# Patient Record
Sex: FEMALE | Race: WHITE | Employment: UNEMPLOYED | URBAN - METROPOLITAN AREA
[De-identification: names, ages, dates, MRNs, and addresses within clinical notes are randomized per-mention and may not be internally consistent; named-entity substitution may affect disease eponyms.]

---
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
## 2019-04-02 ENCOUNTER — HOSPITAL ENCOUNTER (OUTPATIENT)
Dept: NEUROLOGY | Age: 54
Discharge: HOME | End: 2019-04-02
Payer: COMMERCIAL

## 2019-04-02 VITALS — HEIGHT: 61 IN | WEIGHT: 138 LBS | BODY MASS INDEX: 26.06 KG/M2

## 2019-04-02 PROCEDURE — 95912 NRV CNDJ TEST 11-12 STUDIES: CPT

## 2019-04-02 PROCEDURE — 95886 MUSC TEST DONE W/N TEST COMP: CPT

## 2019-04-02 NOTE — PROCEDURES
[address] [facility]  [address] [facility], [address] [facility]  Phone: [phone]  Fax: [phone]      Referring Provider: Brice Rodriguez*  Primary Care Physician: Ok Tanner MD  Patient Name: Kip Ramos  Patient YOB: 1965  Gender: female  BMI: Body mass index is 26.07 kg/m². Height 5' 1\" (1.549 m), weight 138 lb (62.6 kg). 4/2/2019    Description of clinical problem:   No chief complaint on file. Pain Yes   ; Numbness/tingling  Yes; Weakness  No       Brief physical exam:   Sensory deficit No; Weakness No; Atrophy  No; Reflex abnormality No    Study Limitations:      Summary of Findings:   Nerve conduction studies:   · The following nerve conduction studies were abnormal:   · None  · All other nerve conduction studies listed in the table above were normal in latency, amplitude and conduction velocity. [facility].  Electrodiagnostic Laboratory  [place]        Full Name: Daylin Tapia. Ami Or Gender: Female  MRN: [id number] YOB: 1965  Location[de-identified] [id number] (01)      Visit Date: 4/2/2019 11:15  Age: 48 Years 9 Months Old  Examining Physician: Dr. Leslye Barthel   Referring Physician: Dr. Leanna Bush  Technician: Phyllis Woodard   Height: 5 feet 1 inch  Weight: 138 lbs  Notes: Bilateral lowers r/o tarsal tunnel right greater then left; gait imbalance      Motor NCS      Nerve / Sites Lat. Amplitude Amp. 1-2 Distance Lat Diff Velocity Temp.    ms mV % cm ms m/s °C   R Peroneal - EDB      Ankle 5.52 2.7 100 8   32.4      Pop fossa 14.95 1.5 55.3 38 9.43 40 32.2   L Peroneal - EDB      Ankle 5.16 4.8 100 8   31.8      Pop fossa 11.35 3.5 73.1 35 6.20 56 31.8   R Tibial - AH      Ankle 3.96 18.4 100 8   31.7      Pop fossa 12.19 15.4 83.5 41 8.23 50 31.7   L Tibial - AH      Ankle 3.75 17.4 100 8   31.1      Pop fossa 11.61 12.6 72.7 42 7.86 53 31.1       Sensory NCS      Nerve / Sites Onset Lat Peak Lat PP Amp Distance Peak Diff Velocity Temp.    ms ms µV cm ms m/s °C   R Superficial peroneal - Ankle      Lat leg 1.88 2.60 4.4 10  53 32.1   L Superficial peroneal - Ankle      Lat leg 1.93 3.02 4.3 10  52 31.6   R Sural - Ankle (Calf)      Calf 2.03 2.71 32.7 14  69 31.9   L Sural - Ankle (Calf)      Calf 2.34 3.02 12.8 14  60 31.4   R Medial plantar, Lateral plantar - Ankle (Medial, lateral sole)      Medial plantar Sole 2.50 3.28 6.3 14  56 31.2      Lateral plantar Sole 2.71 3.44 7.3 14  52 31.2        -0.16  31.2   L Medial plantar, Lateral plantar - Ankle (Medial, lateral sole)      Medial plantar Sole 2.55 3.28 18.1 14  55 31.2      Lateral plantar Sole 2.71 3.75 7.3 14  52 31.1        -0.47  31.1       F  Wave      Nerve F Lat M Lat F-M Lat    ms ms ms   R Peroneal - EDB 46.3 5.9 40.4   R Tibial - AH 42.0 4.0 38.0   L Tibial - AH 42.7 3.0 39.6   L Peroneal - EDB 46.2 5.3 40.9       H Reflex      Nerve Lat Hmax    ms   R Tibial - Soleus 30.6   L Tibial - Soleus 30.6       EMG         EMG Summary Table     Spontaneous MUAP Recruitment   Muscle IA Fib PSW Fasc H.F. Amp Dur. PPP Pattern   R. Extensor digitorum brevis N None None None None N N N N   R. Gastrocnemius (Lateral head) N None None None None N N N N   R. Gastrocnemius (Medial head) N None None None None N N N N   R. Tibialis anterior N None None None None N N N N   R. Vastus lateralis N None None None None N N N N                 ·     Needle EMG:   · Needle EMG was performed using a concentric needle. · The following abnormalities were seen on needle EMG: None  All other muscles tested, as listed in the table above demonstrated normal amplitude, duration, phases and recruitment and no active denervation signs were seen. Diagnostic Interpretation:   Normal EMG study of lower extremities. There is no evidence of peripheral neuropathy. There is no evidence of entrapment neuropathies. Clinical correlation advised.       Technologist: [facility]  Physician: Christen Melendrez M.D.    Nerve conduction studies and electromyography were performed according to our laboratory policies and procedures which can be provided upon request. All abnormal values are identified in the table.  Laboratory normal values can also be provided upon request.       Cc: DAREN Walsh MD

## 2019-05-21 ENCOUNTER — APPOINTMENT (OUTPATIENT)
Dept: MRI IMAGING | Age: 54
End: 2019-05-21
Payer: COMMERCIAL

## 2019-05-21 ENCOUNTER — HOSPITAL ENCOUNTER (EMERGENCY)
Age: 54
Discharge: PSYCHIATRIC OR SUBSTANCE USE FACILITY | End: 2019-05-21
Attending: EMERGENCY MEDICINE
Payer: COMMERCIAL

## 2019-05-21 ENCOUNTER — APPOINTMENT (OUTPATIENT)
Dept: GENERAL RADIOLOGY | Age: 54
End: 2019-05-21
Payer: COMMERCIAL

## 2019-05-21 ENCOUNTER — HOSPITAL ENCOUNTER (OUTPATIENT)
Age: 54
Discharge: HOME | DRG: 045 | End: 2019-05-21
Attending: INTERNAL MEDICINE
Payer: COMMERCIAL

## 2019-05-21 ENCOUNTER — HOSPITAL ENCOUNTER (EMERGENCY)
Age: 54
Discharge: HOME | End: 2019-05-21
Attending: EMERGENCY MEDICINE
Payer: COMMERCIAL

## 2019-05-21 ENCOUNTER — APPOINTMENT (OUTPATIENT)
Dept: CT IMAGING | Age: 54
End: 2019-05-21
Payer: COMMERCIAL

## 2019-05-21 ENCOUNTER — HOSPITAL ENCOUNTER (INPATIENT)
Age: 54
LOS: 3 days | Discharge: NURSING, REHAB OR LONG-TERM CARE | DRG: 045 | End: 2019-05-24
Attending: INTERNAL MEDICINE | Admitting: INTERNAL MEDICINE
Payer: COMMERCIAL

## 2019-05-21 ENCOUNTER — HOSPITAL ENCOUNTER (OUTPATIENT)
Age: 54
Discharge: HOME | End: 2019-05-21
Payer: COMMERCIAL

## 2019-05-21 VITALS
HEART RATE: 106 BPM | BODY MASS INDEX: 26.06 KG/M2 | HEIGHT: 61 IN | DIASTOLIC BLOOD PRESSURE: 79 MMHG | TEMPERATURE: 98.4 F | RESPIRATION RATE: 18 BRPM | SYSTOLIC BLOOD PRESSURE: 118 MMHG | WEIGHT: 138 LBS | OXYGEN SATURATION: 99 %

## 2019-05-21 VITALS
BODY MASS INDEX: 26.06 KG/M2 | OXYGEN SATURATION: 98 % | HEIGHT: 61 IN | DIASTOLIC BLOOD PRESSURE: 77 MMHG | TEMPERATURE: 97.6 F | RESPIRATION RATE: 20 BRPM | SYSTOLIC BLOOD PRESSURE: 114 MMHG | WEIGHT: 138 LBS | HEART RATE: 80 BPM

## 2019-05-21 DIAGNOSIS — M43.10 ANTEROLISTHESIS: ICD-10-CM

## 2019-05-21 DIAGNOSIS — M21.371 RIGHT FOOT DROP: Primary | ICD-10-CM

## 2019-05-21 DIAGNOSIS — I63.232 ACUTE ISCHEMIC LEFT ICA STROKE (HCC): Primary | ICD-10-CM

## 2019-05-21 PROBLEM — I63.9 ACUTE CVA (CEREBROVASCULAR ACCIDENT) (HCC): Status: ACTIVE | Noted: 2019-05-21

## 2019-05-21 LAB
ALBUMIN SERPL-MCNC: 5 G/DL (ref 3.5–5.2)
ALP BLD-CCNC: 81 U/L (ref 35–104)
ALT SERPL-CCNC: 28 U/L (ref 0–32)
ANION GAP SERPL CALCULATED.3IONS-SCNC: 15 MMOL/L (ref 7–16)
APTT: 34.3 SEC (ref 24.5–35.1)
AST SERPL-CCNC: 21 U/L (ref 0–31)
BASOPHILS ABSOLUTE: 0.01 E9/L (ref 0–0.2)
BASOPHILS RELATIVE PERCENT: 0.1 % (ref 0–2)
BILIRUB SERPL-MCNC: 0.2 MG/DL (ref 0–1.2)
BUN BLDV-MCNC: 14 MG/DL (ref 6–20)
CALCIUM SERPL-MCNC: 10.1 MG/DL (ref 8.6–10.2)
CHLORIDE BLD-SCNC: 97 MMOL/L (ref 98–107)
CO2: 28 MMOL/L (ref 22–29)
CREAT SERPL-MCNC: 0.6 MG/DL (ref 0.5–1)
EOSINOPHILS ABSOLUTE: 0.01 E9/L (ref 0.05–0.5)
EOSINOPHILS RELATIVE PERCENT: 0.1 % (ref 0–6)
GFR AFRICAN AMERICAN: >60
GFR NON-AFRICAN AMERICAN: >60 ML/MIN/1.73
GLUCOSE BLD-MCNC: 184 MG/DL (ref 74–99)
HCT VFR BLD CALC: 49.7 % (ref 34–48)
HEMOGLOBIN: 17.1 G/DL (ref 11.5–15.5)
IMMATURE GRANULOCYTES #: 0.07 E9/L
IMMATURE GRANULOCYTES %: 0.6 % (ref 0–5)
INR BLD: 0.9
LYMPHOCYTES ABSOLUTE: 1.17 E9/L (ref 1.5–4)
LYMPHOCYTES RELATIVE PERCENT: 9.6 % (ref 20–42)
MCH RBC QN AUTO: 30.7 PG (ref 26–35)
MCHC RBC AUTO-ENTMCNC: 34.4 % (ref 32–34.5)
MCV RBC AUTO: 89.2 FL (ref 80–99.9)
MONOCYTES ABSOLUTE: 0.11 E9/L (ref 0.1–0.95)
MONOCYTES RELATIVE PERCENT: 0.9 % (ref 2–12)
NEUTROPHILS ABSOLUTE: 10.8 E9/L (ref 1.8–7.3)
NEUTROPHILS RELATIVE PERCENT: 88.7 % (ref 43–80)
PDW BLD-RTO: 12.5 FL (ref 11.5–15)
PLATELET # BLD: 274 E9/L (ref 130–450)
PMV BLD AUTO: 10.3 FL (ref 7–12)
POTASSIUM SERPL-SCNC: 3.5 MMOL/L (ref 3.5–5)
PROTHROMBIN TIME: 10.8 SEC (ref 9.3–12.4)
RBC # BLD: 5.57 E12/L (ref 3.5–5.5)
SODIUM BLD-SCNC: 140 MMOL/L (ref 132–146)
TOTAL PROTEIN: 7.9 G/DL (ref 6.4–8.3)
TROPONIN: <0.01 NG/ML (ref 0–0.03)
WBC # BLD: 12.2 E9/L (ref 4.5–11.5)

## 2019-05-21 PROCEDURE — 99284 EMERGENCY DEPT VISIT MOD MDM: CPT

## 2019-05-21 PROCEDURE — 70450 CT HEAD/BRAIN W/O DYE: CPT

## 2019-05-21 PROCEDURE — 99291 CRITICAL CARE FIRST HOUR: CPT | Performed by: SURGERY

## 2019-05-21 PROCEDURE — 6370000000 HC RX 637 (ALT 250 FOR IP): Performed by: EMERGENCY MEDICINE

## 2019-05-21 PROCEDURE — 85610 PROTHROMBIN TIME: CPT

## 2019-05-21 PROCEDURE — 87081 CULTURE SCREEN ONLY: CPT

## 2019-05-21 PROCEDURE — 70496 CT ANGIOGRAPHY HEAD: CPT

## 2019-05-21 PROCEDURE — 2000000000 HC ICU R&B

## 2019-05-21 PROCEDURE — A0425 GROUND MILEAGE: HCPCS

## 2019-05-21 PROCEDURE — 85730 THROMBOPLASTIN TIME PARTIAL: CPT

## 2019-05-21 PROCEDURE — 2580000003 HC RX 258: Performed by: EMERGENCY MEDICINE

## 2019-05-21 PROCEDURE — 80053 COMPREHEN METABOLIC PANEL: CPT

## 2019-05-21 PROCEDURE — 0042T CT BRAIN PERFUSION: CPT

## 2019-05-21 PROCEDURE — 84484 ASSAY OF TROPONIN QUANT: CPT

## 2019-05-21 PROCEDURE — A0426 ALS 1: HCPCS

## 2019-05-21 PROCEDURE — 72148 MRI LUMBAR SPINE W/O DYE: CPT

## 2019-05-21 PROCEDURE — 6360000002 HC RX W HCPCS: Performed by: EMERGENCY MEDICINE

## 2019-05-21 PROCEDURE — 93005 ELECTROCARDIOGRAM TRACING: CPT | Performed by: PHYSICIAN ASSISTANT

## 2019-05-21 PROCEDURE — 70551 MRI BRAIN STEM W/O DYE: CPT

## 2019-05-21 PROCEDURE — 71045 X-RAY EXAM CHEST 1 VIEW: CPT

## 2019-05-21 PROCEDURE — 70498 CT ANGIOGRAPHY NECK: CPT

## 2019-05-21 PROCEDURE — 85025 COMPLETE CBC W/AUTO DIFF WBC: CPT

## 2019-05-21 PROCEDURE — 6360000004 HC RX CONTRAST MEDICATION: Performed by: RADIOLOGY

## 2019-05-21 RX ORDER — METHYLPREDNISOLONE 4 MG/1
TABLET ORAL
Qty: 1 KIT | Refills: 0 | Status: ON HOLD | OUTPATIENT
Start: 2019-05-21 | End: 2019-05-21 | Stop reason: ALTCHOICE

## 2019-05-21 RX ORDER — METOPROLOL TARTRATE 50 MG/1
50 TABLET, FILM COATED ORAL DAILY
COMMUNITY
End: 2019-05-21

## 2019-05-21 RX ORDER — AMLODIPINE BESYLATE 5 MG/1
5 TABLET ORAL DAILY
Status: DISCONTINUED | OUTPATIENT
Start: 2019-05-22 | End: 2019-05-24 | Stop reason: HOSPADM

## 2019-05-21 RX ORDER — ASPIRIN 325 MG
325 TABLET ORAL ONCE
Status: COMPLETED | OUTPATIENT
Start: 2019-05-21 | End: 2019-05-21

## 2019-05-21 RX ORDER — TRIAMTERENE AND HYDROCHLOROTHIAZIDE 37.5; 25 MG/1; MG/1
1 TABLET ORAL DAILY
Status: ON HOLD | COMMUNITY
End: 2019-06-06 | Stop reason: HOSPADM

## 2019-05-21 RX ORDER — DEXAMETHASONE SODIUM PHOSPHATE 10 MG/ML
10 INJECTION INTRAMUSCULAR; INTRAVENOUS ONCE
Status: COMPLETED | OUTPATIENT
Start: 2019-05-21 | End: 2019-05-21

## 2019-05-21 RX ORDER — TRIAMTERENE AND HYDROCHLOROTHIAZIDE 37.5; 25 MG/1; MG/1
1 TABLET ORAL DAILY
Status: DISCONTINUED | OUTPATIENT
Start: 2019-05-22 | End: 2019-05-24 | Stop reason: HOSPADM

## 2019-05-21 RX ORDER — SODIUM CHLORIDE 9 MG/ML
INJECTION, SOLUTION INTRAVENOUS ONCE
Status: COMPLETED | OUTPATIENT
Start: 2019-05-21 | End: 2019-05-21

## 2019-05-21 RX ADMIN — DEXAMETHASONE SODIUM PHOSPHATE 10 MG: 10 INJECTION INTRAMUSCULAR; INTRAVENOUS at 08:10

## 2019-05-21 RX ADMIN — ASPIRIN 325 MG: 325 TABLET, COATED ORAL at 21:26

## 2019-05-21 RX ADMIN — IOPAMIDOL 100 ML: 755 INJECTION, SOLUTION INTRAVENOUS at 18:57

## 2019-05-21 RX ADMIN — SODIUM CHLORIDE: 9 INJECTION, SOLUTION INTRAVENOUS at 20:00

## 2019-05-21 ASSESSMENT — ENCOUNTER SYMPTOMS
NAUSEA: 0
SHORTNESS OF BREATH: 0
DIARRHEA: 0
CHEST TIGHTNESS: 0
DIARRHEA: 0
ABDOMINAL PAIN: 0
SORE THROAT: 0
COLOR CHANGE: 0
BACK PAIN: 0
BACK PAIN: 0
BLOOD IN STOOL: 0
ABDOMINAL PAIN: 0
VOMITING: 0
BLOOD IN STOOL: 0
VOMITING: 0
COLOR CHANGE: 0
NAUSEA: 0
CONSTIPATION: 0
SHORTNESS OF BREATH: 0
RHINORRHEA: 0
COUGH: 0
COUGH: 0

## 2019-05-21 ASSESSMENT — PAIN SCALES - GENERAL: PAINLEVEL_OUTOF10: 0

## 2019-05-21 NOTE — ED TRIAGE NOTES
Name: Wai Pavon  : 1965  MRN: [id number]    Date: 2019    Benefits of immediately proceeding with Radiology exam outweigh the risks and therefore the following is being waived:      [x] Pregnancy test    [] Protocol for Iodine allergy    [] MRI questionnaire    [x] BUN/Creatinine        MESERET Larios

## 2019-05-21 NOTE — ED PROVIDER NOTES
foot to mid-calf). Negative for dizziness, syncope, light-headedness and headaches. Psychiatric/Behavioral: Negative for confusion. Physical Exam   Constitutional: She is oriented to person, place, and time. She appears well-developed and well-nourished. No distress. HENT:   Head: Normocephalic and atraumatic. Right Ear: External ear normal.   Left Ear: External ear normal.   Nose: Nose normal.   Mouth/Throat: Oropharynx is clear and moist. No oropharyngeal exudate. Eyes: Pupils are equal, round, and reactive to light. Conjunctivae and EOM are normal. Right eye exhibits no discharge. Left eye exhibits no discharge. No scleral icterus. Neck: Neck supple. Cardiovascular: Normal rate, regular rhythm, normal heart sounds and intact distal pulses. Exam reveals no gallop and no friction rub. No murmur heard. Pulmonary/Chest: Effort normal and breath sounds normal. No stridor. No respiratory distress. She has no wheezes. She has no rales. She exhibits no tenderness. Abdominal: Soft. Bowel sounds are normal. She exhibits no distension and no mass. There is no tenderness. There is no rebound and no guarding. Musculoskeletal: She exhibits no edema. Patient unable to dorsiflex her right ankle or right great toe; dorsiflexion of ankle and extension of right knee 5/5 on right   Neurological: She is alert and oriented to person, place, and time. A sensory deficit (generalized numbness to palpation) is present. She exhibits normal muscle tone. Skin: Skin is warm and dry. No rash noted. She is not diaphoretic. No erythema. No pallor. Psychiatric: She has a normal mood and affect. Her behavior is normal. Judgment and thought content normal.       Procedures    MDM  Number of Diagnoses or Management Options  Anterolisthesis:   Right foot drop:   Diagnosis management comments: Patient presents with painless foot drop. MRI of the lumbar spine ordered with compression of L4 nerve root seen.   Neurosurgery was contacted. They recommended outpatient follow-up. This could be a peripheral nerve issue as well. Patient has an ankle-foot orthotic ordered and will receive that tomorrow. She was given oral Decadron here and prescription for Medrol Dosepak. She did not want any crutches. She has [facility] soon she is encouraged to follow up with neurosurgery as outpatient. She should follow up with her PCP as well. She'll return here if needed. --------------------------------------------- PAST HISTORY ---------------------------------------------  Past Medical History:  has a past medical history of Hypertension. Past Surgical History:  has no past surgical history on file. Social History:  reports that she has been smoking cigarettes. She has been smoking about 0.50 packs per day. She has never used smokeless tobacco. She reports that she does not drink alcohol or use drugs. Family History: family history is not on file. The patients home medications have been reviewed. Allergies: Patient has no known allergies. -------------------------------------------------- RESULTS -------------------------------------------------  Labs:  No results found for this visit on 05/21/19. Radiology:  MRI Lumbar Spine WO Contrast   Final Result   1. Grade 1 anterolisthesis of L4 on L5 with abutment of the exiting   right L4 spinal nerve root on series 3 image 3, within the   extraforaminal region without evident compression. Clinical   correlation with any potential spinal radiculopathy levels on physical   exam is recommended. 2. Other degenerative changes noted T9-T10, T11-L1, and L2-S1, as   described above. No mary spinal canal stenosis is seen. See above for   level by level details.             ------------------------- NURSING NOTES AND VITALS REVIEWED ---------------------------  Date / Time Roomed:  5/21/2019  5:57 AM  ED Bed Assignment:  19/19    The nursing notes within the ED encounter and vital signs as below have been reviewed. /77   Pulse 80   Temp 97.6 °F (36.4 °C)   Resp 20   Ht 5' 1\" (1.549 m)   Wt 138 lb (62.6 kg)   LMP 05/01/2019 (Approximate)   SpO2 98%   BMI 26.07 kg/m²   Oxygen Saturation Interpretation: Normal      ------------------------------------------ PROGRESS NOTES ------------------------------------------  Consults:  9178 - discussed case with Dr. Deonte Felipe, neurosurgery. He reviewed the CT. Although he says it is possible, he says it is unlikely that her back findings would be causing her foot drop. He recommended AFO which she has coming and he will follow up with her as outpatient. ED COURSE MEDICATIONS:                Medications   dexamethasone (DECADRON) injection 10 mg (10 mg Oral Given 5/21/19 0810)       I have spoken with the patient and discussed todays results, in addition to providing specific details for the plan of care and counseling regarding the diagnosis and prognosis. Their questions are answered at this time and they are agreeable with the plan. I discussed at length with them reasons for immediate return here for re evaluation. They will followup with primary care by calling their office tomorrow. --------------------------------- ADDITIONAL PROVIDER NOTES ---------------------------------  At this time the patient is without objective evidence of an acute process requiring hospitalization or inpatient management. They have remained hemodynamically stable throughout their entire ED visit and are stable for discharge with outpatient follow-up. The plan has been discussed in detail and they are aware of the specific conditions for emergent return, as well as the importance of follow-up. New Prescriptions    METHYLPREDNISOLONE (MEDROL, TOMASZ,) 4 MG TABLET    Take by mouth. Diagnosis:  1. Right foot drop    2.  Anterolisthesis        Disposition:  Patient's disposition: Discharge to home  Patient's condition is stable.             [facility]   Resident  05/21/19 0601

## 2019-05-21 NOTE — ED NOTES
Bed:  [place] [address]  Expected date:   Expected time:   Means of arrival:   Comments:  Delmer De La Paz RN  05/21/19 1098

## 2019-05-21 NOTE — ED PROVIDER NOTES
Patient is a 72-year-old female presenting with right leg weakness, right arm heaviness. Was seen here in the department earlier this morning for progressive worsening weakness of her right lower extremity. States she's been having intermittent weakness, foot drop in her right lower leg on and off for the past several years, was scheduled for an MRI for evaluation. However, starting 9 AM yesterday she began having worsening weakness of her lower leg. Was seen this morning, felt to be peripheral in origin, subsequently felt stable for outpatient management and follow-up. However, approximately 2 hours prior to arrival, she began having more weakness in her right lower extremity, unable to ambulate and noticed that her right arm was becoming weak compared to her last period does note associated tingling on her right side compared to left as well. Denies headache, vision changes, chest pain, shortness of breath, pain in her extremities. No prior history of DVT or PE. Symptoms reportedly moderate in severity, worsened by nothing and improved by nothing. Review of Systems   Constitutional: Negative for chills and fever. Respiratory: Negative for cough, chest tightness and shortness of breath. Cardiovascular: Negative for chest pain, palpitations and leg swelling. Gastrointestinal: Negative for abdominal pain, blood in stool, diarrhea, nausea and vomiting. Genitourinary: Negative for dysuria, flank pain, frequency, menstrual problem, vaginal bleeding and vaginal discharge. Musculoskeletal: Negative for back pain and neck pain. Skin: Negative for color change, rash and wound. Neurological: Positive for weakness (RLE, RUE). Negative for dizziness, syncope and light-headedness. RLE/RUE tingling       Physical Exam   Constitutional: She is oriented to person, place, and time. She appears well-developed and well-nourished. No distress. HENT:   Head: Normocephalic and atraumatic. 0=Performs both tasks correctly   2. Best Gaze: 0=normal   3. Visual: 0=No visual loss   4. Facial Palsy: 0=Normal symmetric movement   5a. Motor left arm: 0=No drift, limb holds 90 (or 45) degrees for full 10 seconds   5b. Motor right arm: 0=No drift, limb holds 90 (or 45) degrees for full 10 seconds   6a. motor left le=No drift, limb holds 90 (or 45) degrees for full 10 seconds   6b  Motor right leg:  3=No effort against gravity, limb falls   7. Limb Ataxia: 1=Present in one limb   8. Sensory: 0=Normal; no sensory loss   9. Best Language:  0=No aphasia, normal   10. Dysarthria: 0=Normal   11. Extinction and Inattention: 0=No abnormality   12. Distal motor function: 0=Normal    Total:   4     NIH Stroke Scale      Interval: Reassessment  Time: 9:01 PM  Person Administering Scale: Troy Steele    Administer stroke scale items in the order listed. Record performance in each category after each subscale exam. Do not go back and change scores. Follow directions provided for each exam technique. Scores should reflect what the patient does, not what the clinician thinks the patient can do. The clinician should record answers while administering the exam and work quickly. Except where indicated, the patient should not be coached (i.e., repeated requests to patient to make a special effort). 1a  Level of consciousness: 0=alert; keenly responsive   1b. LOC questions:  0=Performs both tasks correctly   1c. LOC commands: 0=Performs both tasks correctly   2. Best Gaze: 0=normal   3. Visual: 0=No visual loss   4. Facial Palsy: 0=Normal symmetric movement   5a. Motor left arm: 0=No drift, limb holds 90 (or 45) degrees for full 10 seconds   5b.   Motor right arm: 0=No drift, limb holds 90 (or 45) degrees for full 10 seconds   6a. motor left le=No drift, limb holds 90 (or 45) degrees for full 10 seconds   6b  Motor right le=Some effort against gravity, limb cannot get to or maintain (if cured) 90 (or 45) degrees, drifts down to bed, but has some effort against gravity   7. Limb Ataxia: 1=Present in one limb   8. Sensory: 0=Normal; no sensory loss   9. Best Language:  1=Mild to moderate aphasia; some obvious loss of fluency or facility of comprehension without significant limitation on ideas expressed or form of expression. 10. Dysarthria: 0=Normal   11. Extinction and Inattention: 0=No abnormality   12. Distal motor function: 0=Normal    Total:   4       ED Course as of May 21 2101   Tue May 21, 2019   1953 D/w dominic [facility] evaluating, also d/w dr Brynn Key    [NAVEEN]   8843 Pt has old CVA in left frontal likely bigger pr radiologist, possibly due to occluded left ICA, likely per cta/p not amenable to intervention    [NAVEEN]   2000 Patient reportedly became aphasic in MRI - reassessed on return to ED, having significant expressive aphasia. Is following commands. Upper extremity strength remains unchanged, RLE remains weak and unchanged. Decision made to repeat CT head to rule out hemorrhagic conversion. Pt is still not a tPA candidate due to onset of symptoms 9AM yesterday. Attending discussed possible embolectomy. [RU]   2049 Dw/ vascular dr Isai Martinez, no indication for stat endocarotiectomy    [NAVEEN]   2049 Critical care  Please note that the withdrawal or failure to initiate urgent interventions for this patient would likely result in a life threatening deterioration or permanent disability. Accordingly this patient received 90 minutes of critical care time, excluding separately billable procedures. [NAVEEN]      ED Course User Index  [NAVEEN] Jackie Perla DO  [RU] Rhoda Harris,        --------------------------------------------- PAST HISTORY ---------------------------------------------  Past Medical History:  has a past medical history of Hypertension. Past Surgical History:  has no past surgical history on file. Social History:  reports that she has been smoking cigarettes.   She has been smoking about 0.50 packs per day. She has never used smokeless tobacco. She reports that she does not drink alcohol or use drugs. Family History: family history is not on file. The patients home medications have been reviewed. Allergies: Patient has no known allergies.     -------------------------------------------------- RESULTS -------------------------------------------------    Lab  Results for orders placed or performed during the hospital encounter of 05/21/19   CBC Auto Differential   Result Value Ref Range    WBC 12.2 (H) 4.5 - 11.5 E9/L    RBC 5.57 (H) 3.50 - 5.50 E12/L    Hemoglobin 17.1 (H) 11.5 - 15.5 g/dL    Hematocrit 49.7 (H) 34.0 - 48.0 %    MCV 89.2 80.0 - 99.9 fL    MCH 30.7 26.0 - 35.0 pg    MCHC 34.4 32.0 - 34.5 %    RDW 12.5 11.5 - 15.0 fL    Platelets 569 057 - 650 E9/L    MPV 10.3 7.0 - 12.0 fL    Neutrophils % 88.7 (H) 43.0 - 80.0 %    Immature Granulocytes % 0.6 0.0 - 5.0 %    Lymphocytes % 9.6 (L) 20.0 - 42.0 %    Monocytes % 0.9 (L) 2.0 - 12.0 %    Eosinophils % 0.1 0.0 - 6.0 %    Basophils % 0.1 0.0 - 2.0 %    Neutrophils # 10.80 (H) 1.80 - 7.30 E9/L    Immature Granulocytes # 0.07 E9/L    Lymphocytes # 1.17 (L) 1.50 - 4.00 E9/L    Monocytes # 0.11 0.10 - 0.95 E9/L    Eosinophils # 0.01 (L) 0.05 - 0.50 E9/L    Basophils # 0.01 0.00 - 0.20 E9/L   Comprehensive Metabolic Panel   Result Value Ref Range    Sodium 140 132 - 146 mmol/L    Potassium 3.5 3.5 - 5.0 mmol/L    Chloride 97 (L) 98 - 107 mmol/L    CO2 28 22 - 29 mmol/L    Anion Gap 15 7 - 16 mmol/L    Glucose 184 (H) 74 - 99 mg/dL    BUN 14 6 - 20 mg/dL    CREATININE 0.6 0.5 - 1.0 mg/dL    GFR Non-African American >60 >=60 mL/min/1.73    GFR African American >60     Calcium 10.1 8.6 - 10.2 mg/dL    Total Protein 7.9 6.4 - 8.3 g/dL    Alb 5.0 3.5 - 5.2 g/dL    Total Bilirubin 0.2 0.0 - 1.2 mg/dL    Alkaline Phosphatase 81 35 - 104 U/L    ALT 28 0 - 32 U/L    AST 21 0 - 31 U/L   Troponin   Result Value Ref Range    Troponin <0.01 0.00 - 0.03 ng/mL   APTT   Result Value Ref Range    aPTT 34.3 24.5 - 35.1 sec   Protime-INR   Result Value Ref Range    Protime 10.8 9.3 - 12.4 sec    INR 0.9        Radiology  CT Head WO Contrast   Final Result      1. No gross intracranial hemorrhage. 2. Redemonstration of area of chronic stroke or trauma involving   medial left frontal lobe. 3. Subtle foci of hypoattenuation are associated with parasagittal   left frontal lobe which could suggest areas of recent stroke as seen   on MRI brain performed today. Short-term follow-up could be helpful   for further evaluation. MRI BRAIN WO CONTRAST   Final Result      1. Findings are suggestive of recent stroke involving medial left   frontal lobe cortex and few foci of abnormal signal involving cortex   along left frontal convexity. 2. Area of encephalomalacic change related to chronic stroke or   remote/old trauma is seen involving medial left frontal lobe. 3. Absent left ICA flow void suggestive of left ICA occlusion. ALERT:  THIS IS AN ABNORMAL REPORT. CT Brain Perfusion   Final Result      Findings suggest areas of ischemia involving left frontal lobe, left   basal ganglia, and extending into left temporal lobe appearing in   distribution of left MIRA and ICA. Findings called to Dr. Sushil Aviles at   7:47 PM 5/21/2019. CTA HEAD W CONTRAST   Final Result      1. Occlusion of left internal carotid artery at its origin likely   related to severe atherosclerotic plaque. 2. Right cervical internal carotid artery is patent. 3. Right and left middle cerebral arteries are patent without evidence   of occlusion. 4. Anterior cerebral arteries are patent without evidence of   occlusion. CTA NECK W CONTRAST   Final Result      1. Occlusion of left internal carotid artery at its origin likely   related to severe atherosclerotic plaque. 2. Right cervical internal carotid artery is patent.       3. Right and complaining of chronic weakness in her right lower extremity, has had EMG in the past, evaluations multiple times for this but states it is been progressively worsening since 9 AM yesterday. Following discharge, approximately 2 hours prior to presentation here she began having increasing weakness of his right lower extremity and felt as though her right arm was heavy as well with numbness and tingling sensation reported. On arrival patient received tonight score 4 due to weakness of the right lower extremity, inability to sit test ataxia on heel to shin but had no speech deficits. Patient went to CT, no acute findings on noncontrast CT scan of the head, awaiting interventional radiologist, discussion was that the patient was not currently a TPA candidate due to extensive duration of her symptoms at greater than 24 hours. Possible whether she would be an interventional candidate, CTA of the head and neck ordered with brain perfusion. Awaiting results of CT scan, discussion regarding differential. Presentation concerning for possible MS flare versus atypical CVA, peripheral lesion, etc. and an MRI of the brain and cervical spine was obtained. On MRI, attending was called by radiologist regarding findings on CTA. Concern for ICA occlusion. Neurosurgery consult for possible interventional therapy, felt she was not a candidate for that at this time. Consult with vascular surgery, potential for stat carotid endarterectomy, however, also felt not appropriate candidate at this time. While the patient was in MRI, she developed acute onset expressive aphasia. She was brought back to the ER, repeat exam showed near complete expressive aphasia that time but no additional findings. Peripheral weakness remains the same. She is monitored closely, be assessed by myself as well as attending. She subsequently began to have improvement in her symptoms, was having improved ability to vocalize.  Repeat NIH after several minutes did elicit mild aphasia, however, she also had an improvement in the weakness in her right leg and was now able to lift the heel of her foot off of the bed improved compared to on arrival. Patient remained without meningeal signs, not felt to be seizure, meningitis, encephalitis. Repeat CT scan of the head was performed to rule out hemorrhagic conversion which was unchanged. Decision ultimately made to transfer to neuro ICU for close monitoring and evaluation and management. 9:02 PM  I have spoken with the patient and discussed todays results, in addition to providing specific details for the plan of care and counseling regarding the diagnosis and prognosis. Their questions are answered at this time and they are agreeable with the plan. I have discussed the risks and benefits of transfer and they wish to proceed with the transfer. --------------------------------- ADDITIONAL PROVIDER NOTES ---------------------------------  Reason for transfer: Left ICA stroke. This patient's ED course included: a personal history and physicial examination, re-evaluation prior to disposition, multiple bedside re-evaluations, IV medications, cardiac monitoring, continuous pulse oximetry and complex medical decision making and emergency management    This patient has remained hemodynamically stable during their ED course. Please note that the withdrawal or failure to initiate urgent interventions for this patient would likely result in a life threatening deterioration or permanent disability. Accordingly this patient received 90 minutes of critical care time, excluding separately billable procedures. Clinical Impression  1. Acute ischemic left ICA stroke ([facility] [address]          Disposition  Patient's disposition: Transfer to [facility]. Transferred by: [place]. Patient's condition is serious.        Jarvis Sandoval DO  Resident  05/21/19 1033

## 2019-05-21 NOTE — PROGRESS NOTES
Patient arrived in radiology for MRIs of brain and cervical spine w+wo contrast.  Pt was alert and answered all safety questions. Pt was able to move from cart to MRI table with minimal assistance. While performing MRI of Brain the patient began moving. I attempted to check on patient over intercom, but got no response. I brought the patient out of scanner and found that she was alert, but had developed aphasia. The ER nurse was notified of change in status and pt was transported back to emergency department.

## 2019-05-22 PROBLEM — G83.11 PARALYSIS OF RIGHT LOWER EXTREMITY (HCC): Status: ACTIVE | Noted: 2019-05-22

## 2019-05-22 PROBLEM — I65.22 OCCLUSION OF LEFT INTERNAL CAROTID ARTERY: Status: ACTIVE | Noted: 2019-05-22

## 2019-05-22 LAB
EKG ATRIAL RATE: 108 BPM
EKG P AXIS: 34 DEGREES
EKG P-R INTERVAL: 162 MS
EKG Q-T INTERVAL: 344 MS
EKG QRS DURATION: 86 MS
EKG QTC CALCULATION (BAZETT): 460 MS
EKG R AXIS: 30 DEGREES
EKG T AXIS: 16 DEGREES
EKG VENTRICULAR RATE: 108 BPM

## 2019-05-22 PROCEDURE — 93010 ELECTROCARDIOGRAM REPORT: CPT | Performed by: INTERNAL MEDICINE

## 2019-05-22 PROCEDURE — APPSS180 APP SPLIT SHARED TIME > 60 MINUTES: Performed by: NURSE PRACTITIONER

## 2019-05-22 PROCEDURE — 99291 CRITICAL CARE FIRST HOUR: CPT | Performed by: PSYCHIATRY & NEUROLOGY

## 2019-05-22 PROCEDURE — 2580000003 HC RX 258: Performed by: PSYCHIATRY & NEUROLOGY

## 2019-05-22 PROCEDURE — 6370000000 HC RX 637 (ALT 250 FOR IP): Performed by: PSYCHIATRY & NEUROLOGY

## 2019-05-22 PROCEDURE — 97162 PT EVAL MOD COMPLEX 30 MIN: CPT

## 2019-05-22 PROCEDURE — 99291 CRITICAL CARE FIRST HOUR: CPT | Performed by: SURGERY

## 2019-05-22 PROCEDURE — 92523 SPEECH SOUND LANG COMPREHEN: CPT

## 2019-05-22 PROCEDURE — 99253 IP/OBS CNSLTJ NEW/EST LOW 45: CPT | Performed by: SURGERY

## 2019-05-22 PROCEDURE — 97535 SELF CARE MNGMENT TRAINING: CPT

## 2019-05-22 PROCEDURE — 2000000000 HC ICU R&B

## 2019-05-22 PROCEDURE — 97530 THERAPEUTIC ACTIVITIES: CPT

## 2019-05-22 PROCEDURE — 6360000002 HC RX W HCPCS: Performed by: NURSE PRACTITIONER

## 2019-05-22 PROCEDURE — 6370000000 HC RX 637 (ALT 250 FOR IP): Performed by: INTERNAL MEDICINE

## 2019-05-22 PROCEDURE — 2580000003 HC RX 258: Performed by: NURSE PRACTITIONER

## 2019-05-22 RX ORDER — SODIUM CHLORIDE 0.9 % (FLUSH) 0.9 %
10 SYRINGE (ML) INJECTION PRN
Status: DISCONTINUED | OUTPATIENT
Start: 2019-05-22 | End: 2019-05-24 | Stop reason: HOSPADM

## 2019-05-22 RX ORDER — 0.9 % SODIUM CHLORIDE 0.9 %
1000 INTRAVENOUS SOLUTION INTRAVENOUS ONCE
Status: COMPLETED | OUTPATIENT
Start: 2019-05-22 | End: 2019-05-22

## 2019-05-22 RX ORDER — ONDANSETRON 2 MG/ML
4 INJECTION INTRAMUSCULAR; INTRAVENOUS EVERY 6 HOURS PRN
Status: DISCONTINUED | OUTPATIENT
Start: 2019-05-22 | End: 2019-05-24 | Stop reason: HOSPADM

## 2019-05-22 RX ORDER — SODIUM CHLORIDE 0.9 % (FLUSH) 0.9 %
10 SYRINGE (ML) INJECTION EVERY 12 HOURS SCHEDULED
Status: DISCONTINUED | OUTPATIENT
Start: 2019-05-22 | End: 2019-05-24 | Stop reason: HOSPADM

## 2019-05-22 RX ORDER — ASPIRIN 81 MG/1
81 TABLET ORAL DAILY
Status: DISCONTINUED | OUTPATIENT
Start: 2019-05-22 | End: 2019-05-24 | Stop reason: HOSPADM

## 2019-05-22 RX ADMIN — Medication 10 ML: at 19:43

## 2019-05-22 RX ADMIN — ASPIRIN 81 MG: 81 TABLET ORAL at 16:51

## 2019-05-22 RX ADMIN — SODIUM CHLORIDE 1000 ML: 9 INJECTION, SOLUTION INTRAVENOUS at 16:51

## 2019-05-22 RX ADMIN — TRIAMTERENE AND HYDROCHLOROTHIAZIDE 1 TABLET: 37.5; 25 TABLET ORAL at 09:41

## 2019-05-22 RX ADMIN — AMLODIPINE BESYLATE 5 MG: 5 TABLET ORAL at 09:41

## 2019-05-22 RX ADMIN — ENOXAPARIN SODIUM 40 MG: 40 INJECTION SUBCUTANEOUS at 14:51

## 2019-05-22 ASSESSMENT — PAIN SCALES - GENERAL
PAINLEVEL_OUTOF10: 0

## 2019-05-22 NOTE — ED NOTES
Patients speech is rapidly improving. She is able to speak in full sentences and able to state her full name, , where she is, her children's names and her cats name - which she was previously unable to say. The patient is also to lift her right heel off of the bed and hold it in the air > 10 seconds - which she was also previously unable to do. RN and Dr. Paula Arora completed repeat NIH assessment. Patient is resting in bed with  at the bedside and appears calm and relaxed. The patient is on the telemetry monitor with continuous pulse oximetry and blood pressure is cycling q15 minutes. The call light is within reach.      Joselin Simon RN  [phone]

## 2019-05-22 NOTE — ED NOTES
RN assigned after patients return from MRI. Patient has expressive aphasia and is unable to say any words at all. It appears as if she is understanding but is not able to speak. She is able to follow commands at this time. Her right lower extremity is flaccid and she is unable to move her right toes or hold her right leg up in the air. Patient is crying because she appears upset that she can not speak. The patient's  is at the bedside. Patient was placed on cardiac telemetry monitor immediately, vital signs were assessed and charted, and RN completed NIH stroke scale with Dr. Negro Estrada.      Jad Dumont RN  05/21/19 7422

## 2019-05-22 NOTE — ED NOTES
Patient was given a swallow test with small sips of water and had no signs of difficulty swallowing.       Ty Kunz RN  05/21/19 5783

## 2019-05-22 NOTE — ED NOTES
Patient is complaining of a heaviness in her right upper extremity, she describes this feeling as a \"weird heavy feeling. \" She has full sensation in her right upper extremity. Dr. Concha Pinedo & Dr. Del Valle Staff made aware immediately.      Angelina Mascorro RN  05/21/19 2122

## 2019-05-22 NOTE — ED NOTES
The patient is 49 yo f started having right leg weakness around 9 AM yesterday. She has had issues witht his leg over the past month and had a EMG done by Dr. Orona Fearing that was essentially unremarkable. This morning she came back for increased weakness and foot drop she was able walk in here but left a wheelchair and had worsening weakness since yesterday. Her  noted she was dragging her leg yesterday and this morning. The doctor's this morning felt she had peripheral disease and did a MRI of her lumbar spine which showed some anterolisthesis and degenerative disease and possibly a right sided compressed lumbar radicular nerve. Patient came back with some nonspecific numbness and tingling of her right arm however motor strength of her arms and hand  are completely normal. Her right leg was very significantly weak with foot drop and she couldn't hold up to gravity. CT head was done which showed old left sided encephalomalacia/infarct versus old TBI. CTA and perfusion were done. Also MRI was ordered simultaneously. I discussed with radiologist who noted patient has occluded left ICA which is acute on chronic with abnormal perfusion. Interventional neurologist is evaluating Dr. Rick Alvarado. Pt now has intermittent aphasia and sent back to r/o hemorrhagic transformation. Case d/w corinna, no indication for stat endocarectomy, admit to neuro icu, monitor BP, fine iwht BP now in 160s. D/w dr Delray Epley who will admit at [facility] to neuroicu    Critical care:  Please note that the withdrawal or failure to initiate urgent interventions for this patient would likely result in a life threatening deterioration or permanent disability. Accordingly this patient received 90 minutes of critical care time, excluding separately billable procedures.      Re-evaluation:  9:00 PM  Expressive aphasia resolvd at this time, some improvement of r leg weakness but still very weak    DX:  LEFT ICA CVA    DISPOSITION:  Admit to [facility] NEURO ICU    Chay Manzanares,   05/21/19 2010       Chay Manzanares,   05/21/19 8340

## 2019-05-22 NOTE — PROGRESS NOTES
2116 Access center called with a bed assignment, patient going to [phone] call nurse to nurse to [phone] Transfered Access center to RN for patient needs. Sera Nelson

## 2019-05-22 NOTE — ED NOTES
Verbal report given to Kathy Fulton RN at [facility] neuro intensive care unit. Bedside report given to transport team. Patient's vital signs were assessed immediately prior to transport.       Tahira Colmenares RN  05/21/19 0066

## 2019-05-23 LAB
ANION GAP SERPL CALCULATED.3IONS-SCNC: 8 MMOL/L (ref 7–16)
BASOPHILS ABSOLUTE: 0.03 E9/L (ref 0–0.2)
BASOPHILS RELATIVE PERCENT: 0.3 % (ref 0–2)
BUN BLDV-MCNC: 13 MG/DL (ref 6–20)
CALCIUM IONIZED: 1.27 MMOL/L (ref 1.15–1.33)
CALCIUM SERPL-MCNC: 9 MG/DL (ref 8.6–10.2)
CHLORIDE BLD-SCNC: 106 MMOL/L (ref 98–107)
CHOLESTEROL, TOTAL: 185 MG/DL (ref 0–199)
CO2: 27 MMOL/L (ref 22–29)
CREAT SERPL-MCNC: 0.6 MG/DL (ref 0.5–1)
EOSINOPHILS ABSOLUTE: 0.15 E9/L (ref 0.05–0.5)
EOSINOPHILS RELATIVE PERCENT: 1.4 % (ref 0–6)
GFR AFRICAN AMERICAN: >60
GFR NON-AFRICAN AMERICAN: >60 ML/MIN/1.73
GLUCOSE BLD-MCNC: 95 MG/DL (ref 74–99)
HCT VFR BLD CALC: 43.1 % (ref 34–48)
HDLC SERPL-MCNC: 44 MG/DL
HEMOGLOBIN: 14.8 G/DL (ref 11.5–15.5)
IMMATURE GRANULOCYTES #: 0.06 E9/L
IMMATURE GRANULOCYTES %: 0.5 % (ref 0–5)
LDL CHOLESTEROL CALCULATED: 106 MG/DL (ref 0–99)
LYMPHOCYTES ABSOLUTE: 5.04 E9/L (ref 1.5–4)
LYMPHOCYTES RELATIVE PERCENT: 45.4 % (ref 20–42)
MAGNESIUM: 2.2 MG/DL (ref 1.6–2.6)
MCH RBC QN AUTO: 30.5 PG (ref 26–35)
MCHC RBC AUTO-ENTMCNC: 34.3 % (ref 32–34.5)
MCV RBC AUTO: 88.7 FL (ref 80–99.9)
MONOCYTES ABSOLUTE: 0.62 E9/L (ref 0.1–0.95)
MONOCYTES RELATIVE PERCENT: 5.6 % (ref 2–12)
MRSA CULTURE ONLY: NORMAL
NEUTROPHILS ABSOLUTE: 5.21 E9/L (ref 1.8–7.3)
NEUTROPHILS RELATIVE PERCENT: 46.8 % (ref 43–80)
PDW BLD-RTO: 12.8 FL (ref 11.5–15)
PHOSPHORUS: 3 MG/DL (ref 2.5–4.5)
PLATELET # BLD: 203 E9/L (ref 130–450)
PMV BLD AUTO: 9.5 FL (ref 7–12)
POTASSIUM REFLEX MAGNESIUM: 4 MMOL/L (ref 3.5–5)
RBC # BLD: 4.86 E12/L (ref 3.5–5.5)
SODIUM BLD-SCNC: 141 MMOL/L (ref 132–146)
TRIGL SERPL-MCNC: 177 MG/DL (ref 0–149)
VLDLC SERPL CALC-MCNC: 35 MG/DL
WBC # BLD: 11.1 E9/L (ref 4.5–11.5)

## 2019-05-23 PROCEDURE — 6370000000 HC RX 637 (ALT 250 FOR IP): Performed by: NURSE PRACTITIONER

## 2019-05-23 PROCEDURE — 6360000002 HC RX W HCPCS: Performed by: NURSE PRACTITIONER

## 2019-05-23 PROCEDURE — 99233 SBSQ HOSP IP/OBS HIGH 50: CPT | Performed by: PHYSICIAN ASSISTANT

## 2019-05-23 PROCEDURE — 2580000003 HC RX 258: Performed by: NURSE PRACTITIONER

## 2019-05-23 PROCEDURE — 80061 LIPID PANEL: CPT

## 2019-05-23 PROCEDURE — 36415 COLL VENOUS BLD VENIPUNCTURE: CPT

## 2019-05-23 PROCEDURE — 1200000000 HC SEMI PRIVATE

## 2019-05-23 PROCEDURE — 84100 ASSAY OF PHOSPHORUS: CPT

## 2019-05-23 PROCEDURE — 82330 ASSAY OF CALCIUM: CPT

## 2019-05-23 PROCEDURE — 83735 ASSAY OF MAGNESIUM: CPT

## 2019-05-23 PROCEDURE — 85025 COMPLETE CBC W/AUTO DIFF WBC: CPT

## 2019-05-23 PROCEDURE — 97530 THERAPEUTIC ACTIVITIES: CPT

## 2019-05-23 PROCEDURE — 80048 BASIC METABOLIC PNL TOTAL CA: CPT

## 2019-05-23 PROCEDURE — 6370000000 HC RX 637 (ALT 250 FOR IP): Performed by: PSYCHIATRY & NEUROLOGY

## 2019-05-23 RX ORDER — ATORVASTATIN CALCIUM 40 MG/1
40 TABLET, FILM COATED ORAL NIGHTLY
Status: DISCONTINUED | OUTPATIENT
Start: 2019-05-23 | End: 2019-05-24 | Stop reason: HOSPADM

## 2019-05-23 RX ADMIN — Medication 10 ML: at 08:53

## 2019-05-23 RX ADMIN — ENOXAPARIN SODIUM 40 MG: 40 INJECTION SUBCUTANEOUS at 08:53

## 2019-05-23 RX ADMIN — Medication 10 ML: at 20:42

## 2019-05-23 RX ADMIN — ASPIRIN 81 MG: 81 TABLET ORAL at 08:53

## 2019-05-23 RX ADMIN — ATORVASTATIN CALCIUM 40 MG: 40 TABLET, FILM COATED ORAL at 20:42

## 2019-05-23 ASSESSMENT — PAIN SCALES - GENERAL
PAINLEVEL_OUTOF10: 0

## 2019-05-24 ENCOUNTER — TELEPHONE (OUTPATIENT)
Dept: VASCULAR SURGERY | Age: 54
End: 2019-05-24

## 2019-05-24 ENCOUNTER — HOSPITAL ENCOUNTER (INPATIENT)
Age: 54
LOS: 13 days | Discharge: HOME | DRG: 045 | End: 2019-06-06
Attending: PHYSICAL MEDICINE & REHABILITATION | Admitting: PHYSICAL MEDICINE & REHABILITATION
Payer: COMMERCIAL

## 2019-05-24 VITALS
RESPIRATION RATE: 18 BRPM | HEIGHT: 61 IN | OXYGEN SATURATION: 97 % | WEIGHT: 147.9 LBS | TEMPERATURE: 97.8 F | HEART RATE: 75 BPM | BODY MASS INDEX: 27.93 KG/M2 | SYSTOLIC BLOOD PRESSURE: 142 MMHG | DIASTOLIC BLOOD PRESSURE: 84 MMHG

## 2019-05-24 LAB
ANION GAP SERPL CALCULATED.3IONS-SCNC: 13 MMOL/L (ref 7–16)
BASOPHILS ABSOLUTE: 0.03 E9/L (ref 0–0.2)
BASOPHILS RELATIVE PERCENT: 0.3 % (ref 0–2)
BUN BLDV-MCNC: 14 MG/DL (ref 6–20)
CALCIUM SERPL-MCNC: 9.1 MG/DL (ref 8.6–10.2)
CHLORIDE BLD-SCNC: 104 MMOL/L (ref 98–107)
CO2: 23 MMOL/L (ref 22–29)
CREAT SERPL-MCNC: 0.6 MG/DL (ref 0.5–1)
EOSINOPHILS ABSOLUTE: 0.18 E9/L (ref 0.05–0.5)
EOSINOPHILS RELATIVE PERCENT: 2.1 % (ref 0–6)
GFR AFRICAN AMERICAN: >60
GFR NON-AFRICAN AMERICAN: >60 ML/MIN/1.73
GLUCOSE BLD-MCNC: 93 MG/DL (ref 74–99)
HCT VFR BLD CALC: 43.5 % (ref 34–48)
HEMOGLOBIN: 14.7 G/DL (ref 11.5–15.5)
IMMATURE GRANULOCYTES #: 0.04 E9/L
IMMATURE GRANULOCYTES %: 0.5 % (ref 0–5)
LYMPHOCYTES ABSOLUTE: 2.83 E9/L (ref 1.5–4)
LYMPHOCYTES RELATIVE PERCENT: 32.4 % (ref 20–42)
MCH RBC QN AUTO: 29.8 PG (ref 26–35)
MCHC RBC AUTO-ENTMCNC: 33.8 % (ref 32–34.5)
MCV RBC AUTO: 88.1 FL (ref 80–99.9)
MONOCYTES ABSOLUTE: 0.7 E9/L (ref 0.1–0.95)
MONOCYTES RELATIVE PERCENT: 8 % (ref 2–12)
NEUTROPHILS ABSOLUTE: 4.96 E9/L (ref 1.8–7.3)
NEUTROPHILS RELATIVE PERCENT: 56.7 % (ref 43–80)
PDW BLD-RTO: 12.5 FL (ref 11.5–15)
PLATELET # BLD: 215 E9/L (ref 130–450)
PMV BLD AUTO: 9.6 FL (ref 7–12)
POTASSIUM REFLEX MAGNESIUM: 4.1 MMOL/L (ref 3.5–5)
RBC # BLD: 4.94 E12/L (ref 3.5–5.5)
SODIUM BLD-SCNC: 140 MMOL/L (ref 132–146)
WBC # BLD: 8.7 E9/L (ref 4.5–11.5)

## 2019-05-24 PROCEDURE — 6370000000 HC RX 637 (ALT 250 FOR IP): Performed by: NURSE PRACTITIONER

## 2019-05-24 PROCEDURE — 36415 COLL VENOUS BLD VENIPUNCTURE: CPT

## 2019-05-24 PROCEDURE — 6360000002 HC RX W HCPCS: Performed by: NURSE PRACTITIONER

## 2019-05-24 PROCEDURE — 2580000003 HC RX 258: Performed by: NURSE PRACTITIONER

## 2019-05-24 PROCEDURE — 6370000000 HC RX 637 (ALT 250 FOR IP): Performed by: INTERNAL MEDICINE

## 2019-05-24 PROCEDURE — 97535 SELF CARE MNGMENT TRAINING: CPT

## 2019-05-24 PROCEDURE — 97127 HC SP THER IVNTJ W/FOCUS COG FUNCJ: CPT

## 2019-05-24 PROCEDURE — 1280000000 HC REHAB R&B

## 2019-05-24 PROCEDURE — 6370000000 HC RX 637 (ALT 250 FOR IP): Performed by: PSYCHIATRY & NEUROLOGY

## 2019-05-24 PROCEDURE — 97530 THERAPEUTIC ACTIVITIES: CPT

## 2019-05-24 PROCEDURE — 99223 1ST HOSP IP/OBS HIGH 75: CPT | Performed by: PHYSICAL MEDICINE & REHABILITATION

## 2019-05-24 PROCEDURE — 2580000003 HC RX 258

## 2019-05-24 PROCEDURE — 80048 BASIC METABOLIC PNL TOTAL CA: CPT

## 2019-05-24 PROCEDURE — 92507 TX SP LANG VOICE COMM INDIV: CPT

## 2019-05-24 PROCEDURE — 85025 COMPLETE CBC W/AUTO DIFF WBC: CPT

## 2019-05-24 PROCEDURE — 99232 SBSQ HOSP IP/OBS MODERATE 35: CPT | Performed by: SURGERY

## 2019-05-24 RX ORDER — ASPIRIN 81 MG/1
81 TABLET ORAL DAILY
Status: CANCELLED | OUTPATIENT
Start: 2019-05-25

## 2019-05-24 RX ORDER — ATORVASTATIN CALCIUM 40 MG/1
40 TABLET, FILM COATED ORAL NIGHTLY
Status: DISCONTINUED | OUTPATIENT
Start: 2019-05-24 | End: 2019-06-06 | Stop reason: HOSPADM

## 2019-05-24 RX ORDER — ONDANSETRON 2 MG/ML
4 INJECTION INTRAMUSCULAR; INTRAVENOUS EVERY 6 HOURS PRN
Status: CANCELLED | OUTPATIENT
Start: 2019-05-24

## 2019-05-24 RX ORDER — SODIUM CHLORIDE 0.9 % (FLUSH) 0.9 %
SYRINGE (ML) INJECTION
Status: COMPLETED
Start: 2019-05-24 | End: 2019-05-24

## 2019-05-24 RX ORDER — ACETAMINOPHEN 325 MG/1
650 TABLET ORAL 4 TIMES DAILY PRN
Status: DISCONTINUED | OUTPATIENT
Start: 2019-05-24 | End: 2019-06-06 | Stop reason: HOSPADM

## 2019-05-24 RX ORDER — ONDANSETRON 2 MG/ML
4 INJECTION INTRAMUSCULAR; INTRAVENOUS EVERY 6 HOURS PRN
Status: DISCONTINUED | OUTPATIENT
Start: 2019-05-24 | End: 2019-05-24

## 2019-05-24 RX ORDER — ATORVASTATIN CALCIUM 40 MG/1
40 TABLET, FILM COATED ORAL NIGHTLY
Status: CANCELLED | OUTPATIENT
Start: 2019-05-24

## 2019-05-24 RX ORDER — ASPIRIN 81 MG/1
81 TABLET ORAL DAILY
Status: DISCONTINUED | OUTPATIENT
Start: 2019-05-25 | End: 2019-06-06 | Stop reason: HOSPADM

## 2019-05-24 RX ADMIN — ENOXAPARIN SODIUM 40 MG: 40 INJECTION SUBCUTANEOUS at 10:36

## 2019-05-24 RX ADMIN — Medication: at 22:53

## 2019-05-24 RX ADMIN — Medication 10 ML: at 10:37

## 2019-05-24 RX ADMIN — ATORVASTATIN CALCIUM 40 MG: 40 TABLET, FILM COATED ORAL at 22:53

## 2019-05-24 RX ADMIN — ASPIRIN 81 MG: 81 TABLET ORAL at 10:35

## 2019-05-24 ASSESSMENT — PAIN SCALES - GENERAL
PAINLEVEL_OUTOF10: 0
PAINLEVEL_OUTOF10: 0

## 2019-05-25 LAB
ANION GAP SERPL CALCULATED.3IONS-SCNC: 12 MMOL/L (ref 7–16)
BASOPHILS ABSOLUTE: 0.02 E9/L (ref 0–0.2)
BASOPHILS RELATIVE PERCENT: 0.2 % (ref 0–2)
BUN BLDV-MCNC: 15 MG/DL (ref 6–20)
CALCIUM SERPL-MCNC: 9 MG/DL (ref 8.6–10.2)
CHLORIDE BLD-SCNC: 104 MMOL/L (ref 98–107)
CO2: 26 MMOL/L (ref 22–29)
CREAT SERPL-MCNC: 0.7 MG/DL (ref 0.5–1)
EOSINOPHILS ABSOLUTE: 0.31 E9/L (ref 0.05–0.5)
EOSINOPHILS RELATIVE PERCENT: 3.8 % (ref 0–6)
GFR AFRICAN AMERICAN: >60
GFR NON-AFRICAN AMERICAN: >60 ML/MIN/1.73
GLUCOSE BLD-MCNC: 99 MG/DL (ref 74–99)
HCT VFR BLD CALC: 42.1 % (ref 34–48)
HEMOGLOBIN: 13.9 G/DL (ref 11.5–15.5)
IMMATURE GRANULOCYTES #: 0.05 E9/L
IMMATURE GRANULOCYTES %: 0.6 % (ref 0–5)
INR BLD: 0.9
LYMPHOCYTES ABSOLUTE: 2.78 E9/L (ref 1.5–4)
LYMPHOCYTES RELATIVE PERCENT: 34.2 % (ref 20–42)
MCH RBC QN AUTO: 29.7 PG (ref 26–35)
MCHC RBC AUTO-ENTMCNC: 33 % (ref 32–34.5)
MCV RBC AUTO: 90 FL (ref 80–99.9)
MONOCYTES ABSOLUTE: 0.65 E9/L (ref 0.1–0.95)
MONOCYTES RELATIVE PERCENT: 8 % (ref 2–12)
NEUTROPHILS ABSOLUTE: 4.32 E9/L (ref 1.8–7.3)
NEUTROPHILS RELATIVE PERCENT: 53.2 % (ref 43–80)
PDW BLD-RTO: 12.3 FL (ref 11.5–15)
PLATELET # BLD: 201 E9/L (ref 130–450)
PMV BLD AUTO: 9.6 FL (ref 7–12)
POTASSIUM REFLEX MAGNESIUM: 4.1 MMOL/L (ref 3.5–5)
PROTHROMBIN TIME: 10.5 SEC (ref 9.3–12.4)
RBC # BLD: 4.68 E12/L (ref 3.5–5.5)
SODIUM BLD-SCNC: 142 MMOL/L (ref 132–146)
WBC # BLD: 8.1 E9/L (ref 4.5–11.5)

## 2019-05-25 PROCEDURE — 80048 BASIC METABOLIC PNL TOTAL CA: CPT

## 2019-05-25 PROCEDURE — 92610 EVALUATE SWALLOWING FUNCTION: CPT

## 2019-05-25 PROCEDURE — 36415 COLL VENOUS BLD VENIPUNCTURE: CPT

## 2019-05-25 PROCEDURE — 97162 PT EVAL MOD COMPLEX 30 MIN: CPT

## 2019-05-25 PROCEDURE — 92523 SPEECH SOUND LANG COMPREHEN: CPT

## 2019-05-25 PROCEDURE — 97530 THERAPEUTIC ACTIVITIES: CPT

## 2019-05-25 PROCEDURE — 85025 COMPLETE CBC W/AUTO DIFF WBC: CPT

## 2019-05-25 PROCEDURE — 85610 PROTHROMBIN TIME: CPT

## 2019-05-25 PROCEDURE — 1280000000 HC REHAB R&B

## 2019-05-25 PROCEDURE — 97165 OT EVAL LOW COMPLEX 30 MIN: CPT | Performed by: OCCUPATIONAL THERAPIST

## 2019-05-25 PROCEDURE — 6370000000 HC RX 637 (ALT 250 FOR IP): Performed by: INTERNAL MEDICINE

## 2019-05-25 PROCEDURE — 97535 SELF CARE MNGMENT TRAINING: CPT | Performed by: OCCUPATIONAL THERAPIST

## 2019-05-25 PROCEDURE — 6360000002 HC RX W HCPCS: Performed by: INTERNAL MEDICINE

## 2019-05-25 RX ADMIN — ASPIRIN 81 MG: 81 TABLET ORAL at 09:06

## 2019-05-25 RX ADMIN — ENOXAPARIN SODIUM 40 MG: 40 INJECTION SUBCUTANEOUS at 09:06

## 2019-05-25 RX ADMIN — ATORVASTATIN CALCIUM 40 MG: 40 TABLET, FILM COATED ORAL at 21:59

## 2019-05-25 ASSESSMENT — PAIN SCALES - GENERAL: PAINLEVEL_OUTOF10: 0

## 2019-05-26 PROCEDURE — 97127 HC SP THER IVNTJ W/FOCUS COG FUNCJ: CPT

## 2019-05-26 PROCEDURE — 6360000002 HC RX W HCPCS: Performed by: INTERNAL MEDICINE

## 2019-05-26 PROCEDURE — 1280000000 HC REHAB R&B

## 2019-05-26 PROCEDURE — 6370000000 HC RX 637 (ALT 250 FOR IP): Performed by: INTERNAL MEDICINE

## 2019-05-26 RX ADMIN — ASPIRIN 81 MG: 81 TABLET ORAL at 08:03

## 2019-05-26 RX ADMIN — ENOXAPARIN SODIUM 40 MG: 40 INJECTION SUBCUTANEOUS at 08:03

## 2019-05-26 RX ADMIN — ATORVASTATIN CALCIUM 40 MG: 40 TABLET, FILM COATED ORAL at 21:30

## 2019-05-26 ASSESSMENT — PAIN SCALES - GENERAL
PAINLEVEL_OUTOF10: 0

## 2019-05-27 PROCEDURE — 6360000002 HC RX W HCPCS: Performed by: INTERNAL MEDICINE

## 2019-05-27 PROCEDURE — 97530 THERAPEUTIC ACTIVITIES: CPT

## 2019-05-27 PROCEDURE — 6370000000 HC RX 637 (ALT 250 FOR IP): Performed by: INTERNAL MEDICINE

## 2019-05-27 PROCEDURE — 1280000000 HC REHAB R&B

## 2019-05-27 PROCEDURE — 97110 THERAPEUTIC EXERCISES: CPT

## 2019-05-27 RX ADMIN — ATORVASTATIN CALCIUM 40 MG: 40 TABLET, FILM COATED ORAL at 22:39

## 2019-05-27 RX ADMIN — ASPIRIN 81 MG: 81 TABLET ORAL at 08:24

## 2019-05-27 RX ADMIN — ENOXAPARIN SODIUM 40 MG: 40 INJECTION SUBCUTANEOUS at 08:25

## 2019-05-27 ASSESSMENT — PAIN SCALES - GENERAL
PAINLEVEL_OUTOF10: 0

## 2019-05-28 PROCEDURE — 97530 THERAPEUTIC ACTIVITIES: CPT

## 2019-05-28 PROCEDURE — 99232 SBSQ HOSP IP/OBS MODERATE 35: CPT | Performed by: PHYSICAL MEDICINE & REHABILITATION

## 2019-05-28 PROCEDURE — 92507 TX SP LANG VOICE COMM INDIV: CPT

## 2019-05-28 PROCEDURE — 6360000002 HC RX W HCPCS: Performed by: INTERNAL MEDICINE

## 2019-05-28 PROCEDURE — 97110 THERAPEUTIC EXERCISES: CPT

## 2019-05-28 PROCEDURE — 1280000000 HC REHAB R&B

## 2019-05-28 PROCEDURE — 6370000000 HC RX 637 (ALT 250 FOR IP): Performed by: INTERNAL MEDICINE

## 2019-05-28 PROCEDURE — 97535 SELF CARE MNGMENT TRAINING: CPT

## 2019-05-28 PROCEDURE — 97127 HC SP THER IVNTJ W/FOCUS COG FUNCJ: CPT

## 2019-05-28 RX ADMIN — ENOXAPARIN SODIUM 40 MG: 40 INJECTION SUBCUTANEOUS at 08:34

## 2019-05-28 RX ADMIN — ASPIRIN 81 MG: 81 TABLET ORAL at 08:34

## 2019-05-28 RX ADMIN — ATORVASTATIN CALCIUM 40 MG: 40 TABLET, FILM COATED ORAL at 21:21

## 2019-05-28 ASSESSMENT — PAIN SCALES - GENERAL
PAINLEVEL_OUTOF10: 0

## 2019-05-29 PROCEDURE — 92507 TX SP LANG VOICE COMM INDIV: CPT | Performed by: SPEECH-LANGUAGE PATHOLOGIST

## 2019-05-29 PROCEDURE — 97535 SELF CARE MNGMENT TRAINING: CPT

## 2019-05-29 PROCEDURE — 97530 THERAPEUTIC ACTIVITIES: CPT

## 2019-05-29 PROCEDURE — 1280000000 HC REHAB R&B

## 2019-05-29 PROCEDURE — 99231 SBSQ HOSP IP/OBS SF/LOW 25: CPT | Performed by: PHYSICAL MEDICINE & REHABILITATION

## 2019-05-29 PROCEDURE — 6360000002 HC RX W HCPCS: Performed by: INTERNAL MEDICINE

## 2019-05-29 PROCEDURE — 97110 THERAPEUTIC EXERCISES: CPT

## 2019-05-29 PROCEDURE — 6370000000 HC RX 637 (ALT 250 FOR IP): Performed by: INTERNAL MEDICINE

## 2019-05-29 RX ADMIN — ENOXAPARIN SODIUM 40 MG: 40 INJECTION SUBCUTANEOUS at 10:21

## 2019-05-29 RX ADMIN — ASPIRIN 81 MG: 81 TABLET ORAL at 10:21

## 2019-05-29 RX ADMIN — ATORVASTATIN CALCIUM 40 MG: 40 TABLET, FILM COATED ORAL at 20:36

## 2019-05-29 ASSESSMENT — PAIN SCALES - GENERAL
PAINLEVEL_OUTOF10: 0

## 2019-05-30 PROCEDURE — 97530 THERAPEUTIC ACTIVITIES: CPT

## 2019-05-30 PROCEDURE — 97127 HC SP THER IVNTJ W/FOCUS COG FUNCJ: CPT | Performed by: SPEECH-LANGUAGE PATHOLOGIST

## 2019-05-30 PROCEDURE — 99232 SBSQ HOSP IP/OBS MODERATE 35: CPT | Performed by: PHYSICAL MEDICINE & REHABILITATION

## 2019-05-30 PROCEDURE — 97535 SELF CARE MNGMENT TRAINING: CPT

## 2019-05-30 PROCEDURE — 6360000002 HC RX W HCPCS: Performed by: INTERNAL MEDICINE

## 2019-05-30 PROCEDURE — 6370000000 HC RX 637 (ALT 250 FOR IP): Performed by: INTERNAL MEDICINE

## 2019-05-30 PROCEDURE — 1280000000 HC REHAB R&B

## 2019-05-30 PROCEDURE — 6370000000 HC RX 637 (ALT 250 FOR IP): Performed by: PHYSICAL MEDICINE & REHABILITATION

## 2019-05-30 RX ADMIN — ASPIRIN 81 MG: 81 TABLET ORAL at 08:20

## 2019-05-30 RX ADMIN — ENOXAPARIN SODIUM 40 MG: 40 INJECTION SUBCUTANEOUS at 08:20

## 2019-05-30 RX ADMIN — ACETAMINOPHEN 650 MG: 325 TABLET, FILM COATED ORAL at 08:21

## 2019-05-30 RX ADMIN — ATORVASTATIN CALCIUM 40 MG: 40 TABLET, FILM COATED ORAL at 20:43

## 2019-05-30 ASSESSMENT — PAIN SCALES - GENERAL
PAINLEVEL_OUTOF10: 0
PAINLEVEL_OUTOF10: 0
PAINLEVEL_OUTOF10: 2

## 2019-05-30 ASSESSMENT — PAIN DESCRIPTION - PAIN TYPE: TYPE: CHRONIC PAIN

## 2019-05-30 ASSESSMENT — PAIN DESCRIPTION - PROGRESSION: CLINICAL_PROGRESSION: NOT CHANGED

## 2019-05-30 ASSESSMENT — PAIN DESCRIPTION - ORIENTATION: ORIENTATION: LOWER

## 2019-05-30 ASSESSMENT — PAIN DESCRIPTION - FREQUENCY: FREQUENCY: INTERMITTENT

## 2019-05-30 ASSESSMENT — PAIN DESCRIPTION - DESCRIPTORS: DESCRIPTORS: ACHING;DISCOMFORT;DULL;SORE

## 2019-05-30 ASSESSMENT — PAIN DESCRIPTION - ONSET: ONSET: ON-GOING

## 2019-05-30 ASSESSMENT — PAIN DESCRIPTION - LOCATION: LOCATION: BACK

## 2019-05-31 PROCEDURE — 99231 SBSQ HOSP IP/OBS SF/LOW 25: CPT | Performed by: PHYSICAL MEDICINE & REHABILITATION

## 2019-05-31 PROCEDURE — 6360000002 HC RX W HCPCS: Performed by: INTERNAL MEDICINE

## 2019-05-31 PROCEDURE — 97535 SELF CARE MNGMENT TRAINING: CPT

## 2019-05-31 PROCEDURE — 97530 THERAPEUTIC ACTIVITIES: CPT

## 2019-05-31 PROCEDURE — 6370000000 HC RX 637 (ALT 250 FOR IP): Performed by: INTERNAL MEDICINE

## 2019-05-31 PROCEDURE — 92507 TX SP LANG VOICE COMM INDIV: CPT | Performed by: SPEECH-LANGUAGE PATHOLOGIST

## 2019-05-31 PROCEDURE — 6370000000 HC RX 637 (ALT 250 FOR IP): Performed by: PHYSICAL MEDICINE & REHABILITATION

## 2019-05-31 PROCEDURE — 1280000000 HC REHAB R&B

## 2019-05-31 PROCEDURE — 97110 THERAPEUTIC EXERCISES: CPT

## 2019-05-31 RX ADMIN — ACETAMINOPHEN 650 MG: 325 TABLET, FILM COATED ORAL at 07:56

## 2019-05-31 RX ADMIN — ASPIRIN 81 MG: 81 TABLET ORAL at 07:56

## 2019-05-31 RX ADMIN — ENOXAPARIN SODIUM 40 MG: 40 INJECTION SUBCUTANEOUS at 07:56

## 2019-05-31 RX ADMIN — ATORVASTATIN CALCIUM 40 MG: 40 TABLET, FILM COATED ORAL at 22:17

## 2019-05-31 ASSESSMENT — PAIN DESCRIPTION - ONSET: ONSET: ON-GOING

## 2019-05-31 ASSESSMENT — PAIN DESCRIPTION - LOCATION: LOCATION: BACK

## 2019-05-31 ASSESSMENT — PAIN SCALES - GENERAL
PAINLEVEL_OUTOF10: 0
PAINLEVEL_OUTOF10: 2
PAINLEVEL_OUTOF10: 0
PAINLEVEL_OUTOF10: 0

## 2019-05-31 ASSESSMENT — PAIN DESCRIPTION - PAIN TYPE: TYPE: CHRONIC PAIN

## 2019-05-31 ASSESSMENT — PAIN DESCRIPTION - DESCRIPTORS: DESCRIPTORS: ACHING;DISCOMFORT;DULL

## 2019-05-31 ASSESSMENT — PAIN DESCRIPTION - FREQUENCY: FREQUENCY: INTERMITTENT

## 2019-06-01 PROCEDURE — 1280000000 HC REHAB R&B

## 2019-06-01 PROCEDURE — 6370000000 HC RX 637 (ALT 250 FOR IP): Performed by: INTERNAL MEDICINE

## 2019-06-01 PROCEDURE — 6360000002 HC RX W HCPCS: Performed by: INTERNAL MEDICINE

## 2019-06-01 PROCEDURE — 97530 THERAPEUTIC ACTIVITIES: CPT

## 2019-06-01 PROCEDURE — 97127 HC SP THER IVNTJ W/FOCUS COG FUNCJ: CPT | Performed by: SPEECH-LANGUAGE PATHOLOGIST

## 2019-06-01 RX ADMIN — ATORVASTATIN CALCIUM 40 MG: 40 TABLET, FILM COATED ORAL at 20:35

## 2019-06-01 RX ADMIN — ENOXAPARIN SODIUM 40 MG: 40 INJECTION SUBCUTANEOUS at 10:15

## 2019-06-01 RX ADMIN — ASPIRIN 81 MG: 81 TABLET ORAL at 10:18

## 2019-06-01 ASSESSMENT — PAIN SCALES - GENERAL
PAINLEVEL_OUTOF10: 0

## 2019-06-02 PROCEDURE — 6360000002 HC RX W HCPCS: Performed by: INTERNAL MEDICINE

## 2019-06-02 PROCEDURE — 6370000000 HC RX 637 (ALT 250 FOR IP): Performed by: INTERNAL MEDICINE

## 2019-06-02 PROCEDURE — 1280000000 HC REHAB R&B

## 2019-06-02 PROCEDURE — 97530 THERAPEUTIC ACTIVITIES: CPT

## 2019-06-02 RX ADMIN — ENOXAPARIN SODIUM 40 MG: 40 INJECTION SUBCUTANEOUS at 10:05

## 2019-06-02 RX ADMIN — ASPIRIN 81 MG: 81 TABLET ORAL at 10:07

## 2019-06-02 RX ADMIN — ATORVASTATIN CALCIUM 40 MG: 40 TABLET, FILM COATED ORAL at 20:33

## 2019-06-02 ASSESSMENT — PAIN SCALES - GENERAL
PAINLEVEL_OUTOF10: 0

## 2019-06-03 PROCEDURE — 97530 THERAPEUTIC ACTIVITIES: CPT

## 2019-06-03 PROCEDURE — 97535 SELF CARE MNGMENT TRAINING: CPT

## 2019-06-03 PROCEDURE — 6370000000 HC RX 637 (ALT 250 FOR IP): Performed by: INTERNAL MEDICINE

## 2019-06-03 PROCEDURE — 99231 SBSQ HOSP IP/OBS SF/LOW 25: CPT | Performed by: PHYSICAL MEDICINE & REHABILITATION

## 2019-06-03 PROCEDURE — 6360000002 HC RX W HCPCS: Performed by: INTERNAL MEDICINE

## 2019-06-03 PROCEDURE — L1932 AFO RIG ANT TIB PREFAB TCF/=: HCPCS

## 2019-06-03 PROCEDURE — 97110 THERAPEUTIC EXERCISES: CPT

## 2019-06-03 PROCEDURE — 92507 TX SP LANG VOICE COMM INDIV: CPT | Performed by: SPEECH-LANGUAGE PATHOLOGIST

## 2019-06-03 PROCEDURE — 1280000000 HC REHAB R&B

## 2019-06-03 RX ADMIN — ENOXAPARIN SODIUM 40 MG: 40 INJECTION SUBCUTANEOUS at 08:13

## 2019-06-03 RX ADMIN — ATORVASTATIN CALCIUM 40 MG: 40 TABLET, FILM COATED ORAL at 20:37

## 2019-06-03 RX ADMIN — ASPIRIN 81 MG: 81 TABLET ORAL at 08:13

## 2019-06-03 ASSESSMENT — PAIN SCALES - GENERAL
PAINLEVEL_OUTOF10: 0

## 2019-06-04 PROCEDURE — 97530 THERAPEUTIC ACTIVITIES: CPT

## 2019-06-04 PROCEDURE — 1280000000 HC REHAB R&B

## 2019-06-04 PROCEDURE — 6370000000 HC RX 637 (ALT 250 FOR IP): Performed by: INTERNAL MEDICINE

## 2019-06-04 PROCEDURE — 97535 SELF CARE MNGMENT TRAINING: CPT

## 2019-06-04 PROCEDURE — 99232 SBSQ HOSP IP/OBS MODERATE 35: CPT | Performed by: PHYSICAL MEDICINE & REHABILITATION

## 2019-06-04 PROCEDURE — 6360000002 HC RX W HCPCS: Performed by: INTERNAL MEDICINE

## 2019-06-04 PROCEDURE — 97110 THERAPEUTIC EXERCISES: CPT

## 2019-06-04 PROCEDURE — 97127 HC SP THER IVNTJ W/FOCUS COG FUNCJ: CPT | Performed by: SPEECH-LANGUAGE PATHOLOGIST

## 2019-06-04 RX ADMIN — ENOXAPARIN SODIUM 40 MG: 40 INJECTION SUBCUTANEOUS at 07:44

## 2019-06-04 RX ADMIN — ATORVASTATIN CALCIUM 40 MG: 40 TABLET, FILM COATED ORAL at 21:07

## 2019-06-04 RX ADMIN — ASPIRIN 81 MG: 81 TABLET ORAL at 07:45

## 2019-06-04 ASSESSMENT — PAIN SCALES - GENERAL
PAINLEVEL_OUTOF10: 0

## 2019-06-05 PROCEDURE — 92507 TX SP LANG VOICE COMM INDIV: CPT

## 2019-06-05 PROCEDURE — 97530 THERAPEUTIC ACTIVITIES: CPT

## 2019-06-05 PROCEDURE — 6370000000 HC RX 637 (ALT 250 FOR IP): Performed by: INTERNAL MEDICINE

## 2019-06-05 PROCEDURE — 1280000000 HC REHAB R&B

## 2019-06-05 PROCEDURE — 97127 HC OT THER IVNTJ W/FOCUS COG FUNCJ: CPT

## 2019-06-05 PROCEDURE — 99231 SBSQ HOSP IP/OBS SF/LOW 25: CPT | Performed by: PHYSICAL MEDICINE & REHABILITATION

## 2019-06-05 PROCEDURE — 97110 THERAPEUTIC EXERCISES: CPT

## 2019-06-05 RX ADMIN — ATORVASTATIN CALCIUM 40 MG: 40 TABLET, FILM COATED ORAL at 21:56

## 2019-06-05 RX ADMIN — ASPIRIN 81 MG: 81 TABLET ORAL at 08:22

## 2019-06-05 ASSESSMENT — PAIN SCALES - GENERAL
PAINLEVEL_OUTOF10: 0

## 2019-06-06 VITALS
OXYGEN SATURATION: 97 % | SYSTOLIC BLOOD PRESSURE: 122 MMHG | TEMPERATURE: 97.7 F | BODY MASS INDEX: 27.34 KG/M2 | WEIGHT: 144.8 LBS | RESPIRATION RATE: 16 BRPM | DIASTOLIC BLOOD PRESSURE: 70 MMHG | HEIGHT: 61 IN | HEART RATE: 78 BPM

## 2019-06-06 PROCEDURE — 97110 THERAPEUTIC EXERCISES: CPT

## 2019-06-06 PROCEDURE — 97530 THERAPEUTIC ACTIVITIES: CPT

## 2019-06-06 PROCEDURE — 6370000000 HC RX 637 (ALT 250 FOR IP): Performed by: INTERNAL MEDICINE

## 2019-06-06 PROCEDURE — 99239 HOSP IP/OBS DSCHRG MGMT >30: CPT | Performed by: PHYSICAL MEDICINE & REHABILITATION

## 2019-06-06 RX ORDER — ATORVASTATIN CALCIUM 40 MG/1
40 TABLET, FILM COATED ORAL NIGHTLY
Qty: 30 TABLET | Refills: 0 | Status: SHIPPED | OUTPATIENT
Start: 2019-06-06 | End: 2019-06-15

## 2019-06-06 RX ORDER — ASPIRIN 81 MG/1
81 TABLET ORAL DAILY
Qty: 90 TABLET | Refills: 0 | Status: SHIPPED | OUTPATIENT
Start: 2019-06-07 | End: 2019-09-10 | Stop reason: SDUPTHER

## 2019-06-06 RX ADMIN — ASPIRIN 81 MG: 81 TABLET ORAL at 09:24

## 2019-06-06 ASSESSMENT — PAIN SCALES - GENERAL: PAINLEVEL_OUTOF10: 0

## 2019-06-11 ENCOUNTER — TELEPHONE (OUTPATIENT)
Dept: ADMINISTRATIVE | Age: 54
End: 2019-06-11

## 2019-06-11 NOTE — TELEPHONE ENCOUNTER
Patient called needing to schedule a one month hospital follow up with Dr Yamileth Lan. I did not have any openings within one month. Please call her back. Thank you.

## 2019-06-15 ENCOUNTER — HOSPITAL ENCOUNTER (EMERGENCY)
Age: 54
Discharge: HOME | End: 2019-06-15
Attending: EMERGENCY MEDICINE
Payer: COMMERCIAL

## 2019-06-15 ENCOUNTER — APPOINTMENT (OUTPATIENT)
Dept: CT IMAGING | Age: 54
End: 2019-06-15
Payer: COMMERCIAL

## 2019-06-15 ENCOUNTER — APPOINTMENT (OUTPATIENT)
Dept: GENERAL RADIOLOGY | Age: 54
End: 2019-06-15
Payer: COMMERCIAL

## 2019-06-15 VITALS
DIASTOLIC BLOOD PRESSURE: 86 MMHG | OXYGEN SATURATION: 98 % | RESPIRATION RATE: 16 BRPM | WEIGHT: 144 LBS | TEMPERATURE: 97.7 F | HEART RATE: 72 BPM | SYSTOLIC BLOOD PRESSURE: 142 MMHG | BODY MASS INDEX: 27.21 KG/M2

## 2019-06-15 DIAGNOSIS — R42 DIZZINESS: Primary | ICD-10-CM

## 2019-06-15 DIAGNOSIS — R42 LIGHTHEADEDNESS: ICD-10-CM

## 2019-06-15 DIAGNOSIS — I65.22 INTERNAL CAROTID ARTERY OCCLUSION, LEFT: ICD-10-CM

## 2019-06-15 LAB
ALBUMIN SERPL-MCNC: 4.6 G/DL (ref 3.5–5.2)
ALP BLD-CCNC: 68 U/L (ref 35–104)
ALT SERPL-CCNC: 32 U/L (ref 0–32)
ANION GAP SERPL CALCULATED.3IONS-SCNC: 10 MMOL/L (ref 7–16)
AST SERPL-CCNC: 18 U/L (ref 0–31)
BACTERIA: NORMAL /HPF
BASOPHILS ABSOLUTE: 0.02 E9/L (ref 0–0.2)
BASOPHILS RELATIVE PERCENT: 0.3 % (ref 0–2)
BILIRUB SERPL-MCNC: 0.4 MG/DL (ref 0–1.2)
BILIRUBIN URINE: NEGATIVE
BLOOD, URINE: ABNORMAL
BUN BLDV-MCNC: 9 MG/DL (ref 6–20)
CALCIUM SERPL-MCNC: 9.3 MG/DL (ref 8.6–10.2)
CHLORIDE BLD-SCNC: 104 MMOL/L (ref 98–107)
CLARITY: CLEAR
CO2: 29 MMOL/L (ref 22–29)
COLOR: YELLOW
CREAT SERPL-MCNC: 0.7 MG/DL (ref 0.5–1)
EKG ATRIAL RATE: 66 BPM
EKG P AXIS: 44 DEGREES
EKG P-R INTERVAL: 140 MS
EKG Q-T INTERVAL: 414 MS
EKG QRS DURATION: 94 MS
EKG QTC CALCULATION (BAZETT): 434 MS
EKG R AXIS: 42 DEGREES
EKG T AXIS: 35 DEGREES
EKG VENTRICULAR RATE: 66 BPM
EOSINOPHILS ABSOLUTE: 0.21 E9/L (ref 0.05–0.5)
EOSINOPHILS RELATIVE PERCENT: 2.6 % (ref 0–6)
GFR AFRICAN AMERICAN: >60
GFR NON-AFRICAN AMERICAN: >60 ML/MIN/1.73
GLUCOSE BLD-MCNC: 117 MG/DL (ref 74–99)
GLUCOSE URINE: NEGATIVE MG/DL
HCT VFR BLD CALC: 44.8 % (ref 34–48)
HEMOGLOBIN: 15.3 G/DL (ref 11.5–15.5)
IMMATURE GRANULOCYTES #: 0.03 E9/L
IMMATURE GRANULOCYTES %: 0.4 % (ref 0–5)
KETONES, URINE: NEGATIVE MG/DL
LEUKOCYTE ESTERASE, URINE: NEGATIVE
LYMPHOCYTES ABSOLUTE: 2.4 E9/L (ref 1.5–4)
LYMPHOCYTES RELATIVE PERCENT: 30.2 % (ref 20–42)
MCH RBC QN AUTO: 30.8 PG (ref 26–35)
MCHC RBC AUTO-ENTMCNC: 34.2 % (ref 32–34.5)
MCV RBC AUTO: 90.1 FL (ref 80–99.9)
MONOCYTES ABSOLUTE: 0.4 E9/L (ref 0.1–0.95)
MONOCYTES RELATIVE PERCENT: 5 % (ref 2–12)
NEUTROPHILS ABSOLUTE: 4.89 E9/L (ref 1.8–7.3)
NEUTROPHILS RELATIVE PERCENT: 61.5 % (ref 43–80)
NITRITE, URINE: NEGATIVE
PDW BLD-RTO: 12.5 FL (ref 11.5–15)
PH UA: 6 (ref 5–9)
PLATELET # BLD: 236 E9/L (ref 130–450)
PMV BLD AUTO: 9.8 FL (ref 7–12)
POTASSIUM REFLEX MAGNESIUM: 4.1 MMOL/L (ref 3.5–5)
PROTEIN UA: NEGATIVE MG/DL
RBC # BLD: 4.97 E12/L (ref 3.5–5.5)
RBC UA: NORMAL /HPF (ref 0–2)
RENAL EPITHELIAL, UA: NORMAL /HPF
SODIUM BLD-SCNC: 143 MMOL/L (ref 132–146)
SPECIFIC GRAVITY UA: <=1.005 (ref 1–1.03)
TOTAL PROTEIN: 7.1 G/DL (ref 6.4–8.3)
TROPONIN: <0.01 NG/ML (ref 0–0.03)
UROBILINOGEN, URINE: 0.2 E.U./DL
WBC # BLD: 8 E9/L (ref 4.5–11.5)
WBC UA: NORMAL /HPF (ref 0–5)

## 2019-06-15 PROCEDURE — 70496 CT ANGIOGRAPHY HEAD: CPT

## 2019-06-15 PROCEDURE — 2580000003 HC RX 258: Performed by: NURSE PRACTITIONER

## 2019-06-15 PROCEDURE — 6360000004 HC RX CONTRAST MEDICATION: Performed by: RADIOLOGY

## 2019-06-15 PROCEDURE — 80053 COMPREHEN METABOLIC PANEL: CPT

## 2019-06-15 PROCEDURE — 70498 CT ANGIOGRAPHY NECK: CPT

## 2019-06-15 PROCEDURE — 84484 ASSAY OF TROPONIN QUANT: CPT

## 2019-06-15 PROCEDURE — 93005 ELECTROCARDIOGRAM TRACING: CPT | Performed by: NURSE PRACTITIONER

## 2019-06-15 PROCEDURE — 93010 ELECTROCARDIOGRAM REPORT: CPT | Performed by: INTERNAL MEDICINE

## 2019-06-15 PROCEDURE — 99285 EMERGENCY DEPT VISIT HI MDM: CPT

## 2019-06-15 PROCEDURE — 85025 COMPLETE CBC W/AUTO DIFF WBC: CPT

## 2019-06-15 PROCEDURE — 36415 COLL VENOUS BLD VENIPUNCTURE: CPT

## 2019-06-15 PROCEDURE — 2580000003 HC RX 258: Performed by: RADIOLOGY

## 2019-06-15 PROCEDURE — 71046 X-RAY EXAM CHEST 2 VIEWS: CPT

## 2019-06-15 PROCEDURE — 81001 URINALYSIS AUTO W/SCOPE: CPT

## 2019-06-15 PROCEDURE — 6370000000 HC RX 637 (ALT 250 FOR IP): Performed by: NURSE PRACTITIONER

## 2019-06-15 RX ORDER — CLONIDINE HYDROCHLORIDE 0.1 MG/1
0.1 TABLET ORAL ONCE
Status: DISCONTINUED | OUTPATIENT
Start: 2019-06-15 | End: 2019-06-15 | Stop reason: HOSPADM

## 2019-06-15 RX ORDER — CLONIDINE HYDROCHLORIDE 0.1 MG/1
0.1 TABLET ORAL 2 TIMES DAILY
Qty: 60 TABLET | Refills: 0 | Status: SHIPPED | OUTPATIENT
Start: 2019-06-15 | End: 2019-07-02

## 2019-06-15 RX ORDER — SODIUM CHLORIDE 0.9 % (FLUSH) 0.9 %
10 SYRINGE (ML) INJECTION ONCE
Status: COMPLETED | OUTPATIENT
Start: 2019-06-15 | End: 2019-06-15

## 2019-06-15 RX ORDER — 0.9 % SODIUM CHLORIDE 0.9 %
1000 INTRAVENOUS SOLUTION INTRAVENOUS ONCE
Status: COMPLETED | OUTPATIENT
Start: 2019-06-15 | End: 2019-06-15

## 2019-06-15 RX ORDER — MECLIZINE HCL 12.5 MG/1
25 TABLET ORAL ONCE
Status: COMPLETED | OUTPATIENT
Start: 2019-06-15 | End: 2019-06-15

## 2019-06-15 RX ORDER — ATORVASTATIN CALCIUM 40 MG/1
40 TABLET, FILM COATED ORAL EVERY MORNING
COMMUNITY
End: 2019-07-11 | Stop reason: SDUPTHER

## 2019-06-15 RX ADMIN — IOPAMIDOL 60 ML: 755 INJECTION, SOLUTION INTRAVENOUS at 13:05

## 2019-06-15 RX ADMIN — MECLIZINE 25 MG: 12.5 TABLET ORAL at 14:14

## 2019-06-15 RX ADMIN — Medication 10 ML: at 13:06

## 2019-06-15 RX ADMIN — SODIUM CHLORIDE 1000 ML: 9 INJECTION, SOLUTION INTRAVENOUS at 11:55

## 2019-06-15 NOTE — ED NOTES
Radiology Procedure Waiver   Name: Xiomara Sales  : 1965  MRN: [id number]    Date:  6/15/19    Time: 1:03 PM    Benefits of immediately proceeding with Radiology exam(s) without pre-testing outweigh the risks or are not indicated as specified below and therefore the following is/are being waived:    [x] Pregnancy test   [] Patients LMP on-time and regular.   [] Patient had Tubal Ligation or has other Contraception Device. [] Patient  is Menopausal or Premenarcheal.    [] Patient had Full or Partial Hysterectomy. [] Protocol for Iodine allergy    [] MRI Questionnaire     [] BUN/Creatinine   [] Patient age w/no hx of renal dysfunction. [] Patient on Dialysis. [] Recent Normal Labs.   Electronically signed by DEMARIO Howell NP on 6/15/19 at 1:03 PM             DEMARIO Farias NP  06/15/19 9730

## 2019-06-15 NOTE — ED PROVIDER NOTES
any shortness of breath at this time. Vertebrobasilar CVA Symptoms:    Vertigo: no (0)   Diplopia: no (0)   Decreased Vision: no (0)   Oscillopsia: no (0)   Ataxia: no (0)   Precipitated by moving one upper extremity with  Decreased BP (subclavian steal syndrome):       no (0)   Total:    0     N/A  ROS    Pertinent positives and negatives are stated within HPI, all other systems reviewed and are negative. Past Surgical History:  has no past surgical history on file. Social History:  reports that she has been smoking cigarettes. She has been smoking about 0.50 packs per day. She has never used smokeless tobacco. She reports that she does not drink alcohol or use drugs. Family History: family history is not on file. Allergies: Patient has no known allergies. Physical Exam           ED Triage Vitals   BP Temp Temp Source Pulse Resp SpO2 Height Weight   06/15/19 1102 06/15/19 1102 06/15/19 1102 06/15/19 1059 06/15/19 1102 06/15/19 1059 -- 06/15/19 1102   (!) 167/86 97.7 °F (36.5 °C) Temporal 70 17 97 %  144 lb (65.3 kg)      Oxygen Saturation Interpretation: Normal.    Constitutional:  Level of Consciousness: Awake and alert. ETOH: No.         Distress: none,  cooperative. Eyes:  PERRL, EOMI, no discharge or conjunctival injection. Ears:  External ears without lesions. Throat:  Pharynx without injection, exudate, or tonsillar hypertrophy. Airway patient. Neck:  Normal ROM. Supple. Left bruit noted  Respiratory:  Clear to auscultation and breath sounds equal.  CV:  Regular rate and rhythm, normal heart sounds, without pathological murmurs, ectopy, gallops, or rubs. GI:  Abdomen Soft, nontender, good bowel sounds. No firm or pulsatile mass. Back:  No costovertebral tenderness. Integument:  Normal turgor. Warm, dry, without visible rash, unless noted elsewhere. Lymphatic: no lymphadenopathy noted  Neurological:  Oriented x3.   Cranial nerves I-XII in tact  NIH Stroke Scale/Score at time of initial evaluation:  1A: Level of Consciousness 0 - alert; keenly responsive   1B: Ask Month and Age 0 - answers both questions correctly   1C:  Tell Patient To Open and Close Eyes, then Hand  Squeeze 0 - performs both tasks correctly   2: Test Horizontal Extraocular Movements 0 - normal   3: Test Visual Fields 0 - no visual loss   4: Test Facial Palsy 0 - normal symmetric movement   5A: Test Left Arm Motor Drift 0 - no drift, limb holds 90 (or 45) degrees for full 10 seconds   5B: Test Right Arm Motor Drift 0 - no drift, limb holds 90 (or 45) degrees for full 10 seconds   6A: Test Left Leg Motor Drift 0 - no drift; leg holds 30 degree position for full 5 seconds   6B: Test Right Leg Motor Drift 0 - no drift; leg holds 30 degree position for full 5 seconds   7: Test Limb Ataxia   (FNF/Heel-Shin) 1 - present in one limb   8: Test Sensation 0 - normal; no sensory loss   9: Test Language/Aphasia 0 - no aphasia, normal   10: Test Dysarthria 0 - normal   11: Test Extinction/Inattention 0 - no abnormality   Total Score: 1   6/15/19 at 3:47 PM.          Lab / Imaging Results   (All laboratory and radiology results have been personally reviewed by myself)  Labs:  Results for orders placed or performed during the hospital encounter of 06/15/19   Comprehensive Metabolic Panel w/ Reflex to MG   Result Value Ref Range    Sodium 143 132 - 146 mmol/L    Potassium reflex Magnesium 4.1 3.5 - 5.0 mmol/L    Chloride 104 98 - 107 mmol/L    CO2 29 22 - 29 mmol/L    Anion Gap 10 7 - 16 mmol/L    Glucose 117 (H) 74 - 99 mg/dL    BUN 9 6 - 20 mg/dL    CREATININE 0.7 0.5 - 1.0 mg/dL    GFR Non-African American >60 >=60 mL/min/1.73    GFR African American >60     Calcium 9.3 8.6 - 10.2 mg/dL    Total Protein 7.1 6.4 - 8.3 g/dL    Alb 4.6 3.5 - 5.2 g/dL    Total Bilirubin 0.4 0.0 - 1.2 mg/dL    Alkaline Phosphatase 68 35 - 104 U/L    ALT 32 0 - 32 U/L    AST 18 0 - 31 U/L   CBC Auto Differential   Result Value Ref Range    WBC 8.0 4.5 - 11.5 E9/L    RBC 4.97 3.50 - 5.50 E12/L    Hemoglobin 15.3 11.5 - 15.5 g/dL    Hematocrit 44.8 34.0 - 48.0 %    MCV 90.1 80.0 - 99.9 fL    MCH 30.8 26.0 - 35.0 pg    MCHC 34.2 32.0 - 34.5 %    RDW 12.5 11.5 - 15.0 fL    Platelets 602 541 - 333 E9/L    MPV 9.8 7.0 - 12.0 fL    Neutrophils % 61.5 43.0 - 80.0 %    Immature Granulocytes % 0.4 0.0 - 5.0 %    Lymphocytes % 30.2 20.0 - 42.0 %    Monocytes % 5.0 2.0 - 12.0 %    Eosinophils % 2.6 0.0 - 6.0 %    Basophils % 0.3 0.0 - 2.0 %    Neutrophils # 4.89 1.80 - 7.30 E9/L    Immature Granulocytes # 0.03 E9/L    Lymphocytes # 2.40 1.50 - 4.00 E9/L    Monocytes # 0.40 0.10 - 0.95 E9/L    Eosinophils # 0.21 0.05 - 0.50 E9/L    Basophils # 0.02 0.00 - 0.20 E9/L   Troponin   Result Value Ref Range    Troponin <0.01 0.00 - 0.03 ng/mL   Urinalysis, reflex to microscopic   Result Value Ref Range    Color, UA Yellow Straw/Yellow    Clarity, UA Clear Clear    Glucose, Ur Negative Negative mg/dL    Bilirubin Urine Negative Negative    Ketones, Urine Negative Negative mg/dL    Specific Gravity, UA <=1.005 1.005 - 1.030    Blood, Urine SMALL (A) Negative    pH, UA 6.0 5.0 - 9.0    Protein, UA Negative Negative mg/dL    Urobilinogen, Urine 0.2 <2.0 E.U./dL    Nitrite, Urine Negative Negative    Leukocyte Esterase, Urine Negative Negative   Microscopic Urinalysis   Result Value Ref Range    WBC, UA NONE 0 - 5 /HPF    RBC, UA 0-1 0 - 2 /HPF    Renal Epithelial, Urine FEW /HPF    Bacteria, UA NONE /HPF   EKG 12 Lead   Result Value Ref Range    Ventricular Rate 66 BPM    Atrial Rate 66 BPM    P-R Interval 140 ms    QRS Duration 94 ms    Q-T Interval 414 ms    QTc Calculation (Bazett) 434 ms    P Axis 44 degrees    R Axis 42 degrees    T Axis 35 degrees     Imaging: All Radiology results interpreted by Radiologist unless otherwise noted. CTA Head W Contrast   Final Result      1.  There is a 2 vessel branch pattern to the aortic arch, and the   right vertebral artery is occluded from its origin to the skull base. 2. The right carotid arterial distribution is normal to the level of   the skull base, but the left internal carotid origin shows surgically   significant and likely critical stenosis over a short segment with   poststenotic dilation. The distal internal carotid artery on the left   is remarkable only for a redundant/tortuous segment just proximal to   the skull base. 4. The [place] is not complete. 5. Intracranial images show an attenuated cavernous carotid artery on   the left with a septated appearance inferiorly, that could be related   to dissection. There is an attenuated appearance to the anterior   aspect of the left upper anterior cerebral artery. A patent   communicating artery is suspected. Middle cerebral arterial   distributions appear intact. 6. No acute intracranial venous pathology is noted, but there is a   dominant intracranial right jugular venous drainage         ALERT:  THIS IS AN ABNORMAL REPORT-short segment high-grade stenosis   of the left ICA origin and left cavernous carotid stenosis with   possible dissection, total apparent occlusion of the right vertebral   artery, and attenuation of distal left anterior cerebral arterial   perfusion in the regions of infarction. CTA NECK W CONTRAST   Final Result      1. There is a 2 vessel branch pattern to the aortic arch, and the   right vertebral artery is occluded from its origin to the skull base. 2. The right carotid arterial distribution is normal to the level of   the skull base, but the left internal carotid origin shows surgically   significant and likely critical stenosis over a short segment with   poststenotic dilation. The distal internal carotid artery on the left   is remarkable only for a redundant/tortuous segment just proximal to   the skull base. 4. The [place] is not complete.    5. Intracranial images show an attenuated cavernous carotid artery on   the left with a septated appearance inferiorly, that could be related   to dissection. There is an attenuated appearance to the anterior   aspect of the left upper anterior cerebral artery. A patent   communicating artery is suspected. Middle cerebral arterial   distributions appear intact. 6. No acute intracranial venous pathology is noted, but there is a   dominant intracranial right jugular venous drainage         ALERT:  THIS IS AN ABNORMAL REPORT-short segment high-grade stenosis   of the left ICA origin and left cavernous carotid stenosis with   possible dissection, total apparent occlusion of the right vertebral   artery, and attenuation of distal left anterior cerebral arterial   perfusion in the regions of infarction. XR CHEST STANDARD (2 VW)   Final Result   No radiographic evidence of acute cardiopulmonary disease. EKG #1:  Interpreted by emergency department physician unless otherwise noted. Time:  1119    Rate: 66  Rhythm: Sinus. Interpretation: normal EKG, normal sinus rhythm, unchanged from previous tracings. ED Course / Medical Decision Making     Medications   0.9 % sodium chloride bolus (0 mLs Intravenous Stopped 6/15/19 1407)   iopamidol (ISOVUE-370) 76 % injection 60 mL (60 mLs Intravenous Given 6/15/19 1305)   sodium chloride flush 0.9 % injection 10 mL (10 mLs Intravenous Given 6/15/19 1306)   meclizine (ANTIVERT) tablet 25 mg (25 mg Oral Given 6/15/19 1414)        Re-Evaluations:  6/15/19      Time:     Patients symptoms are improving. Consultations:             Dr. Jono Willis    Procedures:   none    MDM: Patient is a 79-year-old female who came to the emergency department today with complaints of dizziness. Patient recently had a CVA. Her blood work was unremarkable at this time.   The CTA of her neck did show the patient's already known left occlusion of her left internal carotid but was showing the left carotid artery with a septated appearance inferiorly that could be related to a dissection. Due to this reading Dr. Abhijeet Simmons was consulted at this time who felt that this was normal with patient's history. Patient was given Antivert as well as IV fluids while in the emergency department with relief. She will be discharged at this time and is to follow-up with Dr. Abhijeet Simmons. I did instruct her that if any of her symptoms worsened that she need to return to the emergency room immediately. Patient was also hypertensive while in the emergency room she did state that prior to being discharged from the hospital they did discontinue all of her blood pressure medications. A prescription for clonidine 0.1 mg was given at this time. Counseling:   I have spoken with the patient and discussed todays results, in addition to providing specific details for the plan of care and counseling regarding the diagnosis and prognosis and are agreeable with the plan to be discharged. At this time the patient is without objective evidence of an acute process requiring hospitalization or inpatient management. They have remained hemodynamically stable throughout their entire ED visit and are stable for discharge with outpatient follow-up. The plan has been discussed in detail and they are aware of the specific conditions for emergent return, as well as the importance of follow-up. Assessment      1. Dizziness    2. Lightheadedness    3. Internal carotid artery occlusion, left      This patient's ED course included: a personal history and physicial examination, cardiac monitoring and continuous pulse oximetry  This patient has remained hemodynamically stable during their ED course. Plan   Discharge to home. Patient condition is good.     New Medications     Discharge Medication List as of 6/15/2019  3:33 PM      START taking these medications    Details   cloNIDine (CATAPRES) 0.1 MG tablet Take 1 tablet by mouth 2 times daily, Disp-60 tablet, R-0Print           Electronically signed by DEMARIO Pisano NP   DD: 6/15/19  **This report was transcribed using voice recognition software. Every effort was made to ensure accuracy; however, inadvertent computerized transcription errors may be present.   END OF PROVIDER NOTE        DEMARIO Asif NP  06/16/19 1952

## 2019-07-02 ENCOUNTER — OFFICE VISIT (OUTPATIENT)
Dept: NEUROLOGY | Age: 54
End: 2019-07-02
Payer: COMMERCIAL

## 2019-07-02 VITALS
HEART RATE: 64 BPM | SYSTOLIC BLOOD PRESSURE: 162 MMHG | HEIGHT: 61 IN | TEMPERATURE: 97.3 F | OXYGEN SATURATION: 98 % | DIASTOLIC BLOOD PRESSURE: 102 MMHG | WEIGHT: 147 LBS | BODY MASS INDEX: 27.75 KG/M2 | RESPIRATION RATE: 12 BRPM

## 2019-07-02 DIAGNOSIS — I63.412 CEREBROVASCULAR ACCIDENT (CVA) DUE TO EMBOLISM OF LEFT MIDDLE CEREBRAL ARTERY (HCC): ICD-10-CM

## 2019-07-02 DIAGNOSIS — R42 INTERMITTENT LIGHTHEADEDNESS: Primary | ICD-10-CM

## 2019-07-02 PROCEDURE — 99204 OFFICE O/P NEW MOD 45 MIN: CPT | Performed by: PHYSICIAN ASSISTANT

## 2019-07-02 SDOH — HEALTH STABILITY: MENTAL HEALTH: HOW OFTEN DO YOU HAVE A DRINK CONTAINING ALCOHOL?: 2-4 TIMES A MONTH

## 2019-07-02 NOTE — PROGRESS NOTES
abnormality, atraumatic  Eyes: conjunctivae/corneas clear; no drainage  Neck: no adenopathy, no carotid bruit, supple   Back: symmetric, no curvature. ROM normal.   Lungs: clear to auscultation bilaterally  Heart: regular rate and rhythm, S1, S2 normal, no murmur  Extremities: normal, atraumatic, no cyanosis or edema-- AFO brace on RLE   Pulses: 2+ and symmetric  Skin:  color, texture, turgor normal--no rashes or lesions    Hyperventilation + for reproducing lightheaded/dizzy symptoms       Mental Status: alert and oriented. Thought content appropriate.      Appropriate attention/concentration  Intact fundus of knowledge  Repetition intact  Memories intact    Speech: no dysarthria  Language: no aphasias    Cranial Nerves:  I: smell    II: visual acuity     II: visual fields Full    II: pupils BENI   III,VII: ptosis None   III,IV,VI: extraocular muscles  EOMI without nystagmus   V: mastication Normal   V: facial light touch sensation  Normal   V,VII: corneal reflex     VII: facial muscle function - upper  Normal   VII: facial muscle function - lower Normal   VIII: hearing Normal   IX: soft palate elevation  Normal   IX,X: gag reflex    XI: trapezius strength  5/5   XI: sternocleidomastoid strength 5/5   XI: neck extension strength  5/5   XII: tongue strength  Normal     Motor:  5/5 throughout except RLE 3+-4/5  Normal bulk and tone  No drift   No abnormal movements    Sensory:  LT and PP normal  Vibration normal    Coordination:   FN, FFM and JAYASHREE normal  HS slower on R -- without dysmetria     Gait:  R hemiparetic gait   Romberg's negative    DTR:   + 2 throughout     No Babinskis  No Yap's    No other pathological reflexes    Laboratory/Radiology:  ry/Radiology:     CBC with Differential:    Lab Results   Component Value Date    WBC 8.0 06/15/2019    RBC 4.97 06/15/2019    HGB 15.3 06/15/2019    HCT 44.8 06/15/2019     06/15/2019    MCV 90.1 06/15/2019    MCH 30.8 06/15/2019    MCHC 34.2 06/15/2019    RDW

## 2019-07-11 RX ORDER — ATORVASTATIN CALCIUM 40 MG/1
TABLET, FILM COATED ORAL
Qty: 30 TABLET | Refills: 0 | Status: SHIPPED | OUTPATIENT
Start: 2019-07-11 | End: 2019-10-03

## 2019-08-28 ENCOUNTER — TELEPHONE (OUTPATIENT)
Dept: ADMINISTRATIVE | Age: 54
End: 2019-08-28

## 2019-08-28 NOTE — TELEPHONE ENCOUNTER
Cardiology note available through [facility]. It appears patient would be new to our practice. Please review and advise.

## 2019-09-10 ENCOUNTER — OFFICE VISIT (OUTPATIENT)
Dept: FAMILY MEDICINE CLINIC | Age: 54
End: 2019-09-10
Payer: COMMERCIAL

## 2019-09-10 VITALS
DIASTOLIC BLOOD PRESSURE: 84 MMHG | BODY MASS INDEX: 27.94 KG/M2 | RESPIRATION RATE: 16 BRPM | HEIGHT: 61 IN | WEIGHT: 148 LBS | SYSTOLIC BLOOD PRESSURE: 118 MMHG | OXYGEN SATURATION: 98 % | HEART RATE: 96 BPM

## 2019-09-10 DIAGNOSIS — I10 ESSENTIAL HYPERTENSION: ICD-10-CM

## 2019-09-10 DIAGNOSIS — I65.22 ICAO (INTERNAL CAROTID ARTERY OCCLUSION), LEFT: ICD-10-CM

## 2019-09-10 DIAGNOSIS — R53.83 FATIGUE, UNSPECIFIED TYPE: ICD-10-CM

## 2019-09-10 DIAGNOSIS — I73.9 PAD (PERIPHERAL ARTERY DISEASE) (HCC): ICD-10-CM

## 2019-09-10 DIAGNOSIS — E55.9 VITAMIN D DEFICIENCY: ICD-10-CM

## 2019-09-10 DIAGNOSIS — I63.9 CEREBROVASCULAR ACCIDENT (CVA), UNSPECIFIED MECHANISM (HCC): Primary | ICD-10-CM

## 2019-09-10 PROCEDURE — 1036F TOBACCO NON-USER: CPT | Performed by: FAMILY MEDICINE

## 2019-09-10 PROCEDURE — G8427 DOCREV CUR MEDS BY ELIG CLIN: HCPCS | Performed by: FAMILY MEDICINE

## 2019-09-10 PROCEDURE — G8419 CALC BMI OUT NRM PARAM NOF/U: HCPCS | Performed by: FAMILY MEDICINE

## 2019-09-10 PROCEDURE — 99204 OFFICE O/P NEW MOD 45 MIN: CPT | Performed by: FAMILY MEDICINE

## 2019-09-10 PROCEDURE — G8598 ASA/ANTIPLAT THER USED: HCPCS | Performed by: FAMILY MEDICINE

## 2019-09-10 PROCEDURE — 3017F COLORECTAL CA SCREEN DOC REV: CPT | Performed by: FAMILY MEDICINE

## 2019-09-10 RX ORDER — BACLOFEN 10 MG/1
TABLET ORAL
Qty: 60 TABLET | Refills: 2 | Status: SHIPPED | OUTPATIENT
Start: 2019-09-10 | End: 2019-10-28

## 2019-09-10 RX ORDER — AMLODIPINE BESYLATE 5 MG/1
5 TABLET ORAL
COMMUNITY
Start: 2019-08-27 | End: 2020-07-28 | Stop reason: SDUPTHER

## 2019-09-10 RX ORDER — ASPIRIN 81 MG/1
81 TABLET ORAL DAILY
Qty: 90 TABLET | Refills: 1 | Status: SHIPPED
Start: 2019-09-10 | End: 2020-03-27 | Stop reason: SDUPTHER

## 2019-09-10 ASSESSMENT — ENCOUNTER SYMPTOMS
DIARRHEA: 0
ABDOMINAL PAIN: 0
SINUS PRESSURE: 0
CONSTIPATION: 0
BACK PAIN: 0
COUGH: 0
SHORTNESS OF BREATH: 0
SORE THROAT: 0
EYE PAIN: 0

## 2019-09-10 ASSESSMENT — PATIENT HEALTH QUESTIONNAIRE - PHQ9
2. FEELING DOWN, DEPRESSED OR HOPELESS: 0
SUM OF ALL RESPONSES TO PHQ QUESTIONS 1-9: 0
SUM OF ALL RESPONSES TO PHQ9 QUESTIONS 1 & 2: 0
1. LITTLE INTEREST OR PLEASURE IN DOING THINGS: 0
SUM OF ALL RESPONSES TO PHQ QUESTIONS 1-9: 0

## 2019-09-10 NOTE — PROGRESS NOTES
Garrison Thakur   1965    Chief Complaint:     Chief Complaint   Patient presents with   Jeremy Dill Doctor       HPI  Garrison Thakur 48 y.o. who presents as a new patient to establish care. Patient has a previous history of CVA, hypertension, left internal carotid artery occlusion. This has all been within recent months. Her first episode was in May. She also had some numbness and tingling and weakness in her right lower extremity. She currently has an AFO placed. She states she has been going to physical therapy for this. However she only has 9 sessions left and she has been doing therapy at home. She is also being evaluated by back surgeon. She did get a second opinion by cardiologist up at the [facility] who recommended echo, event monitor and NIKKO. However patient does not wish to go back to them. She currently has event monitor on her. She wishes for echo and NIKKO to be completed around here. She also is very fatigued. She has not had labs recently since . She states she does feel improved since diagnosis of CVA however she is slightly anxious. She is not sure if this is because of the recent stroke or not. She does not wish for any medication at this time. She denies any depression symptoms. Past Medical History:   Diagnosis Date    Back pain     Cerebral artery occlusion with cerebral infarction ([address]     Hypertension     Numbness and tingling     Occlusion of left internal carotid artery 2019    Paralysis of right lower extremity ([address] 2019       History reviewed. No pertinent surgical history.        Social History     Socioeconomic History    Marital status: Single     Spouse name: None    Number of children: None    Years of education: None    Highest education level: None   Occupational History    None   Social Needs    Financial resource strain: None    Food insecurity:     Worry: None     Inability: None    Transportation

## 2019-09-13 ENCOUNTER — OFFICE VISIT (OUTPATIENT)
Dept: PHYSICAL MEDICINE AND REHAB | Age: 54
End: 2019-09-13
Payer: COMMERCIAL

## 2019-09-13 VITALS
DIASTOLIC BLOOD PRESSURE: 78 MMHG | HEART RATE: 84 BPM | OXYGEN SATURATION: 98 % | BODY MASS INDEX: 27.56 KG/M2 | HEIGHT: 61 IN | WEIGHT: 146 LBS | SYSTOLIC BLOOD PRESSURE: 122 MMHG

## 2019-09-13 DIAGNOSIS — M21.371 RIGHT FOOT DROP: ICD-10-CM

## 2019-09-13 DIAGNOSIS — G81.91 RIGHT HEMIPARESIS (HCC): ICD-10-CM

## 2019-09-13 DIAGNOSIS — R25.2 SPASTICITY AS LATE EFFECT OF CEREBROVASCULAR ACCIDENT (CVA): ICD-10-CM

## 2019-09-13 DIAGNOSIS — Z86.73 HISTORY OF STROKE: Primary | ICD-10-CM

## 2019-09-13 DIAGNOSIS — I69.398 SPASTICITY AS LATE EFFECT OF CEREBROVASCULAR ACCIDENT (CVA): ICD-10-CM

## 2019-09-13 PROCEDURE — G8598 ASA/ANTIPLAT THER USED: HCPCS | Performed by: PHYSICAL MEDICINE & REHABILITATION

## 2019-09-13 PROCEDURE — G8419 CALC BMI OUT NRM PARAM NOF/U: HCPCS | Performed by: PHYSICAL MEDICINE & REHABILITATION

## 2019-09-13 PROCEDURE — 99214 OFFICE O/P EST MOD 30 MIN: CPT | Performed by: PHYSICAL MEDICINE & REHABILITATION

## 2019-09-13 PROCEDURE — 1036F TOBACCO NON-USER: CPT | Performed by: PHYSICAL MEDICINE & REHABILITATION

## 2019-09-13 PROCEDURE — G8427 DOCREV CUR MEDS BY ELIG CLIN: HCPCS | Performed by: PHYSICAL MEDICINE & REHABILITATION

## 2019-09-13 PROCEDURE — 3017F COLORECTAL CA SCREEN DOC REV: CPT | Performed by: PHYSICAL MEDICINE & REHABILITATION

## 2019-09-13 NOTE — PROGRESS NOTES
 Diabetes Mother        No Known Allergies    Current Outpatient Medications   Medication Sig Dispense Refill    Calcium Carbonate-Vit D-Min (CALCIUM 1200 PO) Take by mouth      amLODIPine (NORVASC) 5 MG tablet Take 5 mg by mouth      Multiple Vitamins-Minerals (MULTIVITAMIN ADULT PO) Take 1 tablet by mouth      aspirin 81 MG EC tablet Take 1 tablet by mouth daily 90 tablet 1    baclofen (LIORESAL) 10 MG tablet Take one tab po bid prn 60 tablet 2    atorvastatin (LIPITOR) 40 MG tablet TAKE 1 TABLET BY MOUTH EVERY NIGHT (Patient taking differently: Take 80 mg by mouth daily ) 30 tablet 0     No current facility-administered medications for this visit. Review of Systems  General: No chills, fatigue, fever, malaise, night sweats, weight gain,  weight loss. Psychological: No anxiety, depression, suicidal ideation   Ophthalmic: No blurry vision, decreased vision, double vision, loss of vision  Ear Nose Throat: No hearing loss, tinnitus, phonophobia, sensitivity to smells, vertigo, or vocal changes. Allergy/Immunology: No watery eyes, itchy eyes, frequent infections. Hematological and Lymphatic: No bleeding problems, blood clots, bruising  Endocrine:  No polydypsia, polyuria, temperature intolerance. Respiratory: No cough, shortness of breath, wheezing. Cardiovascular: No syncope, chest pain, dyspnea on exertion,palpitations. Gastrointestinal: No abdominal pain, hematemesis, melena, nausea, vomiting, stool incontinence  Genito-Urinary: No dysuria, hematuria, incontinence   Musculoskeletal: Per HPI  Neurological: Per HPI  Dermatological:  No rash      Physical Exam:   Blood pressure 122/78, pulse 84, height 5' 1\" (1.549 m), weight 146 lb (66.2 kg), SpO2 98 %. General: The patient is in no apparent distress. HEENT: No rhinorrhea, sneezing, yawning, or lacrimation. No scleral icterus or conjunctival injection. SKIN: No piloerection. No track marks. No rash. Normal turgor.  No erythema or was educated about the diagnosis, prognosis, indications, risks and benefits of treatment. An opportunity to ask questions was given to the patient and questions were answered. The patient agreed to proceed with the recommended treatment as described above. Follow up 4 months         Tiffanie Dotson M.D.   Physical Medicine and Rehabilitation

## 2019-09-23 ENCOUNTER — HOSPITAL ENCOUNTER (OUTPATIENT)
Age: 54
Discharge: HOME | End: 2019-09-23
Payer: COMMERCIAL

## 2019-09-23 DIAGNOSIS — R53.83 FATIGUE, UNSPECIFIED TYPE: ICD-10-CM

## 2019-09-23 DIAGNOSIS — E55.9 VITAMIN D DEFICIENCY: ICD-10-CM

## 2019-09-23 DIAGNOSIS — I63.9 CEREBROVASCULAR ACCIDENT (CVA), UNSPECIFIED MECHANISM (HCC): ICD-10-CM

## 2019-09-23 LAB
ALBUMIN SERPL-MCNC: 4.9 G/DL (ref 3.5–5.2)
ALP BLD-CCNC: 74 U/L (ref 35–104)
ALT SERPL-CCNC: 33 U/L (ref 0–32)
ANION GAP SERPL CALCULATED.3IONS-SCNC: 13 MMOL/L (ref 7–16)
AST SERPL-CCNC: 22 U/L (ref 0–31)
BASOPHILS ABSOLUTE: 0.03 E9/L (ref 0–0.2)
BASOPHILS RELATIVE PERCENT: 0.3 % (ref 0–2)
BILIRUB SERPL-MCNC: 0.7 MG/DL (ref 0–1.2)
BUN BLDV-MCNC: 12 MG/DL (ref 6–20)
C-REACTIVE PROTEIN: 0.1 MG/DL (ref 0–0.4)
CALCIUM SERPL-MCNC: 9.6 MG/DL (ref 8.6–10.2)
CHLORIDE BLD-SCNC: 103 MMOL/L (ref 98–107)
CHOLESTEROL, TOTAL: 133 MG/DL (ref 0–199)
CO2: 26 MMOL/L (ref 22–29)
CREAT SERPL-MCNC: 0.7 MG/DL (ref 0.5–1)
EOSINOPHILS ABSOLUTE: 0.36 E9/L (ref 0.05–0.5)
EOSINOPHILS RELATIVE PERCENT: 3.7 % (ref 0–6)
FOLATE: >20 NG/ML (ref 4.8–24.2)
GFR AFRICAN AMERICAN: >60
GFR NON-AFRICAN AMERICAN: >60 ML/MIN/1.73
GLUCOSE BLD-MCNC: 95 MG/DL (ref 74–99)
HCT VFR BLD CALC: 46.3 % (ref 34–48)
HDLC SERPL-MCNC: 48 MG/DL
HEMOGLOBIN: 15.4 G/DL (ref 11.5–15.5)
IMMATURE GRANULOCYTES #: 0.02 E9/L
IMMATURE GRANULOCYTES %: 0.2 % (ref 0–5)
LDL CHOLESTEROL CALCULATED: 66 MG/DL (ref 0–99)
LYMPHOCYTES ABSOLUTE: 2.31 E9/L (ref 1.5–4)
LYMPHOCYTES RELATIVE PERCENT: 23.5 % (ref 20–42)
MCH RBC QN AUTO: 29.7 PG (ref 26–35)
MCHC RBC AUTO-ENTMCNC: 33.3 % (ref 32–34.5)
MCV RBC AUTO: 89.4 FL (ref 80–99.9)
MONOCYTES ABSOLUTE: 0.63 E9/L (ref 0.1–0.95)
MONOCYTES RELATIVE PERCENT: 6.4 % (ref 2–12)
NEUTROPHILS ABSOLUTE: 6.5 E9/L (ref 1.8–7.3)
NEUTROPHILS RELATIVE PERCENT: 65.9 % (ref 43–80)
PDW BLD-RTO: 11.9 FL (ref 11.5–15)
PLATELET # BLD: 238 E9/L (ref 130–450)
PMV BLD AUTO: 9.3 FL (ref 7–12)
POTASSIUM SERPL-SCNC: 4.2 MMOL/L (ref 3.5–5)
RBC # BLD: 5.18 E12/L (ref 3.5–5.5)
SEDIMENTATION RATE, ERYTHROCYTE: 32 MM/HR (ref 0–20)
SODIUM BLD-SCNC: 142 MMOL/L (ref 132–146)
TOTAL PROTEIN: 7.6 G/DL (ref 6.4–8.3)
TRIGL SERPL-MCNC: 96 MG/DL (ref 0–149)
VITAMIN B-12: 1171 PG/ML (ref 211–946)
VITAMIN D 25-HYDROXY: 55 NG/ML (ref 30–100)
VLDLC SERPL CALC-MCNC: 19 MG/DL
WBC # BLD: 9.9 E9/L (ref 4.5–11.5)

## 2019-09-23 PROCEDURE — 36415 COLL VENOUS BLD VENIPUNCTURE: CPT

## 2019-09-23 PROCEDURE — 80053 COMPREHEN METABOLIC PANEL: CPT

## 2019-09-23 PROCEDURE — 85651 RBC SED RATE NONAUTOMATED: CPT

## 2019-09-23 PROCEDURE — 82607 VITAMIN B-12: CPT

## 2019-09-23 PROCEDURE — 85025 COMPLETE CBC W/AUTO DIFF WBC: CPT

## 2019-09-23 PROCEDURE — 86140 C-REACTIVE PROTEIN: CPT

## 2019-09-23 PROCEDURE — 80061 LIPID PANEL: CPT

## 2019-09-23 PROCEDURE — 82306 VITAMIN D 25 HYDROXY: CPT

## 2019-09-23 PROCEDURE — 82746 ASSAY OF FOLIC ACID SERUM: CPT

## 2019-09-25 ENCOUNTER — APPOINTMENT (OUTPATIENT)
Dept: INTERVENTIONAL RADIOLOGY/VASCULAR | Age: 54
End: 2019-09-25
Payer: COMMERCIAL

## 2019-09-25 ENCOUNTER — HOSPITAL ENCOUNTER (OUTPATIENT)
Dept: NON INVASIVE DIAGNOSTICS | Age: 54
Discharge: HOME | End: 2019-09-25
Payer: COMMERCIAL

## 2019-09-25 DIAGNOSIS — I10 ESSENTIAL HYPERTENSION: ICD-10-CM

## 2019-09-25 DIAGNOSIS — I63.9 CEREBROVASCULAR ACCIDENT (CVA), UNSPECIFIED MECHANISM (HCC): ICD-10-CM

## 2019-09-25 LAB
LV EF: 64 %
LVEF MODALITY: NORMAL

## 2019-09-25 PROCEDURE — 93306 TTE W/DOPPLER COMPLETE: CPT

## 2019-09-30 ENCOUNTER — TELEPHONE (OUTPATIENT)
Dept: FAMILY MEDICINE CLINIC | Age: 54
End: 2019-09-30

## 2019-10-03 ENCOUNTER — TELEPHONE (OUTPATIENT)
Dept: FAMILY MEDICINE CLINIC | Age: 54
End: 2019-10-03

## 2019-10-03 RX ORDER — ATORVASTATIN CALCIUM 80 MG/1
80 TABLET, FILM COATED ORAL DAILY
Refills: 11 | COMMUNITY
Start: 2019-09-24 | End: 2020-07-28 | Stop reason: SDUPTHER

## 2019-10-03 RX ORDER — OLMESARTAN MEDOXOMIL 20 MG/1
TABLET ORAL
COMMUNITY
Start: 2019-10-02 | End: 2019-10-28

## 2019-10-28 ENCOUNTER — OFFICE VISIT (OUTPATIENT)
Dept: NEUROLOGY | Age: 54
End: 2019-10-28
Payer: COMMERCIAL

## 2019-10-28 VITALS
HEART RATE: 75 BPM | RESPIRATION RATE: 15 BRPM | DIASTOLIC BLOOD PRESSURE: 82 MMHG | SYSTOLIC BLOOD PRESSURE: 137 MMHG | OXYGEN SATURATION: 100 %

## 2019-10-28 DIAGNOSIS — I63.412 CEREBROVASCULAR ACCIDENT (CVA) DUE TO EMBOLISM OF LEFT MIDDLE CEREBRAL ARTERY (HCC): Primary | ICD-10-CM

## 2019-10-28 PROCEDURE — 1036F TOBACCO NON-USER: CPT | Performed by: PHYSICIAN ASSISTANT

## 2019-10-28 PROCEDURE — 3017F COLORECTAL CA SCREEN DOC REV: CPT | Performed by: PHYSICIAN ASSISTANT

## 2019-10-28 PROCEDURE — G8419 CALC BMI OUT NRM PARAM NOF/U: HCPCS | Performed by: PHYSICIAN ASSISTANT

## 2019-10-28 PROCEDURE — G8484 FLU IMMUNIZE NO ADMIN: HCPCS | Performed by: PHYSICIAN ASSISTANT

## 2019-10-28 PROCEDURE — G8427 DOCREV CUR MEDS BY ELIG CLIN: HCPCS | Performed by: PHYSICIAN ASSISTANT

## 2019-10-28 PROCEDURE — 99213 OFFICE O/P EST LOW 20 MIN: CPT | Performed by: PHYSICIAN ASSISTANT

## 2019-10-28 PROCEDURE — G8598 ASA/ANTIPLAT THER USED: HCPCS | Performed by: PHYSICIAN ASSISTANT

## 2019-11-14 ENCOUNTER — TELEPHONE (OUTPATIENT)
Dept: FAMILY MEDICINE CLINIC | Age: 54
End: 2019-11-14

## 2019-11-15 ENCOUNTER — OFFICE VISIT (OUTPATIENT)
Dept: FAMILY MEDICINE CLINIC | Age: 54
End: 2019-11-15
Payer: COMMERCIAL

## 2019-11-15 VITALS
BODY MASS INDEX: 28.21 KG/M2 | HEART RATE: 74 BPM | HEIGHT: 61 IN | DIASTOLIC BLOOD PRESSURE: 82 MMHG | WEIGHT: 149.4 LBS | SYSTOLIC BLOOD PRESSURE: 134 MMHG

## 2019-11-15 DIAGNOSIS — B96.89 ACUTE BACTERIAL SINUSITIS: Primary | ICD-10-CM

## 2019-11-15 DIAGNOSIS — Z12.11 SCREENING FOR COLON CANCER: ICD-10-CM

## 2019-11-15 DIAGNOSIS — J01.90 ACUTE BACTERIAL SINUSITIS: Primary | ICD-10-CM

## 2019-11-15 PROCEDURE — G8427 DOCREV CUR MEDS BY ELIG CLIN: HCPCS | Performed by: FAMILY MEDICINE

## 2019-11-15 PROCEDURE — 3017F COLORECTAL CA SCREEN DOC REV: CPT | Performed by: FAMILY MEDICINE

## 2019-11-15 PROCEDURE — 99213 OFFICE O/P EST LOW 20 MIN: CPT | Performed by: FAMILY MEDICINE

## 2019-11-15 PROCEDURE — G8484 FLU IMMUNIZE NO ADMIN: HCPCS | Performed by: FAMILY MEDICINE

## 2019-11-15 PROCEDURE — G8598 ASA/ANTIPLAT THER USED: HCPCS | Performed by: FAMILY MEDICINE

## 2019-11-15 PROCEDURE — 1036F TOBACCO NON-USER: CPT | Performed by: FAMILY MEDICINE

## 2019-11-15 PROCEDURE — G8419 CALC BMI OUT NRM PARAM NOF/U: HCPCS | Performed by: FAMILY MEDICINE

## 2019-11-15 RX ORDER — CEFDINIR 300 MG/1
300 CAPSULE ORAL 2 TIMES DAILY
Qty: 14 CAPSULE | Refills: 0 | Status: SHIPPED | OUTPATIENT
Start: 2019-11-15 | End: 2019-11-22

## 2019-11-15 RX ORDER — OLMESARTAN MEDOXOMIL 20 MG/1
20 TABLET ORAL DAILY
COMMUNITY
End: 2020-07-28 | Stop reason: CLARIF

## 2019-11-15 ASSESSMENT — ENCOUNTER SYMPTOMS
CONSTIPATION: 0
SORE THROAT: 1
DIARRHEA: 0
SHORTNESS OF BREATH: 0
COUGH: 1
SINUS PRESSURE: 1
ABDOMINAL PAIN: 0
SINUS PAIN: 1

## 2019-12-20 ENCOUNTER — OFFICE VISIT (OUTPATIENT)
Dept: FAMILY MEDICINE CLINIC | Age: 54
End: 2019-12-20
Payer: COMMERCIAL

## 2019-12-20 VITALS
BODY MASS INDEX: 28.51 KG/M2 | HEIGHT: 61 IN | DIASTOLIC BLOOD PRESSURE: 88 MMHG | RESPIRATION RATE: 20 BRPM | WEIGHT: 151 LBS | SYSTOLIC BLOOD PRESSURE: 131 MMHG | HEART RATE: 110 BPM | TEMPERATURE: 97.8 F

## 2019-12-20 DIAGNOSIS — I63.9 CEREBROVASCULAR ACCIDENT (CVA), UNSPECIFIED MECHANISM (HCC): ICD-10-CM

## 2019-12-20 DIAGNOSIS — Z91.09 ENVIRONMENTAL ALLERGIES: ICD-10-CM

## 2019-12-20 DIAGNOSIS — M25.551 RIGHT HIP PAIN: ICD-10-CM

## 2019-12-20 DIAGNOSIS — G56.22 ULNAR NEUROPATHY OF LEFT UPPER EXTREMITY: Primary | ICD-10-CM

## 2019-12-20 DIAGNOSIS — I10 ESSENTIAL HYPERTENSION: ICD-10-CM

## 2019-12-20 PROCEDURE — G8598 ASA/ANTIPLAT THER USED: HCPCS | Performed by: FAMILY MEDICINE

## 2019-12-20 PROCEDURE — 1036F TOBACCO NON-USER: CPT | Performed by: FAMILY MEDICINE

## 2019-12-20 PROCEDURE — G8419 CALC BMI OUT NRM PARAM NOF/U: HCPCS | Performed by: FAMILY MEDICINE

## 2019-12-20 PROCEDURE — 3017F COLORECTAL CA SCREEN DOC REV: CPT | Performed by: FAMILY MEDICINE

## 2019-12-20 PROCEDURE — 99214 OFFICE O/P EST MOD 30 MIN: CPT | Performed by: FAMILY MEDICINE

## 2019-12-20 PROCEDURE — G8427 DOCREV CUR MEDS BY ELIG CLIN: HCPCS | Performed by: FAMILY MEDICINE

## 2019-12-20 PROCEDURE — G8484 FLU IMMUNIZE NO ADMIN: HCPCS | Performed by: FAMILY MEDICINE

## 2019-12-20 RX ORDER — METHYLPREDNISOLONE 4 MG/1
TABLET ORAL
Qty: 1 KIT | Refills: 0 | Status: SHIPPED | OUTPATIENT
Start: 2019-12-20 | End: 2019-12-26

## 2019-12-20 RX ORDER — MONTELUKAST SODIUM 10 MG/1
10 TABLET ORAL DAILY
Qty: 30 TABLET | Refills: 3 | Status: SHIPPED
Start: 2019-12-20 | End: 2020-04-29

## 2019-12-20 ASSESSMENT — ENCOUNTER SYMPTOMS
COUGH: 1
SORE THROAT: 0
ABDOMINAL PAIN: 0
DIARRHEA: 0
SINUS PRESSURE: 0
CONSTIPATION: 0
SHORTNESS OF BREATH: 0
SINUS PAIN: 0

## 2019-12-26 DIAGNOSIS — Z12.11 SCREENING FOR COLON CANCER: ICD-10-CM

## 2019-12-27 ENCOUNTER — HOSPITAL ENCOUNTER (OUTPATIENT)
Age: 54
Discharge: HOME | End: 2019-12-29
Payer: COMMERCIAL

## 2019-12-27 ENCOUNTER — HOSPITAL ENCOUNTER (OUTPATIENT)
Dept: GENERAL RADIOLOGY | Age: 54
Discharge: HOME | End: 2019-12-29
Payer: COMMERCIAL

## 2019-12-27 DIAGNOSIS — M25.551 RIGHT HIP PAIN: ICD-10-CM

## 2019-12-27 PROCEDURE — 73502 X-RAY EXAM HIP UNI 2-3 VIEWS: CPT

## 2019-12-30 ENCOUNTER — TELEPHONE (OUTPATIENT)
Dept: FAMILY MEDICINE CLINIC | Age: 54
End: 2019-12-30

## 2020-01-13 ENCOUNTER — OFFICE VISIT (OUTPATIENT)
Dept: PHYSICAL MEDICINE AND REHAB | Age: 55
End: 2020-01-13
Payer: COMMERCIAL

## 2020-01-13 VITALS
SYSTOLIC BLOOD PRESSURE: 126 MMHG | HEIGHT: 61 IN | WEIGHT: 148 LBS | DIASTOLIC BLOOD PRESSURE: 78 MMHG | HEART RATE: 76 BPM | OXYGEN SATURATION: 97 % | BODY MASS INDEX: 27.94 KG/M2

## 2020-01-13 PROCEDURE — G8427 DOCREV CUR MEDS BY ELIG CLIN: HCPCS | Performed by: PHYSICAL MEDICINE & REHABILITATION

## 2020-01-13 PROCEDURE — G8419 CALC BMI OUT NRM PARAM NOF/U: HCPCS | Performed by: PHYSICAL MEDICINE & REHABILITATION

## 2020-01-13 PROCEDURE — 3017F COLORECTAL CA SCREEN DOC REV: CPT | Performed by: PHYSICAL MEDICINE & REHABILITATION

## 2020-01-13 PROCEDURE — G8484 FLU IMMUNIZE NO ADMIN: HCPCS | Performed by: PHYSICAL MEDICINE & REHABILITATION

## 2020-01-13 PROCEDURE — 99214 OFFICE O/P EST MOD 30 MIN: CPT | Performed by: PHYSICAL MEDICINE & REHABILITATION

## 2020-01-13 PROCEDURE — 1036F TOBACCO NON-USER: CPT | Performed by: PHYSICAL MEDICINE & REHABILITATION

## 2020-01-13 NOTE — PROGRESS NOTES
Mackenzie Puente M.D. [facility] PHYSICAL MEDICINE AND REHABILITAION  [address]  [facility] [address]  Dept: [phone]  Dept Fax: [phone]    PCP: Kevyn Cortez DO  Date of visit: 1/13/20    Chief Complaint   Patient presents with    Extremity Weakness     wants to modify driving with L foot, discuss brace for R foot. Also, needs new order for PT      Kiana Weiss is a 48 y.o.  woman who presents for follow up of rehab needs post stroke. Patient sustained left MIRA and MCA distribution strokes in May of 2019 with right hemiparesis, right foot drop, cognitive impairment, and word finding deficits. Once medically stabilized she completed the Acute Rehab program from 5/24/19-6/6/19. At time of discharge home she was at [facility] for bathing, Mod I for all other ADLs, and Mod I for mobility. She was ambulating 700ft with a right AFO. She completed Speech therapy and OT and reported improvement in speech. She denies any ongoing issues with speech. She notes she is still somewhat forgetful at times, but cognition is within functional limits. Since last visit patient reports she is doing well overall. She is still in physical therapy, and notes ongoing progress. She ran out of covered PT sessions for the year. She is at [facility] level for all ADLs and mobility. She is utilizing her right AFO. She reports she is having some intermittent foot pain in the current AFO and does not feel she is getting adequate support or correction of ankle foot alignment in the current brace. She continues to hip hike and circumduct despite the current AFO. She has had 2 falls while wearing the current brace. She has persistent right foot drop. She has been unable to drive due to right foot drop. We previously discussed adaptive driving options and she now would like to pursue. Right upper extremity strength has improved.  She notes some pain and spasticity in the RLE and was previously started on baclofen 10mg BID by her primary care physician. She states the spasticity has improved greatly as she progresses with therapy and she is no longer taking the baclofen. No other issues reported. Past Medical History:   Diagnosis Date    Back pain     [facility]erebral [facility] occlusion [facility] infarction [facility] [address]     Hypertension     Numbness and tingling     Occlusion of left internal carotid artery 2019    Paralysis of right lower extremity ([facility] 2019       History reviewed. No pertinent surgical history.     Social History     Socioeconomic History    Marital status: Single     Spouse name: Not on file    Number of children: Not on file    Years of education: Not on file    Highest education level: Not on file   Occupational History    Not on file   Social Needs    Financial resource strain: Not on file    Food insecurity:     Worry: Not on file     Inability: Not on file    Transportation needs:     Medical: Not on file     Non-medical: Not on file   Tobacco Use    Smoking status: Former Smoker     Packs/day: 0.50     Types: Cigarettes     Last attempt to quit: 2019     Years since quittin.7    Smokeless tobacco: Never Used   Substance and Sexual Activity    Alcohol use: Yes     Frequency: 2-4 times a month     Comment: moderate    Drug use: No    Sexual activity: Not Currently   Lifestyle    Physical activity:     Days per week: Not on file     Minutes per session: Not on file    Stress: Not on file   Relationships    Social connections:     Talks on phone: Not on file     Gets together: Not on file     Attends Yarsanism service: Not on file     Active member of club or organization: Not on file     Attends meetings of clubs or organizations: Not on file     Relationship status: Not on file    Intimate partner violence:     Fear of current or ex partner: Not on file     Emotionally abused: Not on file     Physically abused: Not on rhinorrhea, sneezing, yawning, or lacrimation. No scleral icterus or conjunctival injection. SKIN: No piloerection. No track marks. No rash. Normal turgor. No erythema or ecchymosis. Psychological: Mood and affect are appropriate. Hygiene is appropriate. Cardiovascular:  Heart is regular rate and rhythm. Peripheral pulses are 2+ at the dorsalis pedis, posterior tibial and radial arteries. There is no edema. Respiratory: Respirations are regular and unlabored. There is no cyanosis. Lymphatic: There is no cervical lymphadenopathy. Gastrointestinal: Soft abdomen, non-tender. Genitourinary: No costovertebral angle tenderness. MSK: Normal range of motion. No deformity. Right plantar flexion spasticity MAS 2. Neurologic: Awake, alert and oriented in three planes. Speech is fluent. No facial weakness. Tongue is midline. Hearing is intact for conversation. Pupils are equal and round. Extraocular muscles are intact. Shoulder shrug symmetric. Strength:   R  L  Deltoid   5  5  Biceps   5  5  Triceps  5  5  Wrist Ext  5  5  Finger Abd  5  5  Hip Flex  4+  5  Knee Ext  5-  5  Ankle dorsi  2-  5  EHL   2 5  Ankle Plantar  2+  5    Sensory:  Intact for light touch in all upper and lower extremity dermatomes. Reflexes:   R  L  Biceps   2 2  Triceps  2 2  BR   2 2   Patellar  2 2   Ankle Jerk  2 2    No Babinski  No Yap    Gait: Mildly spastic gait. Right foot drop. + hip hike and circumduction. Gait improved with AFO. Impression:   1. History of stroke    2. Right hemiparesis ([address]    3. Right foot drop    4. 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 She is having some issues with her current AFO--not providing adequate support/alignment. She requires a right custom AFO brace. The patient is diagnosed with stroke, right hemiparesis, right foot drop.  Patient is in need of a right custom AFO to offset weakness, joint instability/malalignment, associated pain and proprioceptive/ tactile feedback. The custom orthosis will provide multi-plane joint control of the ankle and foot. Length of need will be more than six months for ambulation and will prevent tissue injury. It will improve the patients gait, limb alignment, balance, and risk of falling. This will enable the patient to safely and effectively achieve an increased level of physical activity, leading to an overall reduction in pain and improved quality of life. · Referral to the 's rehabilitation program for eval and adaptive driving training for left foot accelerator. Once she completes the program and training and passes functional  evaluation we will fill out her medical clearance forms for the BMV. The patient was educated about the diagnosis, prognosis, indications, risks and benefits of treatment. An opportunity to ask questions was given to the patient and questions were answered. The patient agreed to proceed with the recommended treatment as described above. Follow up 5 months         Lynda Diaz M.D.   Physical Medicine and Rehabilitation

## 2020-02-20 ENCOUNTER — HOSPITAL ENCOUNTER (OUTPATIENT)
Dept: ULTRASOUND IMAGING | Age: 55
Discharge: HOME | End: 2020-02-22
Payer: COMMERCIAL

## 2020-02-20 PROCEDURE — 76770 US EXAM ABDO BACK WALL COMP: CPT

## 2020-03-27 ENCOUNTER — TELEPHONE (OUTPATIENT)
Dept: FAMILY MEDICINE CLINIC | Age: 55
End: 2020-03-27

## 2020-03-27 RX ORDER — ASPIRIN 81 MG/1
81 TABLET ORAL DAILY
Qty: 90 TABLET | Refills: 1 | Status: SHIPPED
Start: 2020-03-27 | End: 2020-07-07

## 2020-04-29 ENCOUNTER — TELEPHONE (OUTPATIENT)
Dept: FAMILY MEDICINE CLINIC | Age: 55
End: 2020-04-29

## 2020-04-29 RX ORDER — MONTELUKAST SODIUM 10 MG/1
10 TABLET ORAL DAILY
Qty: 30 TABLET | Refills: 0 | Status: SHIPPED
Start: 2020-04-29 | End: 2020-04-29 | Stop reason: SDUPTHER

## 2020-04-29 RX ORDER — MONTELUKAST SODIUM 10 MG/1
10 TABLET ORAL DAILY
Qty: 30 TABLET | Refills: 3 | Status: SHIPPED
Start: 2020-04-29 | End: 2020-07-28 | Stop reason: SDUPTHER

## 2020-05-06 ENCOUNTER — TELEPHONE (OUTPATIENT)
Dept: VASCULAR SURGERY | Age: 55
End: 2020-05-06

## 2020-05-14 ENCOUNTER — TELEPHONE (OUTPATIENT)
Dept: FAMILY MEDICINE CLINIC | Age: 55
End: 2020-05-14

## 2020-05-14 NOTE — TELEPHONE ENCOUNTER
Pt called left a voice message for a return call, she had went to PT yesterday and she has been having nerve pain, therapist gave her a script and she would like to discuss this, [phone]

## 2020-05-14 NOTE — TELEPHONE ENCOUNTER
Patient called stated she had a stroke almost 1 year ago, having pains in her legs and hip, her legs feels really heavy , pt stated PT stated to her it could be nerve damage , and suggestive pt consult PCP to maybe prescribe gabapentin, also pt stated her left arm also gives her a throbbing pain, tingling down through fingers, she cant lay on that side, pt wants to know is she can have EMG testing done, please advise if you want a face to face with patient or a virtual visit

## 2020-05-18 ENCOUNTER — VIRTUAL VISIT (OUTPATIENT)
Dept: FAMILY MEDICINE CLINIC | Age: 55
End: 2020-05-18
Payer: COMMERCIAL

## 2020-05-18 VITALS — BODY MASS INDEX: 27.94 KG/M2 | WEIGHT: 148 LBS | HEIGHT: 61 IN

## 2020-05-18 PROCEDURE — 3017F COLORECTAL CA SCREEN DOC REV: CPT | Performed by: FAMILY MEDICINE

## 2020-05-18 PROCEDURE — G8427 DOCREV CUR MEDS BY ELIG CLIN: HCPCS | Performed by: FAMILY MEDICINE

## 2020-05-18 PROCEDURE — G9899 SCRN MAM PERF RSLTS DOC: HCPCS | Performed by: FAMILY MEDICINE

## 2020-05-18 PROCEDURE — 99214 OFFICE O/P EST MOD 30 MIN: CPT | Performed by: FAMILY MEDICINE

## 2020-05-18 RX ORDER — GABAPENTIN 100 MG/1
100 CAPSULE ORAL 2 TIMES DAILY
Qty: 60 CAPSULE | Refills: 5 | Status: SHIPPED
Start: 2020-05-18 | End: 2020-09-09 | Stop reason: ALTCHOICE

## 2020-05-18 ASSESSMENT — ENCOUNTER SYMPTOMS
COUGH: 0
SINUS PAIN: 0
DIARRHEA: 0
CONSTIPATION: 0
SORE THROAT: 0
SINUS PRESSURE: 0
SHORTNESS OF BREATH: 0
ABDOMINAL PAIN: 0

## 2020-05-18 ASSESSMENT — PATIENT HEALTH QUESTIONNAIRE - PHQ9
SUM OF ALL RESPONSES TO PHQ QUESTIONS 1-9: 0
SUM OF ALL RESPONSES TO PHQ QUESTIONS 1-9: 0
2. FEELING DOWN, DEPRESSED OR HOPELESS: 0
1. LITTLE INTEREST OR PLEASURE IN DOING THINGS: 0
SUM OF ALL RESPONSES TO PHQ9 QUESTIONS 1 & 2: 0

## 2020-05-18 NOTE — PROGRESS NOTES
Psychiatric/Behavioral: Negative for dysphoric mood. The patient is not nervous/anxious. PHYSICAL EXAM  Ht 5' 1\" (1.549 m)   Wt 148 lb (67.1 kg)   BMI 27.96 kg/m²   Physical Exam  Vitals signs reviewed. Constitutional:       Appearance: Normal appearance. She is normal weight. HENT:      Head: Normocephalic. Nose: Nose normal.   Eyes:      Extraocular Movements: Extraocular movements intact. Conjunctiva/sclera: Conjunctivae normal.   Neck:      Musculoskeletal: Normal range of motion. Pulmonary:      Effort: Pulmonary effort is normal.   Musculoskeletal: Normal range of motion. Skin:     Findings: No rash. Neurological:      General: No focal deficit present. Mental Status: She is alert and oriented to person, place, and time. Mental status is at baseline. Psychiatric:         Mood and Affect: Mood normal.         Behavior: Behavior normal.         Thought Content: Thought content normal.         Judgment: Judgment normal.              Laboratory:   All laboratory and radiology results have been personally reviewed by myself    Lab Results   Component Value Date     09/23/2019    K 4.2 09/23/2019    K 4.1 06/15/2019     09/23/2019    CO2 26 09/23/2019    BUN 12 09/23/2019    CREATININE 0.7 09/23/2019    PROT 7.6 09/23/2019    LABALBU 4.9 09/23/2019    CALCIUM 9.6 09/23/2019    GFRAA >60 09/23/2019    LABGLOM >60 09/23/2019    GLUCOSE 95 09/23/2019    AST 22 09/23/2019    ALT 33 09/23/2019    ALKPHOS 74 09/23/2019    BILITOT 0.7 09/23/2019    TSH 3.500 04/13/2015    VITD25 55 09/23/2019    CHOL 133 09/23/2019    TRIG 96 09/23/2019    HDL 48 09/23/2019    LDLCALC 66 09/23/2019        Lab Results   Component Value Date    CHOL 133 09/23/2019    CHOL 185 05/23/2019    CHOL 198 04/13/2015     Lab Results   Component Value Date    TRIG 96 09/23/2019    TRIG 177 (H) 05/23/2019    TRIG 163 (H) 04/13/2015     Lab Results   Component Value Date    HDL 48 09/23/2019    HDL 44

## 2020-05-28 ENCOUNTER — OFFICE VISIT (OUTPATIENT)
Dept: VASCULAR SURGERY | Age: 55
End: 2020-05-28
Payer: COMMERCIAL

## 2020-05-28 ENCOUNTER — TELEPHONE (OUTPATIENT)
Dept: VASCULAR SURGERY | Age: 55
End: 2020-05-28

## 2020-05-28 ENCOUNTER — HOSPITAL ENCOUNTER (OUTPATIENT)
Age: 55
Discharge: HOME | End: 2020-05-28
Payer: COMMERCIAL

## 2020-05-28 VITALS — HEIGHT: 61 IN | BODY MASS INDEX: 27.94 KG/M2 | WEIGHT: 148 LBS | RESPIRATION RATE: 16 BRPM

## 2020-05-28 PROBLEM — R29.898 RIGHT LEG WEAKNESS: Status: ACTIVE | Noted: 2020-05-28

## 2020-05-28 PROBLEM — I65.23 BILATERAL CAROTID ARTERY STENOSIS: Status: ACTIVE | Noted: 2020-05-28

## 2020-05-28 PROBLEM — R09.89 BRUIT OF LEFT CAROTID ARTERY: Status: ACTIVE | Noted: 2020-05-28

## 2020-05-28 PROBLEM — I63.9 ACUTE CVA (CEREBROVASCULAR ACCIDENT) (HCC): Status: RESOLVED | Noted: 2019-05-21 | Resolved: 2020-05-28

## 2020-05-28 PROBLEM — Z86.73 H/O: CVA (CEREBROVASCULAR ACCIDENT): Status: ACTIVE | Noted: 2020-05-28

## 2020-05-28 LAB
ANION GAP SERPL CALCULATED.3IONS-SCNC: 16 MMOL/L (ref 7–16)
BUN BLDV-MCNC: 15 MG/DL (ref 6–20)
CALCIUM SERPL-MCNC: 9.4 MG/DL (ref 8.6–10.2)
CHLORIDE BLD-SCNC: 102 MMOL/L (ref 98–107)
CO2: 30 MMOL/L (ref 22–29)
CREAT SERPL-MCNC: 0.6 MG/DL (ref 0.5–1)
GFR AFRICAN AMERICAN: >60
GFR NON-AFRICAN AMERICAN: >60 ML/MIN/1.73
GLUCOSE BLD-MCNC: 110 MG/DL (ref 74–99)
POTASSIUM SERPL-SCNC: 4.2 MMOL/L (ref 3.5–5)
SODIUM BLD-SCNC: 148 MMOL/L (ref 132–146)

## 2020-05-28 PROCEDURE — 36415 COLL VENOUS BLD VENIPUNCTURE: CPT

## 2020-05-28 PROCEDURE — G9899 SCRN MAM PERF RSLTS DOC: HCPCS | Performed by: SURGERY

## 2020-05-28 PROCEDURE — G8419 CALC BMI OUT NRM PARAM NOF/U: HCPCS | Performed by: SURGERY

## 2020-05-28 PROCEDURE — G8427 DOCREV CUR MEDS BY ELIG CLIN: HCPCS | Performed by: SURGERY

## 2020-05-28 PROCEDURE — 99215 OFFICE O/P EST HI 40 MIN: CPT | Performed by: SURGERY

## 2020-05-28 PROCEDURE — 1036F TOBACCO NON-USER: CPT | Performed by: SURGERY

## 2020-05-28 PROCEDURE — 3017F COLORECTAL CA SCREEN DOC REV: CPT | Performed by: SURGERY

## 2020-05-28 PROCEDURE — 80048 BASIC METABOLIC PNL TOTAL CA: CPT

## 2020-05-28 NOTE — PROGRESS NOTES
The CTA of the carotids was reviewed again by me, done in May of last year, revealed complete occlusion left internal carotid artery associate with associated plaque with mild stenosis of less than 30% on the right side    3. When I was looking at the CTA of the carotids done in May, I noted that patient did have a CT of the carotids in June, patient tells me she went there because of concerned that she not doing well, felt slightly lightheaded    I have personally reviewed the CTA, surprisingly, the left internal carotid is widely patent with stenosis at the takeoff, 80% intracranially, there is minimal dissection of the carotid siphon with mild to moderate disease but not hemodynamically significant    Assessment:    1. Occlusion of left internal carotid artery    2. H/O: CVA (cerebrovascular accident)    3. Right leg weakness    4. Bruit of left carotid artery    5. Bilateral carotid artery stenosis              Plan:       Discussed the patient and her , I informed him, that I reviewed the CTA carotid in June, at this time the left internal carotid recanalized revealed 80% atherosclerotic plaque stenosis with some distal disease neck of the left carotid siphon, to my recollection, the only thing he knew was that she has complete occlusion left internal carotid artery    Because of the new finding, but unfortunately the CTA was 3year-old, patient recommended repeat CT of the carotids for further documentation and indeed if she has similar findings, may benefit from carotid artery intervention    Patient was recommend CT of the carotids and then make additional recommendations at that time and to call me PRN if any neurological symptoms in the interim      Patient was instructed to continue walking program and to call if any worsening of symptoms and to call if any focal lateralizing neurological symptoms like loss of speech, vision or loss of function of extremity.       All the questions were answered. Orders Placed This Encounter   Procedures    CTA Neck W Contrast    CTA HEAD W CONTRAST    Basic Metabolic Panel             Indicated follow-up: Return if symptoms worsen or fail to improve.

## 2020-05-30 ENCOUNTER — HOSPITAL ENCOUNTER (OUTPATIENT)
Dept: CT IMAGING | Age: 55
Discharge: HOME | End: 2020-06-01
Payer: COMMERCIAL

## 2020-05-30 PROCEDURE — 70498 CT ANGIOGRAPHY NECK: CPT

## 2020-05-30 PROCEDURE — 6360000004 HC RX CONTRAST MEDICATION: Performed by: RADIOLOGY

## 2020-05-30 PROCEDURE — 70496 CT ANGIOGRAPHY HEAD: CPT

## 2020-05-30 RX ADMIN — IOPAMIDOL 60 ML: 755 INJECTION, SOLUTION INTRAVENOUS at 08:34

## 2020-06-01 ENCOUNTER — TELEPHONE (OUTPATIENT)
Dept: VASCULAR SURGERY | Age: 55
End: 2020-06-01

## 2020-06-01 NOTE — TELEPHONE ENCOUNTER
I had a long detailed discussion with patient regarding the CTA findings, approximately 80 to 90% left carotid stenosis, some degree of stenosis intracranially, approximate 30 to 40%, recommend a left carotid intervention provided patient stable from a cardiac point, patient informed me that Dr. Annetta Salcedo told her, based upon the 2D echo, her cardiac condition stable, and she is fairly active with any symptoms of angina palpitation etc.    I have also discussed with the patient's  David Said, telephone #[phone] explained to him the situation and he understands and agrees, informed him that I will contact Dr. Annetta Salcedo regarding her cardiac status in that interim tentatively we will plan on surgery next week, all their questions were answered

## 2020-06-04 ENCOUNTER — PREP FOR PROCEDURE (OUTPATIENT)
Dept: VASCULAR SURGERY | Age: 55
End: 2020-06-04

## 2020-06-04 ENCOUNTER — OFFICE VISIT (OUTPATIENT)
Dept: VASCULAR SURGERY | Age: 55
End: 2020-06-04
Payer: COMMERCIAL

## 2020-06-04 VITALS — BODY MASS INDEX: 27.94 KG/M2 | RESPIRATION RATE: 16 BRPM | WEIGHT: 148 LBS | HEIGHT: 61 IN

## 2020-06-04 DIAGNOSIS — I65.22 LEFT CAROTID STENOSIS: Primary | ICD-10-CM

## 2020-06-04 PROCEDURE — G8427 DOCREV CUR MEDS BY ELIG CLIN: HCPCS | Performed by: SURGERY

## 2020-06-04 PROCEDURE — 3017F COLORECTAL CA SCREEN DOC REV: CPT | Performed by: SURGERY

## 2020-06-04 PROCEDURE — 1036F TOBACCO NON-USER: CPT | Performed by: SURGERY

## 2020-06-04 PROCEDURE — G9899 SCRN MAM PERF RSLTS DOC: HCPCS | Performed by: SURGERY

## 2020-06-04 PROCEDURE — 99214 OFFICE O/P EST MOD 30 MIN: CPT | Performed by: SURGERY

## 2020-06-04 PROCEDURE — G8419 CALC BMI OUT NRM PARAM NOF/U: HCPCS | Performed by: SURGERY

## 2020-06-04 RX ORDER — SODIUM CHLORIDE 0.9 % (FLUSH) 0.9 %
10 SYRINGE (ML) INJECTION PRN
Status: CANCELLED | OUTPATIENT
Start: 2020-06-04

## 2020-06-04 RX ORDER — SODIUM CHLORIDE 9 MG/ML
INJECTION, SOLUTION INTRAVENOUS CONTINUOUS
Status: CANCELLED | OUTPATIENT
Start: 2020-06-04

## 2020-06-04 RX ORDER — SODIUM CHLORIDE 0.9 % (FLUSH) 0.9 %
10 SYRINGE (ML) INJECTION EVERY 12 HOURS SCHEDULED
Status: CANCELLED | OUTPATIENT
Start: 2020-06-04

## 2020-06-04 NOTE — PROGRESS NOTES
Chief Complaint:   Chief Complaint   Patient presents with    Pre-op Exam     CEA         HPI:  Patient came to the office with her , for discussion regarding the findings of the repeat CT of the carotids, that revealed approximately 90% left carotid stenosis in the neck, approximate 30 to 40% again stenosis and options for management including left carotid endarterectomy    Patient still has weakness of the right leg, foot drop, does wear a brace    Patient recently did undergo cardiac evaluation at [facility], 2D echo thought, no major abnormal findings, patient was told, her cardiac condition stable, I have personally discussed  [facility], okay to proceed with  carotid surgery from a cardiac perspective      Patient denies any new focal lateralizing neurological symptoms like loss of speech, vision or loss of function of extremity since her stroke last year    Patient can walk a few blocks , and denies any symptoms of rest pain    No Known Allergies    Current Outpatient Medications   Medication Sig Dispense Refill    gabapentin (NEURONTIN) 100 MG capsule Take 1 capsule by mouth 2 times daily for 180 days. Intended supply: 30 days 60 capsule 5    montelukast (SINGULAIR) 10 MG tablet Take 1 tablet by mouth daily 30 tablet 3    aspirin 81 MG EC tablet Take 1 tablet by mouth daily 90 tablet 1    olmesartan (BENICAR) 20 MG tablet Take 20 mg by mouth daily      atorvastatin (LIPITOR) 80 MG tablet Take 80 mg by mouth daily   11    amLODIPine (NORVASC) 5 MG tablet Take 5 mg by mouth      Multiple Vitamins-Minerals (MULTIVITAMIN ADULT PO) Take 1 tablet by mouth       No current facility-administered medications for this visit.         Past Medical History:   Diagnosis Date    Back pain     Bilateral carotid artery stenosis 5/28/2020    Bruit of left carotid artery 5/28/2020    Cerebral artery occlusion with cerebral infarction ([address]     H/O: CVA (cerebrovascular accident) 5/28/2020    Hypertension    

## 2020-06-05 ENCOUNTER — HOSPITAL ENCOUNTER (OUTPATIENT)
Age: 55
Discharge: HOME | End: 2020-06-07
Payer: COMMERCIAL

## 2020-06-05 PROCEDURE — U0003 INFECTIOUS AGENT DETECTION BY NUCLEIC ACID (DNA OR RNA); SEVERE ACUTE RESPIRATORY SYNDROME CORONAVIRUS 2 (SARS-COV-2) (CORONAVIRUS DISEASE [COVID-19]), AMPLIFIED PROBE TECHNIQUE, MAKING USE OF HIGH THROUGHPUT TECHNOLOGIES AS DESCRIBED BY CMS-2020-01-R: HCPCS

## 2020-06-07 LAB
SARS-COV-2: NOT DETECTED
SOURCE: NORMAL

## 2020-06-08 ENCOUNTER — HOSPITAL ENCOUNTER (OUTPATIENT)
Dept: PREADMISSION TESTING | Age: 55
Setting detail: SURGERY ADMIT
Discharge: HOME | DRG: 024 | End: 2020-06-08
Payer: COMMERCIAL

## 2020-06-08 ENCOUNTER — HOSPITAL ENCOUNTER (OUTPATIENT)
Dept: GENERAL RADIOLOGY | Age: 55
Discharge: HOME | DRG: 024 | End: 2020-06-10
Payer: COMMERCIAL

## 2020-06-08 LAB
ABO/RH: NORMAL
ALBUMIN SERPL-MCNC: 4.4 G/DL (ref 3.5–5.2)
ALP BLD-CCNC: 68 U/L (ref 35–104)
ALT SERPL-CCNC: 21 U/L (ref 0–32)
ANION GAP SERPL CALCULATED.3IONS-SCNC: 10 MMOL/L (ref 7–16)
ANTIBODY SCREEN: NORMAL
APTT: 31.2 SEC (ref 24.5–35.1)
AST SERPL-CCNC: 17 U/L (ref 0–31)
BILIRUB SERPL-MCNC: 0.3 MG/DL (ref 0–1.2)
BUN BLDV-MCNC: 13 MG/DL (ref 6–20)
CALCIUM SERPL-MCNC: 8.9 MG/DL (ref 8.6–10.2)
CHLORIDE BLD-SCNC: 106 MMOL/L (ref 98–107)
CO2: 25 MMOL/L (ref 22–29)
CREAT SERPL-MCNC: 0.6 MG/DL (ref 0.5–1)
EKG ATRIAL RATE: 69 BPM
EKG P AXIS: 34 DEGREES
EKG P-R INTERVAL: 168 MS
EKG Q-T INTERVAL: 404 MS
EKG QRS DURATION: 94 MS
EKG QTC CALCULATION (BAZETT): 432 MS
EKG R AXIS: 17 DEGREES
EKG T AXIS: 14 DEGREES
EKG VENTRICULAR RATE: 69 BPM
GFR AFRICAN AMERICAN: >60
GFR NON-AFRICAN AMERICAN: >60 ML/MIN/1.73
GLUCOSE BLD-MCNC: 98 MG/DL (ref 74–99)
HCG QUALITATIVE: NEGATIVE
HCT VFR BLD CALC: 41.7 % (ref 34–48)
HEMOGLOBIN: 13.7 G/DL (ref 11.5–15.5)
INR BLD: 0.9
MCH RBC QN AUTO: 29.8 PG (ref 26–35)
MCHC RBC AUTO-ENTMCNC: 32.9 % (ref 32–34.5)
MCV RBC AUTO: 90.8 FL (ref 80–99.9)
PDW BLD-RTO: 12.2 FL (ref 11.5–15)
PLATELET # BLD: 227 E9/L (ref 130–450)
PMV BLD AUTO: 10 FL (ref 7–12)
POTASSIUM REFLEX MAGNESIUM: 4.1 MMOL/L (ref 3.5–5)
PROTHROMBIN TIME: 10.1 SEC (ref 9.3–12.4)
RBC # BLD: 4.59 E12/L (ref 3.5–5.5)
SODIUM BLD-SCNC: 141 MMOL/L (ref 132–146)
TOTAL PROTEIN: 7 G/DL (ref 6.4–8.3)
WBC # BLD: 6.6 E9/L (ref 4.5–11.5)

## 2020-06-08 PROCEDURE — 84703 CHORIONIC GONADOTROPIN ASSAY: CPT

## 2020-06-08 PROCEDURE — 87081 CULTURE SCREEN ONLY: CPT

## 2020-06-08 PROCEDURE — 71046 X-RAY EXAM CHEST 2 VIEWS: CPT

## 2020-06-08 PROCEDURE — 93005 ELECTROCARDIOGRAM TRACING: CPT | Performed by: SURGERY

## 2020-06-08 PROCEDURE — 86901 BLOOD TYPING SEROLOGIC RH(D): CPT

## 2020-06-08 PROCEDURE — 85610 PROTHROMBIN TIME: CPT

## 2020-06-08 PROCEDURE — 86850 RBC ANTIBODY SCREEN: CPT

## 2020-06-08 PROCEDURE — 36415 COLL VENOUS BLD VENIPUNCTURE: CPT

## 2020-06-08 PROCEDURE — 80053 COMPREHEN METABOLIC PANEL: CPT

## 2020-06-08 PROCEDURE — 85730 THROMBOPLASTIN TIME PARTIAL: CPT

## 2020-06-08 PROCEDURE — 85027 COMPLETE CBC AUTOMATED: CPT

## 2020-06-08 PROCEDURE — 86900 BLOOD TYPING SEROLOGIC ABO: CPT

## 2020-06-09 ENCOUNTER — ANESTHESIA EVENT (OUTPATIENT)
Dept: OPERATING ROOM | Age: 55
DRG: 024 | End: 2020-06-09
Payer: COMMERCIAL

## 2020-06-09 LAB — MRSA CULTURE ONLY: NORMAL

## 2020-06-09 NOTE — H&P
occlusion with [facility] ([facility]      H/O: CVA (cerebrovascular accident) 2020    Hypertension      Numbness and tingling      Occlusion of left internal carotid artery 2019    Paralysis of right lower extremity ([facility] [address] 2019    Right leg weakness 2020            Past Surgical History   History reviewed. No pertinent surgical history.        Family History         Family History   Problem Relation Age of Onset    High Blood Pressure Mother      Diabetes Mother              Social History               Socioeconomic History    Marital status: Single       Spouse name: Not on file    Number of children: Not on file    Years of education: Not on file    Highest education level: Not on file   Occupational History    Not on file   Social Needs    Financial resource strain: Not on file    Food insecurity       Worry: Not on file       Inability: Not on file    Transportation needs       Medical: Not on file       Non-medical: Not on file   Tobacco Use    Smoking status: Former Smoker       Packs/day: 0.50       Types: Cigarettes       Last attempt to quit: 2019       Years since quittin.0    Smokeless tobacco: Never Used   Substance and Sexual Activity    Alcohol use:  Yes       Frequency: 2-4 times a month       Comment: moderate    Drug use: No    Sexual activity: Not Currently   Lifestyle    Physical activity       Days per week: Not on file       Minutes per session: Not on file    Stress: Not on file   Relationships    Social connections       Talks on phone: Not on file       Gets together: Not on file       Attends Yazidism service: Not on file       Active member of club or organization: Not on file       Attends meetings of clubs or organizations: Not on file       Relationship status: Not on file    Intimate partner violence       Fear of current or ex partner: Not on file       Emotionally abused: Not on file       Physically abused: Not on file       Brachial 3 3     Radial      3=normal   Femoral 2 2  2=diminished   Popliteal      1=barely palpable   Dorsalis pedis      0=absent   Posterior tibial      4=aneurysmal                  Other pertinent information:1. The past medical records were reviewed.     2. The CT of the carotid that was done last week was personally reviewed by me, in fact I have shown the patient and family the actual films, approximately 80 to 90% left internal carotid stenosis, some disease intracranially, on the left side, on the right side, less than 30% stenosis     Assessment:     1. Right leg weakness    2. H/O: CVA (cerebrovascular accident)    3. Bruit of left carotid artery    4. Bilateral carotid artery stenosis                   Plan:         I had a long detailed discussion with patient. All options, risks benefits and alternatives were explained, because of high-grade stenosis of a proximal 90% left side, patient recommend left carotid intervention, as she was found to be stable from a cardiac perspective, and person discussed with Dr. Baljinder Moon and also patient was symptomatic last year      The risks, benefits, options and alternatives were explained, it was decided to proceed with the carotid endarterectomy. All the risks including bleeding, clotting, infection, arterial, venous, nerve, cardiopulmonary complications, neuroprxia causing hoarseness of voice, difficulty swallowing, deviation of the tongue, stroke, complications leading to paralysis, death, etc were explained to the patient and her , including increased neurological morbidity due to previous stroke, understands and consents for surgery. All  questions were answered              Patient was instructed to continue walking program and to call if any worsening of symptoms and to call if any focal lateralizing neurological symptoms like loss of speech, vision or loss of function of extremity.       All the questions were answered.       Addendum:    No changes noted in the history and physical examination the patient since my last last evaluation of the patient    Carlos Cochran

## 2020-06-10 ENCOUNTER — HOSPITAL ENCOUNTER (INPATIENT)
Age: 55
LOS: 1 days | Discharge: HOME | DRG: 024 | End: 2020-06-11
Attending: SURGERY | Admitting: SURGERY
Payer: COMMERCIAL

## 2020-06-10 ENCOUNTER — ANESTHESIA (OUTPATIENT)
Dept: OPERATING ROOM | Age: 55
DRG: 024 | End: 2020-06-10
Payer: COMMERCIAL

## 2020-06-10 VITALS — DIASTOLIC BLOOD PRESSURE: 79 MMHG | SYSTOLIC BLOOD PRESSURE: 125 MMHG | OXYGEN SATURATION: 96 % | TEMPERATURE: 99.7 F

## 2020-06-10 PROBLEM — G83.11 PARALYSIS OF RIGHT LOWER EXTREMITY (HCC): Status: RESOLVED | Noted: 2019-05-22 | Resolved: 2020-06-10

## 2020-06-10 PROBLEM — I65.22 LEFT CAROTID STENOSIS: Status: ACTIVE | Noted: 2020-06-10

## 2020-06-10 LAB
ANION GAP SERPL CALCULATED.3IONS-SCNC: 10 MMOL/L (ref 7–16)
BUN BLDV-MCNC: 14 MG/DL (ref 6–20)
CALCIUM SERPL-MCNC: 7.6 MG/DL (ref 8.6–10.2)
CHLORIDE BLD-SCNC: 109 MMOL/L (ref 98–107)
CO2: 21 MMOL/L (ref 22–29)
CREAT SERPL-MCNC: 0.6 MG/DL (ref 0.5–1)
GFR AFRICAN AMERICAN: >60
GFR NON-AFRICAN AMERICAN: >60 ML/MIN/1.73
GLUCOSE BLD-MCNC: 171 MG/DL (ref 74–99)
POC ACT LR: 143 SECONDS
POC ACT LR: 154 SECONDS
POC ACT LR: 314 SECONDS
POC ACT LR: >400 SECONDS
POTASSIUM REFLEX MAGNESIUM: 3.8 MMOL/L (ref 3.5–5)
SODIUM BLD-SCNC: 140 MMOL/L (ref 132–146)

## 2020-06-10 PROCEDURE — 6370000000 HC RX 637 (ALT 250 FOR IP): Performed by: SURGERY

## 2020-06-10 PROCEDURE — 3700000001 HC ADD 15 MINUTES (ANESTHESIA): Performed by: SURGERY

## 2020-06-10 PROCEDURE — 6360000002 HC RX W HCPCS: Performed by: SURGERY

## 2020-06-10 PROCEDURE — 85347 COAGULATION TIME ACTIVATED: CPT

## 2020-06-10 PROCEDURE — 35301 RECHANNELING OF ARTERY: CPT | Performed by: SURGERY

## 2020-06-10 PROCEDURE — 2580000003 HC RX 258: Performed by: SURGERY

## 2020-06-10 PROCEDURE — 36620 INSERTION CATHETER ARTERY: CPT | Performed by: ANESTHESIOLOGY

## 2020-06-10 PROCEDURE — 2709999900 HC NON-CHARGEABLE SUPPLY: Performed by: SURGERY

## 2020-06-10 PROCEDURE — 7100000000 HC PACU RECOVERY - FIRST 15 MIN: Performed by: SURGERY

## 2020-06-10 PROCEDURE — 6360000002 HC RX W HCPCS: Performed by: ANESTHESIOLOGY

## 2020-06-10 PROCEDURE — 03CL0ZZ EXTIRPATION OF MATTER FROM LEFT INTERNAL CAROTID ARTERY, OPEN APPROACH: ICD-10-PCS | Performed by: SURGERY

## 2020-06-10 PROCEDURE — 2500000003 HC RX 250 WO HCPCS

## 2020-06-10 PROCEDURE — 3600000015 HC SURGERY LEVEL 5 ADDTL 15MIN: Performed by: SURGERY

## 2020-06-10 PROCEDURE — 6360000002 HC RX W HCPCS

## 2020-06-10 PROCEDURE — 7100000001 HC PACU RECOVERY - ADDTL 15 MIN: Performed by: SURGERY

## 2020-06-10 PROCEDURE — 03UN0JZ SUPPLEMENT LEFT EXTERNAL CAROTID ARTERY WITH SYNTHETIC SUBSTITUTE, OPEN APPROACH: ICD-10-PCS | Performed by: SURGERY

## 2020-06-10 PROCEDURE — 80048 BASIC METABOLIC PNL TOTAL CA: CPT

## 2020-06-10 PROCEDURE — 03CN0ZZ EXTIRPATION OF MATTER FROM LEFT EXTERNAL CAROTID ARTERY, OPEN APPROACH: ICD-10-PCS | Performed by: SURGERY

## 2020-06-10 PROCEDURE — 3600000005 HC SURGERY LEVEL 5 BASE: Performed by: SURGERY

## 2020-06-10 PROCEDURE — 2580000003 HC RX 258

## 2020-06-10 PROCEDURE — 3700000000 HC ANESTHESIA ATTENDED CARE: Performed by: SURGERY

## 2020-06-10 PROCEDURE — 88304 TISSUE EXAM BY PATHOLOGIST: CPT

## 2020-06-10 PROCEDURE — 03CJ0ZZ EXTIRPATION OF MATTER FROM LEFT COMMON CAROTID ARTERY, OPEN APPROACH: ICD-10-PCS | Performed by: SURGERY

## 2020-06-10 PROCEDURE — C1768 GRAFT, VASCULAR: HCPCS | Performed by: SURGERY

## 2020-06-10 PROCEDURE — 03UL0JZ SUPPLEMENT LEFT INTERNAL CAROTID ARTERY WITH SYNTHETIC SUBSTITUTE, OPEN APPROACH: ICD-10-PCS | Performed by: SURGERY

## 2020-06-10 PROCEDURE — 36415 COLL VENOUS BLD VENIPUNCTURE: CPT

## 2020-06-10 PROCEDURE — 03UJ0JZ SUPPLEMENT LEFT COMMON CAROTID ARTERY WITH SYNTHETIC SUBSTITUTE, OPEN APPROACH: ICD-10-PCS | Performed by: SURGERY

## 2020-06-10 PROCEDURE — 2000000000 HC ICU R&B

## 2020-06-10 DEVICE — PATCH VASC L15.2X2.5CM CLLGN KNIT DBL VEL IMPL HEMSHLD: Type: IMPLANTABLE DEVICE | Site: CAROTID | Status: FUNCTIONAL

## 2020-06-10 RX ORDER — LOSARTAN POTASSIUM 50 MG/1
50 TABLET ORAL DAILY
Status: DISCONTINUED | OUTPATIENT
Start: 2020-06-10 | End: 2020-06-11 | Stop reason: HOSPADM

## 2020-06-10 RX ORDER — HYDRALAZINE HYDROCHLORIDE 20 MG/ML
10 INJECTION INTRAMUSCULAR; INTRAVENOUS
Status: DISCONTINUED | OUTPATIENT
Start: 2020-06-10 | End: 2020-06-11

## 2020-06-10 RX ORDER — DEXAMETHASONE SODIUM PHOSPHATE 10 MG/ML
INJECTION INTRAMUSCULAR; INTRAVENOUS PRN
Status: DISCONTINUED | OUTPATIENT
Start: 2020-06-10 | End: 2020-06-10 | Stop reason: SDUPTHER

## 2020-06-10 RX ORDER — CEFAZOLIN SODIUM 2 G/50ML
2 SOLUTION INTRAVENOUS
Status: COMPLETED | OUTPATIENT
Start: 2020-06-10 | End: 2020-06-10

## 2020-06-10 RX ORDER — SODIUM CHLORIDE AND POTASSIUM CHLORIDE .9; .15 G/100ML; G/100ML
SOLUTION INTRAVENOUS CONTINUOUS
Status: DISCONTINUED | OUTPATIENT
Start: 2020-06-10 | End: 2020-06-11

## 2020-06-10 RX ORDER — PROPOFOL 10 MG/ML
INJECTION, EMULSION INTRAVENOUS PRN
Status: DISCONTINUED | OUTPATIENT
Start: 2020-06-10 | End: 2020-06-10 | Stop reason: SDUPTHER

## 2020-06-10 RX ORDER — MORPHINE SULFATE 2 MG/ML
2 INJECTION, SOLUTION INTRAMUSCULAR; INTRAVENOUS EVERY 5 MIN PRN
Status: DISCONTINUED | OUTPATIENT
Start: 2020-06-10 | End: 2020-06-10 | Stop reason: HOSPADM

## 2020-06-10 RX ORDER — SODIUM CHLORIDE 0.9 % (FLUSH) 0.9 %
10 SYRINGE (ML) INJECTION EVERY 12 HOURS SCHEDULED
Status: DISCONTINUED | OUTPATIENT
Start: 2020-06-10 | End: 2020-06-10 | Stop reason: HOSPADM

## 2020-06-10 RX ORDER — LABETALOL HYDROCHLORIDE 5 MG/ML
10 INJECTION, SOLUTION INTRAVENOUS
Status: DISCONTINUED | OUTPATIENT
Start: 2020-06-10 | End: 2020-06-11

## 2020-06-10 RX ORDER — SODIUM CHLORIDE 9 MG/ML
INJECTION, SOLUTION INTRAVENOUS CONTINUOUS PRN
Status: DISCONTINUED | OUTPATIENT
Start: 2020-06-10 | End: 2020-06-10 | Stop reason: SDUPTHER

## 2020-06-10 RX ORDER — SODIUM CHLORIDE 0.9 % (FLUSH) 0.9 %
10 SYRINGE (ML) INJECTION EVERY 12 HOURS SCHEDULED
Status: DISCONTINUED | OUTPATIENT
Start: 2020-06-10 | End: 2020-06-11 | Stop reason: HOSPADM

## 2020-06-10 RX ORDER — GABAPENTIN 100 MG/1
100 CAPSULE ORAL 2 TIMES DAILY
Status: DISCONTINUED | OUTPATIENT
Start: 2020-06-11 | End: 2020-06-11 | Stop reason: HOSPADM

## 2020-06-10 RX ORDER — LIDOCAINE HYDROCHLORIDE 20 MG/ML
INJECTION, SOLUTION INTRAVENOUS PRN
Status: DISCONTINUED | OUTPATIENT
Start: 2020-06-10 | End: 2020-06-10 | Stop reason: SDUPTHER

## 2020-06-10 RX ORDER — MONTELUKAST SODIUM 10 MG/1
10 TABLET ORAL DAILY
Status: DISCONTINUED | OUTPATIENT
Start: 2020-06-10 | End: 2020-06-11 | Stop reason: HOSPADM

## 2020-06-10 RX ORDER — OXYCODONE HYDROCHLORIDE AND ACETAMINOPHEN 5; 325 MG/1; MG/1
2 TABLET ORAL EVERY 4 HOURS PRN
Status: DISCONTINUED | OUTPATIENT
Start: 2020-06-10 | End: 2020-06-11

## 2020-06-10 RX ORDER — MEPERIDINE HYDROCHLORIDE 25 MG/ML
12.5 INJECTION INTRAMUSCULAR; INTRAVENOUS; SUBCUTANEOUS
Status: DISCONTINUED | OUTPATIENT
Start: 2020-06-10 | End: 2020-06-10 | Stop reason: HOSPADM

## 2020-06-10 RX ORDER — SODIUM CHLORIDE 9 MG/ML
INJECTION, SOLUTION INTRAVENOUS CONTINUOUS
Status: DISCONTINUED | OUTPATIENT
Start: 2020-06-10 | End: 2020-06-10

## 2020-06-10 RX ORDER — AMLODIPINE BESYLATE 5 MG/1
5 TABLET ORAL DAILY
Status: DISCONTINUED | OUTPATIENT
Start: 2020-06-11 | End: 2020-06-11 | Stop reason: HOSPADM

## 2020-06-10 RX ORDER — MORPHINE SULFATE 4 MG/ML
3 INJECTION, SOLUTION INTRAMUSCULAR; INTRAVENOUS EVERY 4 HOURS PRN
Status: DISCONTINUED | OUTPATIENT
Start: 2020-06-10 | End: 2020-06-11

## 2020-06-10 RX ORDER — NEOSTIGMINE METHYLSULFATE 1 MG/ML
INJECTION, SOLUTION INTRAVENOUS PRN
Status: DISCONTINUED | OUTPATIENT
Start: 2020-06-10 | End: 2020-06-10 | Stop reason: SDUPTHER

## 2020-06-10 RX ORDER — FENTANYL CITRATE 50 UG/ML
INJECTION, SOLUTION INTRAMUSCULAR; INTRAVENOUS PRN
Status: DISCONTINUED | OUTPATIENT
Start: 2020-06-10 | End: 2020-06-10 | Stop reason: SDUPTHER

## 2020-06-10 RX ORDER — ONDANSETRON 2 MG/ML
4 INJECTION INTRAMUSCULAR; INTRAVENOUS
Status: DISCONTINUED | OUTPATIENT
Start: 2020-06-10 | End: 2020-06-10 | Stop reason: HOSPADM

## 2020-06-10 RX ORDER — CEFAZOLIN SODIUM 2 G/50ML
2 SOLUTION INTRAVENOUS EVERY 8 HOURS
Status: COMPLETED | OUTPATIENT
Start: 2020-06-10 | End: 2020-06-10

## 2020-06-10 RX ORDER — SODIUM CHLORIDE 0.9 % (FLUSH) 0.9 %
10 SYRINGE (ML) INJECTION PRN
Status: DISCONTINUED | OUTPATIENT
Start: 2020-06-10 | End: 2020-06-10 | Stop reason: HOSPADM

## 2020-06-10 RX ORDER — OXYCODONE HYDROCHLORIDE AND ACETAMINOPHEN 5; 325 MG/1; MG/1
1 TABLET ORAL EVERY 4 HOURS PRN
Status: DISCONTINUED | OUTPATIENT
Start: 2020-06-10 | End: 2020-06-11 | Stop reason: HOSPADM

## 2020-06-10 RX ORDER — ROCURONIUM BROMIDE 10 MG/ML
INJECTION, SOLUTION INTRAVENOUS PRN
Status: DISCONTINUED | OUTPATIENT
Start: 2020-06-10 | End: 2020-06-10 | Stop reason: SDUPTHER

## 2020-06-10 RX ORDER — PHENYLEPHRINE HYDROCHLORIDE 10 MG/ML
INJECTION INTRAVENOUS PRN
Status: DISCONTINUED | OUTPATIENT
Start: 2020-06-10 | End: 2020-06-10 | Stop reason: SDUPTHER

## 2020-06-10 RX ORDER — GLYCOPYRROLATE 1 MG/5 ML
SYRINGE (ML) INTRAVENOUS PRN
Status: DISCONTINUED | OUTPATIENT
Start: 2020-06-10 | End: 2020-06-10 | Stop reason: SDUPTHER

## 2020-06-10 RX ORDER — ASPIRIN 81 MG/1
81 TABLET ORAL DAILY
Status: DISCONTINUED | OUTPATIENT
Start: 2020-06-11 | End: 2020-06-11 | Stop reason: HOSPADM

## 2020-06-10 RX ORDER — ATORVASTATIN CALCIUM 80 MG/1
80 TABLET, FILM COATED ORAL DAILY
Status: DISCONTINUED | OUTPATIENT
Start: 2020-06-10 | End: 2020-06-11 | Stop reason: HOSPADM

## 2020-06-10 RX ORDER — PROTAMINE SULFATE 10 MG/ML
INJECTION, SOLUTION INTRAVENOUS PRN
Status: DISCONTINUED | OUTPATIENT
Start: 2020-06-10 | End: 2020-06-10 | Stop reason: SDUPTHER

## 2020-06-10 RX ORDER — SODIUM CHLORIDE 0.9 % (FLUSH) 0.9 %
10 SYRINGE (ML) INJECTION PRN
Status: DISCONTINUED | OUTPATIENT
Start: 2020-06-10 | End: 2020-06-11 | Stop reason: HOSPADM

## 2020-06-10 RX ORDER — ONDANSETRON 2 MG/ML
INJECTION INTRAMUSCULAR; INTRAVENOUS PRN
Status: DISCONTINUED | OUTPATIENT
Start: 2020-06-10 | End: 2020-06-10 | Stop reason: SDUPTHER

## 2020-06-10 RX ORDER — LIDOCAINE HYDROCHLORIDE ANHYDROUS AND DEXTROSE MONOHYDRATE .4; 5 G/100ML; G/100ML
INJECTION, SOLUTION INTRAVENOUS CONTINUOUS PRN
Status: DISCONTINUED | OUTPATIENT
Start: 2020-06-10 | End: 2020-06-10 | Stop reason: SDUPTHER

## 2020-06-10 RX ORDER — OXYCODONE HYDROCHLORIDE AND ACETAMINOPHEN 5; 325 MG/1; MG/1
1 TABLET ORAL PRN
Status: DISCONTINUED | OUTPATIENT
Start: 2020-06-10 | End: 2020-06-10 | Stop reason: HOSPADM

## 2020-06-10 RX ORDER — HEPARIN SODIUM 1000 [USP'U]/ML
INJECTION, SOLUTION INTRAVENOUS; SUBCUTANEOUS PRN
Status: DISCONTINUED | OUTPATIENT
Start: 2020-06-10 | End: 2020-06-10 | Stop reason: SDUPTHER

## 2020-06-10 RX ORDER — OXYCODONE HYDROCHLORIDE AND ACETAMINOPHEN 5; 325 MG/1; MG/1
2 TABLET ORAL PRN
Status: DISCONTINUED | OUTPATIENT
Start: 2020-06-10 | End: 2020-06-10 | Stop reason: HOSPADM

## 2020-06-10 RX ORDER — ACETAMINOPHEN 325 MG/1
650 TABLET ORAL EVERY 4 HOURS PRN
Status: DISCONTINUED | OUTPATIENT
Start: 2020-06-10 | End: 2020-06-11

## 2020-06-10 RX ORDER — MIDAZOLAM HYDROCHLORIDE 1 MG/ML
INJECTION INTRAMUSCULAR; INTRAVENOUS PRN
Status: DISCONTINUED | OUTPATIENT
Start: 2020-06-10 | End: 2020-06-10 | Stop reason: SDUPTHER

## 2020-06-10 RX ORDER — MORPHINE SULFATE 2 MG/ML
2 INJECTION, SOLUTION INTRAMUSCULAR; INTRAVENOUS
Status: DISCONTINUED | OUTPATIENT
Start: 2020-06-10 | End: 2020-06-11

## 2020-06-10 RX ORDER — MORPHINE SULFATE 2 MG/ML
1 INJECTION, SOLUTION INTRAMUSCULAR; INTRAVENOUS EVERY 5 MIN PRN
Status: DISCONTINUED | OUTPATIENT
Start: 2020-06-10 | End: 2020-06-10 | Stop reason: HOSPADM

## 2020-06-10 RX ADMIN — MORPHINE SULFATE 2 MG: 2 INJECTION, SOLUTION INTRAMUSCULAR; INTRAVENOUS at 12:18

## 2020-06-10 RX ADMIN — HEPARIN SODIUM 8000 UNITS: 1000 INJECTION INTRAVENOUS; SUBCUTANEOUS at 08:46

## 2020-06-10 RX ADMIN — PROPOFOL 90 MG: 10 INJECTION, EMULSION INTRAVENOUS at 07:37

## 2020-06-10 RX ADMIN — MONTELUKAST SODIUM 10 MG: 10 TABLET, FILM COATED ORAL at 21:13

## 2020-06-10 RX ADMIN — FENTANYL CITRATE 50 MCG: 50 INJECTION, SOLUTION INTRAMUSCULAR; INTRAVENOUS at 10:50

## 2020-06-10 RX ADMIN — PHENYLEPHRINE HYDROCHLORIDE 100 MCG: 10 INJECTION INTRAVENOUS at 07:42

## 2020-06-10 RX ADMIN — SODIUM CHLORIDE: 9 INJECTION, SOLUTION INTRAVENOUS at 07:30

## 2020-06-10 RX ADMIN — FENTANYL CITRATE 100 MCG: 50 INJECTION, SOLUTION INTRAMUSCULAR; INTRAVENOUS at 07:37

## 2020-06-10 RX ADMIN — OXYCODONE HYDROCHLORIDE AND ACETAMINOPHEN 2 TABLET: 5; 325 TABLET ORAL at 20:01

## 2020-06-10 RX ADMIN — ROCURONIUM BROMIDE 10 MG: 10 INJECTION, SOLUTION INTRAVENOUS at 09:56

## 2020-06-10 RX ADMIN — MIDAZOLAM 2 MG: 1 INJECTION INTRAMUSCULAR; INTRAVENOUS at 07:30

## 2020-06-10 RX ADMIN — Medication 3 MG: at 10:37

## 2020-06-10 RX ADMIN — CEFAZOLIN SODIUM 2 G: 2 SOLUTION INTRAVENOUS at 23:01

## 2020-06-10 RX ADMIN — PHENYLEPHRINE HYDROCHLORIDE 100 MCG: 10 INJECTION INTRAVENOUS at 07:45

## 2020-06-10 RX ADMIN — HEPARIN SODIUM 2000 UNITS: 1000 INJECTION INTRAVENOUS; SUBCUTANEOUS at 09:29

## 2020-06-10 RX ADMIN — ENOXAPARIN SODIUM 40 MG: 40 INJECTION SUBCUTANEOUS at 15:15

## 2020-06-10 RX ADMIN — CEFAZOLIN SODIUM 2 G: 2 SOLUTION INTRAVENOUS at 07:41

## 2020-06-10 RX ADMIN — POTASSIUM CHLORIDE AND SODIUM CHLORIDE: 900; 150 INJECTION, SOLUTION INTRAVENOUS at 13:49

## 2020-06-10 RX ADMIN — ATORVASTATIN CALCIUM 80 MG: 80 TABLET, FILM COATED ORAL at 16:54

## 2020-06-10 RX ADMIN — HYDROMORPHONE HYDROCHLORIDE 0.5 MG: 1 INJECTION, SOLUTION INTRAMUSCULAR; INTRAVENOUS; SUBCUTANEOUS at 12:29

## 2020-06-10 RX ADMIN — Medication 0.6 MG: at 10:37

## 2020-06-10 RX ADMIN — SODIUM CHLORIDE: 9 INJECTION, SOLUTION INTRAVENOUS at 09:23

## 2020-06-10 RX ADMIN — FENTANYL CITRATE 50 MCG: 50 INJECTION, SOLUTION INTRAMUSCULAR; INTRAVENOUS at 08:08

## 2020-06-10 RX ADMIN — SODIUM CHLORIDE, PRESERVATIVE FREE 10 ML: 5 INJECTION INTRAVENOUS at 21:14

## 2020-06-10 RX ADMIN — PHENYLEPHRINE HYDROCHLORIDE 100 MCG: 10 INJECTION INTRAVENOUS at 07:51

## 2020-06-10 RX ADMIN — OXYCODONE HYDROCHLORIDE AND ACETAMINOPHEN 1 TABLET: 5; 325 TABLET ORAL at 15:28

## 2020-06-10 RX ADMIN — ONDANSETRON HYDROCHLORIDE 4 MG: 2 INJECTION, SOLUTION INTRAMUSCULAR; INTRAVENOUS at 10:26

## 2020-06-10 RX ADMIN — DEXAMETHASONE SODIUM PHOSPHATE 10 MG: 10 INJECTION INTRAMUSCULAR; INTRAVENOUS at 07:37

## 2020-06-10 RX ADMIN — FENTANYL CITRATE 50 MCG: 50 INJECTION, SOLUTION INTRAMUSCULAR; INTRAVENOUS at 09:56

## 2020-06-10 RX ADMIN — PHENYLEPHRINE HYDROCHLORIDE 100 MCG: 10 INJECTION INTRAVENOUS at 07:48

## 2020-06-10 RX ADMIN — LIDOCAINE HYDROCHLORIDE ANHYDROUS AND DEXTROSE MONOHYDRATE 2 MG/MIN: .4; 5 INJECTION, SOLUTION INTRAVENOUS at 07:45

## 2020-06-10 RX ADMIN — OXYCODONE HYDROCHLORIDE AND ACETAMINOPHEN 2 TABLET: 5; 325 TABLET ORAL at 23:31

## 2020-06-10 RX ADMIN — ROCURONIUM BROMIDE 20 MG: 10 INJECTION, SOLUTION INTRAVENOUS at 08:32

## 2020-06-10 RX ADMIN — PROTAMINE SULFATE 50 MG: 10 INJECTION, SOLUTION INTRAVENOUS at 09:56

## 2020-06-10 RX ADMIN — HYDROMORPHONE HYDROCHLORIDE 0.5 MG: 1 INJECTION, SOLUTION INTRAMUSCULAR; INTRAVENOUS; SUBCUTANEOUS at 12:39

## 2020-06-10 RX ADMIN — ROCURONIUM BROMIDE 50 MG: 10 INJECTION, SOLUTION INTRAVENOUS at 07:37

## 2020-06-10 RX ADMIN — SODIUM CHLORIDE: 9 INJECTION, SOLUTION INTRAVENOUS at 06:02

## 2020-06-10 RX ADMIN — CEFAZOLIN SODIUM 2 G: 2 SOLUTION INTRAVENOUS at 15:15

## 2020-06-10 RX ADMIN — LOSARTAN POTASSIUM 50 MG: 50 TABLET, FILM COATED ORAL at 16:54

## 2020-06-10 RX ADMIN — PHENYLEPHRINE HYDROCHLORIDE 100 MCG: 10 INJECTION INTRAVENOUS at 07:56

## 2020-06-10 RX ADMIN — LIDOCAINE HYDROCHLORIDE 100 MG: 20 INJECTION, SOLUTION INTRAVENOUS at 07:37

## 2020-06-10 RX ADMIN — PHENYLEPHRINE HYDROCHLORIDE 25 MCG/MIN: 10 INJECTION INTRAVENOUS at 08:00

## 2020-06-10 ASSESSMENT — PULMONARY FUNCTION TESTS
PIF_VALUE: 28
PIF_VALUE: 28
PIF_VALUE: 29
PIF_VALUE: 28
PIF_VALUE: 29
PIF_VALUE: 28
PIF_VALUE: 26
PIF_VALUE: 30
PIF_VALUE: 29
PIF_VALUE: 27
PIF_VALUE: 29
PIF_VALUE: 32
PIF_VALUE: 29
PIF_VALUE: 17
PIF_VALUE: 0
PIF_VALUE: 29
PIF_VALUE: 28
PIF_VALUE: 27
PIF_VALUE: 28
PIF_VALUE: 27
PIF_VALUE: 29
PIF_VALUE: 27
PIF_VALUE: 32
PIF_VALUE: 30
PIF_VALUE: 27
PIF_VALUE: 28
PIF_VALUE: 29
PIF_VALUE: 28
PIF_VALUE: 5
PIF_VALUE: 28
PIF_VALUE: 29
PIF_VALUE: 17
PIF_VALUE: 14
PIF_VALUE: 30
PIF_VALUE: 28
PIF_VALUE: 28
PIF_VALUE: 29
PIF_VALUE: 29
PIF_VALUE: 18
PIF_VALUE: 29
PIF_VALUE: 31
PIF_VALUE: 28
PIF_VALUE: 29
PIF_VALUE: 29
PIF_VALUE: 28
PIF_VALUE: 29
PIF_VALUE: 30
PIF_VALUE: 29
PIF_VALUE: 29
PIF_VALUE: 28
PIF_VALUE: 25
PIF_VALUE: 13
PIF_VALUE: 29
PIF_VALUE: 24
PIF_VALUE: 31
PIF_VALUE: 23
PIF_VALUE: 29
PIF_VALUE: 0
PIF_VALUE: 29
PIF_VALUE: 29
PIF_VALUE: 28
PIF_VALUE: 21
PIF_VALUE: 28
PIF_VALUE: 30
PIF_VALUE: 29
PIF_VALUE: 30
PIF_VALUE: 29
PIF_VALUE: 29
PIF_VALUE: 27
PIF_VALUE: 30
PIF_VALUE: 25
PIF_VALUE: 28
PIF_VALUE: 28
PIF_VALUE: 27
PIF_VALUE: 27
PIF_VALUE: 31
PIF_VALUE: 15
PIF_VALUE: 29
PIF_VALUE: 0
PIF_VALUE: 28
PIF_VALUE: 29
PIF_VALUE: 28
PIF_VALUE: 29
PIF_VALUE: 15
PIF_VALUE: 28
PIF_VALUE: 29
PIF_VALUE: 27
PIF_VALUE: 28
PIF_VALUE: 29
PIF_VALUE: 30
PIF_VALUE: 29
PIF_VALUE: 29
PIF_VALUE: 30
PIF_VALUE: 28
PIF_VALUE: 29
PIF_VALUE: 28
PIF_VALUE: 21
PIF_VALUE: 0
PIF_VALUE: 29
PIF_VALUE: 27
PIF_VALUE: 2
PIF_VALUE: 27
PIF_VALUE: 29
PIF_VALUE: 30
PIF_VALUE: 28
PIF_VALUE: 29
PIF_VALUE: 30
PIF_VALUE: 29
PIF_VALUE: 29
PIF_VALUE: 15
PIF_VALUE: 29
PIF_VALUE: 29
PIF_VALUE: 28
PIF_VALUE: 29
PIF_VALUE: 27
PIF_VALUE: 27
PIF_VALUE: 0
PIF_VALUE: 24
PIF_VALUE: 21
PIF_VALUE: 29
PIF_VALUE: 29
PIF_VALUE: 28
PIF_VALUE: 28
PIF_VALUE: 29
PIF_VALUE: 27
PIF_VALUE: 29
PIF_VALUE: 28
PIF_VALUE: 29
PIF_VALUE: 29
PIF_VALUE: 28
PIF_VALUE: 29
PIF_VALUE: 28
PIF_VALUE: 29
PIF_VALUE: 27
PIF_VALUE: 28
PIF_VALUE: 27
PIF_VALUE: 29
PIF_VALUE: 28
PIF_VALUE: 1
PIF_VALUE: 29
PIF_VALUE: 28
PIF_VALUE: 30
PIF_VALUE: 21
PIF_VALUE: 25
PIF_VALUE: 30
PIF_VALUE: 28
PIF_VALUE: 29
PIF_VALUE: 29
PIF_VALUE: 27
PIF_VALUE: 30
PIF_VALUE: 28
PIF_VALUE: 28
PIF_VALUE: 29
PIF_VALUE: 1
PIF_VALUE: 28
PIF_VALUE: 29
PIF_VALUE: 28
PIF_VALUE: 29
PIF_VALUE: 27
PIF_VALUE: 29
PIF_VALUE: 28
PIF_VALUE: 29
PIF_VALUE: 28
PIF_VALUE: 0
PIF_VALUE: 29
PIF_VALUE: 33
PIF_VALUE: 29
PIF_VALUE: 28
PIF_VALUE: 28
PIF_VALUE: 23
PIF_VALUE: 29
PIF_VALUE: 1
PIF_VALUE: 29
PIF_VALUE: 26
PIF_VALUE: 28
PIF_VALUE: 28
PIF_VALUE: 27
PIF_VALUE: 28

## 2020-06-10 ASSESSMENT — PAIN DESCRIPTION - PAIN TYPE
TYPE: SURGICAL PAIN;ACUTE PAIN
TYPE: ACUTE PAIN;SURGICAL PAIN
TYPE: SURGICAL PAIN;ACUTE PAIN
TYPE: ACUTE PAIN
TYPE: SURGICAL PAIN;ACUTE PAIN
TYPE: SURGICAL PAIN
TYPE: ACUTE PAIN;SURGICAL PAIN

## 2020-06-10 ASSESSMENT — PAIN DESCRIPTION - DESCRIPTORS
DESCRIPTORS: ACHING;CONSTANT;DISCOMFORT
DESCRIPTORS: ACHING;CONSTANT;DISCOMFORT
DESCRIPTORS: HEADACHE;PRESSURE
DESCRIPTORS: PRESSURE
DESCRIPTORS: HEADACHE;PRESSURE
DESCRIPTORS: DISCOMFORT;ACHING;HEADACHE

## 2020-06-10 ASSESSMENT — PAIN SCALES - GENERAL
PAINLEVEL_OUTOF10: 8
PAINLEVEL_OUTOF10: 8
PAINLEVEL_OUTOF10: 9
PAINLEVEL_OUTOF10: 8
PAINLEVEL_OUTOF10: 5
PAINLEVEL_OUTOF10: 6
PAINLEVEL_OUTOF10: 2
PAINLEVEL_OUTOF10: 5
PAINLEVEL_OUTOF10: 3
PAINLEVEL_OUTOF10: 7
PAINLEVEL_OUTOF10: 9

## 2020-06-10 ASSESSMENT — PAIN DESCRIPTION - ORIENTATION
ORIENTATION: LEFT

## 2020-06-10 ASSESSMENT — PAIN DESCRIPTION - LOCATION
LOCATION: NECK
LOCATION: HEAD;NECK
LOCATION: NECK
LOCATION: HEAD;NECK
LOCATION: HEAD;NECK

## 2020-06-10 ASSESSMENT — PAIN DESCRIPTION - ONSET
ONSET: ON-GOING
ONSET: GRADUAL

## 2020-06-10 ASSESSMENT — PAIN - FUNCTIONAL ASSESSMENT: PAIN_FUNCTIONAL_ASSESSMENT: 0-10

## 2020-06-10 ASSESSMENT — PAIN DESCRIPTION - FREQUENCY: FREQUENCY: CONTINUOUS

## 2020-06-10 ASSESSMENT — PAIN DESCRIPTION - PROGRESSION: CLINICAL_PROGRESSION: GRADUALLY IMPROVING

## 2020-06-10 NOTE — ANESTHESIA PROCEDURE NOTES
Arterial Line:    An arterial line was placed using surface landmarks, in the procedure area for the following indication(s): continuous blood pressure monitoring and blood sampling needed. A 20 gauge (size), 1 and 3/8 inch (length), Arrow (type) catheter was placed, Seldinger technique not used, into the right radial artery, secured by tape and Tegaderm. Anesthesia type: Local  Local infiltration: Injection    Events:  patient tolerated procedure well with no complications.   6/10/2020 7:00 AM6/10/2020 7:15 AM  Anesthesiologist: Willy King DO  Resident/CRNA: DEMARIO Patel - LUCY  Other anesthesia staff: Naya Barnes RN  Performed: Resident/CRNA and Other anesthesia staff   Preanesthetic Checklist  Completed: patient identified, site marked, surgical consent, pre-op evaluation, timeout performed, IV checked, risks and benefits discussed, monitors and equipment checked, anesthesia consent given, oxygen available and patient being monitored

## 2020-06-10 NOTE — CONSULTS
extremity paresis    CXR:  I have reviewed the CXR with the following interpretation: No acute findings 6/8/2020  EKG:  I have reviewed the EKG with the following interpretation: Sinus rhythm, right bundle branch block, 6/8/2020    Labs:     CBC:   Recent Labs     06/08/20  0830   WBC 6.6   RBC 4.59   HGB 13.7   HCT 41.7   MCV 90.8   RDW 12.2        BMP:   Recent Labs     06/08/20  0830 06/10/20  1125    140   K 4.1 3.8    109*   CO2 25 21*   BUN 13 14   CREATININE 0.6 0.6     LFT:  Recent Labs     06/08/20  0830   PROT 7.0   ALKPHOS 68   ALT 21   AST 17   BILITOT 0.3     CE:  No results for input(s): Nory Cordon in the last 72 hours. PT/INR:   Recent Labs     06/08/20  0830   INR 0.9   APTT 31.2     BNP: No results for input(s): BNP in the last 72 hours.   Hgb A1C: No results found for: LABA1C  No results found for: EAG  ESR:   Lab Results   Component Value Date    SEDRATE 32 (H) 09/23/2019     CRP:   Lab Results   Component Value Date    CRP 0.1 09/23/2019     D Dimer: No results found for: DDIMER  Folate and B12:   Lab Results   Component Value Date    EZKQRIUK60 1171 (H) 09/23/2019   ,   Lab Results   Component Value Date    FOLATE >20.0 09/23/2019     Lactic Acid: No results found for: LACTA  Thyroid Studies:   Lab Results   Component Value Date    TSH 3.500 04/13/2015         ASSESSMENT:      Status post left carotid endarterectomy  Carotid artery disease  Hypertension  Hyperlipidemia  Previous history of stroke  Residual right Lower Extremity Paresis  Allergies by history        PLAN:    Monitor blood pressure and will adjust medications if needed  Continue Cozaar  Continue amlodipine  Continue aspirin  Continue atorvastatin  Continue Singulair, patient described he has been prescribed this for allergies        Diet: Diet NPO Effective Now Exceptions are: Ice Chips, Popsicles, Sips with Meds  Code Status: Full Code    PT/OT Eval Status: [] Ordered [] Evaluation noted [x] Not

## 2020-06-10 NOTE — OP NOTE
Operative Note      Patient: Raeann Velasco  YOB: 1965  MRN: [id number]  DATE OF PROCEDURE: 6/10/2020      SURGEON:  Irais Watson M.D.      ASSISTANT:  Radha Henry C.F.A., C.S.T. PREOPERATIVE DIAGNOSIS: Left  Carotid  stenosis, 90%, with history of stroke, with right leg weakness      POSTOPERATIVE DIAGNOSIS:  Same      OPERATION: Left carotid endarterectomy and repair with Dacron patch. ANESTHESIA:  General endotracheal anesthesia. ESTIMATED BLOOD FYZR:193 ml      COMPLICATIONS: none      OPERATIVE PROCEDURE:   With the patient in a supine position, after   satisfactory induction of general endotracheal anesthesia, placement of a   Guerrero catheter and an arterial line, the left side of the neck and chest were   prepped and draped in the usual fashion. An incision was made along the anterior border of the sternomastoid and   deepened down to the platysma, gaining access to the carotid sheath where the   common, internal, and external carotid arteries were mobilized and   controlled with vessel loops. The patient was given heparin intravenously,   and the arteries were occluded. A longitudinal arteriotomy was made on the   common, extending onto the internal carotid artery. Examination of the lumen   revealed the plaque was fibrotic with ulceration causing approximately 90% +   stenosis. Backbleeding was found to be  sluggish. A temporary shunt was   inserted, restoring flow to the left side of the brain. Endarterectomy of the common, internal, and external carotid arteries was   then carried out. The distal intima was sutured with 6-0 Prolene. The   arteriotomy was repaired with a Dacron patch using 5-0 Prolene suture. Just   prior to completion of the repair, the shunt was removed. The arteries were   flushed, repair was completed, and flow was restored, first to the common   followed by external and internal carotid arteries.   The suture line was   found to

## 2020-06-11 VITALS
HEART RATE: 91 BPM | RESPIRATION RATE: 23 BRPM | SYSTOLIC BLOOD PRESSURE: 108 MMHG | TEMPERATURE: 98.3 F | OXYGEN SATURATION: 96 % | BODY MASS INDEX: 25.96 KG/M2 | WEIGHT: 137.5 LBS | HEIGHT: 61 IN | DIASTOLIC BLOOD PRESSURE: 74 MMHG

## 2020-06-11 PROCEDURE — 2580000003 HC RX 258: Performed by: SURGERY

## 2020-06-11 PROCEDURE — 6360000002 HC RX W HCPCS: Performed by: SURGERY

## 2020-06-11 PROCEDURE — 6370000000 HC RX 637 (ALT 250 FOR IP): Performed by: SURGERY

## 2020-06-11 RX ORDER — OXYCODONE HYDROCHLORIDE AND ACETAMINOPHEN 5; 325 MG/1; MG/1
1 TABLET ORAL EVERY 6 HOURS PRN
Qty: 28 TABLET | Refills: 0 | Status: SHIPPED | OUTPATIENT
Start: 2020-06-11 | End: 2020-06-18

## 2020-06-11 RX ADMIN — OXYCODONE HYDROCHLORIDE AND ACETAMINOPHEN 1 TABLET: 5; 325 TABLET ORAL at 10:51

## 2020-06-11 RX ADMIN — SODIUM CHLORIDE, PRESERVATIVE FREE 10 ML: 5 INJECTION INTRAVENOUS at 08:46

## 2020-06-11 RX ADMIN — GABAPENTIN 100 MG: 100 CAPSULE ORAL at 08:45

## 2020-06-11 RX ADMIN — ATORVASTATIN CALCIUM 80 MG: 80 TABLET, FILM COATED ORAL at 08:47

## 2020-06-11 RX ADMIN — ASPIRIN 81 MG: 81 TABLET, COATED ORAL at 08:45

## 2020-06-11 RX ADMIN — AMLODIPINE BESYLATE 5 MG: 5 TABLET ORAL at 08:45

## 2020-06-11 RX ADMIN — ACETAMINOPHEN 650 MG: 325 TABLET, FILM COATED ORAL at 07:50

## 2020-06-11 ASSESSMENT — PAIN SCALES - GENERAL
PAINLEVEL_OUTOF10: 3
PAINLEVEL_OUTOF10: 2
PAINLEVEL_OUTOF10: 6
PAINLEVEL_OUTOF10: 3
PAINLEVEL_OUTOF10: 7
PAINLEVEL_OUTOF10: 3

## 2020-06-11 NOTE — PROGRESS NOTES
Hospital Medicine Progress Note      Date of Admission: 6/10/2020  Hospital day # 1    Course: Follow up post op management    Subjective    Clinically improving. Feeling better. Stable overnight. No other overnight issues reported. Exam:    /84   Pulse 79   Temp 98.1 °F (36.7 °C) (Oral)   Resp 14   Ht 5' 1\" (1.549 m)   Wt 137 lb 8 oz (62.4 kg)   LMP 03/04/2020   SpO2 98%   BMI 25.98 kg/m²     General appearance: No apparent distress, appears stated age and cooperative. HEENT: Pupils equal, round, and reactive to light. Conjunctivae/corneas clear. Neck: Supple. No jugular venous distention. Trachea midline. Respiratory:  Normal respiratory effort. Clear to auscultation, bilaterally without Rales/Wheezes/Rhonchi. Cardiovascular: S1/S2  Abdomen: Soft, non-tender, non-distended with normal bowel sounds. Musculoskeletal: No clubbing, cyanosis or edema bilaterally. Brisk capillary refill. 2+ lower extremity pulses (dorsalis pedis). Skin:  No rashes    Neurologic: awake, alert and following commands     Medications:  Reviewed    Infusion Medications   Scheduled Medications    amLODIPine  5 mg Oral Daily    aspirin  81 mg Oral Daily    atorvastatin  80 mg Oral Daily    gabapentin  100 mg Oral BID    montelukast  10 mg Oral Daily    losartan  50 mg Oral Daily    sodium chloride flush  10 mL Intravenous 2 times per day    enoxaparin  40 mg Subcutaneous Daily     PRN Meds: sodium chloride flush, oxyCODONE-acetaminophen **OR** [DISCONTINUED] oxyCODONE-acetaminophen    I/O    Intake/Output Summary (Last 24 hours) at 6/11/2020 0935  Last data filed at 6/11/2020 0900  Gross per 24 hour   Intake 3030 ml   Output 3745 ml   Net -715 ml       Labs:   No results for input(s): WBC, HGB, HCT, PLT in the last 72 hours.     Recent Labs     06/10/20  1125      K 3.8   *   CO2 21*   BUN 14   CREATININE 0.6   CALCIUM 7.6*       No results for input(s): PROT, ALB, ALKPHOS, ALT, AST, BILITOT,

## 2020-06-11 NOTE — PLAN OF CARE
Fluid Volume:  Goal: Will show no signs or symptoms of fluid imbalance  Description: Will show no signs or symptoms of fluid imbalance  6/11/2020 0238 by Brianne De La Torre  Outcome: Met This Shift  6/10/2020 1533 by Hitesh Zabala RN  Outcome: Ongoing     Problem: Pain:  Goal: Control of acute pain  Description: Control of acute pain  Outcome: Met This Shift  Goal: Control of chronic pain  Description: Control of chronic pain  Outcome: Met This Shift

## 2020-06-11 NOTE — PROGRESS NOTES
PROGRESS NOTE       PATIENT PROBLEM LIST:  Active Problems:    H/O: CVA (cerebrovascular accident)    Right leg weakness    Bruit of left carotid artery    Bilateral carotid artery stenosis    Left carotid stenosis  Resolved Problems:    * No resolved hospital problems. *      SUBJECTIVE:  Abigail Aponte states while her throat is somewhat sore he denies any chest discomfort, shortness of breath nor difficulty swallowing. REVIEW OF SYSTEMS:  General ROS: negative for - fatigue, malaise,  weight gain or weight loss  Psychological ROS: positive for - anxiety , depression  Ophthalmic ROS: positive for - decreased vision and utilizes corrective lenses for visual acuity  ENT ROS: negative  Allergy and Immunology ROS: negative  Hematological and Lymphatic ROS: negative  Endocrine: no heat or cold intolerance and no polyphagia, polydipsia, or polyuria  Respiratory ROS: negative for - hemoptysis, pleuritic pain and wheezing  Cardiovascular ROS: negative for - chest pain, dyspnea on exertion, irregular heartbeat and shortness of breath. Gastrointestinal ROS: no abdominal pain, change in bowel habits, or black or bloody stools  Genito-Urinary ROS: no nocturia, dysuria, trouble voiding, frequency or hematuria  Musculoskeletal ROS: negative for- myalgias, arthralgias, or claudication positive for-slight weakness right lower extremity  Neurological ROS: no new TIA or stroke symptoms otherwise no significant change in symptoms or problems since yesterday as documented in previous progress notes.     SCHEDULED MEDICATIONS:   amLODIPine  5 mg Oral Daily    aspirin  81 mg Oral Daily    atorvastatin  80 mg Oral Daily    gabapentin  100 mg Oral BID    montelukast  10 mg Oral Daily    losartan  50 mg Oral Daily    sodium chloride flush  10 mL Intravenous 2 times per day    enoxaparin  40 mg Subcutaneous Daily       VITAL SIGNS: /73   Pulse 79   Temp 98.1 °F (36.7 °C) (Oral)   Resp 14   Ht 5' 1\" (1.549 m)   Wt 137 lb 8 oz (62.4 kg)   LMP 03/04/2020   SpO2 98%   BMI 25.98 kg/m²   Patient Vitals for the past 96 hrs (Last 3 readings):   Weight   06/11/20 0400 137 lb 8 oz (62.4 kg)   06/10/20 0544 148 lb (67.1 kg)     OBJECTIVE:    HEENT: PERRL, EOM  Intact; sclera non-icteric, conjunctiva pink. Carotids are brisk in upstroke with normal contour. No carotid bruits. Normal jugular venous pulsation at 45°. No palpable cervical nor supraclavicular nodes. Thyroid not palpable. Trachea midline. Chest: Even excursion  Lungs: CTA B, no expiratory wheezes or rhonchi, no decreased tactile fremitus without inspiratory rales. Heart: Regular  rhythm; S1 > S2, no gallop or murmur. No clicks, rub, palpable thrills   or heaves. PMI nondisplaced, 5th intercostal space MCL. Abdomen: Soft, nontender, nondistended,  mildly protuberant, no masses or organomegaly. Bowel sounds active. Extremities: Without clubbing, cyanosis or edema. Pulses present 3+ upper extermities bilaterally; present 1+ DP and present 1+ PT bilaterally. Data:   Scheduled Meds: Reviewed  Continuous Infusions:     Intake/Output Summary (Last 24 hours) at 6/11/2020 1228  Last data filed at 6/11/2020 0900  Gross per 24 hour   Intake 2030 ml   Output 3120 ml   Net -1090 ml     CBC: No results for input(s): WBC, HGB, HCT, PLT in the last 72 hours. BMP:  Recent Labs     06/10/20  1125      K 3.8   *   CO2 21*   BUN 14   CREATININE 0.6   LABGLOM >60     ABGs: No results found for: PH, PO2, PCO2  INR: No results for input(s): INR in the last 72 hours. PRO-BNP: No results found for: PROBNP   TSH:   Lab Results   Component Value Date    TSH 3.500 04/13/2015      Cardiac Injury Profile: No results for input(s): CKTOTAL, CKMB, TROPONINI in the last 72 hours.    Lipid Profile:   Lab Results   Component Value Date    TRIG 96 09/23/2019    HDL 48 the paranasal sinuses but no fluid levels are evident. There are degenerative changes of the cervical spine. There is no additional evidence for aneurysm or stenosis within the distal internal carotid, distal vertebral, basilar, anterior, middle, and posterior cerebral arteries as well as the respective visualized branches. Lung apices show mild dependent atelectasis. There is no additional evidence for stenosis or aneurysm within the aortic arch, the innominate artery, subclavian arteries, the common carotid arteries, vertebral arteries, the internal carotid arteries, the external carotid arteries or their respective visualized branches. Bovine origin of the common carotid arteries which is a normal variant. 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 Soft and calcific plaque at the origin of the left internal carotid arteries results in greater than 90% stenosis. Asymmetric diminutive appearance of the clinoid left internal carotid artery which is approximately 50% narrowed as compared to the right. This could be congenital or due to acquired stenosis. Encephalomalacia of the left frontal lobe from remote infarct. Cta Head W Contrast    Result Date: 5/30/2020  Clinical indications: Occlusion of left internal carotid artery. Stroke. Right leg weakness. COMPARISON: [facility], 2019. Exposure control: This examination and all examinations utilizing ionizing radiation at this facility done so according to the ALARA (as low as reasonably achievable) principal for radiation dose reduction. TECHNIQUE: Axial, sagittal, and coronal computed tomography of the head and neck was performed following intravenous administration of 100 mL of Isovue-370. 3-D post processing. Three-dimensional reconstructions of the arterial tree. MIP imaging.  FINDINGS: There is bovine origin of the common carotid arteries which is a normal variant. The right vertebral artery is diminutive and is most likely congenitally diminutive although a chronic dissection could have a similar appearance. Soft and calcific plaque at the origin of the left internal carotid arteries results in greater than 90% stenosis. There is asymmetric diminutive appearance of the clinoid left internal carotid artery which is approximately 50% narrowed as compared to the right. This could be congenital or due to acquired stenosis. There is encephalomalacia of the left frontal lobe from remote infarct. The brain parenchyma is otherwise unremarkable. There is no evidence for mass or lymphadenopathy within the neck. There is mild mucosal thickening within the paranasal sinuses but no fluid levels are evident. There are degenerative changes of the cervical spine. There is no additional evidence for aneurysm or stenosis within the distal internal carotid, distal vertebral, basilar, anterior, middle, and posterior cerebral arteries as well as the respective visualized branches. Lung apices show mild dependent atelectasis. There is no additional evidence for stenosis or aneurysm within the aortic arch, the innominate artery, subclavian arteries, the common carotid arteries, vertebral arteries, the internal carotid arteries, the external carotid arteries or their respective visualized branches. Bovine origin of the common carotid arteries which is a normal variant. 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 Soft and calcific plaque at the origin of the left internal carotid arteries results in greater than 90% stenosis. Asymmetric diminutive appearance of the clinoid left internal carotid artery which is approximately 50% narrowed as compared to the right. This could be congenital or due to acquired stenosis.  Encephalomalacia of the left frontal lobe from remote

## 2020-06-14 ENCOUNTER — TELEPHONE (OUTPATIENT)
Dept: VASCULAR SURGERY | Age: 55
End: 2020-06-14

## 2020-06-14 LAB
EKG ATRIAL RATE: 108 BPM
EKG P AXIS: 37 DEGREES
EKG P-R INTERVAL: 166 MS
EKG Q-T INTERVAL: 352 MS
EKG QRS DURATION: 88 MS
EKG QTC CALCULATION (BAZETT): 471 MS
EKG R AXIS: 12 DEGREES
EKG T AXIS: 4 DEGREES
EKG VENTRICULAR RATE: 108 BPM

## 2020-06-15 ENCOUNTER — OFFICE VISIT (OUTPATIENT)
Dept: VASCULAR SURGERY | Age: 55
End: 2020-06-15

## 2020-06-15 ENCOUNTER — TELEPHONE (OUTPATIENT)
Dept: VASCULAR SURGERY | Age: 55
End: 2020-06-15

## 2020-06-15 ENCOUNTER — OFFICE VISIT (OUTPATIENT)
Dept: PHYSICAL MEDICINE AND REHAB | Age: 55
End: 2020-06-15
Payer: COMMERCIAL

## 2020-06-15 VITALS
DIASTOLIC BLOOD PRESSURE: 70 MMHG | SYSTOLIC BLOOD PRESSURE: 118 MMHG | WEIGHT: 148 LBS | TEMPERATURE: 98.5 F | HEIGHT: 61 IN | BODY MASS INDEX: 27.94 KG/M2

## 2020-06-15 VITALS — BODY MASS INDEX: 27.94 KG/M2 | HEIGHT: 61 IN | WEIGHT: 148 LBS | RESPIRATION RATE: 16 BRPM

## 2020-06-15 PROBLEM — I65.21 CAROTID STENOSIS, RIGHT: Status: ACTIVE | Noted: 2020-06-15

## 2020-06-15 PROBLEM — Z98.890 S/P CAROTID ENDARTERECTOMY: Status: ACTIVE | Noted: 2020-06-15

## 2020-06-15 PROBLEM — I65.22 LEFT CAROTID STENOSIS: Status: RESOLVED | Noted: 2020-06-10 | Resolved: 2020-06-15

## 2020-06-15 PROBLEM — R09.89 BRUIT OF LEFT CAROTID ARTERY: Status: RESOLVED | Noted: 2020-05-28 | Resolved: 2020-06-15

## 2020-06-15 PROBLEM — I65.23 BILATERAL CAROTID ARTERY STENOSIS: Status: RESOLVED | Noted: 2020-05-28 | Resolved: 2020-06-15

## 2020-06-15 PROCEDURE — 99024 POSTOP FOLLOW-UP VISIT: CPT | Performed by: SURGERY

## 2020-06-15 PROCEDURE — G8419 CALC BMI OUT NRM PARAM NOF/U: HCPCS | Performed by: PHYSICAL MEDICINE & REHABILITATION

## 2020-06-15 PROCEDURE — G9899 SCRN MAM PERF RSLTS DOC: HCPCS | Performed by: PHYSICAL MEDICINE & REHABILITATION

## 2020-06-15 PROCEDURE — G8427 DOCREV CUR MEDS BY ELIG CLIN: HCPCS | Performed by: PHYSICAL MEDICINE & REHABILITATION

## 2020-06-15 PROCEDURE — 3017F COLORECTAL CA SCREEN DOC REV: CPT | Performed by: PHYSICAL MEDICINE & REHABILITATION

## 2020-06-15 PROCEDURE — 99214 OFFICE O/P EST MOD 30 MIN: CPT | Performed by: PHYSICAL MEDICINE & REHABILITATION

## 2020-06-15 PROCEDURE — 1111F DSCHRG MED/CURRENT MED MERGE: CPT | Performed by: PHYSICAL MEDICINE & REHABILITATION

## 2020-06-15 PROCEDURE — 1036F TOBACCO NON-USER: CPT | Performed by: PHYSICAL MEDICINE & REHABILITATION

## 2020-06-15 NOTE — PROGRESS NOTES
Cardiovascular: No syncope, chest pain, dyspnea on exertion,palpitations. Gastrointestinal: No abdominal pain, hematemesis, melena, nausea, vomiting, stool incontinence  Genito-Urinary: No dysuria, hematuria, incontinence   Musculoskeletal: Per HPI  Neurological: Per HPI  Dermatological:  No rash      Physical Exam:   Blood pressure 118/70, temperature 98.5 °F (36.9 °C), height 5' 1\" (1.549 m), weight 148 lb (67.1 kg). General: The patient is in no apparent distress. HEENT: No rhinorrhea, sneezing, yawning, or lacrimation. No scleral icterus or conjunctival injection. SKIN: No piloerection. No track marks. No rash. Normal turgor. No erythema or ecchymosis. Psychological: Mood and affect are appropriate. Hygiene is appropriate. Cardiovascular:  Heart is regular rate. There is no edema. Respiratory: Respirations are regular and unlabored. There is no cyanosis. Lymphatic: There is no lymphadenopathy. Gastrointestinal: Soft abdomen  Genitourinary: No costovertebral angle tenderness. MSK: Normal range of motion. No deformity. Right plantar flexion spasticity MAS 2. Neurologic: Awake, alert and oriented in three planes. Speech is fluent. No facial weakness. Hearing is intact for conversation. Pupils are equal and round. Extraocular muscles are intact. Strength:   R  L  Deltoid   5  5  Biceps   5  5  Triceps  5  5  Wrist Ext  5  5  Finger Abd  5  5  Hip Flex  4+  5  Knee Ext  5-  5  Ankle dorsi  2+  5  EHL   2+ 5  Ankle Plantar  2+  5    Sensory:  Intact for light touch in all lower extremity dermatomes. Reflexes:   R  L  Patellar  2 2   Ankle Jerk  2 2    No Babinski  No Yap    Gait: Mildly spastic gait. Right foot drop. + hip hike and circumduction. Gait improved with AFO. Impression:   1. History of stroke    2. Right hemiparesis ([address]    3. Right foot drop    4.  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

## 2020-06-15 NOTE — TELEPHONE ENCOUNTER
Discussed with the patient, concerned about swelling, speech intact, some difficulty swallowing, no difficulty breathing, spoke with Dr. Euceda Noon yesterday, was not bad enough to come to the emergency room yesterday, office today

## 2020-06-16 ENCOUNTER — TELEPHONE (OUTPATIENT)
Dept: VASCULAR SURGERY | Age: 55
End: 2020-06-16

## 2020-06-19 ENCOUNTER — TELEPHONE (OUTPATIENT)
Dept: VASCULAR SURGERY | Age: 55
End: 2020-06-19

## 2020-07-07 RX ORDER — ASPIRIN 81 MG/1
TABLET ORAL
Qty: 90 TABLET | Refills: 0 | Status: SHIPPED
Start: 2020-07-07 | End: 2020-09-22 | Stop reason: SDUPTHER

## 2020-07-09 ENCOUNTER — OFFICE VISIT (OUTPATIENT)
Dept: VASCULAR SURGERY | Age: 55
End: 2020-07-09

## 2020-07-09 VITALS
HEART RATE: 89 BPM | WEIGHT: 148 LBS | SYSTOLIC BLOOD PRESSURE: 139 MMHG | DIASTOLIC BLOOD PRESSURE: 71 MMHG | BODY MASS INDEX: 27.96 KG/M2

## 2020-07-09 PROBLEM — I65.22 LEFT CAROTID STENOSIS: Status: RESOLVED | Noted: 2020-06-10 | Resolved: 2020-07-09

## 2020-07-09 PROCEDURE — 99024 POSTOP FOLLOW-UP VISIT: CPT | Performed by: SURGERY

## 2020-07-09 NOTE — PROGRESS NOTES
S:  Doing well. Denies any neurological symptoms. O:  On examination, the patient is alert and oriented. The incision is healing well. Neurological examination is normal.    Lower Extremities:  Pulses Right  Left    Radial    3=normal   Femoral    2=diminished   Popliteal    1=barely palpable   Dorsalis pedis    0=absent   Posterior tibial    4=aneurysmal       A:  Stable post-operative carotid endarterectomy. P:  Discussed with the patient and family. Instructions were given to call if there are any symptoms. Follow up appointment suggested to see back in 6 months time.

## 2020-07-28 ENCOUNTER — OFFICE VISIT (OUTPATIENT)
Dept: FAMILY MEDICINE CLINIC | Age: 55
End: 2020-07-28
Payer: COMMERCIAL

## 2020-07-28 VITALS
RESPIRATION RATE: 16 BRPM | OXYGEN SATURATION: 96 % | HEART RATE: 96 BPM | TEMPERATURE: 99 F | BODY MASS INDEX: 28.92 KG/M2 | WEIGHT: 153.2 LBS | HEIGHT: 61 IN | DIASTOLIC BLOOD PRESSURE: 72 MMHG | SYSTOLIC BLOOD PRESSURE: 108 MMHG

## 2020-07-28 PROBLEM — I73.9 PAD (PERIPHERAL ARTERY DISEASE) (HCC): Status: ACTIVE | Noted: 2020-07-28

## 2020-07-28 LAB — HBA1C MFR BLD: 5.4 %

## 2020-07-28 PROCEDURE — G9899 SCRN MAM PERF RSLTS DOC: HCPCS | Performed by: FAMILY MEDICINE

## 2020-07-28 PROCEDURE — 99214 OFFICE O/P EST MOD 30 MIN: CPT | Performed by: FAMILY MEDICINE

## 2020-07-28 PROCEDURE — 1036F TOBACCO NON-USER: CPT | Performed by: FAMILY MEDICINE

## 2020-07-28 PROCEDURE — 3017F COLORECTAL CA SCREEN DOC REV: CPT | Performed by: FAMILY MEDICINE

## 2020-07-28 PROCEDURE — 83036 HEMOGLOBIN GLYCOSYLATED A1C: CPT | Performed by: FAMILY MEDICINE

## 2020-07-28 PROCEDURE — G8427 DOCREV CUR MEDS BY ELIG CLIN: HCPCS | Performed by: FAMILY MEDICINE

## 2020-07-28 PROCEDURE — G8419 CALC BMI OUT NRM PARAM NOF/U: HCPCS | Performed by: FAMILY MEDICINE

## 2020-07-28 RX ORDER — ESCITALOPRAM OXALATE 5 MG/1
5 TABLET ORAL DAILY
Qty: 30 TABLET | Refills: 5 | Status: SHIPPED
Start: 2020-07-28 | End: 2020-09-22

## 2020-07-28 RX ORDER — AMLODIPINE BESYLATE 5 MG/1
5 TABLET ORAL DAILY
Qty: 30 TABLET | Refills: 5 | Status: SHIPPED
Start: 2020-07-28 | End: 2021-02-02

## 2020-07-28 RX ORDER — IRBESARTAN 150 MG/1
TABLET ORAL
COMMUNITY
Start: 2020-06-30 | End: 2020-07-28 | Stop reason: SDUPTHER

## 2020-07-28 RX ORDER — MONTELUKAST SODIUM 10 MG/1
10 TABLET ORAL DAILY
Qty: 30 TABLET | Refills: 5 | Status: SHIPPED
Start: 2020-07-28 | End: 2021-02-04

## 2020-07-28 RX ORDER — ATORVASTATIN CALCIUM 80 MG/1
80 TABLET, FILM COATED ORAL DAILY
Qty: 30 TABLET | Refills: 5 | Status: SHIPPED
Start: 2020-07-28 | End: 2021-01-21

## 2020-07-28 RX ORDER — IRBESARTAN 150 MG/1
150 TABLET ORAL DAILY
Qty: 30 TABLET | Refills: 2 | Status: SHIPPED
Start: 2020-07-28 | End: 2020-09-09 | Stop reason: SDUPTHER

## 2020-07-28 ASSESSMENT — ENCOUNTER SYMPTOMS
SORE THROAT: 0
CONSTIPATION: 0
ABDOMINAL PAIN: 0
SINUS PAIN: 0
COUGH: 0
SHORTNESS OF BREATH: 0
EYE PAIN: 0
DIARRHEA: 0
BACK PAIN: 0
SINUS PRESSURE: 0

## 2020-07-28 NOTE — PROGRESS NOTES
Parker Wood  : 1965    Chief Complaint:     Chief Complaint   Patient presents with    Discuss Medications     discuss gabapentin       ERIBERTO Wood 48 y.o. presents for   Chief Complaint   Patient presents with    Discuss Medications     discuss gabapentin     Patient presents for follow-up today. She continues to have some issues with right leg weakness. She continues to follow with physical therapy for this. She has had some slow progress but she has had some progress. They are still not allowing her to drive due to the weakness. Her blood pressure has been low. She denies any syncopal episodes, dizziness, lightheadedness, chest pain, shortness of breath. She is status post endarterectomy of her carotid. She states she has been doing well since his surgery. A1c today is under good control. She is considering weaning off the gabapentin. She also admits to continued anxiety. She thinks this is causing many of her issues. She is considering starting medication for this today. She denies any suicidal homicidal thoughts. All questions were answered to patients satisfaction.     Past Medical History:   Diagnosis Date    Back pain     Bilateral carotid artery stenosis 2020    Bruit of left carotid artery 2020    Carotid stenosis, right 6/15/2020    Cerebral artery occlusion with cerebral infarction ([address]     H/O: CVA (cerebrovascular accident) 2020    Hypertension     Numbness and tingling     Occlusion of left internal carotid artery 2019    Paralysis of right lower extremity ([address] 2019    Right leg weakness 2020    S/P carotid endarterectomy 6/15/2020       Past Surgical History:   Procedure Laterality Date    CAROTID ENDARTERECTOMY Left 6/10/2020    LEFT CAROTID ENDARTERECTOMY performed by [facility]ile Lemos MD at  [facility] History     Socioeconomic History    Marital status: Single     Spouse name: None    Number of children: None    Years of education: None    Highest education level: None   Occupational History    None   Social Needs    Financial resource strain: None    Food insecurity     Worry: None     Inability: None    Transportation needs     Medical: None     Non-medical: None   Tobacco Use    Smoking status: Former Smoker     Packs/day: 0.50     Types: Cigarettes     Last attempt to quit: 2019     Years since quittin.2    Smokeless tobacco: Never Used   Substance and Sexual Activity    Alcohol use: Yes     Frequency: 2-4 times a month     Comment: moderate    Drug use: No    Sexual activity: Not Currently   Lifestyle    Physical activity     Days per week: None     Minutes per session: None    Stress: None   Relationships    Social connections     Talks on phone: None     Gets together: None     Attends Jainism service: None     Active member of club or organization: None     Attends meetings of clubs or organizations: None     Relationship status: None    Intimate partner violence     Fear of current or ex partner: None     Emotionally abused: None     Physically abused: None     Forced sexual activity: None   Other Topics Concern    None   Social History Narrative    None       Family History   Problem Relation Age of Onset    High Blood Pressure Mother     Diabetes Mother           Current Outpatient Medications   Medication Sig Dispense Refill    montelukast (SINGULAIR) 10 MG tablet Take 1 tablet by mouth daily 30 tablet 5    amLODIPine (NORVASC) 5 MG tablet Take 1 tablet by mouth daily 30 tablet 5    atorvastatin (LIPITOR) 80 MG tablet Take 1 tablet by mouth daily 30 tablet 5    irbesartan (AVAPRO) 150 MG tablet Take 1 tablet by mouth daily 30 tablet 2    escitalopram (LEXAPRO) 5 MG tablet Take 1 tablet by mouth daily 30 tablet 5    ASPIRIN ADULT LOW STRENGTH 81 MG EC tablet TAKE ONE TABLET BY MOUTH EVERY DAY 90 tablet 0    gabapentin (NEURONTIN) 100 MG capsule Take 1 capsule by mouth 2 times daily for 180 days. Intended supply: 30 days 60 capsule 5    Multiple Vitamins-Minerals (MULTIVITAMIN ADULT PO) Take 1 tablet by mouth       No current facility-administered medications for this visit. No Known Allergies    Health Maintenance Due   Topic Date Due    HIV screen  08/13/1982    DTaP/Tdap/Td vaccine (1 - Tdap) 08/13/1986    Cervical cancer screen  08/13/1988    Shingles Vaccine (1 of 2) 08/13/2017           REVIEW OF SYSTEMS  Review of Systems   Constitutional: Negative for chills, fatigue and fever. HENT: Negative for congestion, ear pain, postnasal drip, sinus pressure, sinus pain, sneezing and sore throat. Eyes: Negative for pain. Respiratory: Negative for cough and shortness of breath. Cardiovascular: Negative for chest pain and leg swelling. Gastrointestinal: Negative for abdominal pain, constipation and diarrhea. Genitourinary: Negative for dysuria, frequency and urgency. Musculoskeletal: Negative for arthralgias, back pain and myalgias. Skin: Negative for rash and wound. Allergic/Immunologic: Negative for food allergies. Neurological: Positive for weakness (right lower extremity) and numbness. Negative for dizziness, light-headedness and headaches. Hematological: Does not bruise/bleed easily. Psychiatric/Behavioral: Negative for behavioral problems, dysphoric mood and sleep disturbance. The patient is nervous/anxious. PHYSICAL EXAM  /72   Pulse 96   Temp 99 °F (37.2 °C)   Resp 16   Ht 5' 1\" (1.549 m)   Wt 153 lb 3.2 oz (69.5 kg)   SpO2 96%   BMI 28.95 kg/m²   Physical Exam  Vitals signs and nursing note reviewed. Constitutional:       Appearance: Normal appearance. She is well-developed and normal weight. HENT:      Head: Normocephalic and atraumatic. Right Ear: External ear normal.      Left Ear: External ear normal.      Nose: Nose normal.   Eyes:      Extraocular Movements: Extraocular movements intact. Conjunctiva/sclera: Conjunctivae normal.   Neck:      Musculoskeletal: Normal range of motion and neck supple. Thyroid: No thyromegaly. Cardiovascular:      Rate and Rhythm: Normal rate and regular rhythm. Heart sounds: Normal heart sounds. No murmur. Pulmonary:      Effort: Pulmonary effort is normal.      Breath sounds: Normal breath sounds. No wheezing. Abdominal:      Palpations: Abdomen is soft. Tenderness: There is no abdominal tenderness. Musculoskeletal: Normal range of motion. General: No tenderness. Lymphadenopathy:      Cervical: No cervical adenopathy. Skin:     General: Skin is warm and dry. Findings: No rash. Neurological:      General: No focal deficit present. Mental Status: She is alert and oriented to person, place, and time. Mental status is at baseline. Deep Tendon Reflexes: Reflexes are normal and symmetric. Comments: Right AFO and foot drop noted, 4/5 muscle weakness right leg compared to left   Psychiatric:         Mood and Affect: Mood normal.         Behavior: Behavior normal.         Thought Content: Thought content normal.         Judgment: Judgment normal.              Laboratory:   All laboratory and radiology results have been personally reviewed by myself    Lab Results   Component Value Date     06/10/2020    K 3.8 06/10/2020     06/10/2020    CO2 21 06/10/2020    BUN 14 06/10/2020    CREATININE 0.6 06/10/2020    PROT 7.0 06/08/2020    LABALBU 4.4 06/08/2020    CALCIUM 7.6 06/10/2020    GFRAA >60 06/10/2020    LABGLOM >60 06/10/2020    GLUCOSE 171 06/10/2020    AST 17 06/08/2020    ALT 21 06/08/2020    ALKPHOS 68 06/08/2020    BILITOT 0.3 06/08/2020    TSH 3.500 04/13/2015    VITD25 55 09/23/2019    CHOL 133 09/23/2019    TRIG 96 09/23/2019    HDL 48 09/23/2019    LDLCALC 66 09/23/2019    LABA1C 5.4 07/28/2020        Lab Results   Component Value Date    CHOL 133 09/23/2019    CHOL 185 05/23/2019    CHOL 198 04/13/2015     Lab Results   Component Value Date    TRIG 96 09/23/2019    TRIG 177 (H) 05/23/2019    TRIG 163 (H) 04/13/2015     Lab Results   Component Value Date    HDL 48 09/23/2019    HDL 44 05/23/2019    HDL 53 04/13/2015     Lab Results   Component Value Date    LDLCALC 66 09/23/2019    LDLCALC 106 (H) 05/23/2019    LDLCALC 112 (H) 04/13/2015       Lab Results   Component Value Date    LABA1C 5.4 07/28/2020     Lab Results   Component Value Date    LDLCALC 66 09/23/2019    CREATININE 0.6 06/10/2020       ASSESSMENT/PLAN:     Diagnosis Orders   1. Impaired fasting glucose  POCT glycosylated hemoglobin (Hb A1C)   2. S/P carotid endarterectomy     3. PAD (peripheral artery disease) ([facility] [address]     4. Essential hypertension     5. Cerebrovascular accident (CVA), unspecified mechanism ([facility] [address] [facility]  CBC Auto Differential    Comprehensive Metabolic Panel    Lipid Panel    TSH without Reflex   6. Environmental allergies     7. Vitamin D deficiency  Vitamin D 25 Hydroxy   8. Anxiety       A1c currently under good control continue current therapy. She continues to follow with vascular surgery currently doing well status post carotid endarterectomy. BP well controlled continue current therapy for now. If continues to be low would consider possibly decreasing amlodipine. Labs to be completed today. As for anxiety will start Lexapro. Side effects medication reviewed with patient. She will return in 6 weeks for reevaluation. No other changes are made at this time    Problem list reviewed andsimplified/updated  HM reviewed today and counseled as appropriate    Call or go to ED immediately if symptoms worsen or persist.  Future Appointments   Date Time Provider Evonne Aburto   9/9/2020  1:30 PM DO Michelle Santana [facility]   12/14/2020 10:00 AM Princess Salazar MD [facility] 2 [facility]   1/14/2021 10:45 AM Kaur Waggoner MD [facility]     Or sooner if necessary.       Educational materials and/or homeexercises printed for patient's review and were included in patient instructions on his/her After Visit Summary and given to patient at the end of visit.       Counseled regarding above diagnosis, including possible risks and complications,  especially if left uncontrolled.     Counseled regarding the possible side effects, risks, benefits and alternatives to treatment; patient and/or guardian verbalizes understanding, agrees,feels comfortable with and wishes to proceed with above treatment plan.     Advised patient to call Nancy Hurter new medication issues, and read all Rx info from pharmacy to assure aware of all possible risks and side effects of medication before taking.     Reviewed age and gender appropriate health screening exams and vaccinations. Advised patient regarding importance of keeping up with recommended health maintenance and toschedule as soon as possible if overdue, as this is important in assessing for undiagnosed pathology, especially cancer, as well as protecting against potentially harmful/life threatening disease.          Patient and/or guardian verbalizes understanding and agrees with above counseling, assessment and plan.     All questions answered. Michael 5, DO  7/28/20    NOTE: This report was transcribed using voice recognition software.  Every effort was made to ensure accuracy; however, inadvertent computerized transcription errors may be present

## 2020-08-23 ENCOUNTER — HOSPITAL ENCOUNTER (OUTPATIENT)
Age: 55
Discharge: HOME | End: 2020-08-23
Payer: COMMERCIAL

## 2020-08-23 LAB
ALBUMIN SERPL-MCNC: 4.4 G/DL (ref 3.5–5.2)
ALP BLD-CCNC: 66 U/L (ref 35–104)
ALT SERPL-CCNC: 26 U/L (ref 0–32)
ANION GAP SERPL CALCULATED.3IONS-SCNC: 9 MMOL/L (ref 7–16)
AST SERPL-CCNC: 17 U/L (ref 0–31)
BASOPHILS ABSOLUTE: 0.01 E9/L (ref 0–0.2)
BASOPHILS RELATIVE PERCENT: 0.2 % (ref 0–2)
BILIRUB SERPL-MCNC: 0.4 MG/DL (ref 0–1.2)
BUN BLDV-MCNC: 10 MG/DL (ref 6–20)
CALCIUM SERPL-MCNC: 9.4 MG/DL (ref 8.6–10.2)
CHLORIDE BLD-SCNC: 100 MMOL/L (ref 98–107)
CHOLESTEROL, TOTAL: 119 MG/DL (ref 0–199)
CO2: 28 MMOL/L (ref 22–29)
CREAT SERPL-MCNC: 0.7 MG/DL (ref 0.5–1)
EOSINOPHILS ABSOLUTE: 0.11 E9/L (ref 0.05–0.5)
EOSINOPHILS RELATIVE PERCENT: 1.8 % (ref 0–6)
GFR AFRICAN AMERICAN: >60
GFR NON-AFRICAN AMERICAN: >60 ML/MIN/1.73
GLUCOSE BLD-MCNC: 108 MG/DL (ref 74–99)
HCT VFR BLD CALC: 43.8 % (ref 34–48)
HDLC SERPL-MCNC: 45 MG/DL
HEMOGLOBIN: 14.6 G/DL (ref 11.5–15.5)
IMMATURE GRANULOCYTES #: 0.01 E9/L
IMMATURE GRANULOCYTES %: 0.2 % (ref 0–5)
LDL CHOLESTEROL CALCULATED: 55 MG/DL (ref 0–99)
LYMPHOCYTES ABSOLUTE: 1.7 E9/L (ref 1.5–4)
LYMPHOCYTES RELATIVE PERCENT: 27.5 % (ref 20–42)
MCH RBC QN AUTO: 30.4 PG (ref 26–35)
MCHC RBC AUTO-ENTMCNC: 33.3 % (ref 32–34.5)
MCV RBC AUTO: 91.3 FL (ref 80–99.9)
MONOCYTES ABSOLUTE: 0.47 E9/L (ref 0.1–0.95)
MONOCYTES RELATIVE PERCENT: 7.6 % (ref 2–12)
NEUTROPHILS ABSOLUTE: 3.88 E9/L (ref 1.8–7.3)
NEUTROPHILS RELATIVE PERCENT: 62.7 % (ref 43–80)
PDW BLD-RTO: 11.9 FL (ref 11.5–15)
PLATELET # BLD: 242 E9/L (ref 130–450)
PMV BLD AUTO: 9.1 FL (ref 7–12)
POTASSIUM SERPL-SCNC: 4.4 MMOL/L (ref 3.5–5)
RBC # BLD: 4.8 E12/L (ref 3.5–5.5)
SODIUM BLD-SCNC: 137 MMOL/L (ref 132–146)
TOTAL PROTEIN: 6.7 G/DL (ref 6.4–8.3)
TRIGL SERPL-MCNC: 96 MG/DL (ref 0–149)
TSH SERPL DL<=0.05 MIU/L-ACNC: 3.18 UIU/ML (ref 0.27–4.2)
VITAMIN D 25-HYDROXY: 43 NG/ML (ref 30–100)
VLDLC SERPL CALC-MCNC: 19 MG/DL
WBC # BLD: 6.2 E9/L (ref 4.5–11.5)

## 2020-08-23 PROCEDURE — 80053 COMPREHEN METABOLIC PANEL: CPT

## 2020-08-23 PROCEDURE — 82306 VITAMIN D 25 HYDROXY: CPT

## 2020-08-23 PROCEDURE — 36415 COLL VENOUS BLD VENIPUNCTURE: CPT

## 2020-08-23 PROCEDURE — 84443 ASSAY THYROID STIM HORMONE: CPT

## 2020-08-23 PROCEDURE — 80061 LIPID PANEL: CPT

## 2020-08-23 PROCEDURE — 85025 COMPLETE CBC W/AUTO DIFF WBC: CPT

## 2020-09-09 ENCOUNTER — OFFICE VISIT (OUTPATIENT)
Dept: FAMILY MEDICINE CLINIC | Age: 55
End: 2020-09-09
Payer: COMMERCIAL

## 2020-09-09 VITALS
RESPIRATION RATE: 16 BRPM | OXYGEN SATURATION: 98 % | BODY MASS INDEX: 28.7 KG/M2 | SYSTOLIC BLOOD PRESSURE: 124 MMHG | HEART RATE: 83 BPM | HEIGHT: 61 IN | TEMPERATURE: 97.3 F | DIASTOLIC BLOOD PRESSURE: 86 MMHG | WEIGHT: 152 LBS

## 2020-09-09 PROCEDURE — 3017F COLORECTAL CA SCREEN DOC REV: CPT | Performed by: FAMILY MEDICINE

## 2020-09-09 PROCEDURE — G8419 CALC BMI OUT NRM PARAM NOF/U: HCPCS | Performed by: FAMILY MEDICINE

## 2020-09-09 PROCEDURE — 17110 DESTRUCTION B9 LES UP TO 14: CPT | Performed by: FAMILY MEDICINE

## 2020-09-09 PROCEDURE — 99214 OFFICE O/P EST MOD 30 MIN: CPT | Performed by: FAMILY MEDICINE

## 2020-09-09 PROCEDURE — 1036F TOBACCO NON-USER: CPT | Performed by: FAMILY MEDICINE

## 2020-09-09 PROCEDURE — 90715 TDAP VACCINE 7 YRS/> IM: CPT | Performed by: FAMILY MEDICINE

## 2020-09-09 PROCEDURE — G9899 SCRN MAM PERF RSLTS DOC: HCPCS | Performed by: FAMILY MEDICINE

## 2020-09-09 PROCEDURE — G8427 DOCREV CUR MEDS BY ELIG CLIN: HCPCS | Performed by: FAMILY MEDICINE

## 2020-09-09 PROCEDURE — 90471 IMMUNIZATION ADMIN: CPT | Performed by: FAMILY MEDICINE

## 2020-09-09 RX ORDER — IRBESARTAN 150 MG/1
150 TABLET ORAL DAILY
Qty: 30 TABLET | Refills: 2 | Status: SHIPPED
Start: 2020-09-09 | End: 2021-04-29 | Stop reason: SDUPTHER

## 2020-09-09 ASSESSMENT — ENCOUNTER SYMPTOMS
ROS SKIN COMMENTS: WART
SINUS PRESSURE: 0
EYE PAIN: 0
SORE THROAT: 0
BACK PAIN: 0
COUGH: 0
SHORTNESS OF BREATH: 0
DIARRHEA: 0
CONSTIPATION: 0
SINUS PAIN: 0
ABDOMINAL PAIN: 0

## 2020-09-09 NOTE — PROGRESS NOTES
Navjotjunior Nina  : 1965    Chief Complaint:     Chief Complaint   Patient presents with   [address]     get records from [address] [facility]        HPI  Navjotjunior Nina 50 y.o. presents for   Chief Complaint   Patient presents with   [address]     get records from [address] [facility]      Patient states her anxiety is much improved since starting the Lexapro. She wishes to continue this. She states that she is walking much better and does wish to get the EMG study completed that was ordered in May. Her blood pressure has been well controlled as well. She also has a wart on her right and left hand she wishes to be removed. She also wishes for a tetanus booster. All questions were answered to patients satisfaction.     Past Medical History:   Diagnosis Date    Back pain     Bilateral carotid artery stenosis 2020    Bruit of left carotid artery 2020    Carotid stenosis, right 6/15/2020    Cerebral artery occlusion with cerebral infarction ([address]     H/O: CVA (cerebrovascular accident) 2020    Hypertension     Numbness and tingling     Occlusion of left internal carotid artery 2019    Paralysis of right lower extremity ([address] 2019    Right leg weakness 2020    S/P carotid endarterectomy 6/15/2020       Past Surgical History:   Procedure Laterality Date    CAROTID ENDARTERECTOMY Left 6/10/2020    LEFT CAROTID ENDARTERECTOMY performed by Maile Lemos MD at  [facility] History     Socioeconomic History    Marital status: Single     Spouse name: None    Number of children: None    Years of education: None    Highest education level: None   Occupational History    None   Social Needs    Financial resource strain: None    Food insecurity     Worry: None     Inability: None    Transportation needs     Medical: None     Non-medical: None   Tobacco Use    Smoking status: Former Smoker     Packs/day: 0.50 Constitutional: Negative for chills, fatigue and fever. HENT: Negative for congestion, ear pain, postnasal drip, sinus pressure, sinus pain, sneezing and sore throat. Eyes: Negative for pain. Respiratory: Negative for cough and shortness of breath. Cardiovascular: Negative for chest pain and leg swelling. Gastrointestinal: Negative for abdominal pain, constipation and diarrhea. Genitourinary: Negative for dysuria, frequency and urgency. Musculoskeletal: Negative for arthralgias, back pain and myalgias. Skin: Negative for rash and wound. wart   Allergic/Immunologic: Negative for food allergies. Neurological: Positive for weakness (right lower extremity) and numbness. Negative for dizziness, light-headedness and headaches. Hematological: Does not bruise/bleed easily. Psychiatric/Behavioral: Negative for behavioral problems, dysphoric mood and sleep disturbance. The patient is nervous/anxious. PHYSICAL EXAM  /86   Pulse 83   Temp 97.3 °F (36.3 °C) (Temporal)   Resp 16   Ht 5' 1\" (1.549 m)   Wt 152 lb (68.9 kg)   SpO2 98%   BMI 28.72 kg/m²   Physical Exam  Vitals signs and nursing note reviewed. Constitutional:       Appearance: Normal appearance. She is well-developed and normal weight. HENT:      Head: Normocephalic and atraumatic. Right Ear: External ear normal.      Left Ear: External ear normal.      Nose: Nose normal.   Eyes:      Extraocular Movements: Extraocular movements intact. Conjunctiva/sclera: Conjunctivae normal.   Neck:      Musculoskeletal: Normal range of motion and neck supple. Thyroid: No thyromegaly. Cardiovascular:      Rate and Rhythm: Normal rate and regular rhythm. Heart sounds: Normal heart sounds. No murmur. Pulmonary:      Effort: Pulmonary effort is normal.      Breath sounds: Normal breath sounds. No wheezing. Abdominal:      Palpations: Abdomen is soft. Tenderness: There is no abdominal tenderness. Musculoskeletal: Normal range of motion. General: No tenderness. Lymphadenopathy:      Cervical: No cervical adenopathy. Skin:     General: Skin is warm and dry. Findings: No rash. Comments: Wart on right pointer finger and left ring finger   Neurological:      General: No focal deficit present. Mental Status: She is alert and oriented to person, place, and time. Mental status is at baseline. Deep Tendon Reflexes: Reflexes are normal and symmetric. Psychiatric:         Mood and Affect: Mood normal.         Behavior: Behavior normal.         Thought Content: Thought content normal.         Judgment: Judgment normal.              Laboratory:   All laboratory and radiology results have been personally reviewed by myself    Lab Results   Component Value Date     08/23/2020    K 4.4 08/23/2020    K 3.8 06/10/2020     08/23/2020    CO2 28 08/23/2020    BUN 10 08/23/2020    CREATININE 0.7 08/23/2020    PROT 6.7 08/23/2020    LABALBU 4.4 08/23/2020    CALCIUM 9.4 08/23/2020    GFRAA >60 08/23/2020    LABGLOM >60 08/23/2020    GLUCOSE 108 08/23/2020    AST 17 08/23/2020    ALT 26 08/23/2020    ALKPHOS 66 08/23/2020    BILITOT 0.4 08/23/2020    TSH 3.180 08/23/2020    VITD25 43 08/23/2020    CHOL 119 08/23/2020    TRIG 96 08/23/2020    HDL 45 08/23/2020    LDLCALC 55 08/23/2020    LABA1C 5.4 07/28/2020        Lab Results   Component Value Date    CHOL 119 08/23/2020    CHOL 133 09/23/2019    CHOL 185 05/23/2019     Lab Results   Component Value Date    TRIG 96 08/23/2020    TRIG 96 09/23/2019    TRIG 177 (H) 05/23/2019     Lab Results   Component Value Date    HDL 45 08/23/2020    HDL 48 09/23/2019    HDL 44 05/23/2019     Lab Results   Component Value Date    LDLCALC 55 08/23/2020    LDLCALC 66 09/23/2019    LDLCALC 106 (H) 05/23/2019       Lab Results   Component Value Date    LABA1C 5.4 07/28/2020     Lab Results   Component Value Date    LDLCALC 55 08/23/2020    CREATININE 0.7 08/23/2020       ASSESSMENT/PLAN:     Diagnosis Orders   1. Anxiety     2. Right foot drop     3. Essential hypertension     4. Viral warts, unspecified type  [address] - [place] DESTRUCTION BENIGN LESIONS UP TO 14   5. Need for tetanus booster  Tdap (age 6y and older) IM (Boostrix)     BP well controlled continue current therapy. She does not wish to increase anxiety medication at this time we will continue current therapy. Continue to follow with PT EMG reprinted for patient to schedule. See procedure note below for cryotherapy. Tetanus booster updated today    Procedure:  Cryotherapy of wart    Indication:  Pain     Number treated:  2    CRYO Treatment of Warts  - Time Out performed:  Correct patient, Correct location, Procedure reviewed  - This procedure was explained to the patient in detail, and verbal consent obtained. - The treatments, side effects and failure rates were discussed regarding cryotherapy for wart removal  - Liquid nitrogen was applied to warts using shield and aerosol canister  - Freeze-thaw-refreeze technique was used x 30 seconds per wart  - Patient tolerated the procedure well with minimal stinging/burning and without adverse sequelae  - The expected skin reaction, including erythema, pain, scabbing, blistering and hypopigmented scar formation, was discussed. - May need return visit for further treatment if one application does not completely destroy the warts. Consent:  The patient understood all the risks and benefits prior to treatment. The risks explained included scarring, hyper and/or hypo-pigmentation. Although this treatment is highly effective, recurrences do occur and this was explained to the patient. Method:  Histofreeze per  protocol    Post-op Instruction:  Post-op instructions were given orally. The patient was instructed to clean the site 2-3 times a day and apply bacitracin ointment. Signs of infection were discussed both orally and in writing. The patient was informed to call if any signs of infection develop such as increasing pain, purulent drainage, or beefy redness. The patient was informed that a blister may occur at the cryo site and that this is an expected event. Problem list reviewed andsimplified/updated  HM reviewed today and counseled as appropriate    Call or go to ED immediately if symptoms worsen or persist.  Future Appointments   Date Time Provider Evonne Laureanoi   9/22/2020  2:30 PM DO Amador Juddmorenita [facility]   12/14/2020 10:00 AM Duarte Templeton MD [facility]   1/14/2021 10:45 AM Jolie Soto MD [facility]     Or sooner if necessary. Educational materials and/or homeexercises printed for patient's review and were included in patient instructions on his/her After Visit Summary and given to patient at the end of visit.       Counseled regarding above diagnosis, including possible risks and complications,  especially if left uncontrolled.     Counseled regarding the possible side effects, risks, benefits and alternatives to treatment; patient and/or guardian verbalizes understanding, agrees,feels comfortable with and wishes to proceed with above treatment plan.     Advised patient to call Tomasz Blades new medication issues, and read all Rx info from pharmacy to assure aware of all possible risks and side effects of medication before taking.     Reviewed age and gender appropriate health screening exams and vaccinations. Advised patient regarding importance of keeping up with recommended health maintenance and toschedule as soon as possible if overdue, as this is important in assessing for undiagnosed pathology, especially cancer, as well as protecting against potentially harmful/life threatening disease.          Patient and/or guardian verbalizes understanding and agrees with above counseling, assessment and plan.     All questions answered.     Konrad Yen DO  9/9/20    NOTE: This report was transcribed using voice recognition software.  Every effort was made to ensure accuracy; however, inadvertent computerized transcription errors may be present

## 2020-09-22 ENCOUNTER — OFFICE VISIT (OUTPATIENT)
Dept: FAMILY MEDICINE CLINIC | Age: 55
End: 2020-09-22
Payer: COMMERCIAL

## 2020-09-22 VITALS
SYSTOLIC BLOOD PRESSURE: 126 MMHG | WEIGHT: 154 LBS | DIASTOLIC BLOOD PRESSURE: 84 MMHG | HEIGHT: 61 IN | RESPIRATION RATE: 22 BRPM | TEMPERATURE: 97.2 F | HEART RATE: 94 BPM | OXYGEN SATURATION: 99 % | BODY MASS INDEX: 29.07 KG/M2

## 2020-09-22 PROCEDURE — 17110 DESTRUCTION B9 LES UP TO 14: CPT | Performed by: FAMILY MEDICINE

## 2020-09-22 PROCEDURE — G8427 DOCREV CUR MEDS BY ELIG CLIN: HCPCS | Performed by: FAMILY MEDICINE

## 2020-09-22 PROCEDURE — 3017F COLORECTAL CA SCREEN DOC REV: CPT | Performed by: FAMILY MEDICINE

## 2020-09-22 PROCEDURE — G9899 SCRN MAM PERF RSLTS DOC: HCPCS | Performed by: FAMILY MEDICINE

## 2020-09-22 PROCEDURE — 99213 OFFICE O/P EST LOW 20 MIN: CPT | Performed by: FAMILY MEDICINE

## 2020-09-22 RX ORDER — ASPIRIN 81 MG/1
TABLET ORAL
Qty: 90 TABLET | Refills: 3 | Status: SHIPPED
Start: 2020-09-22 | End: 2021-03-30

## 2020-09-22 RX ORDER — ESCITALOPRAM OXALATE 10 MG/1
10 TABLET ORAL DAILY
Qty: 30 TABLET | Refills: 2 | Status: SHIPPED
Start: 2020-09-22 | End: 2020-11-24

## 2020-09-23 ASSESSMENT — ENCOUNTER SYMPTOMS
BACK PAIN: 0
EYE PAIN: 0
SORE THROAT: 0
COUGH: 0
SHORTNESS OF BREATH: 0
SINUS PAIN: 0
SINUS PRESSURE: 0
CONSTIPATION: 0
ROS SKIN COMMENTS: WART
DIARRHEA: 0
ABDOMINAL PAIN: 0

## 2020-09-23 NOTE — PROGRESS NOTES
Josselin Dural  : 1965    Chief Complaint:     Chief Complaint   Patient presents with    Verruca Vulgaris     b/l hand    Health Maintenance     last pap center for woman. HPI  Josselin Dural 50 y.o. presents for   Chief Complaint   Patient presents with    Verruca Vulgaris     b/l hand    Health Maintenance     last pap center for woman. She has had some issues with her tennis elbow. She has not been using her elbow straps. She has been using this more often. The wart on her right hand is improved but left is still present. All questions were answered to patients satisfaction.     Past Medical History:   Diagnosis Date    Back pain     Bilateral carotid artery stenosis 2020    Bruit of left carotid artery 2020    Carotid stenosis, right 6/15/2020    Cerebral artery occlusion with cerebral infarction ([address]     H/O: CVA (cerebrovascular accident) 2020    Hypertension     Numbness and tingling     Occlusion of left internal carotid artery 2019    Paralysis of right lower extremity ([address] 2019    Right leg weakness 2020    S/P carotid endarterectomy 6/15/2020       Past Surgical History:   Procedure Laterality Date    CAROTID ENDARTERECTOMY Left 6/10/2020    LEFT CAROTID ENDARTERECTOMY [facility] at  [facility] History     Socioeconomic History    Marital status: Single     Spouse name: None    Number of children: None    Years of education: None    Highest education level: None   Occupational History    None   Social Needs    Financial resource strain: None    Food insecurity     Worry: None     Inability: None    Transportation needs     Medical: None     Non-medical: None   Tobacco Use    Smoking status: Former Smoker     Packs/day: 0.50     Types: Cigarettes     Last attempt to quit: 2019     Years since quittin.4    Smokeless tobacco: Never Used   Substance and Sexual Activity    Alcohol use: Yes     Frequency: 2-4 times a month     Comment: moderate    Drug use: No    Sexual activity: Not Currently   Lifestyle    Physical activity     Days per week: None     Minutes per session: None    Stress: None   Relationships    Social connections     Talks on phone: None     Gets together: None     Attends Nondenominational service: None     Active member of club or organization: None     Attends meetings of clubs or organizations: None     Relationship status: None    Intimate partner violence     Fear of current or ex partner: None     Emotionally abused: None     Physically abused: None     Forced sexual activity: None   Other Topics Concern    None   Social History Narrative    None       Family History   Problem Relation Age of Onset    High Blood Pressure Mother     Diabetes Mother           Current Outpatient Medications   Medication Sig Dispense Refill    aspirin (ASPIRIN ADULT LOW STRENGTH) 81 MG EC tablet TAKE ONE TABLET BY MOUTH EVERY DAY 90 tablet 3    escitalopram (LEXAPRO) 10 MG tablet Take 1 tablet by mouth daily 30 tablet 2    irbesartan (AVAPRO) 150 MG tablet Take 1 tablet by mouth daily 30 tablet 2    montelukast (SINGULAIR) 10 MG tablet Take 1 tablet by mouth daily 30 tablet 5    amLODIPine (NORVASC) 5 MG tablet Take 1 tablet by mouth daily 30 tablet 5    atorvastatin (LIPITOR) 80 MG tablet Take 1 tablet by mouth daily 30 tablet 5    Multiple Vitamins-Minerals (MULTIVITAMIN ADULT PO) Take 1 tablet by mouth       No current facility-administered medications for this visit. No Known Allergies    Health Maintenance Due   Topic Date Due    HIV screen  08/13/1982    Cervical cancer screen  08/13/1988    Shingles Vaccine (1 of 2) 08/13/2017    Flu vaccine (1) 09/01/2020           REVIEW OF SYSTEMS  Review of Systems   Constitutional: Negative for chills, fatigue and fever.    HENT: Negative for congestion, ear pain, postnasal drip, sinus pressure, sinus pain, sneezing and sore throat. Eyes: Negative for pain. Respiratory: Negative for cough and shortness of breath. Cardiovascular: Negative for chest pain and leg swelling. Gastrointestinal: Negative for abdominal pain, constipation and diarrhea. Genitourinary: Negative for dysuria, frequency and urgency. Musculoskeletal: Negative for arthralgias, back pain and myalgias. Bilateral elbow pain   Skin: Negative for rash and wound. wart   Allergic/Immunologic: Negative for food allergies. Neurological: Positive for weakness (right lower extremity) and numbness. Negative for dizziness, light-headedness and headaches. Hematological: Does not bruise/bleed easily. Psychiatric/Behavioral: Negative for behavioral problems, dysphoric mood and sleep disturbance. The patient is nervous/anxious. PHYSICAL EXAM  /84   Pulse 94   Temp 97.2 °F (36.2 °C)   Resp 22   Ht 5' 1\" (1.549 m)   Wt 154 lb (69.9 kg)   SpO2 99%   BMI 29.10 kg/m²   Physical Exam  Vitals signs and nursing note reviewed. Constitutional:       Appearance: Normal appearance. She is well-developed and normal weight. HENT:      Head: Normocephalic and atraumatic. Right Ear: External ear normal.      Left Ear: External ear normal.      Nose: Nose normal.   Eyes:      Extraocular Movements: Extraocular movements intact. Conjunctiva/sclera: Conjunctivae normal.   Neck:      Musculoskeletal: Normal range of motion and neck supple. Thyroid: No thyromegaly. Cardiovascular:      Rate and Rhythm: Normal rate and regular rhythm. Heart sounds: Normal heart sounds. No murmur. Pulmonary:      Effort: Pulmonary effort is normal.      Breath sounds: Normal breath sounds. No wheezing. Abdominal:      Palpations: Abdomen is soft. Tenderness: There is no abdominal tenderness. Musculoskeletal: Normal range of motion. General: No tenderness. Lymphadenopathy:      Cervical: No cervical adenopathy. Skin:     General: Skin is warm and dry. Findings: No rash. Comments: Wart on right pointer finger and left ring finger   Neurological:      General: No focal deficit present. Mental Status: She is alert and oriented to person, place, and time. Mental status is at baseline. Deep Tendon Reflexes: Reflexes are normal and symmetric. Psychiatric:         Mood and Affect: Mood normal.         Behavior: Behavior normal.         Thought Content: Thought content normal.         Judgment: Judgment normal.              Laboratory: All laboratory and radiology results have been personally reviewed by myself    Lab Results   Component Value Date     08/23/2020    K 4.4 08/23/2020    K 3.8 06/10/2020     08/23/2020    CO2 28 08/23/2020    BUN 10 08/23/2020    CREATININE 0.7 08/23/2020    PROT 6.7 08/23/2020    LABALBU 4.4 08/23/2020    CALCIUM 9.4 08/23/2020    GFRAA >60 08/23/2020    LABGLOM >60 08/23/2020    GLUCOSE 108 08/23/2020    AST 17 08/23/2020    ALT 26 08/23/2020    ALKPHOS 66 08/23/2020    BILITOT 0.4 08/23/2020    TSH 3.180 08/23/2020    VITD25 43 08/23/2020    CHOL 119 08/23/2020    TRIG 96 08/23/2020    HDL 45 08/23/2020    LDLCALC 55 08/23/2020    LABA1C 5.4 07/28/2020        Lab Results   Component Value Date    CHOL 119 08/23/2020    CHOL 133 09/23/2019    CHOL 185 05/23/2019     Lab Results   Component Value Date    TRIG 96 08/23/2020    TRIG 96 09/23/2019    TRIG 177 (H) 05/23/2019     Lab Results   Component Value Date    HDL 45 08/23/2020    HDL 48 09/23/2019    HDL 44 05/23/2019     Lab Results   Component Value Date    LDLCALC 55 08/23/2020    LDLCALC 66 09/23/2019    LDLCALC 106 (H) 05/23/2019       Lab Results   Component Value Date    LABA1C 5.4 07/28/2020     Lab Results   Component Value Date    LDLCALC 55 08/23/2020    CREATININE 0.7 08/23/2020       ASSESSMENT/PLAN:     Diagnosis Orders   1. Verruca vulgaris     2.  Lateral epicondylitis of both elbows       See procedure below for left finger. Recommend to use straps on elbows for tennis elbow and rest. Patient agreeable. Procedure:  Cryotherapy of wart    Indication:  Pain     Number treated:  1    CRYO Treatment of Warts  - Time Out performed:  Correct patient, Correct location, Procedure reviewed  - This procedure was explained to the patient in detail, and verbal consent obtained. - The treatments, side effects and failure rates were discussed regarding cryotherapy for wart removal  - Liquid nitrogen was applied to warts using shield and aerosol canister  - Freeze-thaw-refreeze technique was used x 30 seconds per wart  - Patient tolerated the procedure well with minimal stinging/burning and without adverse sequelae  - The expected skin reaction, including erythema, pain, scabbing, blistering and hypopigmented scar formation, was discussed. - May need return visit for further treatment if one application does not completely destroy the warts. Consent:  The patient understood all the risks and benefits prior to treatment. The risks explained included scarring, hyper and/or hypo-pigmentation. Although this treatment is highly effective, recurrences do occur and this was explained to the patient. Method:  Histofreeze per  protocol    Post-op Instruction:  Post-op instructions were given orally. The patient was instructed to clean the site 2-3 times a day and apply bacitracin ointment. Signs of infection were discussed both orally and in writing. The patient was informed to call if any signs of infection develop such as increasing pain, purulent drainage, or beefy redness. The patient was informed that a blister may occur at the cryo site and that this is an expected event.         Problem list reviewed andsimplified/updated  HM reviewed today and counseled as appropriate    Call or go to ED immediately if symptoms worsen or persist.  Future Appointments   Date Time Provider Department

## 2020-10-13 ENCOUNTER — TELEPHONE (OUTPATIENT)
Dept: PHYSICAL MEDICINE AND REHAB | Age: 55
End: 2020-10-13

## 2020-11-24 RX ORDER — ESCITALOPRAM OXALATE 10 MG/1
10 TABLET ORAL DAILY
Qty: 30 TABLET | Refills: 0 | Status: SHIPPED
Start: 2020-11-24 | End: 2021-01-14 | Stop reason: ALTCHOICE

## 2020-12-14 ENCOUNTER — OFFICE VISIT (OUTPATIENT)
Dept: PHYSICAL MEDICINE AND REHAB | Age: 55
End: 2020-12-14
Payer: COMMERCIAL

## 2020-12-14 VITALS
TEMPERATURE: 98.5 F | WEIGHT: 148 LBS | BODY MASS INDEX: 27.94 KG/M2 | SYSTOLIC BLOOD PRESSURE: 138 MMHG | HEIGHT: 61 IN | DIASTOLIC BLOOD PRESSURE: 88 MMHG

## 2020-12-14 PROCEDURE — G8419 CALC BMI OUT NRM PARAM NOF/U: HCPCS | Performed by: PHYSICAL MEDICINE & REHABILITATION

## 2020-12-14 PROCEDURE — G8427 DOCREV CUR MEDS BY ELIG CLIN: HCPCS | Performed by: PHYSICAL MEDICINE & REHABILITATION

## 2020-12-14 PROCEDURE — 1036F TOBACCO NON-USER: CPT | Performed by: PHYSICAL MEDICINE & REHABILITATION

## 2020-12-14 PROCEDURE — 3017F COLORECTAL CA SCREEN DOC REV: CPT | Performed by: PHYSICAL MEDICINE & REHABILITATION

## 2020-12-14 PROCEDURE — G9899 SCRN MAM PERF RSLTS DOC: HCPCS | Performed by: PHYSICAL MEDICINE & REHABILITATION

## 2020-12-14 PROCEDURE — G8484 FLU IMMUNIZE NO ADMIN: HCPCS | Performed by: PHYSICAL MEDICINE & REHABILITATION

## 2020-12-14 PROCEDURE — 99214 OFFICE O/P EST MOD 30 MIN: CPT | Performed by: PHYSICAL MEDICINE & REHABILITATION

## 2020-12-14 NOTE — PROGRESS NOTES
Christiana Dawn M.D. [address] [facility] PHYSICAL MEDICINE AND REHABILITAION  [address]  [place] [address]  Dept: [phone]  Dept Fax: [phone]    PCP: Michael Cox DO  Date of visit: 12/14/20    Chief Complaint   Patient presents with    Extremity Weakness     currently in PT 3x a week also does exercises at home. states she doesn't wear AFO very much, only when she goes walking, Also, brought driving papers to fill out          Suri Leal is a 50 y.o.  woman who presents for follow up of rehab needs post stroke. Patient sustained left MIRA and MCA distribution strokes in May of 2019 with right hemiparesis, right foot drop, cognitive impairment, and word finding deficits. Once medically stabilized she completed the Acute Rehab program from 5/24/19-6/6/19. At time of discharge home she was at [facility] for bathing, Mod I for all other ADLs, and Mod I for mobility. She was ambulating 700ft with a right AFO. She completed Speech therapy and OT and reported improvement in speech. She denies any ongoing issues with speech. She notes she is still somewhat forgetful at times, but cognition is within functional limits. Since last visit patient reports she is doing well overall. She is currently in PT and reports ongoing improvement. She completed OT functional  evaluation on 2/21/2020 and was unable to achieve a passing score on accelerator/brake reaction time with the left foot--she also did not achieve a passing score with right foot. It was recommended that patient requires a left foot accelerator adaptation to her vehicle followed by 5-8 hours of training with an OT adaptive  and then passing a driving test with the device prior to being released to drive.  Unfortunately patient currently has a leased vehicle and states they will not allow her to put a left foot accelerator on the vehicle, thus she has not completed the above requirements and is not currently cleared to drive. She reports her lease is up next year. She is unable to drive with her right foot due to right foot drop and inadequate control at the foot/ankle. Otherwise, patient is doing well. She is at [address] level for all ADLs and mobility. She is utilizing her right AFO intermittently. No recent falls reported.         Past Medical History:   Diagnosis Date    Back pain     Bilateral carotid artery stenosis 2020    Bruit of left carotid artery 2020    Carotid stenosis, right 6/15/2020    Cerebral artery occlusion with cerebral infarction ([facility] [address]     H/O: CVA (cerebrovascular accident) 2020    Hypertension     Numbness and tingling     Occlusion of left internal carotid artery 2019    Paralysis of right lower extremity ([facility] [address] 2019    Right leg weakness 2020    S/P carotid endarterectomy 6/15/2020       Past Surgical History:   Procedure Laterality Date    CAROTID ENDARTERECTOMY Left 6/10/2020    LEFT CAROTID ENDARTERECTOMY performed by Nica Barlow MD at  [facility] History     Socioeconomic History    Marital status: Single     Spouse name: Not on file    Number of children: Not on file    Years of education: Not on file    Highest education level: Not on file   Occupational History    Not on file   Social Needs    Financial resource strain: Not on file    Food insecurity     Worry: Not on file     Inability: Not on file    Transportation needs     Medical: Not on file     Non-medical: Not on file   Tobacco Use    Smoking status: Former Smoker     Packs/day: 0.50     Types: Cigarettes     Quit date: 2019     Years since quittin.6    Smokeless tobacco: Never Used   Substance and Sexual Activity    Alcohol use: Yes     Frequency: 2-4 times a month     Comment: moderate    Drug use: No    Sexual activity: Not Currently   Lifestyle    Physical activity     Days per week: Not on file problems, blood clots, bruising  Endocrine:  No polydypsia, polyuria, temperature intolerance. Respiratory: No cough, shortness of breath, wheezing. Cardiovascular: No syncope, chest pain, dyspnea on exertion,palpitations. Gastrointestinal: No abdominal pain, hematemesis, melena, nausea, vomiting, stool incontinence  Genito-Urinary: No dysuria, hematuria, incontinence   Musculoskeletal: Per HPI  Neurological: Per HPI  Dermatological:  No rash      Physical Exam:   Blood pressure 138/88, temperature 98.5 °F (36.9 °C), height 5' 1\" (1.549 m), weight 148 lb (67.1 kg). General: The patient is in no apparent distress. HEENT: No rhinorrhea, sneezing, yawning, or lacrimation. No scleral icterus or conjunctival injection. SKIN: No piloerection. No track marks. No rash. Normal turgor. No erythema or ecchymosis. Psychological: Mood and affect are appropriate. Hygiene is appropriate. Cardiovascular:  Heart is regular rate. There is no edema. Respiratory: Respirations are regular and unlabored. There is no cyanosis. Lymphatic: There is no lymphadenopathy. Gastrointestinal: Soft abdomen  Genitourinary: No costovertebral angle tenderness. MSK: Normal range of motion. No deformity. Right plantar flexion spasticity MAS 2. Neurologic: Awake, alert and oriented in three planes. Speech is fluent. No facial weakness. Hearing is intact for conversation. Pupils are equal and round. Extraocular muscles are intact. Strength:   R  L  Deltoid   5  5  Biceps   5  5  Triceps  5  5  Wrist Ext  5  5  Finger Abd  5  5  Hip Flex  4+  5  Knee Ext  5-  5  Ankle dorsi  3-  5  EHL   2+ 5  Ankle Plantar  3-  5    Sensory:  Intact for light touch in all lower extremity dermatomes. Reflexes:   R  L  Patellar  2 2   Ankle Jerk  2 2    No Babinski  No Yap    Gait: Mildly spastic gait. Right foot drop. + circumduction. Gait improved with AFO. Impression:   1. History of stroke    2.  Right hemiparesis ([address] 3. Right foot drop    4. 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 Recommendation is that patient requires a left foot accelerator device, 5-8 hours of training with the device, then ability to pass functional  evaluation while using the device. She reports that when her lease is up this year she plans to get a vehicle that she will be able to have the left foot accelerator. The patient was educated about the diagnosis, prognosis, indications, risks and benefits of treatment. An opportunity to ask questions was given to the patient and questions were answered. The patient agreed to proceed with the recommended treatment as described above. Follow up 6 months or sooner if needed        Taylor Rodriguez M.D.   Physical Medicine and Rehabilitation

## 2021-01-11 LAB
PAP SMEAR, EXTERNAL: NORMAL
PAP SMEAR, EXTERNAL: NORMAL

## 2021-01-14 ENCOUNTER — OFFICE VISIT (OUTPATIENT)
Dept: VASCULAR SURGERY | Age: 56
End: 2021-01-14
Payer: COMMERCIAL

## 2021-01-14 ENCOUNTER — TELEPHONE (OUTPATIENT)
Dept: PHYSICAL MEDICINE AND REHAB | Age: 56
End: 2021-01-14

## 2021-01-14 VITALS — WEIGHT: 148 LBS | BODY MASS INDEX: 27.94 KG/M2 | HEIGHT: 61 IN

## 2021-01-14 DIAGNOSIS — I65.21 CAROTID STENOSIS, RIGHT: Primary | ICD-10-CM

## 2021-01-14 DIAGNOSIS — Z98.890 S/P CAROTID ENDARTERECTOMY: ICD-10-CM

## 2021-01-14 DIAGNOSIS — Z86.73 H/O: CVA (CEREBROVASCULAR ACCIDENT): ICD-10-CM

## 2021-01-14 PROCEDURE — 99213 OFFICE O/P EST LOW 20 MIN: CPT | Performed by: SURGERY

## 2021-01-14 PROCEDURE — G9899 SCRN MAM PERF RSLTS DOC: HCPCS | Performed by: SURGERY

## 2021-01-14 PROCEDURE — G8419 CALC BMI OUT NRM PARAM NOF/U: HCPCS | Performed by: SURGERY

## 2021-01-14 PROCEDURE — G8427 DOCREV CUR MEDS BY ELIG CLIN: HCPCS | Performed by: SURGERY

## 2021-01-14 PROCEDURE — 1036F TOBACCO NON-USER: CPT | Performed by: SURGERY

## 2021-01-14 PROCEDURE — 3017F COLORECTAL CA SCREEN DOC REV: CPT | Performed by: SURGERY

## 2021-01-14 PROCEDURE — G8484 FLU IMMUNIZE NO ADMIN: HCPCS | Performed by: SURGERY

## 2021-01-14 NOTE — PROGRESS NOTES
LEFT CAROTID ENDARTERECTOMY performed by Lindsey Youngblood MD at [address] History   Problem Relation Age of Onset    High Blood Pressure Mother     Diabetes Mother        Social History     Socioeconomic History    Marital status: Single     Spouse name: Not on file    Number of children: Not on file    Years of education: Not on file    Highest education level: Not on file   Occupational History    Not on file   Social Needs    Financial resource strain: Not on file    Food insecurity     Worry: Not on file     Inability: Not on file    Transportation needs     Medical: Not on file     Non-medical: Not on file   Tobacco Use    Smoking status: Former Smoker     Packs/day: 0.50     Types: Cigarettes     Quit date: 2019     Years since quittin.7    Smokeless tobacco: Never Used   Substance and Sexual Activity    Alcohol use: Yes     Frequency: 2-4 times a month     Comment: moderate    Drug use: No    Sexual activity: Not Currently   Lifestyle    Physical activity     Days per week: Not on file     Minutes per session: Not on file    Stress: Not on file   Relationships    Social connections     Talks on phone: Not on file     Gets together: Not on file     Attends [employer] service: Not on file     Active member of club or organization: Not on file     Attends meetings of clubs or organizations: Not on file     Relationship status: Not on file    Intimate partner violence     Fear of current or ex partner: Not on file     Emotionally abused: Not on file     Physically abused: Not on file     Forced sexual activity: Not on file   Other Topics Concern    Not on file   Social History Narrative    Not on file       Review of Systems:    Eyes:  No blurring, diplopia or vision loss. Respiratory:  No cough, pleuritic chest pain, dyspnea, or wheezing. Cardiovascular: No angina, palpitations . Hypertension, hyperlipidemia  Musculoskeletal:  No arthritis or weakness. Neurologic:  No paralysis, paresis, paresthesia, seizures or headache. History of stroke with right leg weakness      Physical Exam:  General appearance:  Alert, awake, oriented x 3. No distress. Eyes:  Conjunctivae appear normal; PERRL  Neck:  No jugular venous distention, lymphadenopathy or thyromegaly. Faint carotid bruit noted on the left  Lungs:  Clear to ausculation bilaterally. No rhonchi, crackles, wheezes  Heart:  Regular rate and rhythm. No rub or murmur  Abdomen:  Soft, non-tender. No masses, organomegaly. Musculoskeletal : No joint effusions, tenderness swelling    Neuro: Speech is intact. Moving all extremities. No focal motor or sensory deficits, minimal weakness right leg compared to left side, overall strong      Extremities:  Both feet are warm to touch. The color of both feet is normal.        Pulses Right  Left    Brachial 3 3    Radial    3=normal   Femoral 2 2  2=diminished   Popliteal    1=barely palpable   Dorsalis pedis    0=absent   Posterior tibial    4=aneurysmal             Other pertinent information:1. The past medical records were reviewed. Assessment:    1. Carotid stenosis, right    2. H/O: CVA (cerebrovascular accident)    3. S/P carotid endarterectomy              Plan:       Discussed with the patient and the , patient recommended follow-up carotid ultrasound, continue aspirin long-term basis and call me as needed              Patient was instructed to continue walking program and to call if any worsening of symptoms and to call if any focal lateralizing neurological symptoms like loss of speech, vision or loss of function of extremity. All the questions were answered. Orders Placed This Encounter   Procedures    US CAROTID ARTERY BILATERAL             Indicated follow-up: Return in about 1 year (around 1/14/2022), or if symptoms worsen or fail to improve.

## 2021-01-14 NOTE — TELEPHONE ENCOUNTER
James Dowling from [facility] [address] called stating he has questions regaurding a medical form. Please call him back at [phone] he is in the office from 8AM-430PM. Thank you.

## 2021-01-15 ENCOUNTER — TELEPHONE (OUTPATIENT)
Dept: PHYSICAL MEDICINE AND REHAB | Age: 56
End: 2021-01-15

## 2021-01-15 NOTE — TELEPHONE ENCOUNTER
Returned Bud's call from the [facility] [address] he wanted to notify you that Benjie Dodson is currently under suspension for not complying to obtain a left foot accelerator. He is going to leave this as is until she makes initiative to reach out.

## 2021-01-15 NOTE — TELEPHONE ENCOUNTER
Nidia Noonan clear her to drive without the left foot accelerator and passing the functional  evaluation. She failed the functional  evaluation without it.

## 2021-01-18 ENCOUNTER — HOSPITAL ENCOUNTER (OUTPATIENT)
Dept: CARDIOLOGY | Age: 56
Discharge: HOME | End: 2021-01-18
Payer: COMMERCIAL

## 2021-01-18 DIAGNOSIS — Z86.73 H/O: CVA (CEREBROVASCULAR ACCIDENT): ICD-10-CM

## 2021-01-18 DIAGNOSIS — I65.21 CAROTID STENOSIS, RIGHT: ICD-10-CM

## 2021-01-18 DIAGNOSIS — Z98.890 S/P CAROTID ENDARTERECTOMY: ICD-10-CM

## 2021-01-18 PROCEDURE — 93880 EXTRACRANIAL BILAT STUDY: CPT

## 2021-01-18 NOTE — PROGRESS NOTES
[facility] - [facility]    This is a pre read worksheet - prior to official physician interpretation    Kaylee Barryas  1965  Date of study: 1/18/21  [id number]    Indication for study:  Carotid artery stenosis  Study : Bilateral Carotid Artery Duplex Examination    Duplex examination of the RIGHT carotid artery system identifies atherosclerotic plaque. The peak systolic velocity in internal carotid artery was 95 centimeters / second. The maximum end diastolic velocity was 41 centimeters / second. The ICA/CCA ratio is 1.1. The right vertebral artery flow is not visualized. Duplex examination of the LEFT carotid artery system identifies atherosclerotic plaque. The peak systolic velocity in internal carotid artery was 52 centimeters / second. The maximum end diastolic velocity was 0 centimeters / second. The ICA/CCA ratio is 1.3. The left vertebral artery has antegrade flow.     RIGHT 30%  LEFT occluded past very proximal ICA, string sign in distal        LAST STUDY

## 2021-01-20 ENCOUNTER — TELEPHONE (OUTPATIENT)
Dept: VASCULAR SURGERY | Age: 56
End: 2021-01-20

## 2021-01-20 DIAGNOSIS — I65.22 LEFT CAROTID STENOSIS: Primary | ICD-10-CM

## 2021-01-20 NOTE — TELEPHONE ENCOUNTER
Discussed with the patient regarding the carotid ultrasound, significant recurrent stenosis with almost subtotal occlusion and potential occlusion of the internal carotid artery, recommended CT of the carotids for further documentation, call pain if any symptoms

## 2021-01-21 ENCOUNTER — HOSPITAL ENCOUNTER (OUTPATIENT)
Age: 56
Discharge: HOME | End: 2021-01-21
Payer: COMMERCIAL

## 2021-01-21 DIAGNOSIS — I65.22 LEFT CAROTID STENOSIS: ICD-10-CM

## 2021-01-21 LAB
ANION GAP SERPL CALCULATED.3IONS-SCNC: 8 MMOL/L (ref 7–16)
BUN BLDV-MCNC: 11 MG/DL (ref 6–20)
CALCIUM SERPL-MCNC: 9.8 MG/DL (ref 8.6–10.2)
CHLORIDE BLD-SCNC: 104 MMOL/L (ref 98–107)
CO2: 29 MMOL/L (ref 22–29)
CREAT SERPL-MCNC: 0.7 MG/DL (ref 0.5–1)
GFR AFRICAN AMERICAN: >60
GFR NON-AFRICAN AMERICAN: >60 ML/MIN/1.73
GLUCOSE BLD-MCNC: 101 MG/DL (ref 74–99)
POTASSIUM SERPL-SCNC: 5.1 MMOL/L (ref 3.5–5)
SODIUM BLD-SCNC: 141 MMOL/L (ref 132–146)

## 2021-01-21 PROCEDURE — 36415 COLL VENOUS BLD VENIPUNCTURE: CPT

## 2021-01-21 PROCEDURE — 80048 BASIC METABOLIC PNL TOTAL CA: CPT

## 2021-01-25 NOTE — PROCEDURES
[address]                  [address] [facility]  [facility]                                VASCULAR REPORT    PATIENT NAME: Mayra Gr                       :        1965  MED REC NO:   [id number]                            ROOM:  ACCOUNT NO:   [de-identified]                           ADMIT DATE: 2021  PROVIDER:     Mady Giles MD    DATE OF PROCEDURE:  2021    CAROTID ULTRASOUND REPORT    INDICATION:  Carotid bruit, post carotid stenosis. Duplex scanning of the right carotid artery revealed the patient has  moderate plaque with the peak internal carotid velocity of 95, diastolic  velocity of 41 cm/sec with less than 30% stenosis. Duplex scanning of the left carotid artery revealed that the patient has  markedly diminished Doppler signals over the left internal artery with  the peak systolic velocity of 52 cm/sec, appears to be occluded proximally with  questionable string sign in the distal internal carotid artery with minimal flow. IMPRESSION:  Less than 30% stenosis on the right side associated with  possible occlusion of the left internal carotid artery was a high-grade  stenosis, post left carotid endarterectomy. Mani Ascencio MD    D: 2021 18:04:00       T: 2021 18:05:49     [email or web address]  Job#: [id number]     Doc#: [id number]    CC:  Mitchell Gusman DO

## 2021-01-26 ENCOUNTER — TELEPHONE (OUTPATIENT)
Dept: VASCULAR SURGERY | Age: 56
End: 2021-01-26

## 2021-01-26 NOTE — TELEPHONE ENCOUNTER
Scheduled CTA neck at [facility]) 2/2/21 at 8:30 a.m, pt notified and instructed.

## 2021-01-30 ENCOUNTER — HOSPITAL ENCOUNTER (OUTPATIENT)
Dept: CT IMAGING | Age: 56
Discharge: HOME | End: 2021-02-01
Payer: COMMERCIAL

## 2021-01-30 DIAGNOSIS — I65.22 LEFT CAROTID STENOSIS: ICD-10-CM

## 2021-01-30 PROCEDURE — 6360000004 HC RX CONTRAST MEDICATION: Performed by: RADIOLOGY

## 2021-01-30 PROCEDURE — 70498 CT ANGIOGRAPHY NECK: CPT

## 2021-01-30 RX ORDER — SODIUM CHLORIDE 0.9 % (FLUSH) 0.9 %
10 SYRINGE (ML) INJECTION PRN
Status: DISCONTINUED | OUTPATIENT
Start: 2021-01-30 | End: 2021-02-02 | Stop reason: HOSPADM

## 2021-01-30 RX ADMIN — IOPAMIDOL 60 ML: 755 INJECTION, SOLUTION INTRAVENOUS at 09:29

## 2021-02-01 ENCOUNTER — TELEPHONE (OUTPATIENT)
Dept: VASCULAR SURGERY | Age: 56
End: 2021-02-01

## 2021-02-01 DIAGNOSIS — I65.22 OCCLUSION OF LEFT INTERNAL CAROTID ARTERY: Primary | ICD-10-CM

## 2021-02-01 NOTE — TELEPHONE ENCOUNTER
Spoke with patient and scheduled 6 week follow up appointment. 3-18-21 at 10:15 am.  She will have blood work done at a [facility].

## 2021-02-01 NOTE — TELEPHONE ENCOUNTER
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   If any symptoms, all her questions were answered

## 2021-02-02 ENCOUNTER — HOSPITAL ENCOUNTER (OUTPATIENT)
Age: 56
Discharge: HOME | End: 2021-02-02
Payer: COMMERCIAL

## 2021-02-02 DIAGNOSIS — I65.22 OCCLUSION OF LEFT INTERNAL CAROTID ARTERY: ICD-10-CM

## 2021-02-02 LAB
APTT: 29.9 SEC (ref 24.5–35.1)
D DIMER: <200 NG/ML DDU
FIBRINOGEN: 454 MG/DL (ref 225–540)
HOMOCYSTEINE: 5.8 UMOL/L (ref 0–15)
INR BLD: 0.9
PROTHROMBIN TIME: 10.1 SEC (ref 9.3–12.4)

## 2021-02-02 PROCEDURE — 36415 COLL VENOUS BLD VENIPUNCTURE: CPT

## 2021-02-02 PROCEDURE — 85300 ANTITHROMBIN III ACTIVITY: CPT

## 2021-02-02 PROCEDURE — 81241 F5 GENE: CPT

## 2021-02-02 PROCEDURE — 81400 MOPATH PROCEDURE LEVEL 1: CPT

## 2021-02-02 PROCEDURE — 81291 MTHFR GENE: CPT

## 2021-02-02 PROCEDURE — 85378 FIBRIN DEGRADE SEMIQUANT: CPT

## 2021-02-02 PROCEDURE — 86147 CARDIOLIPIN ANTIBODY EA IG: CPT

## 2021-02-02 PROCEDURE — 85613 RUSSELL VIPER VENOM DILUTED: CPT

## 2021-02-02 PROCEDURE — 85610 PROTHROMBIN TIME: CPT

## 2021-02-02 PROCEDURE — 85730 THROMBOPLASTIN TIME PARTIAL: CPT

## 2021-02-02 PROCEDURE — 85306 CLOT INHIBIT PROT S FREE: CPT

## 2021-02-02 PROCEDURE — 85303 CLOT INHIBIT PROT C ACTIVITY: CPT

## 2021-02-02 PROCEDURE — 81240 F2 GENE: CPT

## 2021-02-02 PROCEDURE — 85384 FIBRINOGEN ACTIVITY: CPT

## 2021-02-02 PROCEDURE — 83090 ASSAY OF HOMOCYSTEINE: CPT

## 2021-02-02 RX ORDER — AMLODIPINE BESYLATE 5 MG/1
5 TABLET ORAL DAILY
Qty: 30 TABLET | Refills: 0 | Status: SHIPPED
Start: 2021-02-02 | End: 2021-04-11 | Stop reason: SDUPTHER

## 2021-02-03 LAB
AT-III ACTIVITY: 110 % ACTIVITY (ref 83–121)
LUPUS ANTICOAG DVVT: NORMAL
PROTEIN C ACTIVITY: >120 % ACTIVITY (ref 68–165)

## 2021-02-04 ENCOUNTER — OFFICE VISIT (OUTPATIENT)
Dept: FAMILY MEDICINE CLINIC | Age: 56
End: 2021-02-04
Payer: COMMERCIAL

## 2021-02-04 VITALS
TEMPERATURE: 96.9 F | WEIGHT: 151 LBS | HEIGHT: 61 IN | DIASTOLIC BLOOD PRESSURE: 86 MMHG | HEART RATE: 84 BPM | SYSTOLIC BLOOD PRESSURE: 122 MMHG | OXYGEN SATURATION: 99 % | RESPIRATION RATE: 18 BRPM | BODY MASS INDEX: 28.51 KG/M2

## 2021-02-04 DIAGNOSIS — G81.91 RIGHT HEMIPARESIS (HCC): Primary | ICD-10-CM

## 2021-02-04 DIAGNOSIS — I10 ESSENTIAL HYPERTENSION: ICD-10-CM

## 2021-02-04 DIAGNOSIS — G47.33 OSA (OBSTRUCTIVE SLEEP APNEA): ICD-10-CM

## 2021-02-04 DIAGNOSIS — Z13.31 POSITIVE DEPRESSION SCREENING: ICD-10-CM

## 2021-02-04 DIAGNOSIS — I63.9 CEREBROVASCULAR ACCIDENT (CVA), UNSPECIFIED MECHANISM (HCC): ICD-10-CM

## 2021-02-04 LAB
ANTICARDIOLIPIN IGA ANTIBODY: 1 APL (ref 0–11)
ANTICARDIOLIPIN IGG ANTIBODY: 0 GPL (ref 0–14)
CARDIOLIPIN AB IGM: 112 MPL (ref 0–12)
PROTEIN S ANTIGEN, FREE: 97 % (ref 55–123)

## 2021-02-04 PROCEDURE — G8431 POS CLIN DEPRES SCRN F/U DOC: HCPCS | Performed by: FAMILY MEDICINE

## 2021-02-04 PROCEDURE — 99214 OFFICE O/P EST MOD 30 MIN: CPT | Performed by: FAMILY MEDICINE

## 2021-02-04 PROCEDURE — G8419 CALC BMI OUT NRM PARAM NOF/U: HCPCS | Performed by: FAMILY MEDICINE

## 2021-02-04 PROCEDURE — G8482 FLU IMMUNIZE ORDER/ADMIN: HCPCS | Performed by: FAMILY MEDICINE

## 2021-02-04 PROCEDURE — 3017F COLORECTAL CA SCREEN DOC REV: CPT | Performed by: FAMILY MEDICINE

## 2021-02-04 PROCEDURE — 1036F TOBACCO NON-USER: CPT | Performed by: FAMILY MEDICINE

## 2021-02-04 PROCEDURE — G9899 SCRN MAM PERF RSLTS DOC: HCPCS | Performed by: FAMILY MEDICINE

## 2021-02-04 PROCEDURE — G8427 DOCREV CUR MEDS BY ELIG CLIN: HCPCS | Performed by: FAMILY MEDICINE

## 2021-02-04 ASSESSMENT — PATIENT HEALTH QUESTIONNAIRE - PHQ9
4. FEELING TIRED OR HAVING LITTLE ENERGY: 3
SUM OF ALL RESPONSES TO PHQ QUESTIONS 1-9: 13
9. THOUGHTS THAT YOU WOULD BE BETTER OFF DEAD, OR OF HURTING YOURSELF: 0
6. FEELING BAD ABOUT YOURSELF - OR THAT YOU ARE A FAILURE OR HAVE LET YOURSELF OR YOUR FAMILY DOWN: 0
SUM OF ALL RESPONSES TO PHQ QUESTIONS 1-9: 13
2. FEELING DOWN, DEPRESSED OR HOPELESS: 3
7. TROUBLE CONCENTRATING ON THINGS, SUCH AS READING THE NEWSPAPER OR WATCHING TELEVISION: 0

## 2021-02-04 ASSESSMENT — ENCOUNTER SYMPTOMS
COUGH: 0
ABDOMINAL PAIN: 0
SHORTNESS OF BREATH: 0
SORE THROAT: 0

## 2021-02-04 ASSESSMENT — COLUMBIA-SUICIDE SEVERITY RATING SCALE - C-SSRS
1. WITHIN THE PAST MONTH, HAVE YOU WISHED YOU WERE DEAD OR WISHED YOU COULD GO TO SLEEP AND NOT WAKE UP?: NO
6. HAVE YOU EVER DONE ANYTHING, STARTED TO DO ANYTHING, OR PREPARED TO DO ANYTHING TO END YOUR LIFE?: NO

## 2021-02-04 NOTE — PROGRESS NOTES
Iven Hammans  : 1965    Chief Complaint:     Chief Complaint   Patient presents with    Sleep Apnea     wants sleep study    [address] [facility]     get mammogram from [facility]        HPI  Iven Hammans 50 y.o. presents for   Chief Complaint   Patient presents with    Sleep Apnea     wants sleep study    [facility]     get mammogram from [place] for [facility]      Patient reports occasional night time awakenings. She says that a few times she woke up gasping for air. She says her  hears her breathing odd at night and she snores. She wakes up tired most days. She is using a fit bit to track her sleep and it tracks her oxygen variation and shows episodes of high variability. She denies SOB, orthopnea, chest pain or lower extremity edema. She wakes up at night multiple times to urination but this is not new for her. She admits to stress and urge incontinence. She denies hematuria or dysuria.         Patient had a stroke in  s/p left carotid endartectomy undergoing physical therapy for right sided weakness. She went to the vascular surgeon for follow up and she had a CTA of the neck in 2021 which showed complete occlusion of L ICA. Edwar reports that he can't do another endarectomy because it is 100% occluded. She had lab work to look for a hypercoagulability disorder which came back normal. Her LDL was 55 in 2020. She is taking atorvastatin 80 mg daily and aspirin daily. She did have a positive depression screen however she recently stopped the Lexapro. She is just very frustrated with her occlusion. She does not wish to start any medication at this time    All questions were answered to patients satisfaction.     Past Medical History:   Diagnosis Date    Back pain     Bilateral carotid artery stenosis 2020    Bruit of left carotid artery 2020    Carotid stenosis, right 6/15/2020    Cerebral artery occlusion with cerebral infarction ([facility] [address]  H/O: CVA (cerebrovascular accident) 2020    Hypertension     Numbness and tingling     Occlusion of left internal carotid artery 2019    Paralysis of right lower extremity ([address] 2019    Right leg weakness 2020    S/P carotid endarterectomy 6/15/2020       Past Surgical History:   Procedure Laterality Date    CAROTID ENDARTERECTOMY Left 6/10/2020    LEFT CAROTID ENDARTERECTOMY performed by Анна Rivas MD at  [facility] History     Socioeconomic History    Marital status: Single     Spouse name: None    Number of children: None    Years of education: None    Highest education level: None   Occupational History    None   Social Needs    Financial resource strain: None    Food insecurity     Worry: None     Inability: None    Transportation needs     Medical: None     Non-medical: None   Tobacco Use    Smoking status: Former Smoker     Packs/day: 0.50     Types: Cigarettes     Quit date: 2019     Years since quittin.7    Smokeless tobacco: Never Used   Substance and Sexual Activity    Alcohol use: Yes     Frequency: 2-4 times a month     Comment: moderate    Drug use: No    Sexual activity: Not Currently   Lifestyle    Physical activity     Days per week: None     Minutes per session: None    Stress: None   Relationships    Social connections     Talks on phone: None     Gets together: None     Attends Yazidism service: None     Active member of club or organization: None     Attends meetings of clubs or organizations: None     Relationship status: None    Intimate partner violence     Fear of current or ex partner: None     Emotionally abused: None     Physically abused: None     Forced sexual activity: None   Other Topics Concern    None   Social History Narrative    None       Family History   Problem Relation Age of Onset    High Blood Pressure Mother     Diabetes Mother           Current Outpatient Medications   Medication Sig Dispense Refill    amLODIPine (NORVASC) 5 MG tablet Take 1 tablet by mouth daily 30 tablet 0    atorvastatin (LIPITOR) 80 MG tablet Take 1 tablet by mouth daily 30 tablet 2    aspirin (ASPIRIN ADULT LOW STRENGTH) 81 MG EC tablet TAKE ONE TABLET BY MOUTH EVERY DAY 90 tablet 3    irbesartan (AVAPRO) 150 MG tablet Take 1 tablet by mouth daily 30 tablet 2    Multiple Vitamins-Minerals (MULTIVITAMIN ADULT PO) Take 1 tablet by mouth       No current facility-administered medications for this visit. No Known Allergies    Health Maintenance Due   Topic Date Due    Hepatitis C screen  1965    HIV screen  08/13/1982    Cervical cancer screen  08/13/1988    Shingles Vaccine (1 of 2) 08/13/2017    Breast cancer screen  01/15/2021           REVIEW OF SYSTEMS  Review of Systems   Constitutional: Negative for chills, fatigue and fever. HENT: Negative for congestion, ear pain, postnasal drip, sinus pressure, sinus pain, sneezing and sore throat. Eyes: Negative for pain. Respiratory: Negative for cough and shortness of breath. Cardiovascular: Negative for chest pain and leg swelling. Gastrointestinal: Negative for abdominal pain, constipation and diarrhea. Genitourinary: Negative for dysuria, frequency and urgency. Musculoskeletal: Negative for arthralgias, back pain and myalgias. Skin: Negative for rash and wound. Allergic/Immunologic: Negative for food allergies. Neurological: Positive for weakness (right lower extremity) and numbness. Negative for dizziness, light-headedness and headaches. Hematological: Does not bruise/bleed easily. Psychiatric/Behavioral: Negative for behavioral problems, dysphoric mood and sleep disturbance. The patient is nervous/anxious. PHYSICAL EXAM  /86   Pulse 84   Temp 96.9 °F (36.1 °C)   Resp 18   Ht 5' 1\" (1.549 m)   Wt 151 lb (68.5 kg)   SpO2 99%   BMI 28.53 kg/m²   Physical Exam  Vitals signs and nursing note reviewed.    Constitutional: Appearance: Normal appearance. She is well-developed and normal weight. HENT:      Head: Normocephalic and atraumatic. Right Ear: External ear normal.      Left Ear: External ear normal.      Nose: Nose normal.   Eyes:      Extraocular Movements: Extraocular movements intact. Conjunctiva/sclera: Conjunctivae normal.   Neck:      Musculoskeletal: Normal range of motion and neck supple. Thyroid: No thyromegaly. Cardiovascular:      Rate and Rhythm: Normal rate and regular rhythm. Heart sounds: Normal heart sounds. No murmur. Pulmonary:      Effort: Pulmonary effort is normal.      Breath sounds: Normal breath sounds. No wheezing. Abdominal:      Palpations: Abdomen is soft. Tenderness: There is no abdominal tenderness. Musculoskeletal: Normal range of motion. General: No tenderness. Lymphadenopathy:      Cervical: No cervical adenopathy. Skin:     General: Skin is warm and dry. Findings: No rash. Neurological:      General: No focal deficit present. Mental Status: She is alert and oriented to person, place, and time. Mental status is at baseline. Deep Tendon Reflexes: Reflexes are normal and symmetric. Comments: Brace on right leg   Psychiatric:         Mood and Affect: Mood normal.         Behavior: Behavior normal.         Thought Content: Thought content normal.         Judgment: Judgment normal.              Laboratory:   All laboratory and radiology results have been personally reviewed by myself    Lab Results   Component Value Date     01/21/2021    K 5.1 01/21/2021    K 3.8 06/10/2020     01/21/2021    CO2 29 01/21/2021    BUN 11 01/21/2021    CREATININE 0.7 01/21/2021    PROT 6.7 08/23/2020    LABALBU 4.4 08/23/2020    CALCIUM 9.8 01/21/2021    GFRAA >60 01/21/2021    LABGLOM >60 01/21/2021    GLUCOSE 101 01/21/2021    AST 17 08/23/2020    ALT 26 08/23/2020    ALKPHOS 66 08/23/2020    BILITOT 0.4 08/23/2020    TSH 3.180 08/23/2020    VITD25 43 08/23/2020    CHOL 119 08/23/2020    TRIG 96 08/23/2020    HDL 45 08/23/2020    LDLCALC 55 08/23/2020    LABA1C 5.4 07/28/2020        Lab Results   Component Value Date    CHOL 119 08/23/2020    CHOL 133 09/23/2019    CHOL 185 05/23/2019     Lab Results   Component Value Date    TRIG 96 08/23/2020    TRIG 96 09/23/2019    TRIG 177 (H) 05/23/2019     Lab Results   Component Value Date    HDL 45 08/23/2020    HDL 48 09/23/2019    HDL 44 05/23/2019     Lab Results   Component Value Date    LDLCALC 55 08/23/2020    LDLCALC 66 09/23/2019    LDLCALC 106 (H) 05/23/2019       Lab Results   Component Value Date    LABA1C 5.4 07/28/2020     Lab Results   Component Value Date    LDLCALC 55 08/23/2020    CREATININE 0.7 01/21/2021       ASSESSMENT/PLAN:     Diagnosis Orders   1. Right hemiparesis ([facility] [address]     2. Essential hypertension     3. Cerebrovascular accident (CVA), unspecified mechanism ([address]  Lipid Panel   4. Positive depression screening  Positive Screen for Clinical Depression with a Documented Follow-up Plan    5. DAO (obstructive sleep apnea)  Baseline Diagnostic Sleep Study     Continue to follow with vascular as directed. They are looking for possible reason that she had an occlusion. So far labs that have returned have been within normal limits. There are still a few pending. Blood pressure is well controlled. Lipid panel is within normal limits as well. However we will repeat this to ensure LDL is not increasing. She did have a positive depression screen however she does not wish to start any medication at this time. She denies any SI or HI. Will obtain sleep study as well.     Problem list reviewed andsimplified/updated  HM reviewed today and counseled as appropriate    Call or go to ED immediately if symptoms worsen or persist.  Future Appointments   Date Time Provider Evonne Aburto   3/18/2021 10:15 AM Kourtney Lisa MD [facility]   6/14/2021  8:20 AM Mini Nuñez Melly Stuart MD [facility]   1/20/2022 10:00 AM Lenard Bell MD [facility]     Or sooner if necessary. Educational materials and/or homeexercises printed for patient's review and were included in patient instructions on his/her After Visit Summary and given to patient at the end of visit.       Counseled regarding above diagnosis, including possible risks and complications,  especially if left uncontrolled.     Counseled regarding the possible side effects, risks, benefits and alternatives to treatment; patient and/or guardian verbalizes understanding, agrees,feels comfortable with and wishes to proceed with above treatment plan.     Advised patient to call [place] new medication issues, and read all Rx info from pharmacy to assure aware of all possible risks and side effects of medication before taking.     Reviewed age and gender appropriate health screening exams and vaccinations. Advised patient regarding importance of keeping up with recommended health maintenance and toschedule as soon as possible if overdue, as this is important in assessing for undiagnosed pathology, especially cancer, as well as protecting against potentially harmful/life threatening disease.          Patient and/or guardian verbalizes understanding and agrees with above counseling, assessment and plan.     All questions answered. Michael 5, DO  2/4/21    NOTE: This report was transcribed using voice recognition software. Every effort was made to ensure accuracy; however, inadvertent computerized transcription errors may be present    On the basis of positive PHQ-9 screening (PHQ-9 Total Score: 13), the following plan was implemented: patient declines further evaluation/treatment for depression. Patient will follow-up in 3 month(s) with PCP.

## 2021-02-04 NOTE — PROGRESS NOTES
 montelukast (SINGULAIR) 10 MG tablet Take 1 tablet by mouth daily 30 tablet 5    Multiple Vitamins-Minerals (MULTIVITAMIN ADULT PO) Take 1 tablet by mouth       No current facility-administered medications on file prior to visit. Review of Systems   Constitutional: Positive for fatigue. HENT: Negative for congestion and sore throat. Respiratory: Negative for cough and shortness of breath. Cardiovascular: Negative for chest pain and leg swelling. Gastrointestinal: Negative for abdominal pain. Genitourinary: Positive for enuresis, frequency and urgency. Negative for difficulty urinating. Neurological: Positive for weakness. Psychiatric/Behavioral: Positive for sleep disturbance. other review of systems reviewed and are negative    OBJECTIVE:  /86   Pulse 84   Temp 96.9 °F (36.1 °C)   Resp 18   Ht 5' 1\" (1.549 m)   Wt 151 lb (68.5 kg)   SpO2 99%   BMI 28.53 kg/m²      Physical Exam  Constitutional:       Appearance: Normal appearance. HENT:      Head: Normocephalic and atraumatic. Nose: Nose normal.   Eyes:      Extraocular Movements: Extraocular movements intact. Conjunctiva/sclera: Conjunctivae normal.      Pupils: Pupils are equal, round, and reactive to light. Neck:      Musculoskeletal: Neck supple. Vascular: No carotid bruit. Cardiovascular:      Rate and Rhythm: Normal rate and regular rhythm. Pulses: Normal pulses. Heart sounds: Normal heart sounds. No murmur. No gallop. Pulmonary:      Effort: Pulmonary effort is normal. No respiratory distress. Breath sounds: Normal breath sounds. No wheezing or rales. Musculoskeletal:      Right lower leg: No edema. Left lower leg: No edema. Lymphadenopathy:      Cervical: No cervical adenopathy. Skin:     General: Skin is warm. Neurological:      Mental Status: She is alert and oriented to person, place, and time. Sensory: No sensory deficit. Motor: No weakness.

## 2021-02-05 ASSESSMENT — ENCOUNTER SYMPTOMS
COUGH: 0
ABDOMINAL PAIN: 0
CONSTIPATION: 0
DIARRHEA: 0
BACK PAIN: 0
SHORTNESS OF BREATH: 0
SINUS PRESSURE: 0
SINUS PAIN: 0
SORE THROAT: 0
EYE PAIN: 0

## 2021-02-10 ENCOUNTER — HOSPITAL ENCOUNTER (OUTPATIENT)
Age: 56
Discharge: HOME | End: 2021-02-10
Payer: COMMERCIAL

## 2021-02-10 DIAGNOSIS — I63.9 CEREBROVASCULAR ACCIDENT (CVA), UNSPECIFIED MECHANISM (HCC): ICD-10-CM

## 2021-02-10 LAB
CHOLESTEROL, TOTAL: 129 MG/DL (ref 0–199)
HDLC SERPL-MCNC: 45 MG/DL
LDL CHOLESTEROL CALCULATED: 63 MG/DL (ref 0–99)
TRIGL SERPL-MCNC: 104 MG/DL (ref 0–149)
VLDLC SERPL CALC-MCNC: 21 MG/DL

## 2021-02-10 PROCEDURE — 80061 LIPID PANEL: CPT

## 2021-02-10 PROCEDURE — 36415 COLL VENOUS BLD VENIPUNCTURE: CPT

## 2021-02-11 LAB — THROMBOPHILIA DNA ASSAY: NORMAL

## 2021-02-15 DIAGNOSIS — G47.33 OBSTRUCTIVE SLEEP APNEA (ADULT) (PEDIATRIC): Primary | ICD-10-CM

## 2021-02-17 ENCOUNTER — TELEPHONE (OUTPATIENT)
Dept: VASCULAR SURGERY | Age: 56
End: 2021-02-17

## 2021-02-18 ENCOUNTER — OFFICE VISIT (OUTPATIENT)
Dept: VASCULAR SURGERY | Age: 56
End: 2021-02-18

## 2021-02-18 VITALS — WEIGHT: 148 LBS | BODY MASS INDEX: 27.94 KG/M2 | HEIGHT: 61 IN

## 2021-02-18 DIAGNOSIS — R76.8 HIGH TOTAL SERUM IGM: ICD-10-CM

## 2021-02-18 PROCEDURE — 99024 POSTOP FOLLOW-UP VISIT: CPT | Performed by: SURGERY

## 2021-02-18 NOTE — PROGRESS NOTES
Chief Complaint:   Chief Complaint   Patient presents with    Circulatory Problem     S/P carotid endarterectomy         HPI: Patient came to the office with her , to discuss the results of her recent CTA of the carotids that was done because the carotid ultrasound done after follow-up evaluation of left carotid and left endarterectomy revealed occlusion, the CTA of the carotids confirmed the same    The CT scan done last year revealed recanalization of the previously completely occluded left internal carotid, felt to be due to dissection potentially because of recannulization, and recommend carotid surgery that was done in June of last year    Because of occlusion of left internal artery, the possibility of hypercoagulable state was considered and patient underwent work-up and patient family came to the office to discuss the results of the same      Patient denies any new focal lateralizing neurological symptoms like loss of speech, vision or loss of function of extremity, other than always has some funny feeling of the right leg ever since she had a stroke in May 2019    Patient can walk a few blocks , and denies any symptoms of rest pain    No Known Allergies    Current Outpatient Medications   Medication Sig Dispense Refill    amLODIPine (NORVASC) 5 MG tablet Take 1 tablet by mouth daily 30 tablet 0    atorvastatin (LIPITOR) 80 MG tablet Take 1 tablet by mouth daily 30 tablet 2    aspirin (ASPIRIN ADULT LOW STRENGTH) 81 MG EC tablet TAKE ONE TABLET BY MOUTH EVERY DAY 90 tablet 3    irbesartan (AVAPRO) 150 MG tablet Take 1 tablet by mouth daily 30 tablet 2    Multiple Vitamins-Minerals (MULTIVITAMIN ADULT PO) Take 1 tablet by mouth       No current facility-administered medications for this visit.         Past Medical History:   Diagnosis Date    Back pain     Bilateral carotid artery stenosis 5/28/2020    Bruit of left carotid artery 5/28/2020    Carotid stenosis, right 6/15/2020  [facility]     H/O: CVA (cerebrovascular accident) 2020    High total serum IgM 2021    Hypertension     Numbness and tingling     Occlusion of left internal carotid artery 2019    Paralysis of right lower extremity ([facility] [address] 2019    Right leg weakness 2020    S/P carotid endarterectomy 6/15/2020       Past Surgical History:   Procedure Laterality Date    CAROTID ENDARTERECTOMY Left 6/10/2020    LEFT CAROTID ENDARTERECTOMY performed by Ofelia Leslie MD at [address] [facility] [address] History   Problem Relation Age of Onset    High Blood Pressure Mother     Diabetes Mother        Social History     Socioeconomic History    Marital status: Single     Spouse name: Not on file    Number of children: Not on file    Years of education: Not on file    Highest education level: Not on file   Occupational History    Not on file   Social Needs    Financial resource strain: Not on file    Food insecurity     Worry: Not on file     Inability: Not on file    Transportation needs     Medical: Not on file     Non-medical: Not on file   Tobacco Use    Smoking status: Former Smoker     Packs/day: 0.50     Types: Cigarettes     Quit date: 2019     Years since quittin.8    Smokeless tobacco: Never Used   Substance and Sexual Activity    Alcohol use: Yes     Frequency: 2-4 times a month     Comment: moderate    Drug use: No    Sexual activity: Not Currently   Lifestyle    Physical activity     Days per week: Not on file     Minutes per session: Not on file    Stress: Not on file   Relationships    Social connections     Talks on phone: Not on file     Gets together: Not on file     Attends Judaism service: Not on file     Active member of club or organization: Not on file     Attends meetings of clubs or organizations: Not on file     Relationship status: Not on file    Intimate partner violence Fear of current or ex partner: Not on file     Emotionally abused: Not on file     Physically abused: Not on file     Forced sexual activity: Not on file   Other Topics Concern    Not on file   Social History Narrative    Not on file       Review of Systems:    Eyes:  No blurring, diplopia or vision loss. Respiratory:  No cough, pleuritic chest pain, dyspnea, or wheezing. Cardiovascular: No angina, palpitations . Hypertension, hyperlipidemia  Musculoskeletal:  No arthritis or weakness. Neurologic:  No paralysis, paresis, paresthesia, seizures or headache. History of stroke, in 2019 due to completely occluded left internal carotid artery, subsequently CTA carotids done in 2020 revealed patency of the internal carotid artery was completely occluded, surmise, most likely recanalization of a dissection, with research some intracranial disease, underwent left carotid enterectomy in June of last year, recent ultrasound with occlusion of left internal carotid artery      Physical Exam:  General appearance:  Alert, awake, oriented x 3. No distress. Eyes:  Conjunctivae appear normal; PERRL  Neck:  No jugular venous distention, lymphadenopathy or thyromegaly. Faint left carotid bruit noted  Lungs:  Clear to ausculation bilaterally. No rhonchi, crackles, wheezes  Heart:  Regular rate and rhythm. No rub or murmur  Abdomen:  Soft, non-tender. No masses, organomegaly. Musculoskeletal : No joint effusions, tenderness swelling    Neuro: Speech is intact. Moving all extremities. No focal motor or sensory deficits      Extremities:  Both feet are warm to touch. The color of both feet is normal.        Pulses Right  Left    Brachial 3 3    Radial    3=normal   Femoral 2 2  2=diminished   Popliteal    1=barely palpable   Dorsalis pedis    0=absent   Posterior tibial    4=aneurysmal             Other pertinent information:1. The past medical records were reviewed. 2. CTA of the carotid was personally reviewed by me occlusion left internal carotid with some intracranial disease on the left side    3. The hypercoagulable panel was personally reviewed, revealed the following abnormal findings    Thrombophilia DNA Assay 02/02/2021 11:49 AM  - [facility]   SEE BELOW    Comment: Thrombophilia DNA Assay     RESULTS:      FACTOR V LEIDEN NO MUTATION DETECTED         Only the normal allele was present        PROTHROMBIN [id number] NO MUTATION DETECTED         Only the normal allele was present        MTHFR 677 (C->T) HETEROZYGOTE         Normal and MTHFR 677(C->T) alleles were present        MTHFR 1298 (A->C) NO MUTATION DETECTED         Only the normal allele was present        HENRY-1 genotype HOMOZYGOTE (4G/4G)        FACTOR XIII V34L HETEROZYGOTE         Normal and V34L alleles were present       With a normal homocystine level        Cardiolipin Ab IgM 112High       With normal IgA and IgG levels      Assessment:    1. High total serum IgM    2. Abnormal coagulation studies including MTHFR 677, with normal homocysteine level, HENRY-1 homozygote, factorXIII V34L, heterozygote  3..  Occluded left internal carotid artery        Plan:       I had a long and detailed discussion with patient and her , poorly from a vascular and neurologic point of view, patient is asymptomatic, he does have some intracranial disease, continue control of her blood pressure, hyperlipidemia etc. along with low-dose of 81 mg aspirin daily    The patient would benefit from further antiplatelet versus anticoagulation in view of abnormal IgM level, and some abnormal hypercoagulable factors, patient recommend hematology evaluation        Patient was instructed  to call if any focal lateralizing neurological symptoms like loss of speech, vision or loss of function of extremity. All the questions were answered. Indicated follow-up: Return in about 1 year (around 2/18/2022), or if symptoms worsen or fail to improve.

## 2021-02-19 DIAGNOSIS — R76.8 HIGH TOTAL SERUM IGM: ICD-10-CM

## 2021-02-19 DIAGNOSIS — Z98.890 S/P CAROTID ENDARTERECTOMY: Primary | ICD-10-CM

## 2021-02-19 DIAGNOSIS — Z86.73 H/O: CVA (CEREBROVASCULAR ACCIDENT): ICD-10-CM

## 2021-03-30 RX ORDER — ASPIRIN 81 MG/1
TABLET ORAL
Qty: 30 TABLET | Refills: 0 | Status: SHIPPED
Start: 2021-03-30 | End: 2021-04-27

## 2021-04-06 LAB
APTT: 31.2 SEC (ref 24.5–35.1)
INR BLD: 0.9
PROTHROMBIN TIME: 9.8 SEC (ref 9.3–12.4)

## 2021-04-13 RX ORDER — AMLODIPINE BESYLATE 5 MG/1
5 TABLET ORAL DAILY
Qty: 30 TABLET | Refills: 0 | Status: SHIPPED
Start: 2021-04-13 | End: 2021-04-29 | Stop reason: SDUPTHER

## 2021-04-20 ENCOUNTER — HOSPITAL ENCOUNTER (OUTPATIENT)
Age: 56
Discharge: HOME | End: 2021-04-20
Payer: COMMERCIAL

## 2021-04-20 PROCEDURE — 86225 DNA ANTIBODY NATIVE: CPT

## 2021-04-20 PROCEDURE — 86038 ANTINUCLEAR ANTIBODIES: CPT

## 2021-04-20 PROCEDURE — 36415 COLL VENOUS BLD VENIPUNCTURE: CPT

## 2021-04-21 LAB
ANTI DNA DOUBLE STRANDED: NEGATIVE
ANTI-NUCLEAR ANTIBODY (ANA): NEGATIVE

## 2021-04-23 LAB
APTT: 45.7 SEC (ref 24.5–35.1)
INR BLD: 2.3
PROTHROMBIN TIME: 25.2 SEC (ref 9.3–12.4)

## 2021-04-27 RX ORDER — ASPIRIN 81 MG/1
TABLET ORAL
Qty: 30 TABLET | Refills: 0 | Status: SHIPPED
Start: 2021-04-27 | Stop reason: SDUPTHER

## 2021-04-27 RX ORDER — ATORVASTATIN CALCIUM 80 MG/1
80 TABLET, FILM COATED ORAL DAILY
Qty: 30 TABLET | Refills: 0 | Status: SHIPPED
Start: 2021-04-27 | End: 2021-04-29 | Stop reason: SDUPTHER

## 2021-04-29 ENCOUNTER — OFFICE VISIT (OUTPATIENT)
Dept: FAMILY MEDICINE CLINIC | Age: 56
End: 2021-04-29
Payer: COMMERCIAL

## 2021-04-29 VITALS
RESPIRATION RATE: 18 BRPM | DIASTOLIC BLOOD PRESSURE: 84 MMHG | SYSTOLIC BLOOD PRESSURE: 136 MMHG | HEIGHT: 62 IN | WEIGHT: 149.3 LBS | BODY MASS INDEX: 27.47 KG/M2 | HEART RATE: 89 BPM

## 2021-04-29 DIAGNOSIS — Z79.01 LONG TERM CURRENT USE OF ANTICOAGULANT: ICD-10-CM

## 2021-04-29 DIAGNOSIS — I63.9 CEREBROVASCULAR ACCIDENT (CVA), UNSPECIFIED MECHANISM (HCC): ICD-10-CM

## 2021-04-29 DIAGNOSIS — D68.61 APS (ANTIPHOSPHOLIPID SYNDROME) (HCC): Primary | ICD-10-CM

## 2021-04-29 DIAGNOSIS — G81.91 RIGHT HEMIPARESIS (HCC): ICD-10-CM

## 2021-04-29 DIAGNOSIS — Z12.31 ENCOUNTER FOR SCREENING MAMMOGRAM FOR MALIGNANT NEOPLASM OF BREAST: ICD-10-CM

## 2021-04-29 LAB
INTERNATIONAL NORMALIZATION RATIO, POC: 3.1
PROTHROMBIN TIME, POC: 37

## 2021-04-29 PROCEDURE — 1036F TOBACCO NON-USER: CPT | Performed by: FAMILY MEDICINE

## 2021-04-29 PROCEDURE — 85610 PROTHROMBIN TIME: CPT | Performed by: FAMILY MEDICINE

## 2021-04-29 PROCEDURE — 3017F COLORECTAL CA SCREEN DOC REV: CPT | Performed by: FAMILY MEDICINE

## 2021-04-29 PROCEDURE — G9899 SCRN MAM PERF RSLTS DOC: HCPCS | Performed by: FAMILY MEDICINE

## 2021-04-29 PROCEDURE — G8419 CALC BMI OUT NRM PARAM NOF/U: HCPCS | Performed by: FAMILY MEDICINE

## 2021-04-29 PROCEDURE — G8427 DOCREV CUR MEDS BY ELIG CLIN: HCPCS | Performed by: FAMILY MEDICINE

## 2021-04-29 PROCEDURE — 99214 OFFICE O/P EST MOD 30 MIN: CPT | Performed by: FAMILY MEDICINE

## 2021-04-29 RX ORDER — WARFARIN SODIUM 5 MG/1
TABLET ORAL
COMMUNITY
Start: 2021-04-26 | End: 2021-04-29 | Stop reason: SDUPTHER

## 2021-04-29 RX ORDER — WARFARIN SODIUM 5 MG/1
TABLET ORAL
Qty: 30 TABLET | Refills: 2 | Status: SHIPPED
Start: 2021-04-29 | End: 2021-07-06 | Stop reason: SDUPTHER

## 2021-04-29 RX ORDER — AMLODIPINE BESYLATE 5 MG/1
5 TABLET ORAL DAILY
Qty: 30 TABLET | Refills: 0 | Status: SHIPPED
Start: 2021-04-29 | End: 2021-06-11

## 2021-04-29 RX ORDER — ATORVASTATIN CALCIUM 80 MG/1
80 TABLET, FILM COATED ORAL DAILY
Qty: 30 TABLET | Refills: 0 | Status: SHIPPED
Start: 2021-04-29 | End: 2021-07-06 | Stop reason: SDUPTHER

## 2021-04-29 RX ORDER — IRBESARTAN 150 MG/1
150 TABLET ORAL DAILY
Qty: 30 TABLET | Refills: 2 | Status: SHIPPED
Start: 2021-04-29 | End: 2021-07-06 | Stop reason: SDUPTHER

## 2021-04-29 ASSESSMENT — ENCOUNTER SYMPTOMS
DIARRHEA: 0
BACK PAIN: 0
EYE PAIN: 0
COUGH: 0
SINUS PRESSURE: 0
SORE THROAT: 0
SINUS PAIN: 0
CONSTIPATION: 0
ABDOMINAL PAIN: 0
SHORTNESS OF BREATH: 0

## 2021-04-29 NOTE — PROGRESS NOTES
Franco Mars  : 1965    Chief Complaint:     Chief Complaint   Patient presents with    Other     blood clotting disorder recently started on coumadin        HPI  Franco Mars 50 y.o. presents for   Chief Complaint   Patient presents with    Other     blood clotting disorder recently started on coumadin      Patient was recently diagnosed with antiphospholipid syndrome. She states that she was put on Coumadin per her hematologist.  However they wish for us to manage this. She does have a referral placed to [facility] clinic to see if this is an accurate diagnosis. She does have a history of CVAs and right hemiparesis. Due to the above she cannot drive and does have issues with transportation at times. She does wish for the home monitoring kit if possible. She otherwise has been feeling well. She does admit to eating many leafy greens as well as drinking alcohol. She understands that this does affect the Coumadin. Her INR today is 3.1. She has taken 5 mg on a daily basis    All questions were answered to patients satisfaction.     Past Medical History:   Diagnosis Date    Back pain     Bilateral carotid artery stenosis 2020    Bruit of left carotid artery 2020    Carotid stenosis, right 6/15/2020    Cerebral artery occlusion with cerebral infarction ([address]     H/O: CVA (cerebrovascular accident) 2020    High total serum IgM 2021    Hypertension     Numbness and tingling     Occlusion of left internal carotid artery 2019    Paralysis of right lower extremity ([address] 2019    Right leg weakness 2020    S/P carotid endarterectomy 6/15/2020       Past Surgical History:   Procedure Laterality Date    CAROTID ENDARTERECTOMY Left 6/10/2020    LEFT CAROTID ENDARTERECTOMY performed by Marcos Robles MD at  [facility] History     Socioeconomic History    Marital status: Single     Spouse name: None    Number of children: None    Years of education: None    Highest education level: None   Occupational History    None   Social Needs    Financial resource strain: None    Food insecurity     Worry: None     Inability: None    Transportation needs     Medical: None     Non-medical: None   Tobacco Use    Smoking status: Former Smoker     Packs/day: 0.50     Types: Cigarettes     Quit date: 2019     Years since quittin.9    Smokeless tobacco: Never Used   Substance and Sexual Activity    Alcohol use: Yes     Frequency: 2-4 times a month     Comment: moderate    Drug use: No    Sexual activity: Not Currently   Lifestyle    Physical activity     Days per week: None     Minutes per session: None    Stress: None   Relationships    Social connections     Talks on phone: None     Gets together: None     Attends Qu[employer] service: None     Active member of club or organization: None     Attends meetings of clubs or organizations: None     Relationship status: None    Intimate partner violence     Fear of current or ex partner: None     Emotionally abused: None     Physically abused: None     Forced sexual activity: None   Other Topics Concern    None   Social History Narrative    None       Family History   Problem Relation Age of Onset    High Blood Pressure Mother     Diabetes Mother           Current Outpatient Medications   Medication Sig Dispense Refill    irbesartan (AVAPRO) 150 MG tablet Take 1 tablet by mouth daily 30 tablet 2    atorvastatin (LIPITOR) 80 MG tablet Take 1 tablet by mouth daily 30 tablet 0    amLODIPine (NORVASC) 5 MG tablet Take 1 tablet by mouth daily 30 tablet 0    PT/INR Testing Monitor KIT Test weekly 1 kit 2    warfarin (COUMADIN) 5 MG tablet Take one tab 5 days a week and take 2.5 mg 2 days a week 30 tablet 2    aspirin (ASPIRIN ADULT LOW STRENGTH) 81 MG EC tablet TAKE ONE TABLET BY MOUTH EVERY DAY 30 tablet 0    Multiple Vitamins-Minerals (MULTIVITAMIN ADULT PO) Take 1 tablet by mouth       No current facility-administered medications for this visit. No Known Allergies    Health Maintenance Due   Topic Date Due    Hepatitis C screen  Never done    HIV screen  Never done    Cervical cancer screen  Never done    Shingles Vaccine (1 of 2) Never done    Breast cancer screen  01/15/2021           REVIEW OF SYSTEMS  Review of Systems   Constitutional: Negative for chills, fatigue and fever. HENT: Negative for congestion, ear pain, postnasal drip, sinus pressure, sinus pain, sneezing and sore throat. Eyes: Negative for pain. Respiratory: Negative for cough and shortness of breath. Cardiovascular: Negative for chest pain and leg swelling. Gastrointestinal: Negative for abdominal pain, constipation and diarrhea. Genitourinary: Negative for dysuria, frequency and urgency. Musculoskeletal: Negative for arthralgias, back pain and myalgias. Skin: Negative for rash and wound. Allergic/Immunologic: Negative for food allergies. Neurological: Positive for weakness (right lower extremity) and numbness. Negative for dizziness, light-headedness and headaches. Hematological: Does not bruise/bleed easily. Psychiatric/Behavioral: Negative for behavioral problems, dysphoric mood and sleep disturbance. The patient is nervous/anxious. PHYSICAL EXAM  /84   Pulse 89   Resp 18   Ht 5' 1.5\" (1.562 m)   Wt 149 lb 4.8 oz (67.7 kg)   BMI 27.75 kg/m²   Physical Exam  Vitals signs and nursing note reviewed. Constitutional:       Appearance: Normal appearance. She is well-developed and normal weight. HENT:      Head: Normocephalic and atraumatic. Right Ear: External ear normal.      Left Ear: External ear normal.      Nose: Nose normal.   Eyes:      Extraocular Movements: Extraocular movements intact. Conjunctiva/sclera: Conjunctivae normal.   Neck:      Musculoskeletal: Normal range of motion and neck supple. Thyroid: No thyromegaly.    Cardiovascular:      Rate and Rhythm: Normal rate and regular rhythm. Heart sounds: Normal heart sounds. No murmur. Pulmonary:      Effort: Pulmonary effort is normal.      Breath sounds: Normal breath sounds. No wheezing. Abdominal:      Palpations: Abdomen is soft. Tenderness: There is no abdominal tenderness. Musculoskeletal: Normal range of motion. General: No tenderness. Lymphadenopathy:      Cervical: No cervical adenopathy. Skin:     General: Skin is warm and dry. Findings: No rash. Neurological:      General: No focal deficit present. Mental Status: She is alert and oriented to person, place, and time. Mental status is at baseline. Deep Tendon Reflexes: Reflexes are normal and symmetric. Comments: Brace on right leg   Psychiatric:         Mood and Affect: Mood normal.         Behavior: Behavior normal.         Thought Content: Thought content normal.         Judgment: Judgment normal.              Laboratory:   All laboratory and radiology results have been personally reviewed by myself    Lab Results   Component Value Date     01/21/2021    K 5.1 01/21/2021    K 3.8 06/10/2020     01/21/2021    CO2 29 01/21/2021    BUN 11 01/21/2021    CREATININE 0.7 01/21/2021    PROT 6.7 08/23/2020    LABALBU 4.4 08/23/2020    CALCIUM 9.8 01/21/2021    GFRAA >60 01/21/2021    LABGLOM >60 01/21/2021    GLUCOSE 101 01/21/2021    AST 17 08/23/2020    ALT 26 08/23/2020    ALKPHOS 66 08/23/2020    BILITOT 0.4 08/23/2020    TSH 3.180 08/23/2020    VITD25 43 08/23/2020    CHOL 129 02/10/2021    TRIG 104 02/10/2021    HDL 45 02/10/2021    LDLCALC 63 02/10/2021    LABA1C 5.4 07/28/2020        Lab Results   Component Value Date    CHOL 129 02/10/2021    CHOL 119 08/23/2020    CHOL 133 09/23/2019     Lab Results   Component Value Date    TRIG 104 02/10/2021    TRIG 96 08/23/2020    TRIG 96 09/23/2019     Lab Results   Component Value Date    HDL 45 02/10/2021    HDL 45 08/23/2020    HDL 48 09/23/2019 Lab Results   Component Value Date    LDLCALC 63 02/10/2021    LDLCALC 55 08/23/2020    LDLCALC 66 09/23/2019       Lab Results   Component Value Date    LABA1C 5.4 07/28/2020     Lab Results   Component Value Date    LDLCALC 63 02/10/2021    CREATININE 0.7 01/21/2021       ASSESSMENT/PLAN:     Diagnosis Orders   1. APS (antiphospholipid syndrome) (HCC)  PT/INR Testing Monitor KIT   2. Long term current use of anticoagulant  POCT INR   3. Encounter for screening mammogram for malignant neoplasm of breast     4. Cerebrovascular accident (CVA), unspecified mechanism ([facility] [address]  PT/INR Testing Monitor KIT   5. Right hemiparesis (HCC)  PT/INR Testing Monitor KIT     INR slightly high at 3.1 we will decrease dosing to 2.5 mg 2 days a week and 5 mg all other days. We will have her return in 1 week for reevaluation. We will also order home testing kit for patient as she does have difficulty with transportation due to her CVA and right hemiparesis. Problem list reviewed andsimplified/updated  HM reviewed today and counseled as appropriate    Call or go to ED immediately if symptoms worsen or persist.  Future Appointments   Date Time Provider Evonne Aburto   5/3/2021  8:40 [facility]   5/5/2021 10:40 AM SCHEDULE, ALE [facility]   6/22/2021  3:00 PM Ike Wolff MD [facility] PMR 2 [facility]   1/20/2022 10:00 AM Abe Rodriguez MD [facility]     Or sooner if necessary.       Educational materials and/or homeexercises printed for patient's review and were included in patient instructions on his/her After Visit Summary and given to patient at the end of visit.       Counseled regarding above diagnosis, including possible risks and complications,  especially if left uncontrolled.     Counseled regarding the possible side effects, risks, benefits and alternatives to treatment; patient and/or guardian verbalizes understanding, agrees,feels comfortable with and wishes to proceed with above treatment plan.     Advised patient to call Franky Baezow new medication issues, and read all Rx info from pharmacy to assure aware of all possible risks and side effects of medication before taking.     Reviewed age and gender appropriate health screening exams and vaccinations. Advised patient regarding importance of keeping up with recommended health maintenance and toschedule as soon as possible if overdue, as this is important in assessing for undiagnosed pathology, especially cancer, as well as protecting against potentially harmful/life threatening disease.          Patient and/or guardian verbalizes understanding and agrees with above counseling, assessment and plan.     All questions answered. Michael 5, DO  4/29/21    NOTE: This report was transcribed using voice recognition software.  Every effort was made to ensure accuracy; however, inadvertent computerized transcription errors may be present

## 2021-04-29 NOTE — PATIENT INSTRUCTIONS
Patient Education        Consistent Vitamin K Diet: Care Instructions  Your Care Instructions     Your body needs vitamin K to clot blood and keep your bones strong. It's found in leafy green vegetables such as kale and spinach. If you take the blood thinner warfarin (Coumadin), you need to be careful about how much vitamin K you get. Vitamin K can keep your warfarin from working as it should. Most people who take warfarin can eat normally. The important thing is to get about the same amount of vitamin K each day. Don't suddenly start eating foods with a lot more or a lot less vitamin K. You can choose how much vitamin K you eat. For example, if you already eat a lot of leafy green vegetables, that's fine. Just keep it about the same amount each day. Follow-up care is a key part of your treatment and safety. Be sure to make and go to all appointments, and call your doctor if you are having problems. It's also a good idea to know your test results and keep a list of the medicines you take. How can you care for yourself at home? You don't need to stop eating foods high in vitamin K. But you do need to know what foods contain vitamin K. Then you can try to eat about the same amount of vitamin K each day. If you have questions about foods with vitamin K, ask if your doctor can refer you to a registered dietitian, an expert in healthy eating. · You might limit foods that are high in vitamin K to about 1 serving a day. These foods have more than 100 micrograms (mcg) of vitamin K in each serving. They include:  ? Cooked leafy green vegetables. Examples are kale, spinach, turnip greens, jamie greens, Swiss chard, mustard greens, broccoli, and brussels sprouts. One serving is ½ cup.  ? Raw leafy green vegetables such as kale, spinach, and endive. · You might limit foods that are moderate in vitamin K to about 3 servings a day. These foods have about 25 to 100 mcg of vitamin K in each serving. These include:  ?

## 2021-05-03 ENCOUNTER — OFFICE VISIT (OUTPATIENT)
Dept: NEUROLOGY | Age: 56
End: 2021-05-03
Payer: COMMERCIAL

## 2021-05-03 VITALS
DIASTOLIC BLOOD PRESSURE: 86 MMHG | WEIGHT: 148 LBS | BODY MASS INDEX: 27.94 KG/M2 | HEART RATE: 88 BPM | OXYGEN SATURATION: 99 % | TEMPERATURE: 98.2 F | SYSTOLIC BLOOD PRESSURE: 150 MMHG | HEIGHT: 61 IN

## 2021-05-03 DIAGNOSIS — Z86.73 H/O: CVA (CEREBROVASCULAR ACCIDENT): ICD-10-CM

## 2021-05-03 DIAGNOSIS — R29.898 RIGHT LEG WEAKNESS: ICD-10-CM

## 2021-05-03 DIAGNOSIS — I65.22 OCCLUSION OF LEFT INTERNAL CAROTID ARTERY: Primary | ICD-10-CM

## 2021-05-03 PROCEDURE — G8427 DOCREV CUR MEDS BY ELIG CLIN: HCPCS | Performed by: PHYSICIAN ASSISTANT

## 2021-05-03 PROCEDURE — G9899 SCRN MAM PERF RSLTS DOC: HCPCS | Performed by: PHYSICIAN ASSISTANT

## 2021-05-03 PROCEDURE — G8419 CALC BMI OUT NRM PARAM NOF/U: HCPCS | Performed by: PHYSICIAN ASSISTANT

## 2021-05-03 PROCEDURE — 3017F COLORECTAL CA SCREEN DOC REV: CPT | Performed by: PHYSICIAN ASSISTANT

## 2021-05-03 PROCEDURE — 99214 OFFICE O/P EST MOD 30 MIN: CPT | Performed by: PHYSICIAN ASSISTANT

## 2021-05-03 PROCEDURE — 1036F TOBACCO NON-USER: CPT | Performed by: PHYSICIAN ASSISTANT

## 2021-05-03 RX ORDER — BACLOFEN 10 MG/1
5 TABLET ORAL 2 TIMES DAILY
Qty: 30 TABLET | Refills: 1 | Status: SHIPPED
Start: 2021-05-03 | End: 2021-07-06 | Stop reason: SDUPTHER

## 2021-05-03 NOTE — PROGRESS NOTES
weekly 1 kit 2    warfarin (COUMADIN) 5 MG tablet Take one tab 5 days a week and take 2.5 mg 2 days a week 30 tablet 2    aspirin (ASPIRIN ADULT LOW STRENGTH) 81 MG EC tablet TAKE ONE TABLET BY MOUTH EVERY DAY 30 tablet 0    Multiple Vitamins-Minerals (MULTIVITAMIN ADULT PO) Take 1 tablet by mouth       No current facility-administered medications for this visit. Objective:       BP (!) 150/86   Pulse 88   Temp 98.2 °F (36.8 °C)   Ht 5' 1\" (1.549 m)   Wt 148 lb (67.1 kg)   SpO2 99%   BMI 27.96 kg/m²      General appearance: alert, appears stated age, cooperative and in no distress  Head: normocephalic, without obvious abnormality, atraumatic  Eyes: conjunctivae/corneas clear; no drainage  Neck: no adenopathy, no carotid bruit, supple   Lungs: clear to auscultation bilaterally  Heart: regular rate and rhythm, S1, S2 normal, no murmur  Extremities: normal, atraumatic, no cyanosis or edema  Skin:  color, texture, turgor normal--no rashes or lesions    Mental Status: alert and oriented. Thought content appropriate.      Appropriate attention/concentration  Intact fundus of knowledge  Repetition intact  Memories intact    Speech: no dysarthria  Language: no aphasias    Cranial Nerves:  I: smell    II: visual acuity     II: visual fields Full    II: pupils BENI   III,VII: ptosis None   III,IV,VI: extraocular muscles  EOMI without nystagmus   V: mastication Normal   V: facial light touch sensation  Normal   V,VII: corneal reflex     VII: facial muscle function - upper  Normal   VII: facial muscle function - lower Normal   VIII: hearing Normal   IX: soft palate elevation  Normal   IX,X: gag reflex    XI: trapezius strength  5/5   XI: sternocleidomastoid strength 5/5   XI: neck extension strength  5/5   XII: tongue strength  Normal     Motor:  5/5 throughout except RLE 5-/5  Normal bulk and increase tone in RLE   No drift   No abnormal movements    Sensory:  LT  normal  Vibration normal    Coordination:   FNF normal  HS slower on R -- without dysmetria     Gait:  R hemiparetic gait with foot drop     DTR:   + 1 on the L  +2 on R     No Yap's    No other pathological reflexes    Laboratory/Radiology:  ry/Radiology:     CBC with Differential:    Lab Results   Component Value Date    WBC 6.2 08/23/2020    RBC 4.80 08/23/2020    HGB 14.6 08/23/2020    HCT 43.8 08/23/2020     08/23/2020    MCV 91.3 08/23/2020    MCH 30.4 08/23/2020    MCHC 33.3 08/23/2020    RDW 11.9 08/23/2020    LYMPHOPCT 27.5 08/23/2020    MONOPCT 7.6 08/23/2020    BASOPCT 0.2 08/23/2020    MONOSABS 0.47 08/23/2020    LYMPHSABS 1.70 08/23/2020    EOSABS 0.11 08/23/2020    BASOSABS 0.01 08/23/2020     CMP:    Lab Results   Component Value Date     01/21/2021    K 5.1 01/21/2021    K 3.8 06/10/2020     01/21/2021    CO2 29 01/21/2021    BUN 11 01/21/2021    CREATININE 0.7 01/21/2021    GFRAA >60 01/21/2021    LABGLOM >60 01/21/2021    GLUCOSE 101 01/21/2021    PROT 6.7 08/23/2020    LABALBU 4.4 08/23/2020    CALCIUM 9.8 01/21/2021    BILITOT 0.4 08/23/2020    ALKPHOS 66 08/23/2020    AST 17 08/23/2020    ALT 26 08/23/2020     FLP:    Lab Results   Component Value Date    TRIG 104 02/10/2021    HDL 45 02/10/2021    LDLCALC 63 02/10/2021    LABVLDL 21 02/10/2021     CTA neck 1/21  Complete occlusion of the left internal carotid artery starting from its  origin, progressed since the prior study May 30, 2020. 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  Occlusion of the V1 segment, and mid V3 to distal V4 segments of the  right vertebral artery, stable. 30% focal stenosis in the distal left common carotid artery.     All labs and images were personally reviewed at the time of this visit    Assessment:     L MIRA/MCA embolic stroke 2/2 LICA occlusion in 8/2185   S/p L CEA in 6/2020--  Unfortunately, recent scans in 1/21 revealed reocclusion    On ASA and Lipitor for secondary prevention    She has since quit smoking     Found to have elevated anti-cardiolipin IgM   Following with hematology -- currently on Coumadin     HTN     \"Memory Fog\"   Likely from interrupted sleep and waking up 5-6 times per night-- recommend  improving this    High dose lipitor may also be contributing as cognitive dysfunction has been reported    Medically, she is stable   Plan:     Continue ASA, Coumadin and statin for stroke prevention     Start Baclofen 5 mg BID for spasticity    Will titrate based on response     Risk factor control     RTO 6 months or sooner prn     Call with questions or concerns     Freeman Kerns PA-C  8:43 AM  5/3/2021    I spent 35 minutes with this patient obtaining the HPI and discussing the exam with greater than 50% of the time providing counseling and education on medications and other treatment plans. All questions were answered prior to leaving my office.

## 2021-05-05 ENCOUNTER — ANTI-COAG VISIT (OUTPATIENT)
Dept: FAMILY MEDICINE CLINIC | Age: 56
End: 2021-05-05
Payer: COMMERCIAL

## 2021-05-05 ENCOUNTER — NURSE ONLY (OUTPATIENT)
Dept: FAMILY MEDICINE CLINIC | Age: 56
End: 2021-05-05
Payer: COMMERCIAL

## 2021-05-05 DIAGNOSIS — Z79.01 LONG TERM CURRENT USE OF ANTICOAGULANT: Primary | ICD-10-CM

## 2021-05-05 LAB
INTERNATIONAL NORMALIZATION RATIO, POC: 4.2
PROTHROMBIN TIME, POC: 50.6

## 2021-05-05 PROCEDURE — 93793 ANTICOAG MGMT PT WARFARIN: CPT | Performed by: FAMILY MEDICINE

## 2021-05-05 PROCEDURE — 99999 PR OFFICE/OUTPT VISIT,PROCEDURE ONLY: CPT | Performed by: FAMILY MEDICINE

## 2021-05-05 PROCEDURE — 85610 PROTHROMBIN TIME: CPT | Performed by: FAMILY MEDICINE

## 2021-05-05 NOTE — PROGRESS NOTES
Patient here for 1 week INR check - currently taking 5mg daily and 2.5mg Sat and Tues. INR 4.2  PT 50.6    Patient advised to hold tonight's dose and then take 2.5mg 5 days per week and 2.5mg 2 days per week. Recheck in 1 week.  Patient verbalized understanding

## 2021-05-12 ENCOUNTER — TELEPHONE (OUTPATIENT)
Dept: FAMILY MEDICINE CLINIC | Age: 56
End: 2021-05-12

## 2021-05-12 ENCOUNTER — NURSE ONLY (OUTPATIENT)
Dept: FAMILY MEDICINE CLINIC | Age: 56
End: 2021-05-12
Payer: COMMERCIAL

## 2021-05-12 ENCOUNTER — ANTI-COAG VISIT (OUTPATIENT)
Dept: FAMILY MEDICINE CLINIC | Age: 56
End: 2021-05-12

## 2021-05-12 DIAGNOSIS — Z79.01 LONG TERM CURRENT USE OF ANTICOAGULANT: Primary | ICD-10-CM

## 2021-05-12 DIAGNOSIS — Z86.73 H/O: CVA (CEREBROVASCULAR ACCIDENT): ICD-10-CM

## 2021-05-12 DIAGNOSIS — I65.21 CAROTID STENOSIS, RIGHT: ICD-10-CM

## 2021-05-12 LAB
INTERNATIONAL NORMALIZATION RATIO, POC: 1.7
PROTHROMBIN TIME, POC: 20.6

## 2021-05-12 PROCEDURE — 93793 ANTICOAG MGMT PT WARFARIN: CPT | Performed by: FAMILY MEDICINE

## 2021-05-12 PROCEDURE — 85610 PROTHROMBIN TIME: CPT | Performed by: FAMILY MEDICINE

## 2021-05-12 NOTE — TELEPHONE ENCOUNTER
MD INR was called for pt regarding the home INR system, they do not accept medicaid.   Patient notified

## 2021-05-12 NOTE — PROGRESS NOTES
Per PCP patient is to take coumadin 2.5mg 4 days a week, and 5mg 3 days a week, recheck INR in 1 week , pt voiced understanding

## 2021-05-19 ENCOUNTER — NURSE ONLY (OUTPATIENT)
Dept: FAMILY MEDICINE CLINIC | Age: 56
End: 2021-05-19
Payer: COMMERCIAL

## 2021-05-19 ENCOUNTER — ANTI-COAG VISIT (OUTPATIENT)
Dept: FAMILY MEDICINE CLINIC | Age: 56
End: 2021-05-19

## 2021-05-19 DIAGNOSIS — Z79.01 LONG TERM CURRENT USE OF ANTICOAGULANT: Primary | ICD-10-CM

## 2021-05-19 LAB
INTERNATIONAL NORMALIZATION RATIO, POC: 3.3
PROTHROMBIN TIME, POC: 39.2

## 2021-05-19 PROCEDURE — 85610 PROTHROMBIN TIME: CPT | Performed by: FAMILY MEDICINE

## 2021-05-26 ENCOUNTER — NURSE ONLY (OUTPATIENT)
Dept: FAMILY MEDICINE CLINIC | Age: 56
End: 2021-05-26

## 2021-05-26 DIAGNOSIS — Z79.01 LONG TERM CURRENT USE OF ANTICOAGULANT: ICD-10-CM

## 2021-05-26 DIAGNOSIS — Z79.01 LONG TERM CURRENT USE OF ANTICOAGULANT: Primary | ICD-10-CM

## 2021-05-26 LAB
INR BLD: 2.9
PROTHROMBIN TIME: 32 SEC (ref 9.3–12.4)

## 2021-05-26 PROCEDURE — 99999 PR OFFICE/OUTPT VISIT,PROCEDURE ONLY: CPT | Performed by: FAMILY MEDICINE

## 2021-05-27 ENCOUNTER — TELEPHONE (OUTPATIENT)
Dept: FAMILY MEDICINE CLINIC | Age: 56
End: 2021-05-27

## 2021-05-27 NOTE — TELEPHONE ENCOUNTER
Spoke to patient this morning she just wants to continue to come in office for her POCT INR she does not want to have a lab draw since there was no significant difference , one was 2.9 and other was 3.0 .   She is scheduled for 6/2/21

## 2021-05-28 RX ORDER — ASPIRIN 81 MG/1
TABLET ORAL
Qty: 30 TABLET | Refills: 0 | Status: SHIPPED
Start: 2021-05-28 | End: 2021-06-28

## 2021-06-02 ENCOUNTER — NURSE ONLY (OUTPATIENT)
Dept: FAMILY MEDICINE CLINIC | Age: 56
End: 2021-06-02
Payer: COMMERCIAL

## 2021-06-02 ENCOUNTER — ANTI-COAG VISIT (OUTPATIENT)
Dept: FAMILY MEDICINE CLINIC | Age: 56
End: 2021-06-02
Payer: COMMERCIAL

## 2021-06-02 DIAGNOSIS — Z79.01 LONG TERM (CURRENT) USE OF ANTICOAGULANTS: ICD-10-CM

## 2021-06-02 DIAGNOSIS — Z79.01 LONG TERM CURRENT USE OF ANTICOAGULANT: Primary | ICD-10-CM

## 2021-06-02 LAB
INTERNATIONAL NORMALIZATION RATIO, POC: 2.8
PROTHROMBIN TIME, POC: 33.2

## 2021-06-02 PROCEDURE — 93793 ANTICOAG MGMT PT WARFARIN: CPT | Performed by: FAMILY MEDICINE

## 2021-06-02 PROCEDURE — 85610 PROTHROMBIN TIME: CPT | Performed by: FAMILY MEDICINE

## 2021-06-02 NOTE — PROGRESS NOTES
Pt thinks she may have forgot to take her dose on Thursday.        Takes 2.5 Monday wed Friday, 5mg all other days

## 2021-06-11 RX ORDER — AMLODIPINE BESYLATE 5 MG/1
5 TABLET ORAL DAILY
Qty: 30 TABLET | Refills: 0 | Status: SHIPPED
Start: 2021-06-11 | End: 2021-07-06 | Stop reason: SDUPTHER

## 2021-06-16 ENCOUNTER — NURSE ONLY (OUTPATIENT)
Dept: FAMILY MEDICINE CLINIC | Age: 56
End: 2021-06-16
Payer: COMMERCIAL

## 2021-06-16 ENCOUNTER — ANTI-COAG VISIT (OUTPATIENT)
Dept: FAMILY MEDICINE CLINIC | Age: 56
End: 2021-06-16

## 2021-06-16 DIAGNOSIS — Z79.01 LONG TERM (CURRENT) USE OF ANTICOAGULANTS: Primary | ICD-10-CM

## 2021-06-16 DIAGNOSIS — Z79.01 LONG TERM CURRENT USE OF ANTICOAGULANT: ICD-10-CM

## 2021-06-16 LAB
INTERNATIONAL NORMALIZATION RATIO, POC: 3.8
PROTHROMBIN TIME, POC: 45.3

## 2021-06-16 PROCEDURE — 93793 ANTICOAG MGMT PT WARFARIN: CPT | Performed by: FAMILY MEDICINE

## 2021-06-16 PROCEDURE — 85610 PROTHROMBIN TIME: CPT | Performed by: FAMILY MEDICINE

## 2021-06-22 ENCOUNTER — OFFICE VISIT (OUTPATIENT)
Dept: PHYSICAL MEDICINE AND REHAB | Age: 56
End: 2021-06-22
Payer: COMMERCIAL

## 2021-06-22 VITALS
WEIGHT: 148 LBS | DIASTOLIC BLOOD PRESSURE: 84 MMHG | HEIGHT: 62 IN | SYSTOLIC BLOOD PRESSURE: 122 MMHG | BODY MASS INDEX: 27.23 KG/M2 | RESPIRATION RATE: 16 BRPM

## 2021-06-22 DIAGNOSIS — Z86.73 HISTORY OF STROKE: Primary | ICD-10-CM

## 2021-06-22 DIAGNOSIS — M21.371 RIGHT FOOT DROP: ICD-10-CM

## 2021-06-22 DIAGNOSIS — R25.2 SPASTICITY AS LATE EFFECT OF CEREBROVASCULAR ACCIDENT (CVA): ICD-10-CM

## 2021-06-22 DIAGNOSIS — G81.91 RIGHT HEMIPARESIS (HCC): ICD-10-CM

## 2021-06-22 DIAGNOSIS — I69.398 SPASTICITY AS LATE EFFECT OF CEREBROVASCULAR ACCIDENT (CVA): ICD-10-CM

## 2021-06-22 PROCEDURE — 3017F COLORECTAL CA SCREEN DOC REV: CPT | Performed by: PHYSICAL MEDICINE & REHABILITATION

## 2021-06-22 PROCEDURE — 99214 OFFICE O/P EST MOD 30 MIN: CPT | Performed by: PHYSICAL MEDICINE & REHABILITATION

## 2021-06-22 PROCEDURE — 1036F TOBACCO NON-USER: CPT | Performed by: PHYSICAL MEDICINE & REHABILITATION

## 2021-06-22 PROCEDURE — G9899 SCRN MAM PERF RSLTS DOC: HCPCS | Performed by: PHYSICAL MEDICINE & REHABILITATION

## 2021-06-22 PROCEDURE — G8419 CALC BMI OUT NRM PARAM NOF/U: HCPCS | Performed by: PHYSICAL MEDICINE & REHABILITATION

## 2021-06-22 PROCEDURE — G8427 DOCREV CUR MEDS BY ELIG CLIN: HCPCS | Performed by: PHYSICAL MEDICINE & REHABILITATION

## 2021-06-22 NOTE — PROGRESS NOTES
Ector Yeh M.D. [address] [facility] PHYSICAL MEDICINE AND REHABILITAION  [address]  [place] [facility] [address]  Dept: [phone]  Dept Fax: [phone]    PCP: Ryan Luz DO  Date of visit: 6/22/21    Chief Complaint   Patient presents with    Extremity Weakness     goes to PT self pay 3x a week. R AFO brace not wearing due to uncomfortable and no shoe fits. she has had brace adjusted by Catherine Mccarty she reports, but still not comfortable. She would like to try the OT functional  exam again.  Other     medication update - taking coumadin for blood clotting disorder (in medication tab) and is still on ASA        Renu Vanessa is a 50 y.o.  woman who presents for follow up of rehab needs post stroke. Patient sustained left MIRA and MCA distribution strokes in May of 2019 with right hemiparesis, right foot drop, cognitive impairment, and word finding deficits. Once medically stabilized she completed the Acute Rehab program from 5/24/19-6/6/19. At time of discharge home she was at [facility] for bathing, Mod I for all other ADLs, and Mod I for mobility. She was ambulating 700ft with a right AFO. She completed Speech therapy and OT and reported improvement in speech. She denies any ongoing issues with speech. She notes she is still somewhat forgetful at times, but cognition is within functional limits. Since last visit patient reports she is doing well overall. She is currently still in PT and reports ongoing improvement. She completed OT functional  evaluation on 2/21/2020 and was unable to achieve a passing score on accelerator/brake reaction time with the left foot--she also did not achieve a passing score with right foot.  It was recommended that patient requires a left foot accelerator adaptation to her vehicle followed by 5-8 hours of training with an OT adaptive  and then passing a driving test with the device prior to Vaping Use    Vaping Use: Never used   Substance and Sexual Activity    Alcohol use: Yes     Comment: moderate    Drug use: No    Sexual activity: Not Currently   Other Topics Concern    Not on file   Social History Narrative    Not on file     Social Determinants of Health     Financial Resource Strain:     Difficulty of Paying Living Expenses:    Food Insecurity:     Worried About Running Out of Food in the Last Year:     [address] in the Last Year:    Transportation Needs:     Lack of Transportation (Medical):      Lack of Transportation (Non-Medical):    Physical Activity:     Days of Exercise per Week:     Minutes of Exercise per Session:    Stress:     Feeling of Stress :    Social Connections:     Frequency of Communication with Friends and Family:     Frequency of Social Gatherings with Friends and Family:     Attends Jew Services:     Active Member of Clubs or Organizations:     Attends Club or Organization Meetings:     Marital Status:    Intimate Partner Violence:     Fear of Current or Ex-Partner:     Emotionally Abused:     Physically Abused:     Sexually Abused:        Family History   Problem Relation Age of Onset    High Blood Pressure Mother     Diabetes Mother        No Known Allergies    Current Outpatient Medications   Medication Sig Dispense Refill    amLODIPine (NORVASC) 5 MG tablet Take 1 tablet by mouth daily 30 tablet 0    aspirin (ASPIRIN ADULT LOW STRENGTH) 81 MG EC tablet TAKE ONE TABLET BY MOUTH EVERY DAY 30 tablet 0    baclofen (LIORESAL) 10 MG tablet Take 0.5 tablets by mouth 2 times daily 30 tablet 1    irbesartan (AVAPRO) 150 MG tablet Take 1 tablet by mouth daily 30 tablet 2    atorvastatin (LIPITOR) 80 MG tablet Take 1 tablet by mouth daily 30 tablet 0    PT/INR Testing Monitor KIT Test weekly 1 kit 2    warfarin (COUMADIN) 5 MG tablet Take one tab 5 days a week and take 2.5 mg 2 days a week 30 tablet 2    Multiple Vitamins-Minerals (MULTIVITAMIN ADULT PO) Take 1 tablet by mouth       No current facility-administered medications for this visit. Review of Systems  General: No chills, fatigue, fever, malaise, night sweats, weight gain,  weight loss. Psychological: No anxiety, depression, suicidal ideation   Ophthalmic: No blurry vision, decreased vision, double vision, loss of vision  Ear Nose Throat: No hearing loss, tinnitus, phonophobia, sensitivity to smells, vertigo, or vocal changes. Allergy/Immunology: No watery eyes, itchy eyes, frequent infections. Hematological and Lymphatic: No bleeding problems, blood clots, bruising  Endocrine:  No polydypsia, polyuria, temperature intolerance. Respiratory: No cough, shortness of breath, wheezing. Cardiovascular: No syncope, chest pain, dyspnea on exertion,palpitations. Gastrointestinal: No abdominal pain, hematemesis, melena, nausea, vomiting, stool incontinence  Genito-Urinary: No dysuria, hematuria, incontinence   Musculoskeletal: Per HPI  Neurological: Per HPI  Dermatological:  No rash      Physical Exam:   Blood pressure 122/84, resp. rate 16, height 5' 1.6\" (1.565 m), weight 148 lb (67.1 kg). General: The patient is in no apparent distress. HEENT: No rhinorrhea, sneezing, yawning, or lacrimation. No scleral icterus or conjunctival injection. SKIN: No piloerection. No track marks. No rash. Normal turgor. No erythema or ecchymosis. Psychological: Mood and affect are appropriate. Hygiene is appropriate. Cardiovascular:  Heart is regular rate. There is no edema. Respiratory: Respirations are regular and unlabored. There is no cyanosis. Lymphatic: There is no lymphadenopathy. Gastrointestinal: No abdominal distension   Genitourinary: No costovertebral angle tenderness. MSK: Normal range of motion. No deformity. Right plantar flexion spasticity MAS 2. Neurologic: Awake, alert and oriented in three planes. Speech is fluent. No facial weakness.   Hearing is intact for conversation. Pupils are equal and round. Extraocular muscles are intact. Strength:   R  L  Deltoid   5  5  Biceps   5  5  Triceps  5  5  Wrist Ext  5  5  Finger Abd  5  5  Hip Flex  4+  5  Knee Ext  5-  5  Ankle dorsi  3-  5  EHL   2+ 5  Ankle Plantar  3-  5    Sensory:  Intact for light touch in all lower extremity dermatomes. Reflexes:   R  L  Patellar  2 2   Ankle Jerk  2 2    No Babinski  No Yap    Gait: Mildly spastic gait. Right foot drop. + circumduction. Gait improved with AFO. Impression:   1. History of stroke    2. Right hemiparesis ([address]    3. Right foot drop    4. 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 Patient is requesting to repeat the test as she feels she has gained some strength and control at the right foot/ankle   · Discussed options, including antispasmodics and trial of botox for right plantar flexor spasticity, patient wishes to hold off   The patient was educated about the diagnosis, prognosis, indications, risks and benefits of treatment. An opportunity to ask questions was given to the patient and questions were answered. The patient agreed to proceed with the recommended treatment as described above. Follow up 6 months or sooner if needed. She will call to notify us when her  evaluation is complete to ensure that we receive results. Taylor Rodriguez M.D.   Physical Medicine and Rehabilitation

## 2021-06-23 ENCOUNTER — NURSE ONLY (OUTPATIENT)
Dept: FAMILY MEDICINE CLINIC | Age: 56
End: 2021-06-23
Payer: COMMERCIAL

## 2021-06-23 ENCOUNTER — ANTI-COAG VISIT (OUTPATIENT)
Dept: FAMILY MEDICINE CLINIC | Age: 56
End: 2021-06-23

## 2021-06-23 DIAGNOSIS — D68.61 APS (ANTIPHOSPHOLIPID SYNDROME) (HCC): ICD-10-CM

## 2021-06-23 DIAGNOSIS — Z79.01 LONG TERM CURRENT USE OF ANTICOAGULANT: Primary | ICD-10-CM

## 2021-06-23 LAB
INTERNATIONAL NORMALIZATION RATIO, POC: 2.6
PROTHROMBIN TIME, POC: 31.3

## 2021-06-23 PROCEDURE — 85610 PROTHROMBIN TIME: CPT | Performed by: FAMILY MEDICINE

## 2021-06-23 PROCEDURE — 93793 ANTICOAG MGMT PT WARFARIN: CPT | Performed by: FAMILY MEDICINE

## 2021-06-28 RX ORDER — AMLODIPINE BESYLATE 5 MG/1
TABLET ORAL
Qty: 30 TABLET | Refills: 0 | OUTPATIENT
Start: 2021-06-28

## 2021-06-28 RX ORDER — ASPIRIN 81 MG/1
TABLET ORAL
Qty: 30 TABLET | Refills: 0 | Status: SHIPPED
Start: 2021-06-28 | End: 2021-07-06 | Stop reason: SDUPTHER

## 2021-07-06 ENCOUNTER — ANTI-COAG VISIT (OUTPATIENT)
Dept: FAMILY MEDICINE CLINIC | Age: 56
End: 2021-07-06

## 2021-07-06 ENCOUNTER — OFFICE VISIT (OUTPATIENT)
Dept: FAMILY MEDICINE CLINIC | Age: 56
End: 2021-07-06
Payer: COMMERCIAL

## 2021-07-06 VITALS
HEART RATE: 88 BPM | HEIGHT: 61 IN | WEIGHT: 150.2 LBS | TEMPERATURE: 97.9 F | DIASTOLIC BLOOD PRESSURE: 89 MMHG | SYSTOLIC BLOOD PRESSURE: 153 MMHG | BODY MASS INDEX: 28.36 KG/M2

## 2021-07-06 DIAGNOSIS — I10 ESSENTIAL HYPERTENSION: Primary | ICD-10-CM

## 2021-07-06 DIAGNOSIS — F41.9 ANXIETY: ICD-10-CM

## 2021-07-06 DIAGNOSIS — Z79.01 LONG TERM CURRENT USE OF ANTICOAGULANT: ICD-10-CM

## 2021-07-06 LAB
INTERNATIONAL NORMALIZATION RATIO, POC: 2.6
PROTHROMBIN TIME, POC: 31

## 2021-07-06 PROCEDURE — 1036F TOBACCO NON-USER: CPT | Performed by: FAMILY MEDICINE

## 2021-07-06 PROCEDURE — G8427 DOCREV CUR MEDS BY ELIG CLIN: HCPCS | Performed by: FAMILY MEDICINE

## 2021-07-06 PROCEDURE — G8419 CALC BMI OUT NRM PARAM NOF/U: HCPCS | Performed by: FAMILY MEDICINE

## 2021-07-06 PROCEDURE — 3017F COLORECTAL CA SCREEN DOC REV: CPT | Performed by: FAMILY MEDICINE

## 2021-07-06 PROCEDURE — 85610 PROTHROMBIN TIME: CPT | Performed by: FAMILY MEDICINE

## 2021-07-06 PROCEDURE — 99214 OFFICE O/P EST MOD 30 MIN: CPT | Performed by: FAMILY MEDICINE

## 2021-07-06 PROCEDURE — G9899 SCRN MAM PERF RSLTS DOC: HCPCS | Performed by: FAMILY MEDICINE

## 2021-07-06 RX ORDER — ASPIRIN 81 MG/1
TABLET ORAL
Qty: 30 TABLET | Refills: 5 | Status: SHIPPED
Start: 2021-07-06 | End: 2022-01-24 | Stop reason: SDUPTHER

## 2021-07-06 RX ORDER — BACLOFEN 10 MG/1
5 TABLET ORAL 2 TIMES DAILY
Qty: 30 TABLET | Refills: 5 | Status: SHIPPED
Start: 2021-07-06 | End: 2021-08-23

## 2021-07-06 RX ORDER — ATORVASTATIN CALCIUM 80 MG/1
80 TABLET, FILM COATED ORAL DAILY
Qty: 30 TABLET | Refills: 5 | Status: SHIPPED
Start: 2021-07-06 | End: 2021-12-27

## 2021-07-06 RX ORDER — WARFARIN SODIUM 5 MG/1
TABLET ORAL
Qty: 30 TABLET | Refills: 5 | Status: SHIPPED
Start: 2021-07-06 | End: 2022-03-18

## 2021-07-06 RX ORDER — AMLODIPINE BESYLATE 10 MG/1
10 TABLET ORAL DAILY
Qty: 30 TABLET | Refills: 5 | Status: SHIPPED
Start: 2021-07-06 | End: 2021-12-14

## 2021-07-06 RX ORDER — IRBESARTAN 150 MG/1
150 TABLET ORAL DAILY
Qty: 30 TABLET | Refills: 5 | Status: SHIPPED
Start: 2021-07-06 | End: 2021-09-02 | Stop reason: ALTCHOICE

## 2021-07-06 RX ORDER — DULOXETIN HYDROCHLORIDE 20 MG/1
20 CAPSULE, DELAYED RELEASE ORAL DAILY
Qty: 30 CAPSULE | Refills: 5 | Status: SHIPPED
Start: 2021-07-06 | End: 2021-09-02

## 2021-07-06 SDOH — ECONOMIC STABILITY: FOOD INSECURITY: WITHIN THE PAST 12 MONTHS, YOU WORRIED THAT YOUR FOOD WOULD RUN OUT BEFORE YOU GOT MONEY TO BUY MORE.: NEVER TRUE

## 2021-07-06 SDOH — ECONOMIC STABILITY: FOOD INSECURITY: WITHIN THE PAST 12 MONTHS, THE FOOD YOU BOUGHT JUST DIDN'T LAST AND YOU DIDN'T HAVE MONEY TO GET MORE.: NEVER TRUE

## 2021-07-06 ASSESSMENT — ENCOUNTER SYMPTOMS
SORE THROAT: 0
SINUS PRESSURE: 0
SINUS PAIN: 0
BACK PAIN: 0
COUGH: 0
ABDOMINAL PAIN: 0
EYE PAIN: 0
CONSTIPATION: 0
DIARRHEA: 0
SHORTNESS OF BREATH: 0

## 2021-07-06 ASSESSMENT — SOCIAL DETERMINANTS OF HEALTH (SDOH): HOW HARD IS IT FOR YOU TO PAY FOR THE VERY BASICS LIKE FOOD, HOUSING, MEDICAL CARE, AND HEATING?: NOT HARD AT ALL

## 2021-07-06 NOTE — PROGRESS NOTES
Jordy Lopez  : 1965    Chief Complaint:     Chief Complaint   Patient presents with    Anxiety       HPI  Jordy Lopez 50 y.o. presents for   Chief Complaint   Patient presents with   [facility] Anxiety     Patient presents for follow-up today. She states that her neurologist recommended for her to have a low-dose CT scan. However she does not meet the qualifications yet. She needs to be 56years old. She has quit within the last 15 years but is not at the age limit. I did explain this to patient. She does not wish to have a regular CT scan of her chest.  As for anxiety this has been worsening. She has been on Lexapro in the past.  She did wean herself off of this. She does wish to restart another medication. Her blood pressure is also elevated. Has been elevated on the last few occasions. Majority of the time it has been elevated. She has had some normal blood pressures. All questions were answered to patients satisfaction.     Past Medical History:   Diagnosis Date    Back pain     Bilateral carotid artery stenosis 2020    Bruit of left carotid artery 2020    Carotid stenosis, right 6/15/2020    Cerebral artery occlusion with cerebral infarction ([address]     H/O: CVA (cerebrovascular accident) 2020    High total serum IgM 2021    Hypertension     Numbness and tingling     Occlusion of left internal carotid artery 2019    Paralysis of right lower extremity ([address] 2019    Right leg weakness 2020    S/P carotid endarterectomy 6/15/2020       Past Surgical History:   Procedure Laterality Date    CAROTID ENDARTERECTOMY Left 6/10/2020    LEFT CAROTID ENDARTERECTOMY performed by Max Rodriguez MD at [address] [place] [address] History     Socioeconomic History    Marital status: Single     Spouse name: None    Number of children: None    Years of education: None    Highest education level: None   Occupational History    None   Tobacco Use    Smoking status: Former Smoker     Packs/day: 0.50     Types: Cigarettes     Quit date: 2019     Years since quittin.1    Smokeless tobacco: Never Used   Vaping Use    Vaping Use: Never used   Substance and Sexual Activity    Alcohol use: Yes     Comment: moderate    Drug use: No    Sexual activity: Not Currently   Other Topics Concern    None   Social History Narrative    None     Social Determinants of Health     Financial Resource Strain: Low Risk     Difficulty of Paying Living Expenses: Not hard at all   Food Insecurity: No Food Insecurity    Worried About Running Out of Food in the Last Year: Never true    Siri of Food in the Last Year: Never true   Transportation Needs:     Lack of Transportation (Medical):      Lack of Transportation (Non-Medical):    Physical Activity:     Days of Exercise per Week:     Minutes of Exercise per Session:    Stress:     Feeling of Stress :    Social Connections:     Frequency of Communication with Friends and Family:     Frequency of Social Gatherings with Friends and Family:     Attends Baptism Services:     Active Member of Clubs or Organizations:     Attends Club or Organization Meetings:     Marital Status:    Intimate Partner Violence:     Fear of Current or Ex-Partner:     Emotionally Abused:     Physically Abused:     Sexually Abused:        Family History   Problem Relation Age of Onset    High Blood Pressure Mother     Diabetes Mother           Current Outpatient Medications   Medication Sig Dispense Refill    aspirin (ASPIRIN ADULT LOW STRENGTH) 81 MG EC tablet TAKE ONE TABLET BY MOUTH EVERY DAY 30 tablet 5    amLODIPine (NORVASC) 10 MG tablet Take 1 tablet by mouth daily 30 tablet 5    atorvastatin (LIPITOR) 80 MG tablet Take 1 tablet by mouth daily 30 tablet 5    baclofen (LIORESAL) 10 MG tablet Take 0.5 tablets by mouth 2 times daily 30 tablet 5    irbesartan (AVAPRO) 150 MG tablet Take 1 tablet by mouth daily 30 tablet 5  warfarin (COUMADIN) 5 MG tablet Take one tab 5 days a week and take 2.5 mg 2 days a week 30 tablet 5    DULoxetine (CYMBALTA) 20 MG extended release capsule Take 1 capsule by mouth daily 30 capsule 5    Multiple Vitamins-Minerals (MULTIVITAMIN ADULT PO) Take 1 tablet by mouth      PT/INR Testing Monitor KIT Test weekly 1 kit 2     No current facility-administered medications for this visit. No Known Allergies    Health Maintenance Due   Topic Date Due    Hepatitis C screen  Never done    HIV screen  Never done    Cervical cancer screen  Never done    Shingles Vaccine (1 of 2) Never done    Breast cancer screen  01/15/2021           REVIEW OF SYSTEMS  Review of Systems   Constitutional: Negative for chills, fatigue and fever. HENT: Negative for congestion, ear pain, postnasal drip, sinus pressure, sinus pain, sneezing and sore throat. Eyes: Negative for pain. Respiratory: Negative for cough and shortness of breath. Cardiovascular: Negative for chest pain and leg swelling. Gastrointestinal: Negative for abdominal pain, constipation and diarrhea. Genitourinary: Negative for dysuria, frequency and urgency. Musculoskeletal: Negative for arthralgias, back pain and myalgias. Skin: Negative for rash and wound. Allergic/Immunologic: Negative for food allergies. Neurological: Positive for weakness (right lower extremity) and numbness. Negative for dizziness, light-headedness and headaches. Hematological: Does not bruise/bleed easily. Psychiatric/Behavioral: Negative for behavioral problems, dysphoric mood and sleep disturbance. The patient is nervous/anxious. PHYSICAL EXAM  BP (!) 153/89   Pulse 88   Temp 97.9 °F (36.6 °C)   Ht 5' 1\" (1.549 m)   Wt 150 lb 3.2 oz (68.1 kg)   BMI 28.38 kg/m²   Physical Exam  Vitals and nursing note reviewed. Constitutional:       Appearance: Normal appearance. She is well-developed and normal weight.    HENT:      Head: Normocephalic and atraumatic. Right Ear: External ear normal.      Left Ear: External ear normal.      Nose: Nose normal.   Eyes:      Extraocular Movements: Extraocular movements intact. Conjunctiva/sclera: Conjunctivae normal.   Neck:      Thyroid: No thyromegaly. Cardiovascular:      Rate and Rhythm: Normal rate and regular rhythm. Heart sounds: Normal heart sounds. No murmur heard. Pulmonary:      Effort: Pulmonary effort is normal.      Breath sounds: Normal breath sounds. No wheezing. Abdominal:      Palpations: Abdomen is soft. Tenderness: There is no abdominal tenderness. Musculoskeletal:         General: No tenderness. Normal range of motion. Cervical back: Normal range of motion and neck supple. Lymphadenopathy:      Cervical: No cervical adenopathy. Skin:     General: Skin is warm and dry. Findings: No rash. Neurological:      General: No focal deficit present. Mental Status: She is alert and oriented to person, place, and time. Mental status is at baseline. Deep Tendon Reflexes: Reflexes are normal and symmetric. Comments: Brace on right leg   Psychiatric:         Mood and Affect: Mood normal.         Behavior: Behavior normal.         Thought Content: Thought content normal.         Judgment: Judgment normal.              Laboratory:   All laboratory and radiology results have been personally reviewed by myself    Lab Results   Component Value Date     01/21/2021    K 5.1 01/21/2021    K 3.8 06/10/2020     01/21/2021    CO2 29 01/21/2021    BUN 11 01/21/2021    CREATININE 0.7 01/21/2021    PROT 6.7 08/23/2020    LABALBU 4.4 08/23/2020    CALCIUM 9.8 01/21/2021    GFRAA >60 01/21/2021    LABGLOM >60 01/21/2021    GLUCOSE 101 01/21/2021    AST 17 08/23/2020    ALT 26 08/23/2020    ALKPHOS 66 08/23/2020    BILITOT 0.4 08/23/2020    TSH 3.180 08/23/2020    VITD25 43 08/23/2020    CHOL 129 02/10/2021    TRIG 104 02/10/2021    HDL 45 02/10/2021    [address] 02/10/2021    LABA1C 5.4 07/28/2020        Lab Results   Component Value Date    CHOL 129 02/10/2021    CHOL 119 08/23/2020    CHOL 133 09/23/2019     Lab Results   Component Value Date    TRIG 104 02/10/2021    TRIG 96 08/23/2020    TRIG 96 09/23/2019     Lab Results   Component Value Date    HDL 45 02/10/2021    HDL 45 08/23/2020    HDL 48 09/23/2019     Lab Results   Component Value Date    LDLCALC 63 02/10/2021    LDLCALC 55 08/23/2020    LDLCALC 66 09/23/2019       Lab Results   Component Value Date    LABA1C 5.4 07/28/2020     Lab Results   Component Value Date    LDLCALC 63 02/10/2021    CREATININE 0.7 01/21/2021       ASSESSMENT/PLAN:     Diagnosis Orders   1. Essential hypertension     2. Long term current use of anticoagulant  POCT INR   3. Anxiety       BP is elevated will increase amlodipine to 10 mg. Side effects medication reviewed with patient. As for anxiety will start low-dose Cymbalta. Side effects again reviewed patient. INR within normal limits today we will continue same dose of 5 mg 3 days a week 2-1/2 mg all other days    Problem list reviewed andsimplified/updated  HM reviewed today and counseled as appropriate    Call or go to ED immediately if symptoms worsen or persist.  Future Appointments   Date Time Provider Evonne Aburto   8/9/2021 10:30 AM SCHEDULE, ALE Martinez [facility]   [facility]   12/27/2021 10:00 AM Jillian Panchal MD [facility] PMR 2 [facility]   1/20/2022 10:00 AM Max Rodriguez MD VAS/[facility]     Or sooner if necessary.       Educational materials and/or homeexercises printed for patient's review and were included in patient instructions on his/her After Visit Summary and given to patient at the end of visit.       Counseled regarding above diagnosis, including possible risks and complications,  especially if left uncontrolled.     Counseled regarding the possible side effects, risks, benefits and alternatives to treatment; patient and/or guardian verbalizes understanding, agrees,feels comfortable with and wishes to proceed with above treatment plan.     Advised patient to call Margarita Precise new medication issues, and read all Rx info from pharmacy to assure aware of all possible risks and side effects of medication before taking.     Reviewed age and gender appropriate health screening exams and vaccinations. Advised patient regarding importance of keeping up with recommended health maintenance and toschedule as soon as possible if overdue, as this is important in assessing for undiagnosed pathology, especially cancer, as well as protecting against potentially harmful/life threatening disease.          Patient and/or guardian verbalizes understanding and agrees with above counseling, assessment and plan.     All questions answered. Montana Lozano DO  7/6/21    NOTE: This report was transcribed using voice recognition software.  Every effort was made to ensure accuracy; however, inadvertent computerized transcription errors may be present

## 2021-08-09 ENCOUNTER — NURSE ONLY (OUTPATIENT)
Dept: FAMILY MEDICINE CLINIC | Age: 56
End: 2021-08-09
Payer: COMMERCIAL

## 2021-08-09 ENCOUNTER — ANTI-COAG VISIT (OUTPATIENT)
Dept: FAMILY MEDICINE CLINIC | Age: 56
End: 2021-08-09
Payer: COMMERCIAL

## 2021-08-09 DIAGNOSIS — Z79.01 LONG TERM CURRENT USE OF ANTICOAGULANT: ICD-10-CM

## 2021-08-09 DIAGNOSIS — Z79.01 LONG TERM CURRENT USE OF ANTICOAGULANT: Primary | ICD-10-CM

## 2021-08-09 LAB
INTERNATIONAL NORMALIZATION RATIO, POC: 3.2
PROTHROMBIN TIME, POC: 38.2

## 2021-08-09 PROCEDURE — 93793 ANTICOAG MGMT PT WARFARIN: CPT | Performed by: FAMILY MEDICINE

## 2021-08-09 PROCEDURE — 85610 PROTHROMBIN TIME: CPT | Performed by: FAMILY MEDICINE

## 2021-08-09 NOTE — PROGRESS NOTES
Per patient she takes a 1/2 of a 5 tonight. Do you want her just to skip the dose or do you want her to cut the pill into a fourth.

## 2021-08-10 ENCOUNTER — TELEPHONE (OUTPATIENT)
Dept: FAMILY MEDICINE CLINIC | Age: 56
End: 2021-08-10

## 2021-08-10 NOTE — TELEPHONE ENCOUNTER
Pt states she is sweating a lot and sleeping a lot. She read up on the cymbalta and saw this is a side effect. She hasn't taken it the last two nights, and she wasn't waking up at night drenched in sweat nearly as much. Please advise.

## 2021-08-16 ENCOUNTER — ANTI-COAG VISIT (OUTPATIENT)
Dept: FAMILY MEDICINE CLINIC | Age: 56
End: 2021-08-16

## 2021-08-16 ENCOUNTER — NURSE ONLY (OUTPATIENT)
Dept: FAMILY MEDICINE CLINIC | Age: 56
End: 2021-08-16
Payer: COMMERCIAL

## 2021-08-16 DIAGNOSIS — Z79.01 LONG TERM CURRENT USE OF ANTICOAGULANT: Primary | ICD-10-CM

## 2021-08-16 LAB
INTERNATIONAL NORMALIZATION RATIO, POC: 3.5
PROTHROMBIN TIME, POC: 41.7

## 2021-08-16 PROCEDURE — 85610 PROTHROMBIN TIME: CPT | Performed by: FAMILY MEDICINE

## 2021-08-16 NOTE — PROGRESS NOTES
Pt takes coumadin, 2.5mg four days a week, and 5mg 3 days a week, skipped last Monday dosage due to INR being high

## 2021-08-23 ENCOUNTER — ANTI-COAG VISIT (OUTPATIENT)
Dept: FAMILY MEDICINE CLINIC | Age: 56
End: 2021-08-23
Payer: COMMERCIAL

## 2021-08-23 ENCOUNTER — NURSE ONLY (OUTPATIENT)
Dept: FAMILY MEDICINE CLINIC | Age: 56
End: 2021-08-23
Payer: COMMERCIAL

## 2021-08-23 DIAGNOSIS — Z79.01 LONG TERM CURRENT USE OF ANTICOAGULANT: Primary | ICD-10-CM

## 2021-08-23 DIAGNOSIS — Z51.81 ANTICOAGULATION MANAGEMENT ENCOUNTER: ICD-10-CM

## 2021-08-23 DIAGNOSIS — Z79.01 ANTICOAGULATION MANAGEMENT ENCOUNTER: ICD-10-CM

## 2021-08-23 LAB
INTERNATIONAL NORMALIZATION RATIO, POC: 1.9
PROTHROMBIN TIME, POC: 22.5

## 2021-08-23 PROCEDURE — 93793 ANTICOAG MGMT PT WARFARIN: CPT | Performed by: FAMILY MEDICINE

## 2021-08-23 PROCEDURE — 85610 PROTHROMBIN TIME: CPT | Performed by: FAMILY MEDICINE

## 2021-08-30 ENCOUNTER — TELEPHONE (OUTPATIENT)
Dept: FAMILY MEDICINE CLINIC | Age: 56
End: 2021-08-30

## 2021-08-30 ENCOUNTER — NURSE ONLY (OUTPATIENT)
Dept: FAMILY MEDICINE CLINIC | Age: 56
End: 2021-08-30
Payer: COMMERCIAL

## 2021-08-30 ENCOUNTER — ANTI-COAG VISIT (OUTPATIENT)
Dept: FAMILY MEDICINE CLINIC | Age: 56
End: 2021-08-30
Payer: COMMERCIAL

## 2021-08-30 DIAGNOSIS — Z51.81 ANTICOAGULATION MANAGEMENT ENCOUNTER: Primary | ICD-10-CM

## 2021-08-30 DIAGNOSIS — Z51.81 ANTICOAGULATION MANAGEMENT ENCOUNTER: ICD-10-CM

## 2021-08-30 DIAGNOSIS — Z79.01 ANTICOAGULATION MANAGEMENT ENCOUNTER: ICD-10-CM

## 2021-08-30 DIAGNOSIS — Z79.01 ANTICOAGULATION MANAGEMENT ENCOUNTER: Primary | ICD-10-CM

## 2021-08-30 LAB
INTERNATIONAL NORMALIZATION RATIO, POC: 1.9
PROTHROMBIN TIME, POC: 22.8

## 2021-08-30 PROCEDURE — 93793 ANTICOAG MGMT PT WARFARIN: CPT | Performed by: FAMILY MEDICINE

## 2021-08-30 PROCEDURE — 85610 PROTHROMBIN TIME: CPT | Performed by: FAMILY MEDICINE

## 2021-08-30 NOTE — TELEPHONE ENCOUNTER
When patient was here today for inr she complained of b/l leg swelling with right leg being worse than left. No current swelling observed by this MA. Patient stated that she was previously on a bloodpressure and water pill combo from her old pcp and wonders if she should be on one again.  Advised patient I would discuss this with dr Syeda Sanchez and she may want to see patient for an appointment to observe said swelling

## 2021-08-30 NOTE — TELEPHONE ENCOUNTER
Needs to come in so we can check her BP (appt okay with me so we can discuss) it was elevated at last visit and diuretic may beindicated

## 2021-08-31 ENCOUNTER — NURSE TRIAGE (OUTPATIENT)
Dept: OTHER | Facility: CLINIC | Age: 56
End: 2021-08-31

## 2021-08-31 NOTE — TELEPHONE ENCOUNTER
Received call from Delicia De La Rosa at [facility] with [employer]. Brief description of triage: needs to make appointment for leg swelling, has spoken with the office already and PCP recommended appointment to check BP and discuss. Triage not indicated - no new or worsening symptoms. Notes from office from yesterday and today noted. Called [facility] and spoke with Lanell Goldmann who agreed to further assist patient. Warm transfer provided. Care advice provided, patient verbalizes understanding; denies any other questions or concerns; instructed to call back for any new or worsening symptoms. Attention Provider: Thank you for allowing me to participate in the care of your patient. The patient was connected to triage in response to information provided to the [facility]. Please do not respond through this encounter as the response is not directed to a shared pool. Reason for Disposition   Caller has already spoken with the PCP (or office), and has no further questions   Requesting regular office appointment    Answer Assessment - Initial Assessment Questions  1. REASON FOR CALL or QUESTION: \"What is your reason for calling today? \" or \"How can I best help you? \" or \"What question do you have that I can help answer? \"      See note    Protocols used: NO CONTACT OR DUPLICATE CONTACT CALL-ADULT-[place], INFORMATION ONLY CALL - NO TRIAGE-ADULT-

## 2021-09-02 ENCOUNTER — OFFICE VISIT (OUTPATIENT)
Dept: FAMILY MEDICINE CLINIC | Age: 56
End: 2021-09-02
Payer: COMMERCIAL

## 2021-09-02 VITALS
HEART RATE: 86 BPM | DIASTOLIC BLOOD PRESSURE: 100 MMHG | HEIGHT: 62 IN | SYSTOLIC BLOOD PRESSURE: 157 MMHG | TEMPERATURE: 97 F | BODY MASS INDEX: 27.64 KG/M2 | RESPIRATION RATE: 20 BRPM | WEIGHT: 150.2 LBS

## 2021-09-02 DIAGNOSIS — Z12.31 SCREENING MAMMOGRAM, ENCOUNTER FOR: ICD-10-CM

## 2021-09-02 DIAGNOSIS — R60.0 LOWER EXTREMITY EDEMA: ICD-10-CM

## 2021-09-02 DIAGNOSIS — I10 ESSENTIAL HYPERTENSION: ICD-10-CM

## 2021-09-02 DIAGNOSIS — F41.9 ANXIETY: Primary | ICD-10-CM

## 2021-09-02 PROCEDURE — G8419 CALC BMI OUT NRM PARAM NOF/U: HCPCS | Performed by: FAMILY MEDICINE

## 2021-09-02 PROCEDURE — G8427 DOCREV CUR MEDS BY ELIG CLIN: HCPCS | Performed by: FAMILY MEDICINE

## 2021-09-02 PROCEDURE — G9899 SCRN MAM PERF RSLTS DOC: HCPCS | Performed by: FAMILY MEDICINE

## 2021-09-02 PROCEDURE — 99214 OFFICE O/P EST MOD 30 MIN: CPT | Performed by: FAMILY MEDICINE

## 2021-09-02 PROCEDURE — 3017F COLORECTAL CA SCREEN DOC REV: CPT | Performed by: FAMILY MEDICINE

## 2021-09-02 PROCEDURE — 1036F TOBACCO NON-USER: CPT | Performed by: FAMILY MEDICINE

## 2021-09-02 RX ORDER — IRBESARTAN AND HYDROCHLOROTHIAZIDE 150; 12.5 MG/1; MG/1
1 TABLET, FILM COATED ORAL DAILY
Qty: 30 TABLET | Refills: 0 | Status: SHIPPED
Start: 2021-09-02 | End: 2021-10-01

## 2021-09-02 RX ORDER — DULOXETINE 40 MG/1
40 CAPSULE, DELAYED RELEASE ORAL DAILY
Qty: 30 CAPSULE | Refills: 2 | Status: SHIPPED
Start: 2021-09-02 | End: 2021-09-13 | Stop reason: ALTCHOICE

## 2021-09-02 ASSESSMENT — ENCOUNTER SYMPTOMS
EYE PAIN: 0
COUGH: 0
ABDOMINAL PAIN: 0
SHORTNESS OF BREATH: 0
DIARRHEA: 0
SINUS PRESSURE: 0
CONSTIPATION: 0
SORE THROAT: 0
BACK PAIN: 0

## 2021-09-02 NOTE — PROGRESS NOTES
Vadim Gallagher  : 1965    Chief Complaint:     Chief Complaint   Patient presents with    Swelling    Hypertension       HPI  Vadim Gallagher 49 y.o. presents for   Chief Complaint   Patient presents with    Swelling    Hypertension     Patient presents for follow-up today. She notes that her blood pressure has been elevated and she also notes swelling in her lower extremities. She was on a water pill in the past.  She also notes increased anxiety. She does wish to try to go up on the Cymbalta. She is also due for mammogram.      All questions were answered to patients satisfaction.     Past Medical History:   Diagnosis Date    Back pain     Bilateral carotid artery stenosis 2020    Bruit of left carotid artery 2020    Carotid stenosis, right 6/15/2020    Cerebral artery occlusion with cerebral infarction ([address]     H/O: CVA (cerebrovascular accident) 2020    High total serum IgM 2021    Hypertension     Numbness and tingling     Occlusion of left internal carotid artery 2019    Paralysis of right lower extremity ([address] 2019    Right leg weakness 2020    S/P carotid endarterectomy 6/15/2020       Past Surgical History:   Procedure Laterality Date    CAROTID ENDARTERECTOMY Left 6/10/2020    LEFT CAROTID ENDARTERECTOMY performed by Alexsandra Sandoval MD at  [facility] History     Socioeconomic History    Marital status: Single     Spouse name: None    Number of children: None    Years of education: None    Highest education level: None   Occupational History    None   Tobacco Use    Smoking status: Former Smoker     Packs/day: 0.50     Years: 22.00     Pack years: 11.00     Types: Cigarettes     Quit date: 2019     Years since quittin.3    Smokeless tobacco: Never Used   Vaping Use    Vaping Use: Never used   Substance and Sexual Activity    Alcohol use: Yes     Comment: moderate    Drug use: No    Sexual activity: Not Currently   Other Topics Concern    None   Social History Narrative    None     Social Determinants of Health     Financial Resource Strain: Low Risk     Difficulty of Paying Living Expenses: Not hard at all   Food Insecurity: No Food Insecurity    Worried About Running Out of Food in the Last Year: Never true    Siri of Food in the Last Year: Never true   Transportation Needs:     Lack of Transportation (Medical):  Lack of Transportation (Non-Medical):    Physical Activity:     Days of Exercise per Week:     Minutes of Exercise per Session:    Stress:     Feeling of Stress :    Social Connections:     Frequency of Communication with Friends and Family:     Frequency of Social Gatherings with Friends and Family:     Attends Yazdanism Services:     Active Member of Clubs or Organizations:     Attends Club or Organization Meetings:     Marital Status:    Intimate Partner Violence:     Fear of Current or Ex-Partner:     Emotionally Abused:     Physically Abused:     Sexually Abused:        Family History   Problem Relation Age of Onset    High Blood Pressure Mother     Diabetes Mother           Current Outpatient Medications   Medication Sig Dispense Refill    DULoxetine 40 MG CPEP Take 40 mg by mouth daily 30 capsule 2    irbesartan-hydroCHLOROthiazide (AVALIDE) 150-12.5 MG per tablet Take 1 tablet by mouth daily 30 tablet 0    aspirin (ASPIRIN ADULT LOW STRENGTH) 81 MG EC tablet TAKE ONE TABLET BY MOUTH EVERY DAY 30 tablet 5    amLODIPine (NORVASC) 10 MG tablet Take 1 tablet by mouth daily 30 tablet 5    atorvastatin (LIPITOR) 80 MG tablet Take 1 tablet by mouth daily 30 tablet 5    warfarin (COUMADIN) 5 MG tablet Take one tab 5 days a week and take 2.5 mg 2 days a week 30 tablet 5    PT/INR Testing Monitor KIT Test weekly 1 kit 2    Multiple Vitamins-Minerals (MULTIVITAMIN ADULT PO) Take 1 tablet by mouth       No current facility-administered medications for this visit.        No Known Allergies    Health Maintenance Due   Topic Date Due    Hepatitis C screen  Never done    HIV screen  Never done    Cervical cancer screen  Never done    Shingles Vaccine (1 of 2) Never done    Breast cancer screen  01/15/2021    Flu vaccine (1) 09/01/2021           REVIEW OF SYSTEMS  Review of Systems   Constitutional: Negative for fatigue and fever. HENT: Negative for ear pain, sinus pressure, sneezing and sore throat. Eyes: Negative for pain. Respiratory: Negative for cough and shortness of breath. Cardiovascular: Positive for leg swelling. Negative for chest pain. Gastrointestinal: Negative for abdominal pain, constipation and diarrhea. Genitourinary: Negative for dysuria and urgency. Musculoskeletal: Negative for back pain and myalgias. Skin: Negative for rash. Allergic/Immunologic: Negative for food allergies. Neurological: Negative for light-headedness and headaches. Hematological: Does not bruise/bleed easily. Psychiatric/Behavioral: Negative for behavioral problems and sleep disturbance. The patient is nervous/anxious. PHYSICAL EXAM  BP (!) 157/100   Pulse 86   Temp 97 °F (36.1 °C)   Resp 20   Ht 5' 1.5\" (1.562 m)   Wt 150 lb 3.2 oz (68.1 kg)   BMI 27.92 kg/m²   Physical Exam  Vitals and nursing note reviewed. Constitutional:       Appearance: She is well-developed. HENT:      Head: Normocephalic and atraumatic. Right Ear: External ear normal.      Left Ear: External ear normal.      Nose: Nose normal.   Eyes:      Conjunctiva/sclera: Conjunctivae normal.   Neck:      Thyroid: No thyromegaly. Cardiovascular:      Rate and Rhythm: Normal rate and regular rhythm. Heart sounds: Normal heart sounds. No murmur heard. No friction rub. Pulmonary:      Effort: Pulmonary effort is normal.      Breath sounds: Normal breath sounds. No wheezing. Abdominal:      Palpations: Abdomen is soft. Tenderness: There is no abdominal tenderness. Musculoskeletal:         General: No tenderness. Normal range of motion. Cervical back: Normal range of motion and neck supple. Lymphadenopathy:      Cervical: No cervical adenopathy. Skin:     General: Skin is warm and dry. Findings: No rash. Neurological:      Mental Status: She is alert and oriented to person, place, and time. Deep Tendon Reflexes: Reflexes are normal and symmetric. Psychiatric:         Behavior: Behavior normal.              Laboratory: All laboratory and radiology results have been personally reviewed by myself    Lab Results   Component Value Date     01/21/2021    K 5.1 01/21/2021    K 3.8 06/10/2020     01/21/2021    CO2 29 01/21/2021    BUN 11 01/21/2021    CREATININE 0.7 01/21/2021    PROT 6.7 08/23/2020    LABALBU 4.4 08/23/2020    CALCIUM 9.8 01/21/2021    GFRAA >60 01/21/2021    LABGLOM >60 01/21/2021    GLUCOSE 101 01/21/2021    AST 17 08/23/2020    ALT 26 08/23/2020    ALKPHOS 66 08/23/2020    BILITOT 0.4 08/23/2020    TSH 3.180 08/23/2020    VITD25 43 08/23/2020    CHOL 129 02/10/2021    TRIG 104 02/10/2021    HDL 45 02/10/2021    LDLCALC 63 02/10/2021    LABA1C 5.4 07/28/2020        Lab Results   Component Value Date    CHOL 129 02/10/2021    CHOL 119 08/23/2020    CHOL 133 09/23/2019     Lab Results   Component Value Date    TRIG 104 02/10/2021    TRIG 96 08/23/2020    TRIG 96 09/23/2019     Lab Results   Component Value Date    HDL 45 02/10/2021    HDL 45 08/23/2020    HDL 48 09/23/2019     Lab Results   Component Value Date    LDLCALC 63 02/10/2021    LDLCALC 55 08/23/2020    LDLCALC 66 09/23/2019       Lab Results   Component Value Date    LABA1C 5.4 07/28/2020     Lab Results   Component Value Date    LDLCALC 63 02/10/2021    CREATININE 0.7 01/21/2021       ASSESSMENT/PLAN:     Diagnosis Orders   1. Anxiety     2. Essential hypertension     3. Lower extremity edema     4.  Screening mammogram, encounter for  TANNA DIGITAL SCREEN BILATERAL PER

## 2021-09-10 ENCOUNTER — TELEPHONE (OUTPATIENT)
Dept: FAMILY MEDICINE CLINIC | Age: 56
End: 2021-09-10

## 2021-09-13 ENCOUNTER — NURSE ONLY (OUTPATIENT)
Dept: FAMILY MEDICINE CLINIC | Age: 56
End: 2021-09-13
Payer: COMMERCIAL

## 2021-09-13 ENCOUNTER — ANTI-COAG VISIT (OUTPATIENT)
Dept: FAMILY MEDICINE CLINIC | Age: 56
End: 2021-09-13

## 2021-09-13 VITALS — HEART RATE: 73 BPM | SYSTOLIC BLOOD PRESSURE: 138 MMHG | DIASTOLIC BLOOD PRESSURE: 87 MMHG

## 2021-09-13 DIAGNOSIS — I65.22 OCCLUSION OF LEFT INTERNAL CAROTID ARTERY: Primary | ICD-10-CM

## 2021-09-13 LAB
INTERNATIONAL NORMALIZATION RATIO, POC: 1.8
PROTHROMBIN TIME, POC: 22

## 2021-09-13 PROCEDURE — 85610 PROTHROMBIN TIME: CPT | Performed by: FAMILY MEDICINE

## 2021-09-13 PROCEDURE — 93793 ANTICOAG MGMT PT WARFARIN: CPT | Performed by: FAMILY MEDICINE

## 2021-09-13 RX ORDER — DULOXETIN HYDROCHLORIDE 60 MG/1
60 CAPSULE, DELAYED RELEASE ORAL DAILY
Qty: 30 CAPSULE | Refills: 5 | Status: SHIPPED
Start: 2021-09-13 | End: 2021-11-12

## 2021-09-13 NOTE — PROGRESS NOTES
Patient here for INR check. Currently taking 2.5mg M-F and 5mg on SS.     INR 1.8  PT 22.0    Patient advised to keep medication dosing the same until she hears from office staff

## 2021-09-21 DIAGNOSIS — Z12.31 SCREENING MAMMOGRAM, ENCOUNTER FOR: ICD-10-CM

## 2021-09-27 ENCOUNTER — ANTI-COAG VISIT (OUTPATIENT)
Dept: FAMILY MEDICINE CLINIC | Age: 56
End: 2021-09-27
Payer: COMMERCIAL

## 2021-09-27 ENCOUNTER — NURSE ONLY (OUTPATIENT)
Dept: FAMILY MEDICINE CLINIC | Age: 56
End: 2021-09-27
Payer: COMMERCIAL

## 2021-09-27 DIAGNOSIS — D68.61 APS (ANTIPHOSPHOLIPID SYNDROME) (HCC): ICD-10-CM

## 2021-09-27 DIAGNOSIS — I65.21 CAROTID STENOSIS, RIGHT: Primary | ICD-10-CM

## 2021-09-27 LAB
INTERNATIONAL NORMALIZATION RATIO, POC: 2.9
PROTHROMBIN TIME, POC: 35.1

## 2021-09-27 PROCEDURE — 93793 ANTICOAG MGMT PT WARFARIN: CPT | Performed by: FAMILY MEDICINE

## 2021-09-27 PROCEDURE — 85610 PROTHROMBIN TIME: CPT | Performed by: FAMILY MEDICINE

## 2021-09-27 NOTE — PROGRESS NOTES
Patient here for INR 2 week check - currently taking 5mg and 2.5mg alternating days. INR 2.9  PT 35.1    Patient scheduled for 2 week follow up and advised to keep dosing the same unless she hears from staff.

## 2021-10-01 RX ORDER — IRBESARTAN AND HYDROCHLOROTHIAZIDE 150; 12.5 MG/1; MG/1
1 TABLET, FILM COATED ORAL DAILY
Qty: 30 TABLET | Refills: 0 | Status: SHIPPED
Start: 2021-10-01 | End: 2021-10-26 | Stop reason: SDUPTHER

## 2021-10-12 ENCOUNTER — TELEPHONE (OUTPATIENT)
Dept: ADMINISTRATIVE | Age: 56
End: 2021-10-12

## 2021-10-15 ENCOUNTER — ANTI-COAG VISIT (OUTPATIENT)
Dept: FAMILY MEDICINE CLINIC | Age: 56
End: 2021-10-15

## 2021-10-15 ENCOUNTER — NURSE ONLY (OUTPATIENT)
Dept: FAMILY MEDICINE CLINIC | Age: 56
End: 2021-10-15
Payer: COMMERCIAL

## 2021-10-15 DIAGNOSIS — Z79.01 LONG TERM (CURRENT) USE OF ANTICOAGULANTS: Primary | ICD-10-CM

## 2021-10-15 DIAGNOSIS — D68.61 APS (ANTIPHOSPHOLIPID SYNDROME) (HCC): ICD-10-CM

## 2021-10-15 LAB
INTERNATIONAL NORMALIZATION RATIO, POC: 1.5
PROTHROMBIN TIME, POC: 18

## 2021-10-15 PROCEDURE — 93793 ANTICOAG MGMT PT WARFARIN: CPT | Performed by: FAMILY MEDICINE

## 2021-10-15 PROCEDURE — 85610 PROTHROMBIN TIME: CPT | Performed by: FAMILY MEDICINE

## 2021-10-15 NOTE — PROGRESS NOTES
Pt states she takes coumadin, 5mg three days a week, all other days 2.5mg , pt stated last week she ate a couple salads so she skipped 2 days of taking her coumadin on her 2.5mg days , and pt forgot to take coumadin on the 12th

## 2021-10-22 ENCOUNTER — ANTI-COAG VISIT (OUTPATIENT)
Dept: FAMILY MEDICINE CLINIC | Age: 56
End: 2021-10-22
Payer: COMMERCIAL

## 2021-10-22 ENCOUNTER — NURSE ONLY (OUTPATIENT)
Dept: FAMILY MEDICINE CLINIC | Age: 56
End: 2021-10-22
Payer: COMMERCIAL

## 2021-10-22 DIAGNOSIS — I65.21 CAROTID STENOSIS, RIGHT: ICD-10-CM

## 2021-10-22 DIAGNOSIS — D68.61 APS (ANTIPHOSPHOLIPID SYNDROME) (HCC): ICD-10-CM

## 2021-10-22 LAB — INTERNATIONAL NORMALIZATION RATIO, POC: 2.5

## 2021-10-22 PROCEDURE — 93793 ANTICOAG MGMT PT WARFARIN: CPT | Performed by: FAMILY MEDICINE

## 2021-10-22 PROCEDURE — 85610 PROTHROMBIN TIME: CPT | Performed by: FAMILY MEDICINE

## 2021-10-25 ENCOUNTER — PATIENT MESSAGE (OUTPATIENT)
Dept: FAMILY MEDICINE CLINIC | Age: 56
End: 2021-10-25

## 2021-10-26 RX ORDER — IRBESARTAN AND HYDROCHLOROTHIAZIDE 150; 12.5 MG/1; MG/1
1 TABLET, FILM COATED ORAL DAILY
Qty: 30 TABLET | Refills: 5 | Status: SHIPPED
Start: 2021-10-26 | End: 2022-01-25

## 2021-10-26 NOTE — TELEPHONE ENCOUNTER
From: Julienne Malcolm  To:  Nubia Sharpe DO  Sent: 10/25/2021 8:56 PM EDT  Subject: Prescription Question    Need refill

## 2021-11-08 ENCOUNTER — ANTI-COAG VISIT (OUTPATIENT)
Dept: FAMILY MEDICINE CLINIC | Age: 56
End: 2021-11-08
Payer: MEDICARE

## 2021-11-08 ENCOUNTER — NURSE ONLY (OUTPATIENT)
Dept: FAMILY MEDICINE CLINIC | Age: 56
End: 2021-11-08
Payer: MEDICARE

## 2021-11-08 DIAGNOSIS — I65.21 CAROTID STENOSIS, RIGHT: ICD-10-CM

## 2021-11-08 DIAGNOSIS — Z51.81 ANTICOAGULATION MANAGEMENT ENCOUNTER: ICD-10-CM

## 2021-11-08 DIAGNOSIS — Z79.01 ANTICOAGULATION MANAGEMENT ENCOUNTER: ICD-10-CM

## 2021-11-08 LAB
INTERNATIONAL NORMALIZATION RATIO, POC: 2.2
INTERNATIONAL NORMALIZATION RATIO, POC: NORMAL
PROTHROMBIN TIME, POC: 26.5

## 2021-11-08 PROCEDURE — 85610 PROTHROMBIN TIME: CPT | Performed by: FAMILY MEDICINE

## 2021-11-08 PROCEDURE — 93793 ANTICOAG MGMT PT WARFARIN: CPT | Performed by: FAMILY MEDICINE

## 2021-11-12 ENCOUNTER — OFFICE VISIT (OUTPATIENT)
Dept: FAMILY MEDICINE CLINIC | Age: 56
End: 2021-11-12
Payer: MEDICARE

## 2021-11-12 VITALS
RESPIRATION RATE: 18 BRPM | WEIGHT: 151 LBS | HEIGHT: 62 IN | HEART RATE: 90 BPM | DIASTOLIC BLOOD PRESSURE: 87 MMHG | SYSTOLIC BLOOD PRESSURE: 131 MMHG | TEMPERATURE: 97.9 F | BODY MASS INDEX: 27.79 KG/M2

## 2021-11-12 DIAGNOSIS — Z86.73 H/O: CVA (CEREBROVASCULAR ACCIDENT): ICD-10-CM

## 2021-11-12 DIAGNOSIS — Z13.31 POSITIVE DEPRESSION SCREENING: ICD-10-CM

## 2021-11-12 DIAGNOSIS — E55.9 VITAMIN D DEFICIENCY: ICD-10-CM

## 2021-11-12 DIAGNOSIS — I10 ESSENTIAL HYPERTENSION: ICD-10-CM

## 2021-11-12 DIAGNOSIS — Z00.00 ROUTINE GENERAL MEDICAL EXAMINATION AT A HEALTH CARE FACILITY: Primary | ICD-10-CM

## 2021-11-12 DIAGNOSIS — M25.512 CHRONIC LEFT SHOULDER PAIN: ICD-10-CM

## 2021-11-12 DIAGNOSIS — E78.5 HYPERLIPIDEMIA, UNSPECIFIED HYPERLIPIDEMIA TYPE: ICD-10-CM

## 2021-11-12 DIAGNOSIS — Z23 NEED FOR INFLUENZA VACCINATION: ICD-10-CM

## 2021-11-12 DIAGNOSIS — D68.61 APS (ANTIPHOSPHOLIPID SYNDROME) (HCC): ICD-10-CM

## 2021-11-12 DIAGNOSIS — G89.29 CHRONIC LEFT SHOULDER PAIN: ICD-10-CM

## 2021-11-12 DIAGNOSIS — I73.9 PAD (PERIPHERAL ARTERY DISEASE) (HCC): ICD-10-CM

## 2021-11-12 PROCEDURE — G0008 ADMIN INFLUENZA VIRUS VAC: HCPCS | Performed by: FAMILY MEDICINE

## 2021-11-12 PROCEDURE — G8431 POS CLIN DEPRES SCRN F/U DOC: HCPCS | Performed by: FAMILY MEDICINE

## 2021-11-12 PROCEDURE — G0402 INITIAL PREVENTIVE EXAM: HCPCS | Performed by: FAMILY MEDICINE

## 2021-11-12 PROCEDURE — 3017F COLORECTAL CA SCREEN DOC REV: CPT | Performed by: FAMILY MEDICINE

## 2021-11-12 PROCEDURE — 90674 CCIIV4 VAC NO PRSV 0.5 ML IM: CPT | Performed by: FAMILY MEDICINE

## 2021-11-12 ASSESSMENT — PATIENT HEALTH QUESTIONNAIRE - PHQ9
10. IF YOU CHECKED OFF ANY PROBLEMS, HOW DIFFICULT HAVE THESE PROBLEMS MADE IT FOR YOU TO DO YOUR WORK, TAKE CARE OF THINGS AT HOME, OR GET ALONG WITH OTHER PEOPLE: 0
SUM OF ALL RESPONSES TO PHQ9 QUESTIONS 1 & 2: 3
1. LITTLE INTEREST OR PLEASURE IN DOING THINGS: 0
SUM OF ALL RESPONSES TO PHQ QUESTIONS 1-9: 8
4. FEELING TIRED OR HAVING LITTLE ENERGY: 0
SUM OF ALL RESPONSES TO PHQ QUESTIONS 1-9: 8
5. POOR APPETITE OR OVEREATING: 0
6. FEELING BAD ABOUT YOURSELF - OR THAT YOU ARE A FAILURE OR HAVE LET YOURSELF OR YOUR FAMILY DOWN: 0
3. TROUBLE FALLING OR STAYING ASLEEP: 3
7. TROUBLE CONCENTRATING ON THINGS, SUCH AS READING THE NEWSPAPER OR WATCHING TELEVISION: 1
8. MOVING OR SPEAKING SO SLOWLY THAT OTHER PEOPLE COULD HAVE NOTICED. OR THE OPPOSITE, BEING SO FIGETY OR RESTLESS THAT YOU HAVE BEEN MOVING AROUND A LOT MORE THAN USUAL: 1
9. THOUGHTS THAT YOU WOULD BE BETTER OFF DEAD, OR OF HURTING YOURSELF: 0
SUM OF ALL RESPONSES TO PHQ QUESTIONS 1-9: 8
2. FEELING DOWN, DEPRESSED OR HOPELESS: 3

## 2021-11-12 ASSESSMENT — LIFESTYLE VARIABLES
AUDIT-C TOTAL SCORE: 3
HOW OFTEN DURING THE LAST YEAR HAVE YOU FOUND THAT YOU WERE NOT ABLE TO STOP DRINKING ONCE YOU HAD STARTED: 0
HOW MANY STANDARD DRINKS CONTAINING ALCOHOL DO YOU HAVE ON A TYPICAL DAY: 0
HOW OFTEN DURING THE LAST YEAR HAVE YOU FAILED TO DO WHAT WAS NORMALLY EXPECTED FROM YOU BECAUSE OF DRINKING: 0
HOW OFTEN DURING THE LAST YEAR HAVE YOU BEEN UNABLE TO REMEMBER WHAT HAPPENED THE NIGHT BEFORE BECAUSE YOU HAD BEEN DRINKING: 0
HAVE YOU OR SOMEONE ELSE BEEN INJURED AS A RESULT OF YOUR DRINKING: 0
HOW OFTEN DURING THE LAST YEAR HAVE YOU HAD A FEELING OF GUILT OR REMORSE AFTER DRINKING: 0
AUDIT TOTAL SCORE: 3
HOW OFTEN DO YOU HAVE SIX OR MORE DRINKS ON ONE OCCASION: 0
HOW OFTEN DO YOU HAVE A DRINK CONTAINING ALCOHOL: 3
HAS A RELATIVE, FRIEND, DOCTOR, OR ANOTHER HEALTH PROFESSIONAL EXPRESSED CONCERN ABOUT YOUR DRINKING OR SUGGESTED YOU CUT DOWN: 0
HOW OFTEN DURING THE LAST YEAR HAVE YOU NEEDED AN ALCOHOLIC DRINK FIRST THING IN THE MORNING TO GET YOURSELF GOING AFTER A NIGHT OF HEAVY DRINKING: 0

## 2021-11-12 NOTE — PROGRESS NOTES
 Numbness and tingling     Occlusion of left internal carotid artery 5/22/2019    Paralysis of right lower extremity ([address] 5/22/2019    Right leg weakness 5/28/2020    S/P carotid endarterectomy 6/15/2020       Past Surgical History:   Procedure Laterality Date    CAROTID ENDARTERECTOMY Left 6/10/2020    LEFT CAROTID ENDARTERECTOMY performed by Meredith Zhao MD at [address] History   Problem Relation Age of Onset    High Blood Pressure Mother     Diabetes Mother        CareTeam (Including outside providers/suppliers regularly involved in providing care):   Patient Care Team:  Michael Cox, DO as PCP - General (Family Medicine)  Michael Cox, DO as PCP [facility]    Wt Readings from Last 3 Encounters:   11/12/21 151 lb (68.5 kg)   09/02/21 150 lb 3.2 oz (68.1 kg)   07/06/21 150 lb 3.2 oz (68.1 kg)     Vitals:    11/12/21 1048   BP: 131/87   Pulse: 90   Resp: 18   Temp: 97.9 °F (36.6 °C)   Weight: 151 lb (68.5 kg)   Height: 5' 1.5\" (1.562 m)     Body mass index is 28.07 kg/m². Based upon direct observation of the patient, evaluation of cognition reveals recent and remote memory intact. Patient's complete Health Risk Assessment and screening values have been reviewed and are found in Flowsheets. The following problems were reviewed today and where indicated follow up appointments were made and/or referrals ordered. Positive Risk Factor Screenings with Interventions:     Fall Risk:  Timed Up and Go Test > 12 seconds?  (Complete if either Fall Risk answers are Yes): no  2 or more falls in past year?: (!) yes  Fall with injury in past year?: no  Fall Risk Interventions:    · Home safety tips provided     Depression:  PHQ-2 Score: 3  PHQ-9 Total Score: 8    Severity:1-4 = minimal depression, 5-9 = mild depression, 10-14 = moderate depression, 15-19 = moderately severe depression, 20-27 = severe depression  Depression Interventions:  · Patient declines any further evaluation/treatment for this issue          ADL:  ADLs  In the past 7 days, did you need help from others to perform any of the following everyday activities? Eating, dressing, grooming, bathing, toileting, or walking/balance?: None  In the past 7 days, did you need help from others to take care of any of the following? Laundry, housekeeping, banking/finances, shopping, telephone use, food preparation, transportation, or taking medications?: (!) Transportation  ADL Interventions:  · Patient declines any further evaluation/treatment for this issue    Personalized Preventive Plan   Current Health Maintenance Status  Immunization History   Administered Date(s) Administered    COVID-19, J&J, PF, 0.5 mL 04/10/2021    Influenza, Quadv, IM, PF (6 mo and older Fluzone, Flulaval, Fluarix, and 3 yrs and older Afluria) 09/14/2020    Tdap (Boostrix, Adacel) 09/09/2020        Health Maintenance   Topic Date Due    Hepatitis C screen  Never done    HIV screen  Never done    Cervical cancer screen  Never done    Shingles Vaccine (1 of 2) Never done    COVID-19 Vaccine (2 - Booster for [employer] series) 06/05/2021    Flu vaccine (1) 09/01/2021    Annual Wellness Visit (AWV)  Never done    Breast cancer screen  01/11/2022    Potassium monitoring  01/21/2022    Creatinine monitoring  01/21/2022    Lipid screen  02/10/2022    Colon cancer screen fecal DNA test (Cologuard)  12/04/2022    Diabetes screen  07/28/2023    DTaP/Tdap/Td vaccine (2 - Td or Tdap) 09/09/2030    Hepatitis A vaccine  Aged Out    Hepatitis B vaccine  Aged Out    Hib vaccine  Aged Out    Meningococcal (ACWY) vaccine  Aged Out    Pneumococcal 0-64 years Vaccine  Aged Out     Recommendations for [place] Due: see orders and patient instructions/AVS.  .   Recommended screening schedule for the next 5-10 years is provided to the patient in written form: see Patient Berto Puckett was seen today for medicare diego.    Diagnoses and all orders for this visit:    Routine general medical examination at a health care facility    APS (antiphospholipid syndrome) ([address]  -     PT/INR Testing Monitor KIT; Use weekly    Need for influenza vaccination  -     INFLUENZA, MDCK QUADV, 2 YRS AND OLDER, IM, PF, PREFILL SYR OR SDV, 0.5ML (FLUCELVAX QUADV, PF)    PAD (peripheral artery disease) ([address]  -     Lipid Panel; Future    Essential hypertension  -     COMPREHENSIVE METABOLIC PANEL; Future    H/O: CVA (cerebrovascular accident)  -     Lipid Panel; Future    Vitamin D deficiency  -     Vitamin D 25 Hydroxy; Future    Hyperlipidemia, unspecified hyperlipidemia type  -     Lipid Panel; Future    Chronic left shoulder pain  -     Melba Key Jr.,[place], , Orthopaedics and [facility], [place]    Other orders  -     Handicap Placard MISC; by Does not apply route Patient cannot walk 200 ft without stopping to rest.    Expiration 11/2026                 On the basis of positive PHQ-9 screening (PHQ-9 Total Score: 8), the following plan was implemented: referral to Behavioral health provided.   Patient will follow-up in 1 month(s) with psychologist.

## 2021-11-12 NOTE — PATIENT INSTRUCTIONS
Personalized Preventive Plan for [facility] - 11/12/2021  Medicare offers a range of preventive health benefits. Some of the tests and screenings are paid in full while other may be subject to a deductible, co-insurance, and/or copay. Some of these benefits include a comprehensive review of your medical history including lifestyle, illnesses that may run in your family, and various assessments and screenings as appropriate. After reviewing your medical record and screening and assessments performed today your provider may have ordered immunizations, labs, imaging, and/or referrals for you. A list of these orders (if applicable) as well as your Preventive Care list are included within your After Visit Summary for your review. Other Preventive Recommendations:    · A preventive eye exam performed by an eye specialist is recommended every 1-2 years to screen for glaucoma; cataracts, macular degeneration, and other eye disorders. · A preventive dental visit is recommended every 6 months. · Try to get at least 150 minutes of exercise per week or 10,000 steps per day on a pedometer . · Order or download the FREE \"Exercise & Physical Activity: Your Everyday Guide\" from [employer]. Call 2-757.897.7522 or search [employer] online. · You need 6773-2481 mg of calcium and 0657-5381 IU of vitamin D per day. It is possible to meet your calcium requirement with diet alone, but a vitamin D supplement is usually necessary to meet this goal.  · When exposed to the sun, use a sunscreen that protects against both UVA and UVB radiation with an SPF of 30 or greater. Reapply every 2 to 3 hours or after sweating, drying off with a towel, or swimming. · Always wear a seat belt when traveling in a car. Always wear a helmet when riding a bicycle or motorcycle.

## 2021-11-14 ENCOUNTER — PATIENT MESSAGE (OUTPATIENT)
Dept: PHYSICAL MEDICINE AND REHAB | Age: 56
End: 2021-11-14

## 2021-11-15 NOTE — TELEPHONE ENCOUNTER
From: Edu Sullivan  To: Dr. Jessenia Caruso: 11/14/2021 9:48 AM EST  Subject: [facility]     I passed everything at [facility]   on October 28th 2021. Have called your office many times and had to leave a message. No one has gotten back to me, but [employer] to say they haven't received anything yet. That was I believe 10 days ago. Can someone please call me.

## 2021-11-17 ENCOUNTER — PATIENT MESSAGE (OUTPATIENT)
Dept: NEUROLOGY | Age: 56
End: 2021-11-17

## 2021-11-17 ENCOUNTER — OFFICE VISIT (OUTPATIENT)
Dept: NEUROLOGY | Age: 56
End: 2021-11-17
Payer: MEDICARE

## 2021-11-17 VITALS
OXYGEN SATURATION: 98 % | DIASTOLIC BLOOD PRESSURE: 90 MMHG | TEMPERATURE: 97.7 F | SYSTOLIC BLOOD PRESSURE: 153 MMHG | RESPIRATION RATE: 20 BRPM | HEART RATE: 83 BPM | HEIGHT: 62 IN | BODY MASS INDEX: 27.23 KG/M2 | WEIGHT: 148 LBS

## 2021-11-17 DIAGNOSIS — I65.22 OCCLUSION OF LEFT INTERNAL CAROTID ARTERY: ICD-10-CM

## 2021-11-17 DIAGNOSIS — Z86.73 H/O: CVA (CEREBROVASCULAR ACCIDENT): Primary | ICD-10-CM

## 2021-11-17 PROCEDURE — 3017F COLORECTAL CA SCREEN DOC REV: CPT | Performed by: PHYSICIAN ASSISTANT

## 2021-11-17 PROCEDURE — G8419 CALC BMI OUT NRM PARAM NOF/U: HCPCS | Performed by: PHYSICIAN ASSISTANT

## 2021-11-17 PROCEDURE — 99214 OFFICE O/P EST MOD 30 MIN: CPT | Performed by: PHYSICIAN ASSISTANT

## 2021-11-17 PROCEDURE — G8427 DOCREV CUR MEDS BY ELIG CLIN: HCPCS | Performed by: PHYSICIAN ASSISTANT

## 2021-11-17 PROCEDURE — G8482 FLU IMMUNIZE ORDER/ADMIN: HCPCS | Performed by: PHYSICIAN ASSISTANT

## 2021-11-17 PROCEDURE — G9899 SCRN MAM PERF RSLTS DOC: HCPCS | Performed by: PHYSICIAN ASSISTANT

## 2021-11-17 PROCEDURE — 1036F TOBACCO NON-USER: CPT | Performed by: PHYSICIAN ASSISTANT

## 2021-11-17 NOTE — PROGRESS NOTES
[address] [facility]. Gabrielle Jackson M.D., F.A.C.P. [facility], CLIFF, APRN, ACNS-BC  Hyacinth Alcaraz, MSN, APRN-FNP-C  EDWARDO Conrad, PA-C  Jose M Lambert, MSN, APRN-FNP-C  [address], [facility]  [place], [address]  Phone: [phone]  Fax: [phone]       Betty Maguire is a 49 y.o. right handed female     She presents for  follow up of embolic L MIRA/MCA stroke in the setting of LICA occlusion in 3/0308     She remains a good historian     PMH significant for HTN, previous tobacco use     She has finished working with physical therapy as she has plateaued. She has minimal right leg weakness, spasticity and foot drop on the right. She is wearing her AFO brace today. She recently passed her driving assessment at [place]. We previously placed her on baclofen for spasticity. She did not feel that this was very beneficial and she does not wish to be on any medications due to side effects. She continues to follow with PM&R and they have discussed Botox injections for spasticity. She is on the fence about this at this time. She continues on aspirin, Coumadin and statin for secondary prevention. No new neuro symptoms.       No chest pain or palpitations  No SOB  No vertigo, lightheadedness or loss of consciousness  No diplopia, dysphagia, dysarthria   No falls, tripping or stumbling  No incontinence of bowels or bladder  No itching or bruising appreciated  No numbness, tingling or (+) focal arm/ R leg weakness      ROS is otherwise negative    Current Outpatient Medications   Medication Sig Dispense Refill    PT/INR Testing Monitor KIT Use weekly 1 kit 0    Handicap Placard MISC by Does not apply route Patient cannot walk 200 ft without stopping to rest.    Expiration 11/2026 1 each 0    irbesartan-hydroCHLOROthiazide (AVALIDE) 150-12.5 MG per tablet Take 1 tablet by mouth daily 30 tablet 5    aspirin (ASPIRIN ADULT LOW STRENGTH) 81 MG EC tablet TAKE ONE TABLET BY MOUTH EVERY DAY 30 tablet 5    amLODIPine (NORVASC) 10 MG tablet Take 1 tablet by mouth daily 30 tablet 5    atorvastatin (LIPITOR) 80 MG tablet Take 1 tablet by mouth daily 30 tablet 5    warfarin (COUMADIN) 5 MG tablet Take one tab 5 days a week and take 2.5 mg 2 days a week 30 tablet 5    Multiple Vitamins-Minerals (MULTIVITAMIN ADULT PO) Take 1 tablet by mouth       No current facility-administered medications for this visit. Objective:       BP (!) 153/90 (Site: Right Upper Arm, Position: Sitting, Cuff Size: Medium Adult)   Pulse 83   Temp 97.7 °F (36.5 °C)   Resp 20   Ht 5' 1.5\" (1.562 m)   Wt 148 lb (67.1 kg)   SpO2 98%   BMI 27.51 kg/m²      General appearance: alert, appears stated age, cooperative and in no distress  Head: normocephalic, without obvious abnormality, atraumatic  Eyes: conjunctivae/corneas clear; no drainage  Neck: no adenopathy, no carotid bruit, supple   Lungs: Effort normal  Extremities: normal, atraumatic, no cyanosis or edema  Skin:  color, texture, turgor normal--no rashes or lesions    Mental Status: alert and oriented. Thought content appropriate.      Appropriate attention/concentration  Memories intact    Speech: no dysarthria  Language: no aphasias    Cranial Nerves:  I: smell    II: visual acuity     II: visual fields Full    II: pupils BENI   III,VII: ptosis None   III,IV,VI: extraocular muscles  EOMI without nystagmus   V: mastication Normal   V: facial light touch sensation  Normal   V,VII: corneal reflex     VII: facial muscle function - upper  Normal   VII: facial muscle function - lower Normal   VIII: hearing Normal   IX: soft palate elevation  Normal   IX,X: gag reflex    XI: trapezius strength  5/5   XI: sternocleidomastoid strength 5/5   XI: neck extension strength  5/5   XII: tongue strength  Normal     Motor:  5/5 throughout except RLE 5-/5 (improved some since last visit)   Normal bulk and increase tone in RLE   No drift   No abnormal movements    Sensory:  LT  normal  Vibration normal    Coordination:   FNF normal  HS slower on R -- without dysmetria     Gait:  R hemiparetic gait with foot drop     DTR:   + 1 on the L  +2 on R     No Yap's    No other pathological reflexes    Laboratory/Radiology:  ry/Radiology:     CBC with Differential:    Lab Results   Component Value Date    WBC 6.2 08/23/2020    RBC 4.80 08/23/2020    HGB 14.6 08/23/2020    HCT 43.8 08/23/2020     08/23/2020    MCV 91.3 08/23/2020    MCH 30.4 08/23/2020    MCHC 33.3 08/23/2020    RDW 11.9 08/23/2020    LYMPHOPCT 27.5 08/23/2020    MONOPCT 7.6 08/23/2020    BASOPCT 0.2 08/23/2020    MONOSABS 0.47 08/23/2020    LYMPHSABS 1.70 08/23/2020    EOSABS 0.11 08/23/2020    BASOSABS 0.01 08/23/2020     CMP:    Lab Results   Component Value Date     01/21/2021    K 5.1 01/21/2021    K 3.8 06/10/2020     01/21/2021    CO2 29 01/21/2021    BUN 11 01/21/2021    CREATININE 0.7 01/21/2021    GFRAA >60 01/21/2021    LABGLOM >60 01/21/2021    GLUCOSE 101 01/21/2021    PROT 6.7 08/23/2020    LABALBU 4.4 08/23/2020    CALCIUM 9.8 01/21/2021    BILITOT 0.4 08/23/2020    ALKPHOS 66 08/23/2020    AST 17 08/23/2020    ALT 26 08/23/2020     FLP:    Lab Results   Component Value Date    TRIG 104 02/10/2021    HDL 45 02/10/2021    LDLCALC 63 02/10/2021    LABVLDL 21 02/10/2021     All labs and images were personally reviewed at the time of this visit    Assessment:     L MIRA/MCA embolic stroke 2/2 LICA occlusion in 5/4340   S/p L CEA in 6/2020--  Unfortunately, recent scans in 1/21 revealed reocclusion    On ASA and Lipitor for secondary prevention    She has since quit smoking     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 She does have an AFO brace.   She does not wish to be on any medications for spasticity at this time.  She is considering Botox injections but will hold off at this time. She recently passed her functional driving assessment at [place] and has been cleared for driving at this time. Plan:     Continue ASA, Coumadin and statin for stroke prevention     Start Baclofen 5 mg BID for spasticity    Will titrate based on response     Risk factor control     RTO 9 months or sooner prn     Call with questions or concerns     Addie Harry PA-C  10:24 AM  11/17/2021    I spent 20 minutes with this patient obtaining the HPI and discussing the exam with greater than 50% of the time providing counseling and education on medications and other treatment plans. All questions were answered prior to leaving my office.

## 2021-11-22 ENCOUNTER — TELEPHONE (OUTPATIENT)
Dept: PHYSICAL MEDICINE AND REHAB | Age: 56
End: 2021-11-22

## 2021-11-22 ENCOUNTER — NURSE ONLY (OUTPATIENT)
Dept: FAMILY MEDICINE CLINIC | Age: 56
End: 2021-11-22
Payer: MEDICARE

## 2021-11-22 ENCOUNTER — ANTI-COAG VISIT (OUTPATIENT)
Dept: FAMILY MEDICINE CLINIC | Age: 56
End: 2021-11-22
Payer: MEDICARE

## 2021-11-22 DIAGNOSIS — Z79.01 ANTICOAGULATION MANAGEMENT ENCOUNTER: ICD-10-CM

## 2021-11-22 DIAGNOSIS — Z51.81 ANTICOAGULATION MANAGEMENT ENCOUNTER: Primary | ICD-10-CM

## 2021-11-22 DIAGNOSIS — Z79.01 ANTICOAGULATION MANAGEMENT ENCOUNTER: Primary | ICD-10-CM

## 2021-11-22 DIAGNOSIS — Z51.81 ANTICOAGULATION MANAGEMENT ENCOUNTER: ICD-10-CM

## 2021-11-22 LAB
INTERNATIONAL NORMALIZATION RATIO, POC: 1.6
PROTHROMBIN TIME, POC: 19.5

## 2021-11-22 PROCEDURE — 93793 ANTICOAG MGMT PT WARFARIN: CPT | Performed by: FAMILY MEDICINE

## 2021-11-22 PROCEDURE — 85610 PROTHROMBIN TIME: CPT | Performed by: FAMILY MEDICINE

## 2021-11-22 NOTE — TELEPHONE ENCOUNTER
Paperwork from [facility] received from her Functional  Evaluation. She passed the evaluation. She is cleared to drive as long as she is able to position seat to achieve view horizon 4\" above steering wheel, using cushion if needed, and using pedal extenders to achieve 10\" seat position distance from steering wheel -- per recommendations on her eval. Paperwork from [facility] to be scanned to media. It appears [facility] paperwork has already been completed. Please let me know if anything further is needed. Thank you.

## 2021-11-22 NOTE — PROGRESS NOTES
Pt called stated she missed one day but she made up for it by taking a 2.5mg dose the next day, then folling day took a 5mg.   Patient stated she has eased off alcohol a lot and salads also , pt stated she will receive her home INR machine on Wednesday and she can recheck at that time

## 2021-11-23 NOTE — TELEPHONE ENCOUNTER
Notified patient received functional  evaluation from [facility]. [facility] (passed driving assessment). [facility]V paperwork has been completed Carlota Romo, [address] [facility]). Patient states she has been in touch with the BMV, she will be getting pedal extensions so she is able to position her seat at least 10 inches from steering wheel/airbag.

## 2021-11-24 ENCOUNTER — ANTI-COAG VISIT (OUTPATIENT)
Dept: FAMILY MEDICINE CLINIC | Age: 56
End: 2021-11-24

## 2021-11-24 LAB — INR BLD: 1.7

## 2021-11-24 NOTE — PROGRESS NOTES
Maykel CAMARA, DO 13 minutes ago (3:17 PM)           Dave Avila, my inr blood  test was 1.7, I forgot to take my 2.5 last night. Before he got here to show me how to use the unit ,I took my 5mg and had a salad. Would u want me to take the 2.5 dose I missed last night.  Get me back on track

## 2021-12-02 ENCOUNTER — OFFICE VISIT (OUTPATIENT)
Dept: ORTHOPEDIC SURGERY | Age: 56
End: 2021-12-02
Payer: MEDICARE

## 2021-12-02 VITALS — HEIGHT: 62 IN | BODY MASS INDEX: 27.23 KG/M2 | TEMPERATURE: 98 F | WEIGHT: 148 LBS

## 2021-12-02 DIAGNOSIS — M25.512 LEFT SHOULDER PAIN, UNSPECIFIED CHRONICITY: Primary | ICD-10-CM

## 2021-12-02 DIAGNOSIS — M75.102 NONTRAUMATIC TEAR OF LEFT ROTATOR CUFF, UNSPECIFIED TEAR EXTENT: Primary | ICD-10-CM

## 2021-12-02 DIAGNOSIS — Z71.82 EXERCISE COUNSELING: ICD-10-CM

## 2021-12-02 DIAGNOSIS — M54.12 CERVICAL RADICULOPATHY: ICD-10-CM

## 2021-12-02 PROCEDURE — 1036F TOBACCO NON-USER: CPT | Performed by: ORTHOPAEDIC SURGERY

## 2021-12-02 PROCEDURE — G8427 DOCREV CUR MEDS BY ELIG CLIN: HCPCS | Performed by: ORTHOPAEDIC SURGERY

## 2021-12-02 PROCEDURE — G8482 FLU IMMUNIZE ORDER/ADMIN: HCPCS | Performed by: ORTHOPAEDIC SURGERY

## 2021-12-02 PROCEDURE — G9899 SCRN MAM PERF RSLTS DOC: HCPCS | Performed by: ORTHOPAEDIC SURGERY

## 2021-12-02 PROCEDURE — G8419 CALC BMI OUT NRM PARAM NOF/U: HCPCS | Performed by: ORTHOPAEDIC SURGERY

## 2021-12-02 PROCEDURE — 99204 OFFICE O/P NEW MOD 45 MIN: CPT | Performed by: ORTHOPAEDIC SURGERY

## 2021-12-02 PROCEDURE — 3017F COLORECTAL CA SCREEN DOC REV: CPT | Performed by: ORTHOPAEDIC SURGERY

## 2021-12-02 NOTE — PROGRESS NOTES
Chief Complaint   Patient presents with    Shoulder Pain     Left shoulder/arm pain over the last few years. Pain starts in the shoulder and has numbness in the hands. Feels like the whole hand is numb at times but thinks she has shoulder damage from holding trays. HPI:    Patient is 49 y.o. female complaining of left shoulder pain and weakness for Left shoulder/arm pain over the last few years. Pain starts in the shoulder and has numbness in the hands. Feels like the whole hand is numb at times but thinks she has shoulder damage from holding trays. She denies a specific traumatic injury to the left shoulder. Previous treatments include rest, ice, and anti-inflammatory medication and HEP without much relief. She denies any other orthopedic complaints. ROS:    Skin: (-) rash,(-) psoriasis,(-) eczema, (-)skin cancer. Neurologic: (-)numbness, (-)tingling, (-)headaches, (-) LOC. Cardiovascular: (-) Chest pain, (-) swelling in legs/feet, (-) SOB, (-) cramping in legs/feet with walking.     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    Hypertension     Numbness and tingling     Occlusion of left internal carotid artery 5/22/2019    Paralysis of right lower extremity ([address] 5/22/2019    Right leg weakness 5/28/2020    S/P carotid endarterectomy 6/15/2020     Past Surgical History:   Procedure Laterality Date    CAROTID ENDARTERECTOMY Left 6/10/2020    LEFT CAROTID ENDARTERECTOMY performed by Stuart Reyes MD at [address]       Current Outpatient Medications:     PT/INR Testing Monitor KIT, Use weekly, Disp: 1 kit, Rfl: 0    Handicap Placard MISC, by Does not apply route Patient cannot walk 200 ft without stopping to rest.   Expiration 2026, Disp: 1 each, Rfl: 0    irbesartan-hydroCHLOROthiazide (AVALIDE) 150-12.5 MG per tablet, Take 1 tablet by mouth daily, Disp: 30 tablet, Rfl: 5    aspirin (ASPIRIN ADULT LOW STRENGTH) 81 MG EC tablet, TAKE ONE TABLET BY MOUTH EVERY DAY, Disp: 30 tablet, Rfl: 5    amLODIPine (NORVASC) 10 MG tablet, Take 1 tablet by mouth daily, Disp: 30 tablet, Rfl: 5    atorvastatin (LIPITOR) 80 MG tablet, Take 1 tablet by mouth daily, Disp: 30 tablet, Rfl: 5    warfarin (COUMADIN) 5 MG tablet, Take one tab 5 days a week and take 2.5 mg 2 days a week, Disp: 30 tablet, Rfl: 5    Multiple Vitamins-Minerals (MULTIVITAMIN ADULT PO), Take 1 tablet by mouth, Disp: , Rfl:   No Known Allergies  Social History     Socioeconomic History    Marital status: Single     Spouse name: Not on file    Number of children: Not on file    Years of education: Not on file    Highest education level: Not on file   Occupational History    Not on file   Tobacco Use    Smoking status: Former Smoker     Packs/day: 0.50     Years: 22.00     Pack years: 11.00     Types: Cigarettes     Quit date: 2019     Years since quittin.5    Smokeless tobacco: Never Used   Vaping Use    Vaping Use: Never used   Substance and Sexual Activity    Alcohol use: Yes     Comment: moderate    Drug use: No    Sexual activity: Not Currently   Other Topics Concern    Not on file   Social History Narrative    Not on file     Social Determinants of Health     Financial Resource Strain: Low Risk     Difficulty of Paying Living Expenses: Not hard at all   Food Insecurity: No Food Insecurity    Worried About Running Out of Food in the Last Year: Never true    Siri of Food in the Last Year: Never true   Transportation Needs:     Lack of Transportation (Medical): Not on file    Lack of Transportation (Non-Medical):  Not on file   Physical Activity:     Days of Exercise per Week: Not on file    Minutes of Exercise per Session: Not on file   Stress:     Feeling of Stress : Not on file   Social Connections:     Frequency of Communication with Friends and Family: Not on file    Frequency of Social Gatherings with Friends and Family: Not on file    Attends Jewish Services: Not on file    Active Member of [address] [place] [address] [email or web address] or Organizations: Not on file    Attends Club or Organization Meetings: Not on file    Marital Status: Not on file   Intimate Partner Violence:     Fear of Current or Ex-Partner: Not on file    Emotionally Abused: Not on file    Physically Abused: Not on file    Sexually Abused: Not on file   Housing Stability:     Unable to Pay for Housing in the Last Year: Not on file    Number of [place] in the Last Year: Not on file    Unstable Housing in the Last Year: Not on file     Family History   Problem Relation Age of Onset    High Blood Pressure Mother     Diabetes Mother            Physical Exam:    Temp 98 °F (36.7 °C)   Ht 5' 1.5\" (1.562 m)   Wt 148 lb (67.1 kg)   BMI 27.51 kg/m²     GENERAL: alert, appears stated age, cooperative, no acute distress    HEENT: Head is normocephalic, atraumatic. PERRLA. SKIN: Clean, dry, intact. There is not any cellulitis or cutaneous lesions noted in the upper extremities    PULMONARY: breathing is regular and unlabored, no acute distress    CV: The bilateral upper and lower extremities are warm and well-perfused with brisk capillary refill. 2+ pulses UE and LE bilateral.     PSYCHIATRY: Pleasant mood, appropriate behavior, follows commands    NEURO: Sensation is intact distally with light touch with no alteration. Motor exam of the upper extremities show elbow flexion and extension, wrist flexion and extension, and finger abduction grossly intact 5/5. Upper extremity reflexes are bilaterally symmetrical and within normal limits. LYMPH: No lymphedema present distally in upper or lower extremity. MUSCULOSKELETAL:  Shoulder Exam:  Examination of the Left shoulder shows: There is not a deformity. There is not erythema. There is not soft tissue swelling. Deltoid region is  tender to palpation. AC Joint is  tender to palpation. Clavicle is not tender to palpation. Bicipital Groove is  tender to palpation. Pectoralis  is not tender to palpation. Scapula/ trapezius is  tender to palpation. Right:  ROM Full, Strength: Supraspinatus 5/5, Infraspinatus 5/5, Subscapularis 5/5  Left:  /140/60, Strength: Supraspinatus 4/5, Infraspinatus 5/5, Subscapularis 5/5  Right Shoulder:  Crepitus:  no   Tenderness:  none   Effusion:   none   Impingement: negative   Empty Can:  negative   Speed's:  negative      Apprehension:  negative   Cross Arm Sign:  negative   [place]'s:  negative       Neer's:  negative      Belly Press Test:  negative      Drop Arm Test:  negative     Left Shoulder:  Crepitus:  no   Tenderness:  mild   Effusion:   non   Impingement: positive   Empty Can:  positive   Speed's: positive   Apprehension:  not tested   Cross Arm Sign:  positive   [place]'s:  positive      Neer's:  positive      Belly Press Test:  negative   Drop Arm Test:  positive           Imaging:  XR SHOULDER LEFT (MIN 2 VIEWS)    Result Date: 12/2/2021  EXAMINATION: THREE XRAY VIEWS OF THE LEFT SHOULDER 12/2/2021 11:02 am COMPARISON: None. HISTORY: ORDERING SYSTEM PROVIDED HISTORY: Left shoulder pain, unspecified chronicity FINDINGS: No fracture or dislocation of the shoulder. Acromioclavicular joint is intact. No evidence of shoulder fracture or dislocation. Goran Jean was seen today for shoulder pain. Diagnoses and all orders for this visit:    Nontraumatic tear of left rotator cuff, unspecified tear extent  -     MRI SHOULDER LEFT WO CONTRAST; Future    Cervical radiculopathy  -     Ambulatory referral to [address]    [facility]        Patient seen and examined. X-rays reviewed.   Patient has exam and history consistent with rotator cuff pathology. MRI recommended for further evaluation and management of possible rotator cuff tear. Return to clinic after MRI      Patient seen and examined for paresthesias. X-rays reviewed with patient in detail. Natural history and course discussed with patient in long discussion  Treatment options discussed with patient in detail including risks and benefits. We will refer to physical medicine rehabilitation for evaluation of possible radiculopathy. In a 15 minute assessment and discussion, patient was counseled on continued weight loss, diet, and physical activity relating to this condition. She was educated with options in detail including nutrition, joining a health club/ weight loss program, and use of cardio equipment such as the Arc Trainer and the importance of use as well as range of motion and HEP exercises for weight loss. Evy Banegas DO          45 minutes was spent with patient. 50% or greater was spent counseling the patient.

## 2021-12-06 LAB — INR BLD: 2.1

## 2021-12-07 ENCOUNTER — ANTI-COAG VISIT (OUTPATIENT)
Dept: FAMILY MEDICINE CLINIC | Age: 56
End: 2021-12-07

## 2021-12-07 DIAGNOSIS — M25.512 LEFT SHOULDER PAIN, UNSPECIFIED CHRONICITY: ICD-10-CM

## 2021-12-07 DIAGNOSIS — M54.12 CERVICAL RADICULOPATHY: Primary | ICD-10-CM

## 2021-12-13 ENCOUNTER — ANTI-COAG VISIT (OUTPATIENT)
Dept: FAMILY MEDICINE CLINIC | Age: 56
End: 2021-12-13
Payer: MEDICARE

## 2021-12-13 DIAGNOSIS — Z79.01 ANTICOAGULATION MANAGEMENT ENCOUNTER: ICD-10-CM

## 2021-12-13 DIAGNOSIS — Z51.81 ANTICOAGULATION MANAGEMENT ENCOUNTER: ICD-10-CM

## 2021-12-13 LAB — INR BLD: 2.3

## 2021-12-13 PROCEDURE — 93793 ANTICOAG MGMT PT WARFARIN: CPT | Performed by: FAMILY MEDICINE

## 2021-12-14 ENCOUNTER — HOSPITAL ENCOUNTER (OUTPATIENT)
Age: 56
Discharge: HOME | End: 2021-12-14
Payer: MEDICARE

## 2021-12-14 DIAGNOSIS — E78.5 HYPERLIPIDEMIA, UNSPECIFIED HYPERLIPIDEMIA TYPE: ICD-10-CM

## 2021-12-14 DIAGNOSIS — E55.9 VITAMIN D DEFICIENCY: ICD-10-CM

## 2021-12-14 DIAGNOSIS — I10 ESSENTIAL HYPERTENSION: ICD-10-CM

## 2021-12-14 DIAGNOSIS — I73.9 PAD (PERIPHERAL ARTERY DISEASE) (HCC): ICD-10-CM

## 2021-12-14 DIAGNOSIS — Z86.73 H/O: CVA (CEREBROVASCULAR ACCIDENT): ICD-10-CM

## 2021-12-14 LAB
ALBUMIN SERPL-MCNC: 4.6 G/DL (ref 3.5–5.2)
ALP BLD-CCNC: 78 U/L (ref 35–104)
ALT SERPL-CCNC: 33 U/L (ref 0–32)
ANION GAP SERPL CALCULATED.3IONS-SCNC: 8 MMOL/L (ref 7–16)
AST SERPL-CCNC: 22 U/L (ref 0–31)
BILIRUB SERPL-MCNC: 0.3 MG/DL (ref 0–1.2)
BUN BLDV-MCNC: 15 MG/DL (ref 6–20)
CALCIUM SERPL-MCNC: 10 MG/DL (ref 8.6–10.2)
CHLORIDE BLD-SCNC: 103 MMOL/L (ref 98–107)
CHOLESTEROL, TOTAL: 133 MG/DL (ref 0–199)
CO2: 31 MMOL/L (ref 22–29)
CREAT SERPL-MCNC: 0.7 MG/DL (ref 0.5–1)
GFR AFRICAN AMERICAN: >60
GFR NON-AFRICAN AMERICAN: >60 ML/MIN/1.73
GLUCOSE BLD-MCNC: 127 MG/DL (ref 74–99)
HDLC SERPL-MCNC: 47 MG/DL
LDL CHOLESTEROL CALCULATED: 70 MG/DL (ref 0–99)
POTASSIUM SERPL-SCNC: 4.3 MMOL/L (ref 3.5–5)
SODIUM BLD-SCNC: 142 MMOL/L (ref 132–146)
TOTAL PROTEIN: 7.2 G/DL (ref 6.4–8.3)
TRIGL SERPL-MCNC: 81 MG/DL (ref 0–149)
VITAMIN D 25-HYDROXY: 34 NG/ML (ref 30–100)
VLDLC SERPL CALC-MCNC: 16 MG/DL

## 2021-12-14 PROCEDURE — 80053 COMPREHEN METABOLIC PANEL: CPT

## 2021-12-14 PROCEDURE — 80061 LIPID PANEL: CPT

## 2021-12-14 PROCEDURE — 82306 VITAMIN D 25 HYDROXY: CPT

## 2021-12-14 PROCEDURE — 36415 COLL VENOUS BLD VENIPUNCTURE: CPT

## 2021-12-14 RX ORDER — AMLODIPINE BESYLATE 10 MG/1
TABLET ORAL
Qty: 30 TABLET | Refills: 0 | Status: SHIPPED
Start: 2021-12-14 | End: 2022-01-24 | Stop reason: SDUPTHER

## 2021-12-14 NOTE — TELEPHONE ENCOUNTER
MA read word for word Gloria's response to patient.   Electronically signed by Evelyn Santoro MA on 12/14/21 at 1:21 PM EST

## 2021-12-14 NOTE — TELEPHONE ENCOUNTER
Please inform patient that based on the BMV statement of Zohra Zhu from Dr. Baldev King and recommendations from functional driving assessment both stating the need for pedal extendors-- I am only able to fill out the form with the given information from the driving assessment. She either needs to address this again with Dr. Baldev King or retest at [facility] with her car.

## 2021-12-16 DIAGNOSIS — R73.01 IMPAIRED FASTING GLUCOSE: Primary | ICD-10-CM

## 2021-12-21 ENCOUNTER — HOSPITAL ENCOUNTER (OUTPATIENT)
Dept: MRI IMAGING | Age: 56
Discharge: HOME | End: 2021-12-23
Payer: MEDICARE

## 2021-12-21 ENCOUNTER — HOSPITAL ENCOUNTER (OUTPATIENT)
Age: 56
Discharge: HOME | End: 2021-12-21
Payer: MEDICARE

## 2021-12-21 DIAGNOSIS — M75.102 NONTRAUMATIC TEAR OF LEFT ROTATOR CUFF, UNSPECIFIED TEAR EXTENT: ICD-10-CM

## 2021-12-21 DIAGNOSIS — R73.01 IMPAIRED FASTING GLUCOSE: ICD-10-CM

## 2021-12-21 LAB — HBA1C MFR BLD: 5.8 % (ref 4–5.6)

## 2021-12-21 PROCEDURE — 36415 COLL VENOUS BLD VENIPUNCTURE: CPT

## 2021-12-21 PROCEDURE — 73221 MRI JOINT UPR EXTREM W/O DYE: CPT

## 2021-12-21 PROCEDURE — 83036 HEMOGLOBIN GLYCOSYLATED A1C: CPT

## 2021-12-27 LAB — INR BLD: 1.8

## 2021-12-28 ENCOUNTER — ANTI-COAG VISIT (OUTPATIENT)
Dept: FAMILY MEDICINE CLINIC | Age: 56
End: 2021-12-28
Payer: MEDICARE

## 2021-12-28 DIAGNOSIS — Z51.81 ANTICOAGULATION MANAGEMENT ENCOUNTER: ICD-10-CM

## 2021-12-28 DIAGNOSIS — Z79.01 ANTICOAGULATION MANAGEMENT ENCOUNTER: ICD-10-CM

## 2021-12-28 PROCEDURE — 93793 ANTICOAG MGMT PT WARFARIN: CPT | Performed by: FAMILY MEDICINE

## 2021-12-28 RX ORDER — ATORVASTATIN CALCIUM 80 MG/1
TABLET, FILM COATED ORAL
Qty: 30 TABLET | Refills: 5 | Status: SHIPPED
Start: 2021-12-28 | End: 2022-01-24 | Stop reason: SDUPTHER

## 2022-01-03 LAB — INR BLD: 2.6

## 2022-01-05 ENCOUNTER — ANTI-COAG VISIT (OUTPATIENT)
Dept: FAMILY MEDICINE CLINIC | Age: 57
End: 2022-01-05
Payer: MEDICARE

## 2022-01-05 DIAGNOSIS — Z79.01 ANTICOAGULATION MANAGEMENT ENCOUNTER: ICD-10-CM

## 2022-01-05 DIAGNOSIS — Z51.81 ANTICOAGULATION MANAGEMENT ENCOUNTER: ICD-10-CM

## 2022-01-05 PROCEDURE — 93793 ANTICOAG MGMT PT WARFARIN: CPT | Performed by: FAMILY MEDICINE

## 2022-01-06 ENCOUNTER — OFFICE VISIT (OUTPATIENT)
Dept: ORTHOPEDIC SURGERY | Age: 57
End: 2022-01-06
Payer: MEDICARE

## 2022-01-06 ENCOUNTER — TELEPHONE (OUTPATIENT)
Dept: ORTHOPEDIC SURGERY | Age: 57
End: 2022-01-06

## 2022-01-06 VITALS — HEIGHT: 62 IN | WEIGHT: 148 LBS | BODY MASS INDEX: 27.23 KG/M2 | TEMPERATURE: 98 F

## 2022-01-06 DIAGNOSIS — Z71.82 EXERCISE COUNSELING: ICD-10-CM

## 2022-01-06 DIAGNOSIS — M75.102 NONTRAUMATIC TEAR OF LEFT ROTATOR CUFF, UNSPECIFIED TEAR EXTENT: Primary | ICD-10-CM

## 2022-01-06 DIAGNOSIS — M54.12 CERVICAL RADICULOPATHY: ICD-10-CM

## 2022-01-06 PROCEDURE — G8482 FLU IMMUNIZE ORDER/ADMIN: HCPCS | Performed by: ORTHOPAEDIC SURGERY

## 2022-01-06 PROCEDURE — G8419 CALC BMI OUT NRM PARAM NOF/U: HCPCS | Performed by: ORTHOPAEDIC SURGERY

## 2022-01-06 PROCEDURE — G8427 DOCREV CUR MEDS BY ELIG CLIN: HCPCS | Performed by: ORTHOPAEDIC SURGERY

## 2022-01-06 PROCEDURE — 1036F TOBACCO NON-USER: CPT | Performed by: ORTHOPAEDIC SURGERY

## 2022-01-06 PROCEDURE — 3017F COLORECTAL CA SCREEN DOC REV: CPT | Performed by: ORTHOPAEDIC SURGERY

## 2022-01-06 PROCEDURE — 99214 OFFICE O/P EST MOD 30 MIN: CPT | Performed by: ORTHOPAEDIC SURGERY

## 2022-01-06 NOTE — PROGRESS NOTES
Chief Complaint   Patient presents with    Shoulder Pain     MRI Left shoulder f/u. Shoulder remains the same. HPI:    Patient is 49 y.o. female complaining of left shoulder pain and weakness for Left shoulder/arm pain over the last few years. Pain starts in the shoulder and has numbness in the hands. Feels like the whole hand is numb at times but thinks she has shoulder damage from holding trays. She denies a specific traumatic injury to the left shoulder. Previous treatments include rest, ice, and anti-inflammatory medication and HEP without much relief. She denies any other orthopedic complaints. Follows up after MRI. ROS:    Skin: (-) rash,(-) psoriasis,(-) eczema, (-)skin cancer. Neurologic: (-)numbness, (-)tingling, (-)headaches, (-) LOC. Cardiovascular: (-) Chest pain, (-) swelling in legs/feet, (-) SOB, (-) cramping in legs/feet with walking.     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    Hypertension     Numbness and tingling     Occlusion of left internal carotid artery 5/22/2019    Paralysis of right lower extremity ([address] 5/22/2019    Right leg weakness 5/28/2020    S/P carotid endarterectomy 6/15/2020     Past Surgical History:   Procedure Laterality Date    CAROTID ENDARTERECTOMY Left 6/10/2020    LEFT CAROTID ENDARTERECTOMY performed by Michelle Hatch MD at [address] [place] [address]       Current Outpatient Medications:     atorvastatin (LIPITOR) 80 MG tablet, TAKE ONE TABLET BY MOUTH EVERY DAY, Disp: 30 tablet, Rfl: 5    amLODIPine (NORVASC) 10 MG tablet, TAKE ONE TABLET BY MOUTH EVERY DAY, Disp: 30 tablet, Rfl: 0    PT/INR Testing Monitor KIT, Use weekly, Disp: 1 kit, Rfl: 0    Handicap Placard MISC, by Does not apply route Patient cannot walk 200 ft without stopping to rest.   Expiration 2026, Disp: 1 each, Rfl: 0    irbesartan-hydroCHLOROthiazide (AVALIDE) 150-12.5 MG per tablet, Take 1 tablet by mouth daily, Disp: 30 tablet, Rfl: 5    aspirin (ASPIRIN ADULT LOW STRENGTH) 81 MG EC tablet, TAKE ONE TABLET BY MOUTH EVERY DAY, Disp: 30 tablet, Rfl: 5    warfarin (COUMADIN) 5 MG tablet, Take one tab 5 days a week and take 2.5 mg 2 days a week, Disp: 30 tablet, Rfl: 5    Multiple Vitamins-Minerals (MULTIVITAMIN ADULT PO), Take 1 tablet by mouth, Disp: , Rfl:   No Known Allergies  Social History     Socioeconomic History    Marital status: Single     Spouse name: Not on file    Number of children: Not on file    Years of education: Not on file    Highest education level: Not on file   Occupational History    Not on file   Tobacco Use    Smoking status: Former Smoker     Packs/day: 0.50     Years: 22.00     Pack years: 11.00     Types: Cigarettes     Quit date: 2019     Years since quittin.6    Smokeless tobacco: Never Used   Vaping Use    Vaping Use: Never used   Substance and Sexual Activity    Alcohol use: Yes     Comment: moderate    Drug use: No    Sexual activity: Not Currently   Other Topics Concern    Not on file   Social History Narrative    Not on file     Social Determinants of Health     Financial Resource Strain: Low Risk     Difficulty of Paying Living Expenses: Not hard at all   Food Insecurity: No Food Insecurity    Worried About Running Out of Food in the Last Year: Never true    Siri of Food in the Last Year: Never true   Transportation Needs:     Lack of Transportation (Medical): Not on file    Lack of Transportation (Non-Medical):  Not on file   Physical Activity:     Days of Exercise per Week: Not on file    Minutes of Exercise per Session: Not on file   Stress:     Feeling of Stress : Not on file   Social Connections:     Frequency of Communication with Friends and Family: Not on file    Frequency of Social Gatherings with Friends and Family: Not on file    Attends Orthodox Services: Not on file    Active Member of Clubs or Organizations: Not on file    Attends Club or Organization Meetings: Not on file    Marital Status: Not on file   Intimate Partner Violence:     Fear of Current or Ex-Partner: Not on file    Emotionally Abused: Not on file    Physically Abused: Not on file    Sexually Abused: Not on file   Housing Stability:     Unable to Pay for Housing in the Last Year: Not on file    Number of [place] in the Last Year: Not on file    Unstable Housing in the Last Year: Not on file     Family History   Problem Relation Age of Onset    High Blood Pressure Mother     Diabetes Mother            Physical Exam:    Temp 98 °F (36.7 °C)   Ht 5' 1.5\" (1.562 m)   Wt 148 lb (67.1 kg)   BMI 27.51 kg/m²     GENERAL: alert, appears stated age, cooperative, no acute distress    HEENT: Head is normocephalic, atraumatic. PERRLA. SKIN: Clean, dry, intact. There is not any cellulitis or cutaneous lesions noted in the upper extremities    PULMONARY: breathing is regular and unlabored, no acute distress    CV: The bilateral upper and lower extremities are warm and well-perfused with brisk capillary refill. 2+ pulses UE and LE bilateral.     PSYCHIATRY: Pleasant mood, appropriate behavior, follows commands    NEURO: Sensation is intact distally with light touch with no alteration. Motor exam of the upper extremities show elbow flexion and extension, wrist flexion and extension, and finger abduction grossly intact 5/5. Upper extremity reflexes are bilaterally symmetrical and within normal limits. LYMPH: No lymphedema present distally in upper or lower extremity. MUSCULOSKELETAL:  Shoulder Exam:  Examination of the Left shoulder shows: There is not a deformity.   There is not erythema. There is not soft tissue swelling. Deltoid region is  tender to palpation. AC Joint is  tender to palpation. Clavicle is not tender to palpation. Bicipital Groove is  tender to palpation. Pectoralis  is not tender to palpation. Scapula/ trapezius is  tender to palpation. Right:  ROM Full, Strength: Supraspinatus 5/5, Infraspinatus 5/5, Subscapularis 5/5  Left:  /140/60, Strength: Supraspinatus 4/5, Infraspinatus 5/5, Subscapularis 5/5    Right Shoulder:  Crepitus:  no   Tenderness:  none   Effusion:   none   Impingement: negative   Empty Can:  negative   Speed's:  negative      Apprehension:  negative   Cross Arm Sign:  negative   [place]'s:  negative       Neer's:  negative      Belly Press Test:  negative      Drop Arm Test:  negative     Left Shoulder:  Crepitus:  no   Tenderness:  mild   Effusion:   non   Impingement: positive   Empty Can:  positive   Speed's: positive   Apprehension:  not tested   Cross Arm Sign:  positive   [place]'s:  positive      Neer's:  positive      Belly Press Test:  negative   Drop Arm Test:  positive     no change since last visit. Imaging:  XR SHOULDER LEFT (MIN 2 VIEWS)    Result Date: 12/2/2021  EXAMINATION: THREE XRAY VIEWS OF THE LEFT SHOULDER 12/2/2021 11:02 am COMPARISON: None. HISTORY: ORDERING SYSTEM PROVIDED HISTORY: Left shoulder pain, unspecified chronicity FINDINGS: No fracture or dislocation of the shoulder. Acromioclavicular joint is intact. No evidence of shoulder fracture or dislocation.        MRI SHOULDER LEFT WO CONTRAST    Result Date: 12/21/2021  EXAMINATION: MRI OF THE LEFT SHOULDER WITHOUT CONTRAST   12/21/2021 8:53 am TECHNIQUE: Multiplanar multisequence MRI of the left shoulder was performed without the administration of intravenous contrast. COMPARISON: Left shoulder plain radiographs from 12/02/2021 HISTORY: ORDERING SYSTEM PROVIDED HISTORY: Nontraumatic tear of left rotator cuff, unspecified tear extent 21-year-old male with possible left-sided rotator cuff tear FINDINGS: ROTATOR CUFF: Small amount of fluid in the subacromial subdeltoid bursa. Less than 50% partial-thickness bursal surface and articular surface tearing of supraspinatus between critical zone and footplate. Shallow partial-thickness articular-surface tearing of infraspinatus between critical zone and footplate. Mild underlying infraspinatus and supraspinatus tendinosis. Low-grade partial-thickness interstitial tearing of the insertional fibers of subscapularis. Mild subscapularis tendinosis. Teres minor muscle/tendon appears grossly intact without evidence of tearing. BICEPS TENDON: Long head of biceps tendon properly located in the bicipital groove and seen extending to the biceps labral anchor. LABRUM: Mild diffuse labral degeneration. GLENOHUMERAL JOINT: Mild glenohumeral chondromalacia. No sizable glenohumeral joint effusion. Inferior glenohumeral ligament appears intact. AC JOINT AND ACROMIOCLAVICULAR ARCH: Mild degenerative change of the left AC joint. Type 2 acromion. BONE MARROW: Subcortical cystic changes and reactive marrow edema at the superolateral humeral head. Bone marrow signal intensity within the visualized osseous structures is within normal limits. No acute fracture or dislocation involving the osseous components of the shoulder. OUTLET SPACES: Suprascapular notch and quadrilateral space grossly unremarkable in appearance. No left axillary lymphadenopathy. 1. Less than 50% partial-thickness bursal surface and articular surface tearing of supraspinatus between critical zone and footplate. 2. Shallow partial-thickness articular-surface tearing of infraspinatus between critical zone and footplate. 3. Mild underlying infraspinatus and supraspinatus tendinosis. 4. Low-grade partial-thickness interstitial tearing of the insertional fibers of subscapularis. Mild subscapularis tendinosis. 5. Mild diffuse labral degeneration.   Mild glenohumeral chondromalacia. 6. Mild degenerative change of the left AC joint. RECOMMENDATIONS: Jasper Gallagher was seen today for shoulder pain. Diagnoses and all orders for this visit:    Nontraumatic tear of left rotator cuff, unspecified tear extent    Cervical radiculopathy    Exercise counseling        Patient seen and examined. X-rays reviewed. Patient has exam and history consistent with rotator cuff pathology. MRI recommended for further evaluation and management of possible rotator cuff tear. Patient seen and examined for paresthesias. X-rays reviewed with patient in detail. Natural history and course discussed with patient in long discussion  Treatment options discussed with patient in detail including risks and benefits. We will refer to physical medicine rehabilitation for evaluation of possible radiculopathy. Patient seen and examined. MRI reviewed with patient in detail. Natural history and course discussed with patient in long discussion  Treatment options discussed with patient in detail including risks and benefits. Patient should do well with conservative management as patient would like to avoid surgery at this time. In a 15 minute assessment and discussion, patient was counseled on continued weight loss, diet, and physical activity relating to this condition. She was educated with options in detail including nutrition, joining a health club/ weight loss program, and use of cardio equipment such as the Arc Trainer and the importance of use as well as range of motion and HEP exercises for weight loss. Romina Marion DO          25 minutes was spent with patient. 50% or greater was spent counseling the patient.

## 2022-01-06 NOTE — TELEPHONE ENCOUNTER
Dr. Jovita Davies,  We saw Ranjeet Brooks in [facility] office today. Dr Ronda Moise wants to get a pain cream to use on her shoulder. He wanted to check and make sure you are okay with it with her being on coumadin. The pain cream is a mix of Diclofenac 3%, Gabapentin 6%, Baclofen 2%, Lidocaine 5%, and Doxepin 5%. Thanks in advance.

## 2022-01-10 ENCOUNTER — HOSPITAL ENCOUNTER (OUTPATIENT)
Dept: NEUROLOGY | Age: 57
Discharge: HOME | End: 2022-01-10
Payer: MEDICARE

## 2022-01-10 ENCOUNTER — ANTI-COAG VISIT (OUTPATIENT)
Dept: FAMILY MEDICINE CLINIC | Age: 57
End: 2022-01-10
Payer: MEDICARE

## 2022-01-10 DIAGNOSIS — Z51.81 ANTICOAGULATION MANAGEMENT ENCOUNTER: ICD-10-CM

## 2022-01-10 DIAGNOSIS — M54.12 CERVICAL RADICULOPATHY: ICD-10-CM

## 2022-01-10 DIAGNOSIS — M25.512 LEFT SHOULDER PAIN, UNSPECIFIED CHRONICITY: ICD-10-CM

## 2022-01-10 DIAGNOSIS — Z79.01 ANTICOAGULATION MANAGEMENT ENCOUNTER: ICD-10-CM

## 2022-01-10 LAB — INR BLD: 1.9

## 2022-01-10 PROCEDURE — 95886 MUSC TEST DONE W/N TEST COMP: CPT

## 2022-01-10 PROCEDURE — 93793 ANTICOAG MGMT PT WARFARIN: CPT | Performed by: FAMILY MEDICINE

## 2022-01-10 PROCEDURE — 95913 NRV CNDJ TEST 13/> STUDIES: CPT

## 2022-01-10 NOTE — PROGRESS NOTES
EMG/NCS completed.   Cheli Davila 1/10/2022 Spoke with  and gave him this information, let him know antibx rx was sent to pharmacy on 1/18/19 & he said already picked up rx.    Gave him office information for Dr. Bruner.

## 2022-01-10 NOTE — PROCEDURES
3. 28 8.8 19.2 Median palm - Ulnar palm 0.42 32.6      Ulnar Ring 3.07 21.9 39.6         Median palm Wrist 2.03 39.8 62.4         Ulnar palm Wrist 1.61 3.7 62.9         CSI     CSI 0.94    L Median - CSI      Median Thumb 3.59 20.5 33.4 Median - Radial 1.25 32.9      Radial Thumb 2.34 35.8 18.5 Median - Ulnar 1.67 32.9      Median Ring 4.64 12.2 15.9 Median palm - Ulnar palm 0.78 32.7      Ulnar Ring 2.97 16.3 39.9         Median palm Wrist 2.45 27.2 63.1         Ulnar palm Wrist 1.67 24.3 57.3         CSI     CSI 3.70            F  Wave      Nerve F Lat M Lat F-M Lat    ms ms ms   R Median - APB 24.0 3.8 20.3   R Ulnar - ADM 24.5 2.6 21.9   L Median - APB 25.8 4.7 21.1   L Ulnar - ADM 24.2 2.7 21.5       EMG         EMG Summary Table     Spontaneous MUAP Recruitment   Muscle IA Fib PSW Fasc H.F. Amp Dur. PPP Pattern   L. Cervical paraspinals (mid) N None None None 1+Serrated N 1+ 1+ N   L. Deltoid N None None None None N N N N   R. Deltoid N None None None None N N N N   L. Triceps brachii N None None None None N N N N   R. Triceps brachii N None None None None N N N N   L. Brachioradialis N None None None None N N N N   L. Extensor digitorum communis N None None None None N N N N   R. Extensor digitorum communis N None None None None N N N N   L. Flexor carpi ulnaris N None None None None N N N N   L. Flexor pollicis longus N None None None None N N N N   R. Flexor pollicis longus N None None None None N N N N   L. Abductor pollicis brevis N None None None 1+Serrated 1+ 1+ 1+ N   R. Abductor pollicis brevis N None None None None N N N N   L. First dorsal interosseous N None None None None N N N N   R. First dorsal interosseous N None None None None N N N N   L. Abductor digiti minimi (manus) N None None None None N N N N   R. Abductor digiti minimi (manus) N None None None None N N N N   L. Flexor digitorum profundus (Ulnar) N None None None None N N N N   R.  Flexor digitorum profundus (Ulnar) N None None None None N N N N         Is the first upper extremity electromyographic examination for this patient. In March 2019 she had an lower extremity electromyographic examination that was within normal limits. She was advised as to the benefits, risk, indications, and restrictions of this examination. She voices understanding and consent to proceed. Were no limitations, contraindications, or complications of this examination. Summary:  Nerve conduction studies in the upper extremities revealed normal mixed motor latency of the median nerve bilaterally. Mixed motor latency of the median nerve was upper limits of normal.  Amplitude and velocities were within normal limits. Temperature measured as normal.    Ulnar mixed motor latencies, amplitudes as well as velocities were all within normal limits. .    Sensory studies in the upper extremities demonstrate normal driving stimulation of the right and left median nerves to digit II. Left median latency was upper limits of normal.  Amplitudes were normal.  Ulnar antidromic stimulation was within normal limits, radial stimulation was also within normal limits. .    Combined sensory index was performed bilaterally and found to be significantly prolonged on the left, and slightly above normal of the right median nerve. F responses were within normal limits of the median and ulnar nerves bilaterally. Insertional activity in the upper extremity revealing serrated potentials and polyphasic waves with increased amplitude and duration found in the left abductor pollicis brevis as well as in the mid cervical paraspinals on the left. Remainder of the examination was within normal limits. Please refer to the above summarization table for further details. .        Impression:      #1  Prolongation of the CSI bilaterally, reflecting right carpal tunnel in evolution, mildmoderate left carpal tunnel syndrome    #2 normal nerve conduction studies of the ulnar nerve and radial nerves bilaterally. #3 no diagnostic evidence of a motor fiber cervical radiculopathy. Pure sensory radiculopathies cannot be detected by electrodiagnostic technique. Clinical correlation is suggested. #4 no diagnostic evidence of a primary myopathy. Recommendations: Patient is complaining of a tingling sensation in the posterior cervical triangle. This is present with shoulder  forward flexion and may present postural issues rather than primary spinal issues. Clinically correlating this with history and examination is suggested. Please see accompanying letter in the communication section of her chart.   Q      Electronically signed by Carlos Haley MD on 6/05/0544 at 2:44 PM

## 2022-01-10 NOTE — LETTER
RE:  Umm Carvalho    Dear Dr. Arun Zamora,     Enclosed you will find a copy of the requested EMG on your patient, Umm Carvalho completed 1/10/2022. EMG Findings:       Marquis Barbara MD   Physician   Internal Medicine   Procedures      Signed   Date of Service:  1/10/2022  1:00 PM              Procedure Orders   EMG [[id number]] ordered by  at 12/07/21 1010     Pre-procedure Diagnoses   Cervical radiculopathy [M54.12]   Left shoulder pain, unspecified chronicity [M25.512]     Procedures   EMG [NEU5]          Signed              Show:Clear all  [x]Manual[x]Template[x]Copied    Added by:  Adilene Leblanc MD      []ver for details    [facility] [address]   Electrodiagnostic Laboratory  [place]         Full Name:    Alyssa Garner            Gender:             Female  MRN:             [id number]             YOB: 1965  Location[de-identified]     Outpt. Visit Date:                          1/10/2022 13:02  Age:                                   49 Years 3 Months Old  Examining Physician:      Dr. Mtateo Dolan  Referring Physician:        Dr. Ferris [facility]  Technician:                       Lord Cross   Height:                               5 feet 1 inch  Weight:                              148 lbs  Notes:                                Cerv. Radic. M544.12; Lt. shoulder pain M25.512      Motor NCS      Nerve / Sites Lat. Amplitude Distance Lat Diff Velocity Temp. Amp. 1-2     ms mV cm ms m/s °C %   R Median - APB      Wrist 3.44 12.3 8     32.5 100      Elbow 6.61 12.3 17 3.18 54 32.5 99.5   L Median - APB      Wrist 4.48 8.9 8     32.7 100      Elbow 7.92 8.3 18 3.44 52 32.7 93.4   R Ulnar - ADM      Wrist 2.50 15.8 8     32.6 100      B. Elbow 5.16 14.9 16 2.66 60 32.6 94.8      A. Elbow 6.72 14.1 10 1.56 64 32.6 89.4   L Ulnar - ADM      Wrist 2.55 15.3 8     32.5 100      B. Elbow 4.95 14.7 16 2.40 67 32.6 96.5      A. Elbow 6.30 14.6 10 1.35 74 32.6 95.4       Sensory NCS      Nerve / Sites Onset Lat Peak Lat PP Amp Distance Velocity Temp.     ms ms µV cm m/s °C   R Median - Digit II (Antidromic)      Mid Palm 1.09 1.82 61.6 7 64 32.7      Wrist 2.76 3.59 54.0 14 51 32.7   L Median - Digit II (Antidromic)      Mid Palm 1.15 1.88 57.9 7 61 32.7      Wrist 3.18 3.96 56.8 14 44 32.7   R Ulnar - Digit V (Antidromic)      Wrist 2.24 3.13 70.8 14 63 32.7   L Ulnar - Digit V (Antidromic)      Wrist 2.34 3.07 51.5 14 60 32.9   R Radial - Anatomical snuff box (Forearm)      Forearm 1.61 2.14 44.7 10 62 32.7   L Radial - Anatomical snuff box (Forearm)      Forearm 1.61 2.14 41.1 10 62 32       Combined Sensory Index      Nerve / Sites Rec. Site Peak Lat NP Amp PP Amp Segments Peak Diff Temp.       ms µV µV   ms °C   R Median - CSI      Median Thumb 2.92 21.9 33.9 Median - Radial 0.31 32.6      Radial Thumb 2.60 15.8 16.6 Median - Ulnar 0.21 32.6      Median Ring 3.28 8.8 19.2 Median palm - Ulnar palm 0.42 32.6      Ulnar Ring 3.07 21.9 39.6            Median palm Wrist 2.03 39.8 62.4            Ulnar palm Wrist 1.61 3.7 62.9            CSI         CSI 0.94     L Median - CSI      Median Thumb 3.59 20.5 33.4 Median - Radial 1.25 32.9      Radial Thumb 2.34 35.8 18.5 Median - Ulnar 1.67 32.9      Median Ring 4.64 12.2 15.9 Median palm - Ulnar palm 0.78 32.7      Ulnar Ring 2.97 16.3 39.9            Median palm Wrist 2.45 27.2 63.1            Ulnar palm Wrist 1.67 24.3 57.3            CSI         CSI 3.70             F  Wave      Nerve F Lat M Lat F-M Lat     ms ms ms   R Median - APB 24.0 3.8 20.3   R Ulnar - ADM 24.5 2.6 21.9   L Median - APB 25.8 4.7 21.1   L Ulnar - ADM 24.2 2.7 21.5       EMG                     EMG Summary Table       Spontaneous MUAP Recruitment   Muscle IA Fib PSW Fasc H.F. Amp Dur. PPP Pattern   L. Cervical paraspinals (mid) N None None None 1+Serrated N 1+ 1+ N   L. Deltoid N None None None None N N N N   R. Deltoid N None None None None N N N N   L.  Triceps brachii N None None None None N N N N   R. Triceps brachii N None None None None N N N N   L. Brachioradialis N None None None None N N N N   L. Extensor digitorum communis N None None None None N N N N   R. Extensor digitorum communis N None None None None N N N N   L. Flexor carpi ulnaris N None None None None N N N N   L. Flexor pollicis longus N None None None None N N N N   R. Flexor pollicis longus N None None None None N N N N   L. Abductor pollicis brevis N None None None 1+Serrated 1+ 1+ 1+ N   R. Abductor pollicis brevis N None None None None N N N N   L. First dorsal interosseous N None None None None N N N N   R. First dorsal interosseous N None None None None N N N N   L. Abductor digiti minimi (manus) N None None None None N N N N   R. Abductor digiti minimi (manus) N None None None None N N N N   L. Flexor digitorum profundus (Ulnar) N None None None None N N N N   R. Flexor digitorum profundus (Ulnar) N None None None None N N N N          Is the first upper extremity electromyographic examination for this patient. In March 2019 she had an lower extremity electromyographic examination that was within normal limits. She was advised as to the benefits, risk, indications, and restrictions of this examination. She voices understanding and consent to proceed. Were no limitations, contraindications, or complications of this examination.     Summary:  Nerve conduction studies in the upper extremities revealed normal mixed motor latency of the median nerve bilaterally. Mixed motor latency of the median nerve was upper limits of normal.  Amplitude and velocities were within normal limits. Temperature measured as normal.     Ulnar mixed motor latencies, amplitudes as well as velocities were all within normal limits. .     Sensory studies in the upper extremities demonstrate normal driving stimulation of the right and left median nerves to digit II.   Left median latency was upper limits of normal.  Amplitudes were normal.  Ulnar antidromic stimulation was within normal limits, radial stimulation was also within normal limits. .     Combined sensory index was performed bilaterally and found to be significantly prolonged on the left, and slightly above normal of the right median nerve. F responses were within normal limits of the median and ulnar nerves bilaterally.     Insertional activity in the upper extremity revealing serrated potentials and polyphasic waves with increased amplitude and duration found in the left abductor pollicis brevis as well as in the mid cervical paraspinals on the left. Remainder of the examination was within normal limits. Please refer to the above summarization table for further details. .        Impression:       #1  Prolongation of the CSI bilaterally, reflecting right carpal tunnel in evolution, mildmoderate left carpal tunnel syndrome     #2 normal nerve conduction studies of the ulnar nerve and radial nerves bilaterally.     #3 no diagnostic evidence of a motor fiber cervical radiculopathy. Pure sensory radiculopathies cannot be detected by electrodiagnostic technique. Clinical correlation is suggested.     #4 no diagnostic evidence of a primary myopathy.     Recommendations: Patient is complaining of a tingling sensation in the posterior cervical triangle. This is present with shoulder  forward flexion and may present postural issues rather than primary spinal issues. Clinically correlating this with history and examination is suggested. Please see accompanying letter in the communication section of her chart. Q        Electronically signed by Christel Pena MD on 8/41/1851 at 2:44 PM                       History:  Gladis Found states that she complains of numbness, pain in the left hand in particular, and tingling in digits 1 through 5. Initially it began in digits 1 2 and 3, however now is progressed to involve the entire palmar aspect of the left hand.   Recently as it has begun to affect the right hand in regard to numbness and tingling and discomfort. Imaging: None    Patient denies any significant neck pain although describes some \"pinching\" in the posterior aspect of the neck. There is no radicular pattern. She does describe tingling with attempted forward flexion of the shoulders in the superior cervical triangle and interscapular areas. It is not usually related to any other type of posture or activity. .     ROS:   Please see the above history. It is pertinent to note that she suffered a CVA probably secondary to hypertension affecting the right side of the body, in 2019. She also had occlusion of the carotid on the left. She has hyperlipidemia and is on atorvastatin she has a high total serum IgM, has a history of smoking. Remainder of system review is negative    Meds:    Prior to Admission medications    Medication Sig Start Date End Date Taking?  Authorizing Provider   atorvastatin (LIPITOR) 80 MG tablet TAKE ONE TABLET BY MOUTH EVERY DAY 12/28/21   Eloisa Gusman, DO   amLODIPine (NORVASC) 10 MG tablet TAKE ONE TABLET BY MOUTH EVERY DAY 12/14/21   Eloisa Gusman, DO   PT/INR Testing Monitor KIT Use weekly 11/12/21   Beccajuan Gusman, DO   Handicap Placard MISC by Does not apply route Patient cannot walk 200 ft without stopping to rest.    Expiration 11/2026 11/12/21   Eloisa Gusman, DO   irbesartan-hydroCHLOROthiazide (AVALIDE) 150-12.5 MG per tablet Take 1 tablet by mouth daily 10/26/21   Eloisa Gusman, DO   aspirin (ASPIRIN ADULT LOW STRENGTH) 81 MG EC tablet TAKE ONE TABLET BY MOUTH EVERY DAY 7/6/21   Eloisa Gusman, DO   warfarin (COUMADIN) 5 MG tablet Take one tab 5 days a week and take 2.5 mg 2 days a week 7/6/21   Eloisa Gusman, DO   aspirin (ASPIRIN ADULT LOW STRENGTH) 81 MG EC tablet TAKE ONE TABLET BY MOUTH EVERY DAY 4/27/21   Eloisa Gusman, DO   Multiple Vitamins-Minerals (MULTIVITAMIN ADULT PO) Take 1 tablet by mouth    Historical MD [place]       PMH:     Past Medical History:   Diagnosis Date    Back pain     Bilateral carotid artery stenosis 2020    Bruit of left carotid artery 2020    Carotid stenosis, right 6/15/2020    [facility]erebral artery occlusion with cerebral infarction [facility] [address]     H/O: CVA (cerebrovascular accident) 2020    High total serum IgM 2021    Hypertension     Numbness and tingling     Occlusion of left internal carotid artery 2019    Paralysis of right lower extremity ([facility] [address] 2019    Right leg weakness 2020    S/P carotid endarterectomy 6/15/2020       PSH:    Past Surgical History:   Procedure Laterality Date    CAROTID ENDARTERECTOMY Left 6/10/2020    LEFT CAROTID ENDARTERECTOMY performed by Monserrat Grant MD at  [facility] [place] History:    Social History     Socioeconomic History    Marital status: Single     Spouse name: Not on file    Number of children: Not on file    Years of education: Not on file    Highest education level: Not on file   Occupational History    Not on file   Tobacco Use    Smoking status: Former Smoker     Packs/day: 0.50     Years: 22.00     Pack years: 11.00     Types: Cigarettes     Quit date: 2019     Years since quittin.6    Smokeless tobacco: Never Used   Vaping Use    Vaping Use: Never used   Substance and Sexual Activity    Alcohol use: Yes     Comment: moderate    Drug use: No    Sexual activity: Not Currently   Other Topics Concern    Not on file   Social History Narrative    Not on file     Social Determinants of Health     Financial Resource Strain: Low Risk     Difficulty of Paying Living Expenses: Not hard at all   Food Insecurity: No Food Insecurity    Worried About Running Out of Food in the Last Year: Never true    Siri of Food in the Last Year: Never true   Transportation Needs:     Lack of Transportation (Medical): Not on file    Lack of Transportation (Non-Medical):  Not on file   Physical Activity:     Days of Exercise per Week: Not on file    Minutes of Exercise per Session: Not on file   Stress:     Feeling of Stress : Not on file   Social Connections:     Frequency of Communication with Friends and Family: Not on file    Frequency of Social Gatherings with Friends and Family: Not on file    Attends [employer] Services: Not on file    Active Member of [address] [place] [address] [employer] or Organizations: Not on file    Attends Club or Organization Meetings: Not on file    Marital Status: Not on file   Intimate Partner Violence:     Fear of Current or Ex-Partner: Not on file    Emotionally Abused: Not on file    Physically Abused: Not on file    Sexually Abused: Not on file   Housing Stability:     Unable to Pay for Housing in the Last Year: Not on file    Number of [place] in the Last Year: Not on file    Unstable Housing in the Last Year: Not on file       Family History:    Family History   Problem Relation Age of Onset    High Blood Pressure Mother     Diabetes Mother        Exam: Exam reveals a 82-year-old female five 1-1/2 weighing 140 pounds. Cognitively intact and following commands. Skin: Skin in the upper extremities normal in color, temperature, and texture. Motor examination reveals one half grade diminished strength resistance in the left nondominant extremity as compared to the right. She does have some weakness in intrinsics bilaterally. .    Sensory examination is intact to touch in the upper extremities. .     Reflexes are symmetrical in the upper extremities. No pathologic reflexes present in the upper extremities. .     Posture demonstrates a duration of the thoracic kyphosis with exaggerated cervical lordosis secondary to shoulder protraction. Discussion:    Electrodiagnostic study reveals evidence of bilateral carpal tunnel syndromes, and no diagnostic evidence of a motor fiber cervical radiculopathy.   Patient is being referred back to Dr. Armando Cardenas for discussion of treatment options. Thank you    If there are any questions, please contact me for discussion. Thank you once again for allowing me to participate along with you in his behalf.             Electronically signed by Guy Orona MD on 2/57/4442 at 2:50 PM

## 2022-01-17 LAB — INR BLD: 2.1

## 2022-01-18 ENCOUNTER — ANTI-COAG VISIT (OUTPATIENT)
Dept: FAMILY MEDICINE CLINIC | Age: 57
End: 2022-01-18
Payer: MEDICARE

## 2022-01-18 DIAGNOSIS — Z51.81 ANTICOAGULATION MANAGEMENT ENCOUNTER: ICD-10-CM

## 2022-01-18 DIAGNOSIS — Z79.01 ANTICOAGULATION MANAGEMENT ENCOUNTER: ICD-10-CM

## 2022-01-18 PROCEDURE — 93793 ANTICOAG MGMT PT WARFARIN: CPT | Performed by: FAMILY MEDICINE

## 2022-01-23 ENCOUNTER — PATIENT MESSAGE (OUTPATIENT)
Dept: FAMILY MEDICINE CLINIC | Age: 57
End: 2022-01-23

## 2022-01-24 ENCOUNTER — ANTI-COAG VISIT (OUTPATIENT)
Dept: FAMILY MEDICINE CLINIC | Age: 57
End: 2022-01-24
Payer: MEDICARE

## 2022-01-24 DIAGNOSIS — Z51.81 ANTICOAGULATION MANAGEMENT ENCOUNTER: ICD-10-CM

## 2022-01-24 DIAGNOSIS — Z79.01 ANTICOAGULATION MANAGEMENT ENCOUNTER: ICD-10-CM

## 2022-01-24 LAB — INR BLD: 1.8

## 2022-01-24 PROCEDURE — 93793 ANTICOAG MGMT PT WARFARIN: CPT | Performed by: FAMILY MEDICINE

## 2022-01-24 RX ORDER — ATORVASTATIN CALCIUM 80 MG/1
TABLET, FILM COATED ORAL
Qty: 90 TABLET | Refills: 1 | Status: SHIPPED
Start: 2022-01-24 | End: 2022-01-25

## 2022-01-24 RX ORDER — ASPIRIN 81 MG/1
TABLET ORAL
Qty: 90 TABLET | Refills: 1 | Status: SHIPPED
Start: 2022-01-24 | End: 2022-01-26 | Stop reason: SDUPTHER

## 2022-01-24 RX ORDER — AMLODIPINE BESYLATE 10 MG/1
TABLET ORAL
Qty: 90 TABLET | Refills: 1 | Status: SHIPPED
Start: 2022-01-24 | End: 2022-01-26 | Stop reason: SDUPTHER

## 2022-01-24 NOTE — TELEPHONE ENCOUNTER
From: Consuelo Servin  To: Dr. Sanders Quale: 1/23/2022 3:39 PM EST  Subject: Refills to a new pharmacy [facility] [place]     81 MG asprin   80 Lipitor   10 Norvasc   Can any of them be a 90 supply   [address]

## 2022-01-25 RX ORDER — IRBESARTAN AND HYDROCHLOROTHIAZIDE 150; 12.5 MG/1; MG/1
1 TABLET, FILM COATED ORAL DAILY
Qty: 30 TABLET | Refills: 0 | Status: SHIPPED
Start: 2022-01-25 | End: 2022-01-26 | Stop reason: SDUPTHER

## 2022-01-25 RX ORDER — ATORVASTATIN CALCIUM 80 MG/1
TABLET, FILM COATED ORAL
Qty: 30 TABLET | Refills: 5 | Status: SHIPPED
Start: 2022-01-25 | End: 2022-01-26 | Stop reason: SDUPTHER

## 2022-01-26 RX ORDER — AMLODIPINE BESYLATE 10 MG/1
TABLET ORAL
Qty: 90 TABLET | Refills: 1 | Status: SHIPPED
Start: 2022-01-26 | End: 2022-02-10

## 2022-01-26 RX ORDER — ASPIRIN 81 MG/1
TABLET ORAL
Qty: 90 TABLET | Refills: 1 | Status: SHIPPED | OUTPATIENT
Start: 2022-01-26 | End: 2022-07-12 | Stop reason: SDUPTHER

## 2022-01-26 RX ORDER — IRBESARTAN AND HYDROCHLOROTHIAZIDE 150; 12.5 MG/1; MG/1
1 TABLET, FILM COATED ORAL DAILY
Qty: 90 TABLET | Refills: 1 | Status: SHIPPED | OUTPATIENT
Start: 2022-01-26 | End: 2022-06-29 | Stop reason: SDUPTHER

## 2022-01-26 RX ORDER — ATORVASTATIN CALCIUM 80 MG/1
80 TABLET, FILM COATED ORAL DAILY
Qty: 90 TABLET | Refills: 1 | Status: SHIPPED | OUTPATIENT
Start: 2022-01-26 | End: 2022-08-29

## 2022-01-27 LAB — INR BLD: 2.3

## 2022-01-31 ENCOUNTER — ANTI-COAG VISIT (OUTPATIENT)
Dept: FAMILY MEDICINE CLINIC | Age: 57
End: 2022-01-31
Payer: MEDICARE

## 2022-01-31 ENCOUNTER — PATIENT MESSAGE (OUTPATIENT)
Dept: FAMILY MEDICINE CLINIC | Age: 57
End: 2022-01-31

## 2022-01-31 DIAGNOSIS — Z79.01 ANTICOAGULATION MANAGEMENT ENCOUNTER: ICD-10-CM

## 2022-01-31 DIAGNOSIS — Z51.81 ANTICOAGULATION MANAGEMENT ENCOUNTER: ICD-10-CM

## 2022-01-31 PROCEDURE — 93793 ANTICOAG MGMT PT WARFARIN: CPT | Performed by: FAMILY MEDICINE

## 2022-01-31 NOTE — TELEPHONE ENCOUNTER
From: Ailin Like  To: Dr. Stewart Fragoso: 1/31/2022 9:46 AM EST  Subject: Allergies    Good morning Dr. Nana Spatz, any chance I can get a prescription for my running nose and eyes. I think it's just allergies and it's been coming and going.  Thank you

## 2022-02-01 NOTE — PROGRESS NOTES
Patient was notified. Just fyi patient states taking INR on Thursday.  So she will contact office every Thursday with number

## 2022-02-03 ENCOUNTER — ANTI-COAG VISIT (OUTPATIENT)
Dept: FAMILY MEDICINE CLINIC | Age: 57
End: 2022-02-03
Payer: MEDICARE

## 2022-02-03 DIAGNOSIS — Z51.81 ANTICOAGULATION MANAGEMENT ENCOUNTER: ICD-10-CM

## 2022-02-03 DIAGNOSIS — Z79.01 ANTICOAGULATION MANAGEMENT ENCOUNTER: ICD-10-CM

## 2022-02-03 LAB — INR BLD: 1.5

## 2022-02-03 PROCEDURE — 93793 ANTICOAG MGMT PT WARFARIN: CPT | Performed by: FAMILY MEDICINE

## 2022-02-03 NOTE — PROGRESS NOTES
Pt called stated she missed her 5mg dose on Tuesday night, so she tried to make up for it on Wed, she took 5mg of coumadin Wed morning, then 2,5mg of coumadin Wed night. She stated he dosage may need changed now, because she keeps getting a lot of low readings. Since she is back driving she is not drinking alcohol as much at all.  Please advise

## 2022-02-03 NOTE — PROGRESS NOTES
Pt called states she takes coumadin 5mg 3 times a week, she alternates 5mg per pt every other day, and 2.5mg all other days

## 2022-02-10 ENCOUNTER — ANTI-COAG VISIT (OUTPATIENT)
Dept: FAMILY MEDICINE CLINIC | Age: 57
End: 2022-02-10
Payer: MEDICARE

## 2022-02-10 DIAGNOSIS — Z79.01 ANTICOAGULATION MANAGEMENT ENCOUNTER: ICD-10-CM

## 2022-02-10 DIAGNOSIS — Z51.81 ANTICOAGULATION MANAGEMENT ENCOUNTER: ICD-10-CM

## 2022-02-10 LAB — INR BLD: 2.9

## 2022-02-10 PROCEDURE — 93793 ANTICOAG MGMT PT WARFARIN: CPT | Performed by: FAMILY MEDICINE

## 2022-02-10 RX ORDER — AMLODIPINE BESYLATE 10 MG/1
TABLET ORAL
Qty: 30 TABLET | Refills: 3 | Status: SHIPPED | OUTPATIENT
Start: 2022-02-10 | End: 2022-07-12 | Stop reason: SDUPTHER

## 2022-02-17 ENCOUNTER — ANTI-COAG VISIT (OUTPATIENT)
Dept: FAMILY MEDICINE CLINIC | Age: 57
End: 2022-02-17

## 2022-02-17 LAB — INR BLD: 2.8

## 2022-02-24 ENCOUNTER — ANTI-COAG VISIT (OUTPATIENT)
Dept: FAMILY MEDICINE CLINIC | Age: 57
End: 2022-02-24
Payer: MEDICARE

## 2022-02-24 ENCOUNTER — ANTI-COAG VISIT (OUTPATIENT)
Dept: FAMILY MEDICINE CLINIC | Age: 57
End: 2022-02-24

## 2022-02-24 DIAGNOSIS — Z51.81 ANTICOAGULATION MANAGEMENT ENCOUNTER: ICD-10-CM

## 2022-02-24 DIAGNOSIS — Z79.01 ANTICOAGULATION MANAGEMENT ENCOUNTER: ICD-10-CM

## 2022-02-24 LAB — INR BLD: 2

## 2022-02-24 PROCEDURE — 93793 ANTICOAG MGMT PT WARFARIN: CPT | Performed by: FAMILY MEDICINE

## 2022-03-02 ENCOUNTER — TELEPHONE (OUTPATIENT)
Dept: VASCULAR SURGERY | Age: 57
End: 2022-03-02

## 2022-03-03 ENCOUNTER — ANTI-COAG VISIT (OUTPATIENT)
Dept: FAMILY MEDICINE CLINIC | Age: 57
End: 2022-03-03
Payer: MEDICARE

## 2022-03-03 ENCOUNTER — OFFICE VISIT (OUTPATIENT)
Dept: VASCULAR SURGERY | Age: 57
End: 2022-03-03
Payer: MEDICARE

## 2022-03-03 VITALS — WEIGHT: 148 LBS | BODY MASS INDEX: 27.94 KG/M2 | HEIGHT: 61 IN

## 2022-03-03 DIAGNOSIS — R76.8 HIGH TOTAL SERUM IGM: ICD-10-CM

## 2022-03-03 DIAGNOSIS — Z51.81 ANTICOAGULATION MANAGEMENT ENCOUNTER: ICD-10-CM

## 2022-03-03 DIAGNOSIS — Z86.73 H/O: CVA (CEREBROVASCULAR ACCIDENT): ICD-10-CM

## 2022-03-03 DIAGNOSIS — I65.21 CAROTID STENOSIS, RIGHT: ICD-10-CM

## 2022-03-03 DIAGNOSIS — I65.22 OCCLUSION OF LEFT INTERNAL CAROTID ARTERY: Primary | ICD-10-CM

## 2022-03-03 DIAGNOSIS — Z79.01 ANTICOAGULATION MANAGEMENT ENCOUNTER: ICD-10-CM

## 2022-03-03 DIAGNOSIS — Z98.890 S/P CAROTID ENDARTERECTOMY: ICD-10-CM

## 2022-03-03 DIAGNOSIS — R29.898 RIGHT LEG WEAKNESS: ICD-10-CM

## 2022-03-03 DIAGNOSIS — D68.61 APS (ANTIPHOSPHOLIPID SYNDROME) (HCC): ICD-10-CM

## 2022-03-03 PROBLEM — G81.91 RIGHT HEMIPARESIS (HCC): Status: RESOLVED | Noted: 2021-02-04 | Resolved: 2022-03-03

## 2022-03-03 LAB — INR BLD: 1.9

## 2022-03-03 PROCEDURE — G8419 CALC BMI OUT NRM PARAM NOF/U: HCPCS | Performed by: SURGERY

## 2022-03-03 PROCEDURE — 1036F TOBACCO NON-USER: CPT | Performed by: SURGERY

## 2022-03-03 PROCEDURE — 99214 OFFICE O/P EST MOD 30 MIN: CPT | Performed by: SURGERY

## 2022-03-03 PROCEDURE — 3017F COLORECTAL CA SCREEN DOC REV: CPT | Performed by: SURGERY

## 2022-03-03 PROCEDURE — 93793 ANTICOAG MGMT PT WARFARIN: CPT | Performed by: FAMILY MEDICINE

## 2022-03-03 PROCEDURE — G8427 DOCREV CUR MEDS BY ELIG CLIN: HCPCS | Performed by: SURGERY

## 2022-03-03 PROCEDURE — G8482 FLU IMMUNIZE ORDER/ADMIN: HCPCS | Performed by: SURGERY

## 2022-03-03 NOTE — PROGRESS NOTES
Chief Complaint:   Chief Complaint   Patient presents with   Bee Campbell     s/p carotid endarterectomy         HPI: Patient came to the office by herself, for the evaluation of carotid artery disease with history of left hemispheric stroke in the past with residual right-sided weakness mainly lower right leg, contemplating a brace, walks slowly 1 block at a time, denies symptoms of rest pain    Patient did undergo a carotid ultrasound in the past, for high-grade left carotid stenosis, initial ultrasound follow-up were normal subsequently patient was found to occlusion of left internal carotid artery, confirmed by CTA, at that time patient underwent hypercoagulable lab work, was found to have antifive phospholipid antibody along with a few other abnormal genetic factors, underwent extensive evaluation with the department of vascular medicine with occasional clinic, currently following locally with hematology Dr. Len Collins    Patient also has seen Dr. Mary Veras, ultrasound testing done they revealed only mild stenosis in the right side less than 30%, with chronic occlusion left side and patient recommended conservative therapy with a follow-up appointment to be seen there in 2 years      Patient denies any focal lateralizing neurological symptoms like loss of speech, vision or loss of function of extremity        No Known Allergies    Current Outpatient Medications   Medication Sig Dispense Refill    amLODIPine (NORVASC) 10 MG tablet TAKE 1 TABLET BY MOUTH EVERY DAY 30 tablet 3    aspirin (ASPIRIN ADULT LOW STRENGTH) 81 MG EC tablet TAKE ONE TABLET BY MOUTH EVERY DAY 90 tablet 1    atorvastatin (LIPITOR) 80 MG tablet Take 1 tablet by mouth daily 90 tablet 1    irbesartan-hydroCHLOROthiazide (AVALIDE) 150-12.5 MG per tablet Take 1 tablet by mouth daily 90 tablet 1    PT/INR Testing Monitor KIT Use weekly 1 kit 0    Handicap Placard MISC by Does not apply route Patient cannot walk 200 ft without stopping to rest. Expiration 2026 1 each 0    warfarin (COUMADIN) 5 MG tablet Take one tab 5 days a week and take 2.5 mg 2 days a week 30 tablet 5    Multiple Vitamins-Minerals (MULTIVITAMIN ADULT PO) Take 1 tablet by mouth       No current facility-administered medications for this visit.        Past Medical History:   Diagnosis Date    Back pain     Bilateral carotid artery stenosis 2020    Bruit of left carotid artery 2020    Carotid stenosis, right 6/15/2020    Cerebral artery occlusion with cerebral infarction ([address]     H/O: CVA (cerebrovascular accident) 2020    High total serum IgM 2021    Hypertension     Numbness and tingling     Occlusion of left internal carotid artery 2019    Paralysis of right lower extremity ([address] 2019    Right leg weakness 2020    S/P carotid endarterectomy 6/15/2020       Past Surgical History:   Procedure Laterality Date    CAROTID ENDARTERECTOMY Left 6/10/2020    LEFT CAROTID ENDARTERECTOMY performed by Koby Carr MD at [facility] OR       Family History   Problem Relation Age of Onset    High Blood Pressure Mother     Diabetes Mother        Social History     Socioeconomic History    Marital status: Single     Spouse name: Not on file    Number of children: Not on file    Years of education: Not on file    Highest education level: Not on file   Occupational History    Not on file   Tobacco Use    Smoking status: Former Smoker     Packs/day: 0.50     Years: 22.00     Pack years: 11.00     Types: Cigarettes     Quit date: 2019     Years since quittin.8    Smokeless tobacco: Never Used   Vaping Use    Vaping Use: Never used   Substance and Sexual Activity    Alcohol use: Yes     Comment: moderate    Drug use: No    Sexual activity: Not Currently   Other Topics Concern    Not on file   Social History Narrative    Not on file     Social Determinants of Health     Financial Resource Strain: Low Risk     Difficulty of Paying Living Expenses: Not hard at all   Food Insecurity: No Food Insecurity    Worried About Running Out of Food in the Last Year: Never true    Ran Out of Food in the Last Year: Never true   Transportation Needs:     Lack of Transportation (Medical): Not on file    Lack of Transportation (Non-Medical): Not on file   Physical Activity:     Days of Exercise per Week: Not on file    Minutes of Exercise per Session: Not on file   Stress:     Feeling of Stress : Not on file   Social Connections:     Frequency of Communication with Friends and Family: Not on file    Frequency of Social Gatherings with Friends and Family: Not on file    Attends Congregational Services: Not on file    Active Member of [address] [place] [address] or Organizations: Not on file    Attends Club or Organization Meetings: Not on file    Marital Status: Not on file   Intimate Partner Violence:     Fear of Current or Ex-Partner: Not on file    Emotionally Abused: Not on file    Physically Abused: Not on file    Sexually Abused: Not on file   Housing Stability:     Unable to Pay for Housing in the Last Year: Not on file    Number of [place] in the Last Year: Not on file    Unstable Housing in the Last Year: Not on file       Review of Systems:    Eyes:  No blurring, diplopia or vision loss. Respiratory:  No cough, pleuritic chest pain, dyspnea, or wheezing. Cardiovascular: No angina, palpitations . Hyperlipidemia, hypertension  Musculoskeletal:  No arthritis or weakness. Neurologic:  No paralysis, paresis, paresthesia, seizures or headache. History of stroke with residual right-sided weakness mainly affecting the right leg  Endocrinology:     Hematology: Hypercoagulable state, including antiphospholipid antibody, was recommend anticoagulation along with low-dose aspirin therapy      Physical Exam:  General appearance:  Alert, awake, oriented x 3. No distress.   Eyes:  Conjunctivae appear normal; PERRL  Neck:  No jugular venous distention,

## 2022-03-07 ENCOUNTER — PATIENT MESSAGE (OUTPATIENT)
Dept: FAMILY MEDICINE CLINIC | Age: 57
End: 2022-03-07

## 2022-03-07 DIAGNOSIS — M54.2 NECK PAIN: Primary | ICD-10-CM

## 2022-03-07 NOTE — TELEPHONE ENCOUNTER
From: Regan Timmons  To: Dr. Mejia [facility]: 3/7/2022 10:46 AM EST  Subject: Thyroid     Good morning Dr Bree May   I have a question, for a couple weeks I feel I have a swollen right thyroid I think. When I swallow and move my neck I'm feeling something. It's not really painful, just more annoying than anything. Do u have any advice for me?

## 2022-03-10 ENCOUNTER — ANTI-COAG VISIT (OUTPATIENT)
Dept: FAMILY MEDICINE CLINIC | Age: 57
End: 2022-03-10
Payer: MEDICARE

## 2022-03-10 DIAGNOSIS — Z79.01 ANTICOAGULATION MANAGEMENT ENCOUNTER: ICD-10-CM

## 2022-03-10 DIAGNOSIS — Z51.81 ANTICOAGULATION MANAGEMENT ENCOUNTER: ICD-10-CM

## 2022-03-10 LAB — INR BLD: 2.3

## 2022-03-10 PROCEDURE — 93793 ANTICOAG MGMT PT WARFARIN: CPT | Performed by: FAMILY MEDICINE

## 2022-03-11 ENCOUNTER — HOSPITAL ENCOUNTER (OUTPATIENT)
Dept: ULTRASOUND IMAGING | Age: 57
Discharge: HOME | End: 2022-03-13
Payer: MEDICARE

## 2022-03-11 DIAGNOSIS — M54.2 NECK PAIN: ICD-10-CM

## 2022-03-11 PROCEDURE — 76536 US EXAM OF HEAD AND NECK: CPT

## 2022-03-17 ENCOUNTER — ANTI-COAG VISIT (OUTPATIENT)
Dept: FAMILY MEDICINE CLINIC | Age: 57
End: 2022-03-17
Payer: MEDICARE

## 2022-03-17 DIAGNOSIS — Z79.01 ANTICOAGULATION MANAGEMENT ENCOUNTER: ICD-10-CM

## 2022-03-17 DIAGNOSIS — Z51.81 ANTICOAGULATION MANAGEMENT ENCOUNTER: ICD-10-CM

## 2022-03-17 LAB — INR BLD: 2.6

## 2022-03-17 PROCEDURE — 93793 ANTICOAG MGMT PT WARFARIN: CPT | Performed by: FAMILY MEDICINE

## 2022-03-18 RX ORDER — WARFARIN SODIUM 5 MG/1
TABLET ORAL
Qty: 30 TABLET | Refills: 1 | Status: SHIPPED
Start: 2022-03-18 | End: 2022-04-26

## 2022-03-18 NOTE — PROGRESS NOTES
Left message
Pt alternates 2.5 and 5mg every other day
inr normal continue same repeat one week
well-groomed/no distress/well-nourished/well-developed

## 2022-03-22 LAB — MAMMOGRAPHY, EXTERNAL: NORMAL

## 2022-03-24 ENCOUNTER — ANTI-COAG VISIT (OUTPATIENT)
Dept: FAMILY MEDICINE CLINIC | Age: 57
End: 2022-03-24
Payer: MEDICARE

## 2022-03-24 DIAGNOSIS — Z51.81 ANTICOAGULATION MANAGEMENT ENCOUNTER: ICD-10-CM

## 2022-03-24 DIAGNOSIS — Z79.01 ANTICOAGULATION MANAGEMENT ENCOUNTER: ICD-10-CM

## 2022-03-24 LAB — INR BLD: 2.6

## 2022-03-24 PROCEDURE — 93793 ANTICOAG MGMT PT WARFARIN: CPT | Performed by: FAMILY MEDICINE

## 2022-03-31 ENCOUNTER — ANTI-COAG VISIT (OUTPATIENT)
Dept: FAMILY MEDICINE CLINIC | Age: 57
End: 2022-03-31
Payer: MEDICARE

## 2022-03-31 DIAGNOSIS — Z79.01 ANTICOAGULATION MANAGEMENT ENCOUNTER: ICD-10-CM

## 2022-03-31 DIAGNOSIS — Z51.81 ANTICOAGULATION MANAGEMENT ENCOUNTER: ICD-10-CM

## 2022-03-31 LAB — INR BLD: 2.6

## 2022-03-31 PROCEDURE — 93793 ANTICOAG MGMT PT WARFARIN: CPT | Performed by: FAMILY MEDICINE

## 2022-04-07 ENCOUNTER — ANTI-COAG VISIT (OUTPATIENT)
Dept: FAMILY MEDICINE CLINIC | Age: 57
End: 2022-04-07
Payer: MEDICARE

## 2022-04-07 DIAGNOSIS — Z51.81 ANTICOAGULATION MANAGEMENT ENCOUNTER: ICD-10-CM

## 2022-04-07 DIAGNOSIS — Z79.01 ANTICOAGULATION MANAGEMENT ENCOUNTER: ICD-10-CM

## 2022-04-07 LAB — INR BLD: 1.8

## 2022-04-07 PROCEDURE — 93793 ANTICOAG MGMT PT WARFARIN: CPT | Performed by: FAMILY MEDICINE

## 2022-04-12 ENCOUNTER — PATIENT MESSAGE (OUTPATIENT)
Dept: FAMILY MEDICINE CLINIC | Age: 57
End: 2022-04-12

## 2022-04-12 NOTE — TELEPHONE ENCOUNTER
From: Rosamaria Ridley  To: Dr. Faiza Cueto: 4/12/2022 9:54 AM EDT  Subject: Lipitor     Good morning Dr Madelyn Kay,   I want to change my Lipitor to something that doesn't cause a lot of joint pain and muscle spasm. I'm back in PT for some of these things. And to be honest with you I'm about sick of feeling like crap all the time. I kno I'm on a 80mg, would a 40 b better. Or just all and all change to something different.

## 2022-04-13 ENCOUNTER — HOSPITAL ENCOUNTER (OUTPATIENT)
Age: 57
Discharge: HOME | End: 2022-04-13
Payer: MEDICARE

## 2022-04-13 ENCOUNTER — TELEPHONE (OUTPATIENT)
Dept: FAMILY MEDICINE CLINIC | Age: 57
End: 2022-04-13

## 2022-04-13 LAB
BUN BLDV-MCNC: 10 MG/DL (ref 6–20)
CREAT SERPL-MCNC: 0.8 MG/DL (ref 0.5–1)
GFR AFRICAN AMERICAN: >60
GFR NON-AFRICAN AMERICAN: >60 ML/MIN/1.73

## 2022-04-13 PROCEDURE — 36415 COLL VENOUS BLD VENIPUNCTURE: CPT

## 2022-04-13 PROCEDURE — 84520 ASSAY OF UREA NITROGEN: CPT

## 2022-04-13 PROCEDURE — 82565 ASSAY OF CREATININE: CPT

## 2022-04-13 NOTE — TELEPHONE ENCOUNTER
Does pt need Pre med and can she get extractions and implants? They would lso like to know how long to keep her off of Blood thinners.  Please Advise [phone]

## 2022-04-14 ENCOUNTER — ANTI-COAG VISIT (OUTPATIENT)
Dept: FAMILY MEDICINE CLINIC | Age: 57
End: 2022-04-14
Payer: MEDICARE

## 2022-04-14 DIAGNOSIS — Z79.01 ANTICOAGULATION MANAGEMENT ENCOUNTER: ICD-10-CM

## 2022-04-14 DIAGNOSIS — Z51.81 ANTICOAGULATION MANAGEMENT ENCOUNTER: ICD-10-CM

## 2022-04-14 LAB — INR BLD: 2.4

## 2022-04-14 PROCEDURE — 93793 ANTICOAG MGMT PT WARFARIN: CPT | Performed by: FAMILY MEDICINE

## 2022-04-21 ENCOUNTER — ANTI-COAG VISIT (OUTPATIENT)
Dept: FAMILY MEDICINE CLINIC | Age: 57
End: 2022-04-21
Payer: MEDICARE

## 2022-04-21 DIAGNOSIS — Z79.01 ANTICOAGULATION MANAGEMENT ENCOUNTER: ICD-10-CM

## 2022-04-21 DIAGNOSIS — Z51.81 ANTICOAGULATION MANAGEMENT ENCOUNTER: ICD-10-CM

## 2022-04-21 LAB — INR BLD: 2.8

## 2022-04-21 PROCEDURE — 93793 ANTICOAG MGMT PT WARFARIN: CPT | Performed by: FAMILY MEDICINE

## 2022-04-26 RX ORDER — WARFARIN SODIUM 5 MG/1
TABLET ORAL
Qty: 30 TABLET | Refills: 1 | Status: SHIPPED
Start: 2022-04-26 | End: 2022-05-20

## 2022-04-27 ENCOUNTER — TELEPHONE (OUTPATIENT)
Dept: FAMILY MEDICINE CLINIC | Age: 57
End: 2022-04-27

## 2022-04-27 NOTE — TELEPHONE ENCOUNTER
Dr luna office called [phone]. Spoke with dr Shira Wolf about stopping bllod thinners before surgury and restarting after.

## 2022-04-27 NOTE — TELEPHONE ENCOUNTER
Please call dr Price Certain office ([facility]) and ask if patient would need bridging before her dental extractions if she were to stop coumadin

## 2022-04-28 ENCOUNTER — ANTI-COAG VISIT (OUTPATIENT)
Dept: FAMILY MEDICINE CLINIC | Age: 57
End: 2022-04-28
Payer: MEDICARE

## 2022-04-28 DIAGNOSIS — Z79.01 ANTICOAGULATION MANAGEMENT ENCOUNTER: ICD-10-CM

## 2022-04-28 DIAGNOSIS — Z51.81 ANTICOAGULATION MANAGEMENT ENCOUNTER: ICD-10-CM

## 2022-04-28 LAB — INR BLD: 2.2

## 2022-04-28 PROCEDURE — 93793 ANTICOAG MGMT PT WARFARIN: CPT | Performed by: FAMILY MEDICINE

## 2022-04-28 NOTE — TELEPHONE ENCOUNTER
Called hematology office. Gave information they would pass it along and return call to office with answer.

## 2022-04-29 NOTE — TELEPHONE ENCOUNTER
Patient was contacted. Patient states she is only getting a dental cleaning and dentist sent in [facility] to take before cleaning. Pt states that she was only inquiring about getting procedure done for missing teeth, she not getting procedure done. Her dental cleaning is scheduled for the middle of May.   NAE

## 2022-05-12 ENCOUNTER — ANTI-COAG VISIT (OUTPATIENT)
Dept: FAMILY MEDICINE CLINIC | Age: 57
End: 2022-05-12
Payer: MEDICARE

## 2022-05-12 DIAGNOSIS — Z51.81 ANTICOAGULATION MANAGEMENT ENCOUNTER: ICD-10-CM

## 2022-05-12 DIAGNOSIS — Z79.01 ANTICOAGULATION MANAGEMENT ENCOUNTER: ICD-10-CM

## 2022-05-12 LAB — INR BLD: 2.3

## 2022-05-12 PROCEDURE — 93793 ANTICOAG MGMT PT WARFARIN: CPT | Performed by: FAMILY MEDICINE

## 2022-05-19 ENCOUNTER — ANTI-COAG VISIT (OUTPATIENT)
Dept: FAMILY MEDICINE CLINIC | Age: 57
End: 2022-05-19

## 2022-05-19 LAB — INR BLD: 2.6

## 2022-05-20 RX ORDER — WARFARIN SODIUM 5 MG/1
TABLET ORAL
Qty: 30 TABLET | Refills: 3 | Status: SHIPPED | OUTPATIENT
Start: 2022-05-20 | End: 2022-10-26 | Stop reason: SDUPTHER

## 2022-05-26 ENCOUNTER — ANTI-COAG VISIT (OUTPATIENT)
Dept: FAMILY MEDICINE CLINIC | Age: 57
End: 2022-05-26

## 2022-05-26 LAB — INR BLD: 2.9

## 2022-06-29 RX ORDER — IRBESARTAN AND HYDROCHLOROTHIAZIDE 150; 12.5 MG/1; MG/1
1 TABLET, FILM COATED ORAL DAILY
Qty: 90 TABLET | Refills: 1 | Status: SHIPPED | OUTPATIENT
Start: 2022-06-29

## 2022-07-01 ENCOUNTER — PATIENT MESSAGE (OUTPATIENT)
Dept: NEUROLOGY | Age: 57
End: 2022-07-01

## 2022-07-01 DIAGNOSIS — Z86.73 H/O: CVA (CEREBROVASCULAR ACCIDENT): Primary | ICD-10-CM

## 2022-07-01 DIAGNOSIS — R29.898 RIGHT LEG WEAKNESS: ICD-10-CM

## 2022-07-05 NOTE — TELEPHONE ENCOUNTER
From: Nelly Zimmerman  To: Tish Myers  Sent: 7/1/2022 10:54 AM EDT  Subject: Prescription for rehab     Good morning Dr Ailyn Oden,   I'm in transition with my primary doc, since Dr Cheri Pina left [facility]. I'm trying to get a prescription for rehab in the [facility] area. It's a rehab hospital called [facility]. My physical therapist sent me a link to a device that's supposed to help with all the spasticity In my leg. I went on last Thursday to try it out. I think it will help me walk better. It's a weight bearing, I'm to understand. So they need a prescription from my Neurologist for physical therapy. The fax # is [phone]. What's your thoughts, I'm willing to try anything, except drugs because of the side effects.

## 2022-07-05 NOTE — TELEPHONE ENCOUNTER
Rx sent for PT for patient per her request. Also, please let her know that I think that is a good idea for her to try. Thank you.

## 2022-07-05 NOTE — TELEPHONE ENCOUNTER
Patient notified of Gloria's response.   Electronically signed by Jackson Schofield MA on 7/5/22 at 10:50 AM EDT

## 2022-07-07 ENCOUNTER — ANTI-COAG VISIT (OUTPATIENT)
Dept: FAMILY MEDICINE CLINIC | Age: 57
End: 2022-07-07
Payer: MEDICARE

## 2022-07-07 DIAGNOSIS — I73.9 PAD (PERIPHERAL ARTERY DISEASE) (HCC): ICD-10-CM

## 2022-07-07 LAB — INR BLD: 2.9

## 2022-07-07 PROCEDURE — 93793 ANTICOAG MGMT PT WARFARIN: CPT | Performed by: STUDENT IN AN ORGANIZED HEALTH CARE EDUCATION/TRAINING PROGRAM

## 2022-07-07 NOTE — PROGRESS NOTES
Please advise, Agatha Platt is covering for Dr Juma Conroy pts, but due to emergency she is out of the office and also left her laptop at office

## 2022-07-08 NOTE — PROGRESS NOTES
Contacted patient. Patient states that she has appointment with new provider next week. Pt voiced understanding.

## 2022-07-12 ENCOUNTER — OFFICE VISIT (OUTPATIENT)
Dept: FAMILY MEDICINE CLINIC | Age: 57
End: 2022-07-12
Payer: MEDICARE

## 2022-07-12 VITALS
HEIGHT: 62 IN | SYSTOLIC BLOOD PRESSURE: 132 MMHG | OXYGEN SATURATION: 100 % | TEMPERATURE: 97.6 F | WEIGHT: 148.7 LBS | RESPIRATION RATE: 16 BRPM | DIASTOLIC BLOOD PRESSURE: 86 MMHG | HEART RATE: 93 BPM | BODY MASS INDEX: 27.36 KG/M2

## 2022-07-12 DIAGNOSIS — Z86.73 H/O: CVA (CEREBROVASCULAR ACCIDENT): ICD-10-CM

## 2022-07-12 DIAGNOSIS — R73.01 IMPAIRED FASTING GLUCOSE: ICD-10-CM

## 2022-07-12 DIAGNOSIS — E78.5 HYPERLIPIDEMIA, UNSPECIFIED HYPERLIPIDEMIA TYPE: ICD-10-CM

## 2022-07-12 DIAGNOSIS — M25.561 ACUTE PAIN OF RIGHT KNEE: Primary | ICD-10-CM

## 2022-07-12 DIAGNOSIS — D68.61 APS (ANTIPHOSPHOLIPID SYNDROME) (HCC): ICD-10-CM

## 2022-07-12 LAB
HBA1C MFR BLD: 5.6 % (ref 4–5.6)
HCT VFR BLD CALC: 48.8 % (ref 34–48)
HEMOGLOBIN: 15.6 G/DL (ref 11.5–15.5)
MCH RBC QN AUTO: 29.3 PG (ref 26–35)
MCHC RBC AUTO-ENTMCNC: 32 % (ref 32–34.5)
MCV RBC AUTO: 91.7 FL (ref 80–99.9)
PDW BLD-RTO: 13 FL (ref 11.5–15)
PLATELET # BLD: 270 E9/L (ref 130–450)
PMV BLD AUTO: 9.8 FL (ref 7–12)
RBC # BLD: 5.32 E12/L (ref 3.5–5.5)
WBC # BLD: 9 E9/L (ref 4.5–11.5)

## 2022-07-12 PROCEDURE — 1036F TOBACCO NON-USER: CPT | Performed by: FAMILY MEDICINE

## 2022-07-12 PROCEDURE — 99215 OFFICE O/P EST HI 40 MIN: CPT | Performed by: FAMILY MEDICINE

## 2022-07-12 PROCEDURE — G8419 CALC BMI OUT NRM PARAM NOF/U: HCPCS | Performed by: FAMILY MEDICINE

## 2022-07-12 PROCEDURE — 3017F COLORECTAL CA SCREEN DOC REV: CPT | Performed by: FAMILY MEDICINE

## 2022-07-12 PROCEDURE — G8427 DOCREV CUR MEDS BY ELIG CLIN: HCPCS | Performed by: FAMILY MEDICINE

## 2022-07-12 RX ORDER — ASPIRIN 81 MG/1
TABLET ORAL
Qty: 90 TABLET | Refills: 3 | Status: SHIPPED | OUTPATIENT
Start: 2022-07-12

## 2022-07-12 RX ORDER — ROSUVASTATIN CALCIUM 5 MG/1
TABLET, COATED ORAL
COMMUNITY
Start: 2022-06-15 | End: 2022-08-16 | Stop reason: SDUPTHER

## 2022-07-12 RX ORDER — AMLODIPINE BESYLATE 10 MG/1
TABLET ORAL
Qty: 90 TABLET | Refills: 3 | Status: SHIPPED | OUTPATIENT
Start: 2022-07-12

## 2022-07-12 SDOH — ECONOMIC STABILITY: FOOD INSECURITY: WITHIN THE PAST 12 MONTHS, YOU WORRIED THAT YOUR FOOD WOULD RUN OUT BEFORE YOU GOT MONEY TO BUY MORE.: NEVER TRUE

## 2022-07-12 SDOH — ECONOMIC STABILITY: FOOD INSECURITY: WITHIN THE PAST 12 MONTHS, THE FOOD YOU BOUGHT JUST DIDN'T LAST AND YOU DIDN'T HAVE MONEY TO GET MORE.: NEVER TRUE

## 2022-07-12 ASSESSMENT — SOCIAL DETERMINANTS OF HEALTH (SDOH): HOW HARD IS IT FOR YOU TO PAY FOR THE VERY BASICS LIKE FOOD, HOUSING, MEDICAL CARE, AND HEATING?: NOT HARD AT ALL

## 2022-07-12 ASSESSMENT — PATIENT HEALTH QUESTIONNAIRE - PHQ9
SUM OF ALL RESPONSES TO PHQ QUESTIONS 1-9: 0
SUM OF ALL RESPONSES TO PHQ QUESTIONS 1-9: 0
2. FEELING DOWN, DEPRESSED OR HOPELESS: 0
SUM OF ALL RESPONSES TO PHQ QUESTIONS 1-9: 0
1. LITTLE INTEREST OR PLEASURE IN DOING THINGS: 0
SUM OF ALL RESPONSES TO PHQ QUESTIONS 1-9: 0
SUM OF ALL RESPONSES TO PHQ9 QUESTIONS 1 & 2: 0

## 2022-07-12 NOTE — PROGRESS NOTES
HPI:  The patient is a 49 y.o. female who presents today to establish care. The patient states she wants to discuss osteoarthritis and wants to discuss stiffness in right leg. States she is due for blood work. She was switched to Crestor in June by Dr. Penny Villasenor due to upper body weakness. She does feel better. She does 5 mg 4 days and 2.5 mg other days. She had cystoscopy with Dr. Dick Robbins and was wnl. She had hematuria with GYN and incontinence. She has pelvic u/s at [facility]. He c/o R knee pain. It is stiff, painful and unstable. Pt is not fasting.      Past Medical History:   Diagnosis Date    Anticardiolipin antibody syndrome [facility])     hematology-[facility]    Back pain     Bilateral carotid artery stenosis 05/28/2020    Bruit of left carotid artery 05/28/2020    Carotid stenosis, right 06/15/2020    Dr. Juam Pereira and Dr. Jen Mcfarlane at [facility]    Cerebral artery occlusion with cerebral infarction [facility])     H/O: CVA (cerebrovascular accident) 05/28/2020    High total serum IgM 02/18/2021    Hypertension     Numbness and tingling     Occlusion of left internal carotid artery 05/22/2019    Paralysis of right lower extremity ([address] 05/22/2019    Right leg weakness 05/28/2020    S/P carotid endarterectomy 06/15/2020      Past Surgical History:   Procedure Laterality Date    CAROTID ENDARTERECTOMY Left 6/10/2020    LEFT CAROTID ENDARTERECTOMY performed by Laly Mascorro MD at [address] [place] [address] History   Problem Relation Age of Onset    High Blood Pressure Mother     Diabetes Mother     Stroke Mother     Heart Disease Mother     Kidney Disease Father     Heart Disease Father     High Blood Pressure Sister     High Blood Pressure Brother     High Blood Pressure Brother      Social History     Socioeconomic History    Marital status: Single     Spouse name: Not on file    Number of children: Not on file    Years of education: Not on file    Highest education level: Not on file   Occupational History    Not on file   Tobacco Use    Smoking status: Former     Packs/day: 0.50     Years: 22.00     Pack years: 11.00     Types: Cigarettes     Quit date: 5/1/2019     Years since quitting: 3.2    Smokeless tobacco: Never   Vaping Use    Vaping Use: Never used   Substance and Sexual Activity    Alcohol use: Yes     Comment: moderate    Drug use: No    Sexual activity: Not Currently   Other Topics Concern    Not on file   Social History Narrative    Not on file     Social Determinants of Health     Financial Resource Strain: Low Risk     Difficulty of Paying Living Expenses: Not hard at all   Food Insecurity: No Food Insecurity    Worried About Running Out of Food in the Last Year: Never true    Ran Out of Food in the Last Year: Never true   Transportation Needs: Not on file   Physical Activity: Not on file   Stress: Not on file   Social Connections: Not on file   Intimate Partner Violence: Not on file   Housing Stability: Not on file      No Known Allergies     Review of Systems:  Constitutional:  No fever, no fatigue, no chills, no headaches, no weight change  Dermatology:  No rash, no mole, no dry or sensitive skin  ENT:  No cough, no sore throat, no sinus pain, no runny nose, no ear pain  Cardiology:  No chest pain, no palpitations, no leg edema, no shortness of breath, no PND  Endocrinology:  No polydipsia, no polyuria, no cold intolerance, no heat intolerance, no polyphagia, no hair changes  Gastroenterology:  No dysphagia, no abdominal pain, no nausea, no vomiting, no constipation, no diarrhea, no heartburn  Female Reproductive:  No hot flashes, no abnormal vaginal discharge, no pain with menstruation, no pelvic pain  Musculoskeletal:  No joint pain, no leg cramps, no back pain, no muscle aches  Respiratory:  No shortness of breath, no orthopnea, no wheezing, no ESPINOSA, no hemoptysis  Urology:  No blood in the urine, no urinary frequency, no urinary incontinence, no urinary urgency, no nocturia, no dysuria  Neurology:  No numbness/tingling, no dizziness, no weakness  Psychology:  No depression, no sleep disturbances, no suicidal ideation, no anxiety  Physical Exam:  Vitals:    07/12/22 1026   BP: 132/86   Pulse: 93   Resp: 16   Temp: 97.6 °F (36.4 °C)   TempSrc: Temporal   SpO2: 100%   Weight: 148 lb 11.2 oz (67.4 kg)   Height: 5' 1.5\" (1.562 m)     General:  Patient alert and oriented x 3, NAD, pleasant  HEENT:  Atraumatic, normocephalic, PERRLA, EOMI, clear conjunctiva, TMs clear, nose-clear, throat - no erythema, tonsils- wnl  Neck:  Supple, no goiter, no carotid bruits, no lymphadenopathy  Lungs:  CTA B  Heart:  RRR, no murmurs, gallops or rubs  Abdomen:  Soft, NTND, + bowel sounds  Back: full ROM, no CVA tenderness  Extremities:  No clubbing, cyanosis or edema  Neuro:  CN II-XII grossly intact, 5/5 strength in bilateral upper and lower extremities, 2 + reflexes. Skin: unremarkable    Assessment/Plan:  Jermain Chavez was seen today for new patient. Diagnoses and all orders for this visit:    Acute pain of right knee  -     XR KNEE RIGHT (MIN 4 VIEWS); Future    APS (antiphospholipid syndrome) ([facility])    H/O: CVA (cerebrovascular accident)  -     Comprehensive Metabolic Panel; Future  -     CBC; Future  -     Lipid Panel; Future    Impaired fasting glucose  -     Hemoglobin A1C; Future    Hyperlipidemia, unspecified hyperlipidemia type  -     Lipid Panel; Future  -     TSH; Future    Other orders  -     aspirin (ASPIRIN ADULT LOW STRENGTH) 81 MG EC tablet; TAKE ONE TABLET BY MOUTH EVERY DAY  -     amLODIPine (NORVASC) 10 MG tablet; TAKE 1 TABLET BY MOUTH EVERY DAY    As above. Call or go to ED immediately if symptoms worsen or persist.  No follow-ups on file. or sooner if necessary. Educational materials and/or home exercises printed for patient's review and were included in patient instructions on his/her After Visit Summary and given to patient at the end of visit.       Counseled regarding above diagnosis, including possible risks and

## 2022-07-13 LAB
ALBUMIN SERPL-MCNC: 4.8 G/DL (ref 3.5–5.2)
ALP BLD-CCNC: 79 U/L (ref 35–104)
ALT SERPL-CCNC: 31 U/L (ref 0–32)
ANION GAP SERPL CALCULATED.3IONS-SCNC: 21 MMOL/L (ref 7–16)
AST SERPL-CCNC: 27 U/L (ref 0–31)
BILIRUB SERPL-MCNC: 0.4 MG/DL (ref 0–1.2)
BUN BLDV-MCNC: 10 MG/DL (ref 6–20)
CALCIUM SERPL-MCNC: 10.1 MG/DL (ref 8.6–10.2)
CHLORIDE BLD-SCNC: 102 MMOL/L (ref 98–107)
CHOLESTEROL, TOTAL: 190 MG/DL (ref 0–199)
CO2: 22 MMOL/L (ref 22–29)
CREAT SERPL-MCNC: 0.7 MG/DL (ref 0.5–1)
GFR AFRICAN AMERICAN: >60
GFR NON-AFRICAN AMERICAN: >60 ML/MIN/1.73
GLUCOSE BLD-MCNC: 92 MG/DL (ref 74–99)
HDLC SERPL-MCNC: 57 MG/DL
LDL CHOLESTEROL CALCULATED: 110 MG/DL (ref 0–99)
POTASSIUM SERPL-SCNC: 4.3 MMOL/L (ref 3.5–5)
SODIUM BLD-SCNC: 145 MMOL/L (ref 132–146)
TOTAL PROTEIN: 7.9 G/DL (ref 6.4–8.3)
TRIGL SERPL-MCNC: 116 MG/DL (ref 0–149)
TSH SERPL DL<=0.05 MIU/L-ACNC: 2.4 UIU/ML (ref 0.27–4.2)
VLDLC SERPL CALC-MCNC: 23 MG/DL

## 2022-07-14 ENCOUNTER — TELEPHONE (OUTPATIENT)
Dept: FAMILY MEDICINE CLINIC | Age: 57
End: 2022-07-14

## 2022-07-14 ENCOUNTER — ANTI-COAG VISIT (OUTPATIENT)
Dept: FAMILY MEDICINE CLINIC | Age: 57
End: 2022-07-14
Payer: MEDICARE

## 2022-07-14 ENCOUNTER — ANTI-COAG VISIT (OUTPATIENT)
Dept: FAMILY MEDICINE CLINIC | Age: 57
End: 2022-07-14

## 2022-07-14 DIAGNOSIS — Z86.73 H/O: CVA (CEREBROVASCULAR ACCIDENT): ICD-10-CM

## 2022-07-14 LAB
INR BLD: 4
INR BLD: 4
PROTIME: NORMAL

## 2022-07-14 PROCEDURE — 93793 ANTICOAG MGMT PT WARFARIN: CPT | Performed by: FAMILY MEDICINE

## 2022-07-15 ENCOUNTER — HOSPITAL ENCOUNTER (OUTPATIENT)
Age: 57
Discharge: HOME | End: 2022-07-15
Payer: MEDICARE

## 2022-07-15 ENCOUNTER — HOSPITAL ENCOUNTER (OUTPATIENT)
Dept: GENERAL RADIOLOGY | Age: 57
Discharge: HOME | End: 2022-07-17
Payer: MEDICARE

## 2022-07-15 ENCOUNTER — HOSPITAL ENCOUNTER (OUTPATIENT)
Age: 57
Discharge: HOME | End: 2022-07-17
Payer: MEDICARE

## 2022-07-15 DIAGNOSIS — M25.561 ACUTE PAIN OF RIGHT KNEE: ICD-10-CM

## 2022-07-15 PROCEDURE — 86146 BETA-2 GLYCOPROTEIN ANTIBODY: CPT

## 2022-07-15 PROCEDURE — 86147 CARDIOLIPIN ANTIBODY EA IG: CPT

## 2022-07-15 PROCEDURE — 73564 X-RAY EXAM KNEE 4 OR MORE: CPT

## 2022-07-18 LAB
ANTICARDIOLIPIN IGA ANTIBODY: <10 APL
ANTICARDIOLIPIN IGG ANTIBODY: <10 GPL
BETA-2 GLYCOPROTEIN 1 IGG ANTIBODY: <10 SGU
BETA-2 GLYCOPROTEIN 1 IGM ANTIBODY: 105 SMU
CARDIOLIPIN AB IGM: >150 MPL

## 2022-08-03 ENCOUNTER — ANTI-COAG VISIT (OUTPATIENT)
Dept: FAMILY MEDICINE CLINIC | Age: 57
End: 2022-08-03

## 2022-08-05 ENCOUNTER — ANTI-COAG VISIT (OUTPATIENT)
Dept: FAMILY MEDICINE CLINIC | Age: 57
End: 2022-08-05

## 2022-08-05 LAB
INR BLD: 2.5
INR BLD: 3.5

## 2022-08-12 ENCOUNTER — TELEPHONE (OUTPATIENT)
Dept: NEUROLOGY | Age: 57
End: 2022-08-12

## 2022-08-12 DIAGNOSIS — Z86.73 H/O: CVA (CEREBROVASCULAR ACCIDENT): Primary | ICD-10-CM

## 2022-08-12 RX ORDER — BACLOFEN 5 MG/1
10 TABLET ORAL 3 TIMES DAILY
Qty: 180 TABLET | Refills: 2 | Status: SHIPPED
Start: 2022-08-12 | End: 2022-08-23

## 2022-08-12 NOTE — TELEPHONE ENCOUNTER
Patient wants refill on Baclofen 5 mg not on med list..  Electronically signed by Chuy Olmos MA on 8/12/2022 at 11:00 AM

## 2022-08-16 RX ORDER — ROSUVASTATIN CALCIUM 5 MG/1
TABLET, COATED ORAL
Qty: 30 TABLET | Refills: 0 | Status: SHIPPED
Start: 2022-08-16 | End: 2022-08-22 | Stop reason: SDUPTHER

## 2022-08-18 ENCOUNTER — ANTI-COAG VISIT (OUTPATIENT)
Dept: FAMILY MEDICINE CLINIC | Age: 57
End: 2022-08-18

## 2022-08-18 LAB
INR BLD: 2.5
INR BLD: 2.5
PROTIME: NORMAL

## 2022-08-22 RX ORDER — ROSUVASTATIN CALCIUM 5 MG/1
TABLET, COATED ORAL
Qty: 30 TABLET | Refills: 0 | Status: SHIPPED
Start: 2022-08-22 | End: 2022-09-14 | Stop reason: SDUPTHER

## 2022-08-23 ENCOUNTER — TELEPHONE (OUTPATIENT)
Dept: NEUROLOGY | Age: 57
End: 2022-08-23

## 2022-08-23 RX ORDER — TIZANIDINE 2 MG/1
2 TABLET ORAL 3 TIMES DAILY PRN
Qty: 90 TABLET | Refills: 2 | Status: SHIPPED
Start: 2022-08-23 | End: 2022-08-29 | Stop reason: SINTOL

## 2022-08-23 NOTE — TELEPHONE ENCOUNTER
Patient stated that her insurance will not pay for the baclofen anymore but they will cover zanaflex. Please advise.

## 2022-08-25 LAB
INR BLD: 1.5
PROTIME: NORMAL

## 2022-08-26 ENCOUNTER — ANTI-COAG VISIT (OUTPATIENT)
Dept: FAMILY MEDICINE CLINIC | Age: 57
End: 2022-08-26
Payer: MEDICARE

## 2022-08-26 ENCOUNTER — ANTI-COAG VISIT (OUTPATIENT)
Dept: FAMILY MEDICINE CLINIC | Age: 57
End: 2022-08-26

## 2022-08-26 DIAGNOSIS — D68.61 APS (ANTIPHOSPHOLIPID SYNDROME) (HCC): Primary | ICD-10-CM

## 2022-08-26 PROCEDURE — 93793 ANTICOAG MGMT PT WARFARIN: CPT | Performed by: FAMILY MEDICINE

## 2022-08-29 ENCOUNTER — SCHEDULED TELEPHONE ENCOUNTER (OUTPATIENT)
Dept: NEUROLOGY | Age: 57
End: 2022-08-29
Payer: MEDICARE

## 2022-08-29 DIAGNOSIS — Z86.73 H/O: CVA (CEREBROVASCULAR ACCIDENT): Primary | ICD-10-CM

## 2022-08-29 PROCEDURE — 99442 PR PHYS/QHP TELEPHONE EVALUATION 11-20 MIN: CPT | Performed by: PHYSICIAN ASSISTANT

## 2022-08-29 RX ORDER — ESTRADIOL 10 UG/1
TABLET VAGINAL
COMMUNITY
Start: 2022-07-26

## 2022-08-29 RX ORDER — GABAPENTIN 100 MG/1
CAPSULE ORAL
COMMUNITY
Start: 2022-08-23

## 2022-08-29 RX ORDER — DANTROLENE SODIUM 25 MG/1
CAPSULE ORAL
Qty: 70 CAPSULE | Refills: 0 | Status: SHIPPED
Start: 2022-08-29 | End: 2022-09-12

## 2022-08-29 NOTE — PROGRESS NOTES
[facility]. Maisha Martínez M.D., F.A.C.P. Abida Ramos, DNP, APRN, CNS  Yasmin Alamo. Silke Godinez, MSN, APRN-FNP-C  Kristi Rosenbaum MSN, APRN, FNP-C  DEMARCO Javiervgatiffany Sanders MSN, APRN, FNP-C  [address], [facility]  [place], [address] [place]  Phone: [phone]  Fax: [phone]           The patient and/or health care decision maker is aware that that he/she may receive a bill for this telephone service, depending on his/her insurance coverage, and has provided verbal consent to proceed: Yes     I affirm this is a Patient Initiated Episode with an Established Patient who has not had a related appointment within my department in the past 7 days or scheduled within the next 24 hours. The patient's identity was verified at the start of the call. Others involved in call: Total Time: minutes: 11-20 minutes    Note: not billable if this call serves to triage the patient into an appointment for the relevant concern    HPI:     She follows with our office for history of embolic L MIRA/MCA stroke in the setting of LICA occlusion in 9/7211      She continues to explore therapy options for her residual R leg weakness and spasticity. Unsure if she is noticing any more benefit. She is not really wearing her AFO brace anymore   Advised high top tennis shoes     Ordered baclofen for her spasticity as she is working with a new therapy device. Denied by insurance. Ordered Zanaflex 2 mg, too sedating and makes her dizzy. Otherwise no new neuro concerns     No chest pain or palpitations  No SOB  No vertigo, lightheadedness or loss of consciousness  No falls, tripping or stumbling  No incontinence of bowels or bladder  No itching or bruising   No numbness, tingling or focal arm/leg weakness    ROS otherwise negative      Medications:     Prior to Admission medications    Medication Sig Start Date End Date Taking?  Authorizing Provider   tiZANidine (ZANAFLEX) 2 MG tablet Take 1 tablet by mouth 3 times daily as needed (spasticity) 8/23/22 11/21/22 Yes MESERET Paulino   rosuvastatin (CRESTOR) 5 MG tablet TAKE 1 TABLET BY MOUTH MONDAY, [facility] AND FRIDAY 8/22/22  Yes Antonino Lima MD   aspirin (ASPIRIN ADULT LOW STRENGTH) 81 MG EC tablet TAKE ONE TABLET BY MOUTH EVERY DAY 7/12/22  Yes Antonino Lima MD   amLODIPine (NORVASC) 10 MG tablet TAKE 1 TABLET BY MOUTH EVERY DAY 7/12/22  Yes Antonino Lima MD   irbesartan-hydroCHLOROthiazide (AVALIDE) 150-12.5 MG per tablet Take 1 tablet by mouth daily 6/29/22  Yes DEMARIO Omer - CNP   warfarin (COUMADIN) 5 MG tablet TAKE 1 TABLET BY MOUTH 5 TIMES A WEEK AND TAKE 1/2 TABLET BY MOUTH TWICE A WEEK 5/20/22  Yes Madi Gusman,    PT/INR Testing Monitor KIT Use weekly 11/12/21  Yes Madi Gusman DO   Handicap [place] [id number] [facility] by Does not apply route Patient cannot walk 200 ft without stopping to rest.    Expiration 11/2026 11/12/21  Yes Pascale Brady, DO   Multiple Vitamins-Minerals (MULTIVITAMIN ADULT PO) Take 1 tablet by mouth   Yes Historical Provider, MD   gabapentin (NEURONTIN) 100 MG capsule TAKE 1 CAPSULE BY MOUTH AT BEDTIME 8/23/22   Historical Provider, MD   YUVAFEM 10 MCG TABS vaginal tablet INSERT 1 TABLET INTRAVAGINALLY AT BEDTIME FOR 14 DAYS THEN TWICE WEEKLY AT BEDTIME. 7/26/22   Historical Provider, MD   aspirin (ASPIRIN ADULT LOW STRENGTH) 81 MG EC tablet TAKE ONE TABLET BY MOUTH EVERY DAY 4/27/21   Madi Gusman, DO       Objective:     Mental Status Exam: sounds alert, oriented x4; thought processes appropriate    Speech sounds clear    Breathing Effort sounds normal    Laboratory/Radiology:     No current labs or images for me to review today     All labs and images personally reviewed today    Assessment:     L MIRA/MCA embolic stroke 2/2 LICA occlusion in 3/8180              S/p L CEA in 6/2020--  Unfortunately, recent scans in 1/21 revealed reocclusion               On ASA

## 2022-08-29 NOTE — PROGRESS NOTES
Tequila Ramon was read the following message We want to confirm that, for purposes of billing, this is a virtual visit with your provider for which we will submit a claim for reimbursement with your insurance company. You will be responsible for any copays, coinsurance amounts or other amounts not covered by your insurance company. If you do not accept this, unfortunately we will not be able to schedule or proceed with a virtual visit with the provider. Do you accept? Sung Patient responded Yes .

## 2022-09-01 ENCOUNTER — ANTI-COAG VISIT (OUTPATIENT)
Dept: FAMILY MEDICINE CLINIC | Age: 57
End: 2022-09-01

## 2022-09-01 LAB
INR BLD: 3.1
PROTIME: NORMAL

## 2022-09-08 LAB
INR BLD: 3
PROTIME: NORMAL

## 2022-09-09 ENCOUNTER — ANTI-COAG VISIT (OUTPATIENT)
Dept: FAMILY MEDICINE CLINIC | Age: 57
End: 2022-09-09
Payer: MEDICARE

## 2022-09-09 DIAGNOSIS — Z86.73 H/O: CVA (CEREBROVASCULAR ACCIDENT): Primary | ICD-10-CM

## 2022-09-09 PROCEDURE — 93793 ANTICOAG MGMT PT WARFARIN: CPT | Performed by: FAMILY MEDICINE

## 2022-09-12 RX ORDER — DANTROLENE SODIUM 25 MG/1
CAPSULE ORAL
Qty: 70 CAPSULE | Refills: 0 | Status: SHIPPED | OUTPATIENT
Start: 2022-09-12 | End: 2022-10-10

## 2022-09-14 ENCOUNTER — TELEPHONE (OUTPATIENT)
Dept: FAMILY MEDICINE CLINIC | Age: 57
End: 2022-09-14

## 2022-09-14 RX ORDER — ROSUVASTATIN CALCIUM 5 MG/1
TABLET, COATED ORAL
Qty: 30 TABLET | Refills: 0 | Status: SHIPPED | OUTPATIENT
Start: 2022-09-14

## 2022-09-15 ENCOUNTER — ANTI-COAG VISIT (OUTPATIENT)
Dept: FAMILY MEDICINE CLINIC | Age: 57
End: 2022-09-15

## 2022-09-15 LAB
INR BLD: 2.5
PROTIME: NORMAL

## 2022-09-15 RX ORDER — TIZANIDINE 2 MG/1
2 TABLET ORAL 3 TIMES DAILY PRN
Qty: 90 TABLET | Refills: 2 | OUTPATIENT
Start: 2022-09-15 | End: 2022-12-14

## 2022-09-22 ENCOUNTER — ANTI-COAG VISIT (OUTPATIENT)
Dept: FAMILY MEDICINE CLINIC | Age: 57
End: 2022-09-22

## 2022-09-22 LAB
INR BLD: 2.5
PROTIME: NORMAL

## 2022-09-29 ENCOUNTER — ANTI-COAG VISIT (OUTPATIENT)
Dept: FAMILY MEDICINE CLINIC | Age: 57
End: 2022-09-29
Payer: MEDICARE

## 2022-09-29 DIAGNOSIS — D68.61 APS (ANTIPHOSPHOLIPID SYNDROME) (HCC): Primary | ICD-10-CM

## 2022-09-29 LAB
INR BLD: 3.2
INR BLD: 3.2
PROTIME: NORMAL

## 2022-09-29 PROCEDURE — 93793 ANTICOAG MGMT PT WARFARIN: CPT | Performed by: FAMILY MEDICINE

## 2022-10-05 ENCOUNTER — ANTI-COAG VISIT (OUTPATIENT)
Dept: FAMILY MEDICINE CLINIC | Age: 57
End: 2022-10-05
Payer: MEDICARE

## 2022-10-05 DIAGNOSIS — Z86.73 H/O: CVA (CEREBROVASCULAR ACCIDENT): Primary | ICD-10-CM

## 2022-10-05 LAB
INR BLD: 2.4
INR BLD: NORMAL
PROTIME: 2.4 SECONDS

## 2022-10-05 PROCEDURE — 93793 ANTICOAG MGMT PT WARFARIN: CPT | Performed by: FAMILY MEDICINE

## 2022-10-12 ENCOUNTER — ANTI-COAG VISIT (OUTPATIENT)
Dept: FAMILY MEDICINE CLINIC | Age: 57
End: 2022-10-12

## 2022-10-12 LAB
INR BLD: 3.4
INR BLD: 3.4
PROTIME: NORMAL

## 2022-10-19 ENCOUNTER — ANTI-COAG VISIT (OUTPATIENT)
Dept: FAMILY MEDICINE CLINIC | Age: 57
End: 2022-10-19
Payer: MEDICARE

## 2022-10-19 DIAGNOSIS — I63.9 CEREBROVASCULAR ACCIDENT (CVA), UNSPECIFIED MECHANISM (HCC): Primary | ICD-10-CM

## 2022-10-19 LAB
INR BLD: 3
INR BLD: 3
PROTIME: NORMAL
PROTIME: NORMAL

## 2022-10-19 PROCEDURE — 93793 ANTICOAG MGMT PT WARFARIN: CPT | Performed by: FAMILY MEDICINE

## 2022-10-26 ENCOUNTER — ANTI-COAG VISIT (OUTPATIENT)
Dept: FAMILY MEDICINE CLINIC | Age: 57
End: 2022-10-26
Payer: MEDICARE

## 2022-10-26 DIAGNOSIS — Z79.01 ANTICOAGULATION GOAL OF INR 2 TO 3: Primary | ICD-10-CM

## 2022-10-26 DIAGNOSIS — Z51.81 ANTICOAGULATION GOAL OF INR 2 TO 3: Primary | ICD-10-CM

## 2022-10-26 LAB
INR BLD: 3.6
PROTIME: NORMAL

## 2022-10-26 PROCEDURE — 93793 ANTICOAG MGMT PT WARFARIN: CPT | Performed by: FAMILY MEDICINE

## 2022-10-26 RX ORDER — WARFARIN SODIUM 5 MG/1
TABLET ORAL
Qty: 30 TABLET | Refills: 3 | Status: SHIPPED | OUTPATIENT
Start: 2022-10-26

## 2022-11-02 LAB
INR BLD: 2.7
PROTIME: NORMAL

## 2022-11-07 DIAGNOSIS — E78.5 HYPERLIPIDEMIA, UNSPECIFIED HYPERLIPIDEMIA TYPE: Primary | ICD-10-CM

## 2022-11-07 DIAGNOSIS — R73.01 IMPAIRED FASTING GLUCOSE: ICD-10-CM

## 2022-11-08 ENCOUNTER — HOSPITAL ENCOUNTER (OUTPATIENT)
Age: 57
Discharge: HOME | End: 2022-11-08
Payer: MEDICARE

## 2022-11-08 DIAGNOSIS — E78.5 HYPERLIPIDEMIA, UNSPECIFIED HYPERLIPIDEMIA TYPE: ICD-10-CM

## 2022-11-08 DIAGNOSIS — R73.01 IMPAIRED FASTING GLUCOSE: ICD-10-CM

## 2022-11-08 LAB
ALBUMIN SERPL-MCNC: 4.5 G/DL (ref 3.5–5.2)
ALP BLD-CCNC: 72 U/L (ref 35–104)
ALT SERPL-CCNC: 26 U/L (ref 0–32)
ANION GAP SERPL CALCULATED.3IONS-SCNC: 10 MMOL/L (ref 7–16)
AST SERPL-CCNC: 20 U/L (ref 0–31)
BASOPHILS ABSOLUTE: 0.03 E9/L (ref 0–0.2)
BASOPHILS RELATIVE PERCENT: 0.4 % (ref 0–2)
BILIRUB SERPL-MCNC: 0.3 MG/DL (ref 0–1.2)
BUN BLDV-MCNC: 15 MG/DL (ref 6–20)
CALCIUM SERPL-MCNC: 9.9 MG/DL (ref 8.6–10.2)
CHLORIDE BLD-SCNC: 101 MMOL/L (ref 98–107)
CHOLESTEROL, TOTAL: 169 MG/DL (ref 0–199)
CO2: 28 MMOL/L (ref 22–29)
CREAT SERPL-MCNC: 0.7 MG/DL (ref 0.5–1)
EOSINOPHILS ABSOLUTE: 0.14 E9/L (ref 0.05–0.5)
EOSINOPHILS RELATIVE PERCENT: 2 % (ref 0–6)
GFR SERPL CREATININE-BSD FRML MDRD: >60 ML/MIN/1.73
GLUCOSE BLD-MCNC: 95 MG/DL (ref 74–99)
HCT VFR BLD CALC: 44.3 % (ref 34–48)
HDLC SERPL-MCNC: 50 MG/DL
HEMOGLOBIN: 15.1 G/DL (ref 11.5–15.5)
IMMATURE GRANULOCYTES #: 0.02 E9/L
IMMATURE GRANULOCYTES %: 0.3 % (ref 0–5)
LDL CHOLESTEROL CALCULATED: 93 MG/DL (ref 0–99)
LYMPHOCYTES ABSOLUTE: 2.05 E9/L (ref 1.5–4)
LYMPHOCYTES RELATIVE PERCENT: 29.8 % (ref 20–42)
MCH RBC QN AUTO: 29.4 PG (ref 26–35)
MCHC RBC AUTO-ENTMCNC: 34.1 % (ref 32–34.5)
MCV RBC AUTO: 86.4 FL (ref 80–99.9)
MONOCYTES ABSOLUTE: 0.52 E9/L (ref 0.1–0.95)
MONOCYTES RELATIVE PERCENT: 7.5 % (ref 2–12)
NEUTROPHILS ABSOLUTE: 4.13 E9/L (ref 1.8–7.3)
NEUTROPHILS RELATIVE PERCENT: 60 % (ref 43–80)
PDW BLD-RTO: 12.1 FL (ref 11.5–15)
PLATELET # BLD: 210 E9/L (ref 130–450)
PMV BLD AUTO: 8.9 FL (ref 7–12)
POTASSIUM SERPL-SCNC: 3.9 MMOL/L (ref 3.5–5)
RBC # BLD: 5.13 E12/L (ref 3.5–5.5)
SODIUM BLD-SCNC: 139 MMOL/L (ref 132–146)
TOTAL PROTEIN: 7.3 G/DL (ref 6.4–8.3)
TRIGL SERPL-MCNC: 128 MG/DL (ref 0–149)
TSH SERPL DL<=0.05 MIU/L-ACNC: 2.39 UIU/ML (ref 0.27–4.2)
VLDLC SERPL CALC-MCNC: 26 MG/DL
WBC # BLD: 6.9 E9/L (ref 4.5–11.5)

## 2022-11-08 PROCEDURE — 84443 ASSAY THYROID STIM HORMONE: CPT

## 2022-11-08 PROCEDURE — 80053 COMPREHEN METABOLIC PANEL: CPT

## 2022-11-08 PROCEDURE — 85025 COMPLETE CBC W/AUTO DIFF WBC: CPT

## 2022-11-08 PROCEDURE — 80061 LIPID PANEL: CPT

## 2022-11-08 PROCEDURE — 36415 COLL VENOUS BLD VENIPUNCTURE: CPT

## 2022-11-09 ENCOUNTER — ANTI-COAG VISIT (OUTPATIENT)
Dept: FAMILY MEDICINE CLINIC | Age: 57
End: 2022-11-09

## 2022-11-09 LAB
INR BLD: 2.9
PROTIME: NORMAL

## 2022-11-16 ENCOUNTER — ANTI-COAG VISIT (OUTPATIENT)
Dept: FAMILY MEDICINE CLINIC | Age: 57
End: 2022-11-16

## 2022-11-16 LAB
INR BLD: 2.4
PROTIME: NORMAL

## 2022-11-23 ENCOUNTER — ANTI-COAG VISIT (OUTPATIENT)
Dept: FAMILY MEDICINE CLINIC | Age: 57
End: 2022-11-23

## 2022-11-23 LAB
INR BLD: 2.8
INR BLD: 2.8
PROTIME: NORMAL
PROTIME: NORMAL

## 2022-11-30 ENCOUNTER — ANTI-COAG VISIT (OUTPATIENT)
Dept: FAMILY MEDICINE CLINIC | Age: 57
End: 2022-11-30
Payer: MEDICARE

## 2022-11-30 DIAGNOSIS — Z79.01 ANTICOAGULATION GOAL OF INR 2 TO 3: Primary | ICD-10-CM

## 2022-11-30 DIAGNOSIS — Z51.81 ANTICOAGULATION GOAL OF INR 2 TO 3: Primary | ICD-10-CM

## 2022-11-30 LAB
INR BLD: 2
PROTIME: NORMAL

## 2022-11-30 PROCEDURE — 93793 ANTICOAG MGMT PT WARFARIN: CPT | Performed by: FAMILY MEDICINE

## 2022-12-07 ENCOUNTER — ANTI-COAG VISIT (OUTPATIENT)
Dept: FAMILY MEDICINE CLINIC | Age: 57
End: 2022-12-07
Payer: MEDICARE

## 2022-12-07 DIAGNOSIS — D68.61 APS (ANTIPHOSPHOLIPID SYNDROME) (HCC): Primary | ICD-10-CM

## 2022-12-07 LAB
INR BLD: 1.5
PROTIME: NORMAL

## 2022-12-07 PROCEDURE — 93793 ANTICOAG MGMT PT WARFARIN: CPT | Performed by: FAMILY MEDICINE

## 2022-12-09 ENCOUNTER — TELEPHONE (OUTPATIENT)
Dept: FAMILY MEDICINE CLINIC | Age: 57
End: 2022-12-09

## 2022-12-09 NOTE — TELEPHONE ENCOUNTER
Cynthia Roche called in this afternoon asking if we have been receiving her INR's because she had an out of range INR this week and hasn't heard anything

## 2022-12-14 ENCOUNTER — ANTI-COAG VISIT (OUTPATIENT)
Dept: FAMILY MEDICINE CLINIC | Age: 57
End: 2022-12-14

## 2022-12-14 LAB
INR BLD: 2.1
PROTIME: NORMAL

## 2022-12-21 ENCOUNTER — ANTI-COAG VISIT (OUTPATIENT)
Dept: FAMILY MEDICINE CLINIC | Age: 57
End: 2022-12-21

## 2022-12-21 LAB
INR BLD: 2.2
PROTIME: NORMAL

## 2023-01-03 ENCOUNTER — HOSPITAL ENCOUNTER (OUTPATIENT)
Dept: ULTRASOUND IMAGING | Age: 58
Discharge: HOME | End: 2023-01-05
Payer: MEDICARE

## 2023-01-03 ENCOUNTER — TRANSCRIBE ORDERS (OUTPATIENT)
Dept: ADMINISTRATIVE | Age: 58
End: 2023-01-03

## 2023-01-03 DIAGNOSIS — R22.42 LOCALIZED SWELLING, MASS, OR LUMP OF LEFT LOWER EXTREMITY: ICD-10-CM

## 2023-01-03 DIAGNOSIS — R22.42 LOCALIZED SWELLING, MASS, OR LUMP OF LEFT LOWER EXTREMITY: Primary | ICD-10-CM

## 2023-01-03 PROCEDURE — 93971 EXTREMITY STUDY: CPT

## 2023-01-04 ENCOUNTER — ANTI-COAG VISIT (OUTPATIENT)
Dept: FAMILY MEDICINE CLINIC | Age: 58
End: 2023-01-04

## 2023-01-04 LAB
INR BLD: 2
PROTIME: NORMAL

## 2023-01-11 ENCOUNTER — ANTI-COAG VISIT (OUTPATIENT)
Dept: FAMILY MEDICINE CLINIC | Age: 58
End: 2023-01-11
Payer: MEDICARE

## 2023-01-11 DIAGNOSIS — D68.61 APS (ANTIPHOSPHOLIPID SYNDROME) (HCC): Primary | ICD-10-CM

## 2023-01-11 LAB
INR BLD: 1.9
INR BLD: 1.9
PROTIME: NORMAL
PROTIME: NORMAL

## 2023-01-11 PROCEDURE — 93793 ANTICOAG MGMT PT WARFARIN: CPT | Performed by: FAMILY MEDICINE

## 2023-01-18 ENCOUNTER — ANTI-COAG VISIT (OUTPATIENT)
Dept: FAMILY MEDICINE CLINIC | Age: 58
End: 2023-01-18
Payer: MEDICARE

## 2023-01-18 DIAGNOSIS — Z86.73 H/O: CVA (CEREBROVASCULAR ACCIDENT): Primary | ICD-10-CM

## 2023-01-18 LAB
INR BLD: 3.4
PROTIME: NORMAL

## 2023-01-18 PROCEDURE — 93793 ANTICOAG MGMT PT WARFARIN: CPT | Performed by: FAMILY MEDICINE

## 2023-01-23 RX ORDER — WARFARIN SODIUM 5 MG/1
TABLET ORAL
Qty: 90 TABLET | Refills: 1 | Status: SHIPPED | OUTPATIENT
Start: 2023-01-23

## 2023-01-25 ENCOUNTER — ANTI-COAG VISIT (OUTPATIENT)
Dept: FAMILY MEDICINE CLINIC | Age: 58
End: 2023-01-25

## 2023-01-25 LAB
INR BLD: 2.2
PROTIME: NORMAL

## 2023-02-01 ENCOUNTER — ANTI-COAG VISIT (OUTPATIENT)
Dept: FAMILY MEDICINE CLINIC | Age: 58
End: 2023-02-01

## 2023-02-01 DIAGNOSIS — Z86.73 H/O: CVA (CEREBROVASCULAR ACCIDENT): Primary | ICD-10-CM

## 2023-02-01 LAB
INR BLD: 1.9
INR BLD: NORMAL
PROTIME: NORMAL
PROTIME: NORMAL

## 2023-02-08 ENCOUNTER — ANTI-COAG VISIT (OUTPATIENT)
Dept: FAMILY MEDICINE CLINIC | Age: 58
End: 2023-02-08

## 2023-02-08 DIAGNOSIS — Z86.73 H/O: CVA (CEREBROVASCULAR ACCIDENT): Primary | ICD-10-CM

## 2023-02-08 LAB
INR BLD: 2.4
PROTIME: NORMAL

## 2023-02-15 ENCOUNTER — ANTI-COAG VISIT (OUTPATIENT)
Dept: FAMILY MEDICINE CLINIC | Age: 58
End: 2023-02-15

## 2023-02-15 LAB
INR BLD: 2.7
PROTIME: NORMAL

## 2023-02-22 ENCOUNTER — OFFICE VISIT (OUTPATIENT)
Dept: FAMILY MEDICINE CLINIC | Age: 58
End: 2023-02-22

## 2023-02-22 ENCOUNTER — ANTI-COAG VISIT (OUTPATIENT)
Dept: FAMILY MEDICINE CLINIC | Age: 58
End: 2023-02-22

## 2023-02-22 VITALS
HEART RATE: 89 BPM | HEIGHT: 61 IN | WEIGHT: 154.1 LBS | OXYGEN SATURATION: 98 % | BODY MASS INDEX: 29.09 KG/M2 | RESPIRATION RATE: 18 BRPM | TEMPERATURE: 98.1 F | SYSTOLIC BLOOD PRESSURE: 124 MMHG | DIASTOLIC BLOOD PRESSURE: 80 MMHG

## 2023-02-22 DIAGNOSIS — I73.9 PAD (PERIPHERAL ARTERY DISEASE) (HCC): ICD-10-CM

## 2023-02-22 DIAGNOSIS — Z00.00 INITIAL MEDICARE ANNUAL WELLNESS VISIT: ICD-10-CM

## 2023-02-22 DIAGNOSIS — M21.70 LEG LENGTH DISCREPANCY: ICD-10-CM

## 2023-02-22 DIAGNOSIS — R20.0 NUMBNESS: ICD-10-CM

## 2023-02-22 DIAGNOSIS — M67.40 GANGLION CYST: ICD-10-CM

## 2023-02-22 DIAGNOSIS — Z78.0 POSTMENOPAUSAL: ICD-10-CM

## 2023-02-22 DIAGNOSIS — Z12.11 SCREEN FOR COLON CANCER: ICD-10-CM

## 2023-02-22 DIAGNOSIS — M77.9 BONE SPUR: Primary | ICD-10-CM

## 2023-02-22 DIAGNOSIS — D68.61 APS (ANTIPHOSPHOLIPID SYNDROME) (HCC): ICD-10-CM

## 2023-02-22 LAB
INR BLD: 1.8
PROTIME: NORMAL

## 2023-02-22 RX ORDER — IRBESARTAN AND HYDROCHLOROTHIAZIDE 150; 12.5 MG/1; MG/1
TABLET, FILM COATED ORAL
Qty: 90 TABLET | Refills: 1 | Status: SHIPPED | OUTPATIENT
Start: 2023-02-22

## 2023-02-22 RX ORDER — AMOXICILLIN 500 MG/1
CAPSULE ORAL
COMMUNITY
Start: 2023-01-09

## 2023-02-22 RX ORDER — ROSUVASTATIN CALCIUM 10 MG/1
TABLET, COATED ORAL
COMMUNITY
Start: 2022-11-30

## 2023-02-22 SDOH — ECONOMIC STABILITY: TRANSPORTATION INSECURITY
IN THE PAST 12 MONTHS, HAS LACK OF TRANSPORTATION KEPT YOU FROM MEETINGS, WORK, OR FROM GETTING THINGS NEEDED FOR DAILY LIVING?: NO

## 2023-02-22 SDOH — HEALTH STABILITY: PHYSICAL HEALTH: ON AVERAGE, HOW MANY DAYS PER WEEK DO YOU ENGAGE IN MODERATE TO STRENUOUS EXERCISE (LIKE A BRISK WALK)?: 0 DAYS

## 2023-02-22 SDOH — ECONOMIC STABILITY: FOOD INSECURITY: WITHIN THE PAST 12 MONTHS, YOU WORRIED THAT YOUR FOOD WOULD RUN OUT BEFORE YOU GOT MONEY TO BUY MORE.: NEVER TRUE

## 2023-02-22 SDOH — ECONOMIC STABILITY: INCOME INSECURITY: HOW HARD IS IT FOR YOU TO PAY FOR THE VERY BASICS LIKE FOOD, HOUSING, MEDICAL CARE, AND HEATING?: NOT VERY HARD

## 2023-02-22 SDOH — ECONOMIC STABILITY: HOUSING INSECURITY
IN THE LAST 12 MONTHS, WAS THERE A TIME WHEN YOU DID NOT HAVE A STEADY PLACE TO SLEEP OR SLEPT IN A SHELTER (INCLUDING NOW)?: NO

## 2023-02-22 SDOH — HEALTH STABILITY: PHYSICAL HEALTH: ON AVERAGE, HOW MANY MINUTES DO YOU ENGAGE IN EXERCISE AT THIS LEVEL?: 30 MIN

## 2023-02-22 SDOH — ECONOMIC STABILITY: FOOD INSECURITY: WITHIN THE PAST 12 MONTHS, THE FOOD YOU BOUGHT JUST DIDN'T LAST AND YOU DIDN'T HAVE MONEY TO GET MORE.: NEVER TRUE

## 2023-02-22 ASSESSMENT — PATIENT HEALTH QUESTIONNAIRE - PHQ9
5. POOR APPETITE OR OVEREATING: 0
SUM OF ALL RESPONSES TO PHQ QUESTIONS 1-9: 10
2. FEELING DOWN, DEPRESSED OR HOPELESS: 2
SUM OF ALL RESPONSES TO PHQ QUESTIONS 1-9: 10
SUM OF ALL RESPONSES TO PHQ QUESTIONS 1-9: 10
10. IF YOU CHECKED OFF ANY PROBLEMS, HOW DIFFICULT HAVE THESE PROBLEMS MADE IT FOR YOU TO DO YOUR WORK, TAKE CARE OF THINGS AT HOME, OR GET ALONG WITH OTHER PEOPLE: 0
3. TROUBLE FALLING OR STAYING ASLEEP: 2
SUM OF ALL RESPONSES TO PHQ QUESTIONS 1-9: 2
6. FEELING BAD ABOUT YOURSELF - OR THAT YOU ARE A FAILURE OR HAVE LET YOURSELF OR YOUR FAMILY DOWN: 0
SUM OF ALL RESPONSES TO PHQ QUESTIONS 1-9: 2
SUM OF ALL RESPONSES TO PHQ9 QUESTIONS 1 & 2: 4
4. FEELING TIRED OR HAVING LITTLE ENERGY: 2
SUM OF ALL RESPONSES TO PHQ QUESTIONS 1-9: 2
1. LITTLE INTEREST OR PLEASURE IN DOING THINGS: 0
7. TROUBLE CONCENTRATING ON THINGS, SUCH AS READING THE NEWSPAPER OR WATCHING TELEVISION: 0
9. THOUGHTS THAT YOU WOULD BE BETTER OFF DEAD, OR OF HURTING YOURSELF: 0
1. LITTLE INTEREST OR PLEASURE IN DOING THINGS: 2
SUM OF ALL RESPONSES TO PHQ QUESTIONS 1-9: 10
8. MOVING OR SPEAKING SO SLOWLY THAT OTHER PEOPLE COULD HAVE NOTICED. OR THE OPPOSITE, BEING SO FIGETY OR RESTLESS THAT YOU HAVE BEEN MOVING AROUND A LOT MORE THAN USUAL: 2
SUM OF ALL RESPONSES TO PHQ9 QUESTIONS 1 & 2: 2
SUM OF ALL RESPONSES TO PHQ QUESTIONS 1-9: 2
2. FEELING DOWN, DEPRESSED OR HOPELESS: 2

## 2023-02-22 ASSESSMENT — LIFESTYLE VARIABLES
HOW MANY STANDARD DRINKS CONTAINING ALCOHOL DO YOU HAVE ON A TYPICAL DAY: 1
HOW OFTEN DO YOU HAVE A DRINK CONTAINING ALCOHOL: 3
HOW MANY STANDARD DRINKS CONTAINING ALCOHOL DO YOU HAVE ON A TYPICAL DAY: 3 OR 4
HOW OFTEN DO YOU HAVE SIX OR MORE DRINKS ON ONE OCCASION: 1
HOW MANY STANDARD DRINKS CONTAINING ALCOHOL DO YOU HAVE ON A TYPICAL DAY: 1 OR 2
HOW OFTEN DO YOU HAVE A DRINK CONTAINING ALCOHOL: 2-4 TIMES A MONTH
HOW OFTEN DO YOU HAVE A DRINK CONTAINING ALCOHOL: 2-4 TIMES A MONTH

## 2023-02-28 NOTE — PROGRESS NOTES
Medicare Annual Wellness Visit    Quang Butcher is here for Medicare AWV (Has questions about colonoscopy, lung cancer screening. Pneumo vaccines. Needs handicap placard. Right knee pain. Ache in stomach relieved by pepcid. )    Assessment & Plan   Bone spur  -     Jeovany Clark MD, [facility] ([place])  Leg length discrepancy  -     Jeovany Clark MD, [facility] ([place])  Ganglion cyst  Raphael Alcantara MD, [facility] ([place])  PAD (peripheral artery disease) ([facility] [address]  APS (antiphospholipid syndrome) ([facility] [address]  Postmenopausal  -     DEXA BONE DENSITY AXIAL SKELETON; Future  Numbness  -     MRI BRAIN W WO CONTRAST; Future  Screen for colon cancer  -     Jaclynn Essex, MD, General Surgery, [place]    Recommendations for Preventive Services Due: see orders and patient instructions/AVS.  Recommended screening schedule for the next 5-10 years is provided to the patient in written form: see Patient Instructions/AVS.     No follow-ups on file. Subjective       Patient's complete Health Risk Assessment and screening values have been reviewed and are found in Flowsheets. The following problems were reviewed today and where indicated follow up appointments were made and/or referrals ordered.     Positive Risk Factor Screenings with Interventions:    Fall Risk:  Do you feel unsteady or are you worried about falling? : (!) yes  2 or more falls in past year?: (!) yes  Fall with injury in past year?: no     Interventions:  Patient declines any further evaluation or treatment  Patient declines any further evaluation or treatment     Depression:  PHQ-2 Score: 4  PHQ-9 Total Score: 10    Interpretation:   1-4 = minimal  5-9 = mild  10-14 = moderate  15-19 = moderately severe  20-27 = severe  Interventions:  See A/P for any pertinent orders    Alcohol Screening:  Alcohol Use: Heavy Drinker    Frequency of Alcohol Consumption: 2-4 times a month    Average Number of Drinks: 3 or 4 Frequency of Binge Drinking: Less than monthly      AUDIT-C Score: 4        Interpretation of AUDIT-C score:   3-7 indicates potential alcohol risk. 8 or more is associated with harmful or hazardous drinking. 15 or more in women, and 15 or more in men, is likely to indicate alcohol dependence. Interventions:  Patient declined any further intervention or treatment              Social and Emotional Support:  Do you get the social and emotional support that you need?: (!) No  Interventions:  See A/P for plan and any pertinent orders    Weight and Activity:  Physical Activity: Inactive    Days of Exercise per Week: 0 days    Minutes of Exercise per Session: 30 min     On average, how many days per week do you engage in moderate to strenuous exercise (like a brisk walk)?: 0 days  Have you lost any weight without trying in the past 3 months?: No  Body mass index: (!) 29.11    Inactivity Interventions:  Patient declined any further interventions or treatment         Safety:  Do you have any tripping hazards - loose or unsecured carpets or rugs?: (!) Yes  Interventions:  Patient declined any further interventions or treatment                     Objective   Vitals:    02/22/23 1436   BP: 124/80   Pulse: 89   Resp: 18   Temp: 98.1 °F (36.7 °C)   TempSrc: Temporal   SpO2: 98%   Weight: 154 lb 1.6 oz (69.9 kg)   Height: 5' 1\" (1.549 m)      Body mass index is 29.12 kg/m².       General Appearance: alert and oriented to person, place and time, well developed and well- nourished, in no acute distress  Skin: warm and dry, no rash or erythema  Head: normocephalic and atraumatic  Eyes: pupils equal, round, and reactive to light, extraocular eye movements intact, conjunctivae normal  ENT: tympanic membrane, external ear and ear canal normal bilaterally, nose without deformity, nasal mucosa and turbinates normal without polyps  Neck: supple and non-tender without mass, no thyromegaly or thyroid nodules, no cervical lymphadenopathy  Pulmonary/Chest: clear to auscultation bilaterally- no wheezes, rales or rhonchi, normal air movement, no respiratory distress  Cardiovascular: normal rate, regular rhythm, normal S1 and S2, no murmurs, rubs, clicks, or gallops, distal pulses intact, no carotid bruits  Abdomen: soft, non-tender, non-distended, normal bowel sounds, no masses or organomegaly  Extremities: no cyanosis, clubbing or edema  Musculoskeletal: normal range of motion, no joint swelling, deformity or tenderness  Neurologic: reflexes normal and symmetric, no cranial nerve deficit, gait, coordination and speech normal       No Known Allergies  Prior to Visit Medications    Medication Sig Taking? Authorizing Provider   irbesartan-hydroCHLOROthiazide (AVALIDE) 150-12.5 MG per tablet TAKE 1 TABLET BY MOUTH EVERY DAY Yes Shera Blizzard, APRN - CNP   rosuvastatin (CRESTOR) 10 MG tablet  Yes Historical Provider, MD   Handicap Placard MISC by Does not apply route Cannot walk 200 ft. Without stopping to rest  Duration: Lifetime Yes Marlyn Chase MD   warfarin (COUMADIN) 5 MG tablet TAKE 1 TABLET BY MOUTH 5 TIMES A WEEK AND TAKE 1/2 TABLET BY MOUTH TWICE A WEEK Yes Marlyn Chase MD   YUVAFEM 10 MCG TABS vaginal tablet INSERT 1 TABLET INTRAVAGINALLY AT BEDTIME FOR 14 DAYS THEN TWICE WEEKLY AT BEDTIME.  Yes Historical Provider, MD   aspirin (ASPIRIN ADULT LOW STRENGTH) 81 MG EC tablet TAKE ONE TABLET BY MOUTH EVERY DAY Yes Marlyn Chase MD   amLODIPine (NORVASC) 10 MG tablet TAKE 1 TABLET BY MOUTH EVERY DAY Yes Marlyn Chase MD   PT/INR Testing Monitor KIT Use weekly Yes Marquita Quintero DO   Multiple Vitamins-Minerals (MULTIVITAMIN ADULT PO) Take 1 tablet by mouth Yes Historical Provider, MD   amoxicillin (AMOXIL) 500 MG capsule TAKE 4 CAPSULES BY MOUTH 1 HOUR PRIOR TO DENTAL APPOINMENT  Patient not taking: Reported on 2/22/2023  Historical Provider, MD   aspirin (ASPIRIN ADULT LOW STRENGTH) 81 MG EC tablet TAKE ONE TABLET BY MOUTH EVERY DAY  Timothy Cisneros,        CareTeam (Including outside providers/suppliers regularly involved in providing care):   Patient Care Team:  Lia Alvarez MD as PCP - General (Family Medicine)  Lia Alvarez MD as PCP - Empaneled Provider     Reviewed and updated this visit:  Tobacco  Allergies  Meds  Med Hx  Surg Hx  Soc Hx  Fam Hx             Lia Alvarez MD

## 2023-02-28 NOTE — PATIENT INSTRUCTIONS
Preventing Falls: Care Instructions  Overview     Getting around your home safely can be a challenge if you have injuries or health problems that make it easy for you to fall. Loose rugs and furniture in walkways are among the dangers for many older people who have problems walking or who have poor eyesight. People who have conditions such as arthritis, osteoporosis, or dementia also have to be careful not to fall. You can make your home safer with a few simple measures. Follow-up care is a key part of your treatment and safety. Be sure to make and go to all appointments, and call your doctor if you are having problems. It's also a good idea to know your test results and keep a list of the medicines you take. How can you care for yourself at home? Taking care of yourself  Exercise regularly to improve your strength, muscle tone, and balance. Walk if you can. Swimming may be a good choice if you cannot walk easily. Have your vision and hearing checked each year or any time you notice a change. If you have trouble seeing and hearing, you might not be able to avoid objects and could lose your balance. Know the side effects of the medicines you take. Ask your doctor or pharmacist whether the medicines you take can affect your balance. Sleeping pills or sedatives can affect your balance. Limit the amount of alcohol you drink. Alcohol can impair your balance and other senses. Ask your doctor whether calluses or corns on your feet need to be removed. If you wear loose-fitting shoes because of calluses or corns, you can lose your balance and fall. Talk to your doctor if you have numbness in your feet. You may get dizzy if you do not drink enough water. To prevent dehydration, drink plenty of fluids. Choose water and other clear liquids. If you have kidney, heart, or liver disease and have to limit fluids, talk with your doctor before you increase the amount of fluids you drink.   Preventing falls at home  Remove raised doorway thresholds, throw rugs, and clutter. Repair loose carpet or raised areas in the floor. Move furniture and electrical cords to keep them out of walking paths. Use nonskid floor wax, and wipe up spills right away, especially on ceramic tile floors. If you use a walker or cane, put rubber tips on it. If you use crutches, clean the bottoms of them regularly with an abrasive pad, such as steel wool. Keep your house well lit, especially stairways, porches, and outside walkways. Use night-lights in areas such as hallways and bathrooms. Add extra light switches or use remote switches (such as switches that go on or off when you clap your hands) to make it easier to turn lights on if you have to get up during the night. Install sturdy handrails on stairways. Move items in your cabinets so that the things you use a lot are on the lower shelves (about waist level). Keep a cordless phone and a flashlight with new batteries by your bed. If possible, put a phone in each of the main rooms of your house, or carry a cell phone in case you fall and cannot reach a phone. Or, you can wear a device around your neck or wrist. You push a button that sends a signal for help. Wear low-heeled shoes that fit well and give your feet good support. Use footwear with nonskid soles. Check the heels and soles of your shoes for wear. Repair or replace worn heels or soles. Do not wear socks without shoes on smooth floors, such as wood. Walk on the grass when the sidewalks are slippery. If you live in an area that gets snow and ice in the winter, sprinkle salt on slippery steps and sidewalks. Or ask a family member or friend to do this for you. Preventing falls in the bath  Install grab bars and nonskid mats inside and outside your shower or tub and near the toilet and sinks. Use shower chairs and bath benches. Use a hand-held shower head that will allow you to sit while showering.   Get into a tub or shower by putting the weaker leg in first. Get out of a tub or shower with your strong side first.  Repair loose toilet seats and consider installing a raised toilet seat to make getting on and off the toilet easier. Keep your bathroom door unlocked while you are in the shower. Where can you learn more? Go to http://www.irvin.com/ and enter G117 to learn more about \"Preventing Falls: Care Instructions. \"  Current as of: May 4, 2022               Content Version: 13.5  © 5622-4716 Healthwise, [employer]. Care instructions adapted under license by [facility] ([facility]). If you have questions about a medical condition or this instruction, always ask your healthcare professional. [address] any warranty or liability for your use of this information. Learning About Mindfulness for Stress  What are mindfulness and stress? Stress is what you feel when you have to handle more than you are used to. A lot of things can cause stress. You may feel stress when you go on a job interview, take a test, or run a race. This kind of short-term stress is normal and even useful. It can help you if you need to work hard or react quickly. Stress also can last a long time. Long-term stress is caused by stressful situations or events. Examples of long-term stress include long-term health problems, ongoing problems at work, and conflicts in your family. Long-term stress can harm your health. Mindfulness is a focus only on things happening in the present moment. It's a process of purposefully paying attention to and being aware of your surroundings, your emotions, your thoughts, and how your body feels. You are aware of these things, but you aren't judging these experiences as \"good\" or \"bad. \" Mindfulness can help you learn to calm your mind and body to help you cope with illness, pain, and stress. How does mindfulness help to relieve stress? Mindfulness can help quiet your mind and relax your body. Studies show that it can help some people sleep better, feel less anxious, and bring their blood pressure down. And it's been shown to help some people live and cope better with certain health problems like heart disease, depression, chronic pain, and cancer. How do you practice mindfulness? To be mindful is to pay attention, to be present, and to be accepting. When you're mindful, you do just one thing and you pay close attention to that one thing. For example, you may sit quietly and notice your emotions or how your food tastes and smells. When you're present, you focus on the things that are happening right now. You let go of your thoughts about the past and the future. When you dwell on the past or the future, you miss moments that can heal and strengthen you. You may miss moments like hearing a child laugh or seeing a friendly face when you think you're all alone. When you're accepting, you don't  the present moment. Instead you accept your thoughts and feelings as they come. You can practice anytime, anywhere, and in any way you choose. You can practice in many ways. Here are a few ideas:  While doing your chores, like washing the dishes, let your mind focus on what's in your hand. What does the dish feel like? Is the water warm or cold? Go outside and take a few deep breaths. What is the air like? Is it warm or cold? When you can, take some time at the start of your day to sit alone and think. Take a slow walk by yourself. Count your steps while you breathe in and out. Try yoga breathing exercises, stretches, and poses to strengthen and relax your muscles. At work, if you can, try to stop for a few moments each hour. Note how your body feels. Let yourself regroup and let your mind settle before you return to what you were doing. If you struggle with anxiety or \"worry thoughts,\" imagine your mind as a blue kelsey and your worry thoughts as clouds.  Now imagine those worry thoughts floating across your mind's kelsey. Just let them pass by as you watch. Follow-up care is a key part of your treatment and safety. Be sure to make and go to all appointments, and call your doctor if you are having problems. It's also a good idea to know your test results and keep a list of the medicines you take. Where can you learn more? Go to http://www.irvin.com/ and enter M676 to learn more about \"Learning About Mindfulness for Stress. \"  Current as of: February 9, 2022               Content Version: 13.5  © 0814-3360 [employer]. Care instructions adapted under license by [facility] ([facility]). If you have questions about a medical condition or this instruction, always ask your healthcare professional. [address] any warranty or liability for your use of this information. Substance Use Disorder: Care Instructions  Overview     You can improve your life and health by stopping your use of alcohol or drugs. When you don't drink or use drugs, you may feel and sleep better. You may get along better with your family, friends, and coworkers. There are medicines and programs that can help with substance use disorder. How can you care for yourself at home? If you have been given medicine to help keep you sober or reduce your cravings, be sure to take it exactly as prescribed. Talk to your doctor about programs that can help you stop using drugs or drinking alcohol. Do not tempt yourself by keeping alcohol or drugs in your home. Learn how to say no when other people drink or use drugs. Or don't spend time with people who drink or use drugs. Use the time and money spent on drinking or drugs to do something fun with your family or friends. Preventing a relapse  Do not drink alcohol or use drugs at all. Using any amount of alcohol or drugs greatly increases your risk for relapse.   Seek help from organizations such as Alcoholics Anonymous, Narcotics Anonymous, or treatment facilities if you feel the need to drink alcohol or use drugs again. Remember that recovery is a lifelong process. Stay away from situations, friends, or places that may lead you to drink or use drugs. Have a plan to spot and deal with relapse. Learn to recognize changes in your thinking that lead you to drink or use drugs. These are warning signs. Get help before you start to drink or use drugs again. Get help as soon as you can if you relapse. Some people make a plan with another person that outlines what they want that person to do for them if they relapse. The plan usually includes how to handle the relapse and who to notify in case of relapse. Don't give up. Remember that a relapse does not mean that you have failed. Use the experience to learn the triggers that lead you to drink or use drugs. Then quit again. Many people have several relapses before they are able to quit for good. Follow-up care is a key part of your treatment and safety. Be sure to make and go to all appointments, and call your doctor if you are having problems. It's also a good idea to know your test results and keep a list of the medicines you take. When should you call for help? Call 911  anytime you think you may need emergency care. For example, call if:    You feel you cannot stop from hurting yourself or someone else. Call your doctor now or seek immediate medical care if:    You have serious withdrawal symptoms, such as confusion, hallucinations, severe trembling, or seizures. Watch closely for changes in your health, and be sure to contact your doctor if:    You have a relapse.     You need more help or support to stop. Where can you learn more? Go to http://www.irvin.com/ and enter H573 to learn more about \"Substance Use Disorder: Care Instructions. \"  Current as of: August 2, 2022               Content Version: 13.5  © 6283-1700 Healthwise, [employer].    Care instructions adapted under license by [facility] ([facility]). If you have questions about a medical condition or this instruction, always ask your healthcare professional. [place] [address] any warranty or liability for your use of this information. For more information on your local Area Agency on Aging or [place] on Aging please visit the appropriate web site below:    [facility]: MobileCycles.pl    [facility]: https://aging. [place].gov/    [address] [facility]: https://aging.[place].gov/    [place]: InsuranceSquad.es           Hearing Loss: Care Instructions  Overview     Hearing loss is a sudden or slow decrease in how well you hear. It can range from mild to severe. Permanent hearing loss can occur with aging. It also can happen when you are exposed long-term to loud noise. Examples include listening to loud music, riding motorcycles, or being around other loud machines. Hearing loss can affect your work and home life. It can make you feel lonely or depressed. You may feel that you have lost your independence. But hearing aids and other devices can help you hear better and feel connected to others. Follow-up care is a key part of your treatment and safety. Be sure to make and go to all appointments, and call your doctor if you are having problems. It's also a good idea to know your test results and keep a list of the medicines you take. How can you care for yourself at home? Avoid loud noises whenever possible. This helps keep your hearing from getting worse. Always wear hearing protection around loud noises. Wear a hearing aid as directed. See a professional who can help you pick a hearing aid that fits you. Have hearing tests as your doctor suggests. They can show whether your hearing has changed. Your hearing aid may need to be adjusted. Use other devices as needed. These may include:  Telephone amplifiers and hearing aids that can connect to a television, stereo, radio, or microphone.   Devices that use lights or vibrations. These alert you to the doorbell, a ringing telephone, or a baby monitor. Television closed-captioning. This shows the words at the bottom of the screen. Most new TVs can do this. TTY (text telephone). This lets you type messages back and forth on the telephone instead of talking or listening. These devices are also called TDD. When messages are typed on the keyboard, they are sent over the phone line to a receiving TTY. The message is shown on a monitor. Use text messaging, social media, and email if it is hard for you to communicate by telephone. Try to learn a listening technique called speechreading. It is not lipreading. You pay attention to people's gestures, expressions, posture, and tone of voice. These clues can help you understand what a person is saying. Face the person you are talking to, and have them face you. Make sure the lighting is good. You need to see the other person's face clearly. Think about counseling if you need help to adjust to your hearing loss. When should you call for help? Watch closely for changes in your health, and be sure to contact your doctor if:    You think your hearing is getting worse.     You have new symptoms, such as dizziness or nausea. Where can you learn more? Go to http://www.irvin.com/ and enter R798 to learn more about \"Hearing Loss: Care Instructions. \"  Current as of: May 4, 2022               Content Version: 13.5  © 7144-6249 Healthwise, Incorporated. Care instructions adapted under license by [facility] ([facility]). If you have questions about a medical condition or this instruction, always ask your healthcare professional. [address] any warranty or liability for your use of this information. Advance Directives: Care Instructions  Overview  An advance directive is a legal way to state your wishes at the end of your life.  It tells your family and your doctor what to do if you can't say what you want. There are two main types of advance directives. You can change them any time your wishes change. Living will. This form tells your family and your doctor your wishes about life support and other treatment. The form is also called a declaration. Medical power of . This form lets you name a person to make treatment decisions for you when you can't speak for yourself. This person is called a health care agent (health care proxy, health care surrogate). The form is also called a durable power of  for health care. If you do not have an advance directive, decisions about your medical care may be made by a family member, or by a doctor or a  who doesn't know you. It may help to think of an advance directive as a gift to the people who care for you. If you have one, they won't have to make tough decisions by themselves. For more information, including forms for your state, see the [address] [facility] website (www.caringinfo.org/planning/advance-directives/). Follow-up care is a key part of your treatment and safety. Be sure to make and go to all appointments, and call your doctor if you are having problems. It's also a good idea to know your test results and keep a list of the medicines you take. What should you include in an advance directive? Many states have a unique advance directive form. (It may ask you to address specific issues.) Or you might use a universal form that's approved by many states. If your form doesn't tell you what to address, it may be hard to know what to include in your advance directive. Use the questions below to help you get started. Who do you want to make decisions about your medical care if you are not able to? What life-support measures do you want if you have a serious illness that gets worse over time or can't be cured? What are you most afraid of that might happen?  (Maybe you're afraid of having pain, losing your independence, or being kept alive by machines.)  Where would you prefer to die? (Your home? A hospital? A nursing home?)  Do you want to donate your organs when you die? Do you want certain [employer] practices performed before you die? When should you call for help? Be sure to contact your doctor if you have any questions. Where can you learn more? Go to http://www.irvin.com/ and enter R264 to learn more about \"Advance Directives: Care Instructions. \"  Current as of: June 16, 2022               Content Version: 13.5  © 1450-1605 [employer]. Care instructions adapted under license by [facility] ([facility]). If you have questions about a medical condition or this instruction, always ask your healthcare professional. [address] any warranty or liability for your use of this information. A Healthy Heart: Care Instructions  Your Care Instructions     Coronary artery disease, also called heart disease, occurs when a substance called plaque builds up in the vessels that supply oxygen-rich blood to your heart muscle. This can narrow the blood vessels and reduce blood flow. A heart attack happens when blood flow is completely blocked. A high-fat diet, smoking, and other factors increase the risk of heart disease. Your doctor has found that you have a chance of having heart disease. You can do lots of things to keep your heart healthy. It may not be easy, but you can change your diet, exercise more, and quit smoking. These steps really work to lower your chance of heart disease. Follow-up care is a key part of your treatment and safety. Be sure to make and go to all appointments, and call your doctor if you are having problems. It's also a good idea to know your test results and keep a list of the medicines you take. How can you care for yourself at home? Diet    Use less salt when you cook and eat. This helps lower your blood pressure. Taste food before salting.  Add only a little salt when you think you need it. With time, your taste buds will adjust to less salt.     Eat fewer snack items, fast foods, canned soups, and other high-salt, high-fat, processed foods.     Read food labels and try to avoid saturated and trans fats. They increase your risk of heart disease by raising cholesterol levels.     Limit the amount of solid fat-butter, margarine, and shortening-you eat. Use olive, peanut, or canola oil when you cook. Bake, broil, and steam foods instead of frying them.     Eat a variety of fruit and vegetables every day. Dark green, deep orange, red, or yellow fruits and vegetables are especially good for you. Examples include spinach, carrots, peaches, and berries.     Foods high in fiber can reduce your cholesterol and provide important vitamins and minerals. High-fiber foods include whole-grain cereals and breads, oatmeal, beans, brown rice, citrus fruits, and apples.     Eat lean proteins. Heart-healthy proteins include seafood, lean meats and poultry, eggs, beans, peas, nuts, seeds, and soy products.     Limit drinks and foods with added sugar. These include candy, desserts, and soda pop. Lifestyle changes    If your doctor recommends it, get more exercise. Walking is a good choice. Bit by bit, increase the amount you walk every day. Try for at least 30 minutes on most days of the week. You also may want to swim, bike, or do other activities.     Do not smoke. If you need help quitting, talk to your doctor about stop-smoking programs and medicines. These can increase your chances of quitting for good. Quitting smoking may be the most important step you can take to protect your heart. It is never too late to quit.     Limit alcohol to 2 drinks a day for men and 1 drink a day for women. Too much alcohol can cause health problems.     Manage other health problems such as diabetes, high blood pressure, and high cholesterol. If you think you may have a problem with alcohol or drug use, talk to your doctor. Medicines    Take your medicines exactly as prescribed. Call your doctor if you think you are having a problem with your medicine.     If your doctor recommends aspirin, take the amount directed each day. Make sure you take aspirin and not another kind of pain reliever, such as acetaminophen (Tylenol). When should you call for help? Call 911 if you have symptoms of a heart attack. These may include:    Chest pain or pressure, or a strange feeling in the chest.     Sweating.     Shortness of breath.     Pain, pressure, or a strange feeling in the back, neck, jaw, or upper belly or in one or both shoulders or arms.     Lightheadedness or sudden weakness.     A fast or irregular heartbeat. After you call 911, the  may tell you to chew 1 adult-strength or 2 to 4 low-dose aspirin. Wait for an ambulance. Do not try to drive yourself. Watch closely for changes in your health, and be sure to contact your doctor if you have any problems. Where can you learn more? Go to http://EcoScraps.ivrin.com/ and enter F075 to learn more about \"A Healthy Heart: Care Instructions. \"  Current as of: September 7, 2022               Content Version: 13.5  © 6034-3215 [employer]. Care instructions adapted under license by [facility] ([facility]). If you have questions about a medical condition or this instruction, always ask your healthcare professional. [place] [address] any warranty or liability for your use of this information. Personalized Preventive Plan for Richi Duong - 2/22/2023  Medicare offers a range of preventive health benefits. Some of the tests and screenings are paid in full while other may be subject to a deductible, co-insurance, and/or copay. Some of these benefits include a comprehensive review of your medical history including lifestyle, illnesses that may run in your family, and various assessments and screenings as appropriate.     After reviewing your medical record and screening and assessments performed today your provider may have ordered immunizations, labs, imaging, and/or referrals for you. A list of these orders (if applicable) as well as your Preventive Care list are included within your After Visit Summary for your review. Other Preventive Recommendations:    A preventive eye exam performed by an eye specialist is recommended every 1-2 years to screen for glaucoma; cataracts, macular degeneration, and other eye disorders. A preventive dental visit is recommended every 6 months. Try to get at least 150 minutes of exercise per week or 10,000 steps per day on a pedometer . Order or download the FREE \"Exercise & Physical Activity: Your Everyday Guide\" from [employer]. Call 3-152.952.1546 or search [employer] online. You need 9959-7907 mg of calcium and 0008-3497 IU of vitamin D per day. It is possible to meet your calcium requirement with diet alone, but a vitamin D supplement is usually necessary to meet this goal.  When exposed to the sun, use a sunscreen that protects against both UVA and UVB radiation with an SPF of 30 or greater. Reapply every 2 to 3 hours or after sweating, drying off with a towel, or swimming. Always wear a seat belt when traveling in a car. Always wear a helmet when riding a bicycle or motorcycle.

## 2023-03-01 ENCOUNTER — ANTI-COAG VISIT (OUTPATIENT)
Dept: FAMILY MEDICINE CLINIC | Age: 58
End: 2023-03-01

## 2023-03-01 DIAGNOSIS — Z86.73 H/O: CVA (CEREBROVASCULAR ACCIDENT): Primary | ICD-10-CM

## 2023-03-01 LAB
INR BLD: 2.4
PROTIME: NORMAL

## 2023-03-02 ENCOUNTER — TELEPHONE (OUTPATIENT)
Dept: FAMILY MEDICINE CLINIC | Age: 58
End: 2023-03-02

## 2023-03-02 ENCOUNTER — PATIENT MESSAGE (OUTPATIENT)
Dept: FAMILY MEDICINE CLINIC | Age: 58
End: 2023-03-02

## 2023-03-02 DIAGNOSIS — Z29.8 ENCOUNTER FOR HYDRATION PRIOR TO CT SCAN: Primary | ICD-10-CM

## 2023-03-02 NOTE — TELEPHONE ENCOUNTER
[facility] called ptaient is scheduled for MRI on 03/30/23 she is in need of the following labs    [employer]    Thank You    Pt needs notified when labs are placed

## 2023-03-08 ENCOUNTER — ANTI-COAG VISIT (OUTPATIENT)
Dept: FAMILY MEDICINE CLINIC | Age: 58
End: 2023-03-08

## 2023-03-08 LAB
INR BLD: 2.2
PROTIME: NORMAL

## 2023-03-15 ENCOUNTER — ANTI-COAG VISIT (OUTPATIENT)
Dept: FAMILY MEDICINE CLINIC | Age: 58
End: 2023-03-15

## 2023-03-15 DIAGNOSIS — Z86.73 H/O: CVA (CEREBROVASCULAR ACCIDENT): Primary | ICD-10-CM

## 2023-03-15 LAB
INR BLD: 1.8
PROTIME: NORMAL

## 2023-03-20 ENCOUNTER — TELEPHONE (OUTPATIENT)
Dept: SURGERY | Age: 58
End: 2023-03-20

## 2023-03-20 ENCOUNTER — HOSPITAL ENCOUNTER (OUTPATIENT)
Age: 58
Discharge: HOME | End: 2023-03-20
Payer: MEDICARE

## 2023-03-20 ENCOUNTER — OFFICE VISIT (OUTPATIENT)
Dept: SURGERY | Age: 58
End: 2023-03-20
Payer: MEDICARE

## 2023-03-20 VITALS
DIASTOLIC BLOOD PRESSURE: 97 MMHG | HEIGHT: 61 IN | BODY MASS INDEX: 28.89 KG/M2 | HEART RATE: 86 BPM | SYSTOLIC BLOOD PRESSURE: 164 MMHG | RESPIRATION RATE: 18 BRPM | WEIGHT: 153 LBS

## 2023-03-20 DIAGNOSIS — Z29.8 ENCOUNTER FOR HYDRATION PRIOR TO CT SCAN: ICD-10-CM

## 2023-03-20 DIAGNOSIS — K62.89 RECTAL PAIN: Primary | ICD-10-CM

## 2023-03-20 DIAGNOSIS — K60.2 ANAL FISSURE: ICD-10-CM

## 2023-03-20 PROBLEM — K60.1 CHRONIC ANAL FISSURE: Status: ACTIVE | Noted: 2023-03-20

## 2023-03-20 LAB
BUN SERPL-MCNC: 11 MG/DL (ref 6–20)
CREAT SERPL-MCNC: 0.7 MG/DL (ref 0.5–1)

## 2023-03-20 PROCEDURE — 3017F COLORECTAL CA SCREEN DOC REV: CPT | Performed by: SURGERY

## 2023-03-20 PROCEDURE — 84520 ASSAY OF UREA NITROGEN: CPT

## 2023-03-20 PROCEDURE — 99203 OFFICE O/P NEW LOW 30 MIN: CPT | Performed by: SURGERY

## 2023-03-20 PROCEDURE — G8419 CALC BMI OUT NRM PARAM NOF/U: HCPCS | Performed by: SURGERY

## 2023-03-20 PROCEDURE — 82565 ASSAY OF CREATININE: CPT

## 2023-03-20 PROCEDURE — G8427 DOCREV CUR MEDS BY ELIG CLIN: HCPCS | Performed by: SURGERY

## 2023-03-20 PROCEDURE — 1036F TOBACCO NON-USER: CPT | Performed by: SURGERY

## 2023-03-20 PROCEDURE — G8484 FLU IMMUNIZE NO ADMIN: HCPCS | Performed by: SURGERY

## 2023-03-20 PROCEDURE — 36415 COLL VENOUS BLD VENIPUNCTURE: CPT

## 2023-03-20 RX ORDER — SODIUM CHLORIDE 9 MG/ML
INJECTION, SOLUTION INTRAVENOUS CONTINUOUS
OUTPATIENT
Start: 2023-03-20

## 2023-03-20 NOTE — PROGRESS NOTES
Stroke Mother     Heart Disease Mother     Clotting Disorder Mother     Depression Mother     Heart Attack Mother     Kidney Disease Father     Heart Disease Father     Early Death Father     High Blood Pressure Sister     High Blood Pressure Brother     High Blood Pressure Brother     Mental Retardation Maternal Aunt        REVIEW OF SYSTEMS:    Constitutional: negative  Eyes: negative  Ears, nose, mouth, throat, and face: negative  Respiratory: negative  Cardiovascular: negative  Gastrointestinal: as in HPI  Genitourinary:negative  Integument/breast: negative  Hematologic/lymphatic: negative  Musculoskeletal:negative  Neurological: negative  Allergic/Immunologic: negative    PHYSICAL EXAM   BP (!) 164/97   Pulse 86   Resp 18   Ht 5' 1\" (1.549 m)   Wt 153 lb (69.4 kg)   LMP  (LMP Unknown)   BMI 28.91 kg/m²     General appearance: alert, cooperative and in no acute distress. Eyes: Grossly normal   Lungs: normal work of breathing  Heart: regular rate  Abdomen:  soft, non-tender, non-distended  Skin: No skin abnormalities  Neurologic: Alert and oriented x 3. Grossly normal  Musculoskeletal: No edema. Perianal: healed posterior anal fissure    ASSESSMENT AND PLAN:     Jose Antonio Mckee is an 56 y.o. female who presents for a colonoscopy with rectal pain, burning, chronic posterior anal fissure     I will set the patient up for a colonoscopy, possible biopsy, possible polypectomy, perianal BOTOX injection  I explained the risks including but not limited to bleeding, perforation leading to possible surgery, or infection. The benefits, alternatives, and potential complications associated with the above procedure to be performed and transfusions when applicable with the patient/responsible person prior to the procedure.        Physician Signature: Electronically signed by Gary Kelly MD, General Surgery    Send copy of H&P to PCP, Lupe Espinosa MD and referring physician, Kary Pruitt MD

## 2023-03-20 NOTE — PATIENT INSTRUCTIONS
understand how to prepare for your procedure. You are having trouble with the bowel prep. You become ill before the procedure (such as fever, flu, or a cold). You need to reschedule or have changed your mind about having the procedure. Where can you learn more? Go to [email or web address] and sign in to your [employer] account. Enter C315 in the [employer] box to learn more about Colonoscopy: Before Your Procedure.     If you do not have an account, please click on the Sign Up Now link. © 4587-9488 Healthwise, Incorporated. Care instructions adapted under license by [facility] ([facility]). This care instruction is for use with your licensed healthcare professional. If you have questions about a medical condition or this instruction, always ask your healthcare professional. [address] any warranty or liability for your use of this information.   Content Version: 95.5.441924; Current as of: November 20, 2015

## 2023-03-20 NOTE — TELEPHONE ENCOUNTER
Prior Authorization Form:      DEMOGRAPHICS:                     Patient Name:  Eugenia Souza  Patient :  1965            Insurance:  Payor: MEDICARE / Plan: MEDICARE PART A AND B / Product Type: *No Product type* /   Insurance ID Number:    Payer/Plan Subscr  Sex Relation Sub. Ins. ID Effective Group Num   1. MEDICARE - [place]* Juan Nolan 1965 Female Self [id number]                                    [address] [address]   2.  [place] -* Juan Nolan 1965 Female Self [id number]                                    [address]         DIAGNOSIS & PROCEDURE:                       Procedure/Operation: COLONOSCOPY W/BOTOX           CPT Code: 56728    Diagnosis:  RECTAL PAIN AND BURNING  CHRONIC POSTERIOR ANAL FISSURE    ICD10 Code: K62.89   K60.1    Location:  [place]    Surgeon:  Andi Sales INFORMATION:                          Date: 2023    Time: 10:30              Anesthesia:  MAC/TIVA                                                       Status:  Outpatient        Special Comments:         Electronically signed by Phi Samaniego MA on 3/20/2023 at 2:53 PM

## 2023-03-22 ENCOUNTER — ANTI-COAG VISIT (OUTPATIENT)
Dept: FAMILY MEDICINE CLINIC | Age: 58
End: 2023-03-22

## 2023-03-22 DIAGNOSIS — Z86.73 H/O: CVA (CEREBROVASCULAR ACCIDENT): Primary | ICD-10-CM

## 2023-03-22 LAB
INR BLD: 2.1
PROTIME: NORMAL

## 2023-03-23 LAB — MAMMOGRAPHY, EXTERNAL: NORMAL

## 2023-03-29 ENCOUNTER — ANTI-COAG VISIT (OUTPATIENT)
Dept: FAMILY MEDICINE CLINIC | Age: 58
End: 2023-03-29
Payer: MEDICARE

## 2023-03-29 DIAGNOSIS — Z51.81 ANTICOAGULATION GOAL OF INR 2 TO 3: Primary | ICD-10-CM

## 2023-03-29 DIAGNOSIS — Z79.01 ANTICOAGULATION GOAL OF INR 2 TO 3: Primary | ICD-10-CM

## 2023-03-29 LAB
INR BLD: 2.6
PROTIME: NORMAL

## 2023-03-29 PROCEDURE — 93793 ANTICOAG MGMT PT WARFARIN: CPT | Performed by: FAMILY MEDICINE

## 2023-03-30 ENCOUNTER — HOSPITAL ENCOUNTER (OUTPATIENT)
Dept: MRI IMAGING | Age: 58
Discharge: HOME | End: 2023-04-01
Payer: MEDICARE

## 2023-03-30 DIAGNOSIS — R20.0 NUMBNESS: ICD-10-CM

## 2023-03-30 PROCEDURE — A9579 GAD-BASE MR CONTRAST NOS,1ML: HCPCS | Performed by: RADIOLOGY

## 2023-03-30 PROCEDURE — 6360000004 HC RX CONTRAST MEDICATION: Performed by: RADIOLOGY

## 2023-03-30 PROCEDURE — 70553 MRI BRAIN STEM W/O & W/DYE: CPT

## 2023-03-30 RX ADMIN — GADOTERIDOL 15 ML: 279.3 INJECTION, SOLUTION INTRAVENOUS at 10:00

## 2023-04-05 ENCOUNTER — ANTI-COAG VISIT (OUTPATIENT)
Dept: FAMILY MEDICINE CLINIC | Age: 58
End: 2023-04-05

## 2023-04-05 DIAGNOSIS — Z86.73 H/O: CVA (CEREBROVASCULAR ACCIDENT): Primary | ICD-10-CM

## 2023-04-05 LAB
INR BLD: 1.9
PROTIME: NORMAL

## 2023-04-19 ENCOUNTER — ANTI-COAG VISIT (OUTPATIENT)
Dept: FAMILY MEDICINE CLINIC | Age: 58
End: 2023-04-19
Payer: MEDICARE

## 2023-04-19 DIAGNOSIS — Z51.81 ANTICOAGULATION GOAL OF INR 2 TO 3: Primary | ICD-10-CM

## 2023-04-19 DIAGNOSIS — Z79.01 ANTICOAGULATION GOAL OF INR 2 TO 3: Primary | ICD-10-CM

## 2023-04-19 LAB
INR BLD: 1.9
PROTIME: NORMAL

## 2023-04-19 PROCEDURE — 93793 ANTICOAG MGMT PT WARFARIN: CPT | Performed by: FAMILY MEDICINE

## 2023-04-24 LAB
INR BLD: 1.7
PROTIME: NORMAL

## 2023-04-26 RX ORDER — ENOXAPARIN SODIUM 300 MG/3ML
INJECTION INTRAVENOUS; SUBCUTANEOUS 2 TIMES DAILY
COMMUNITY

## 2023-04-26 RX ORDER — CITALOPRAM 20 MG/1
20 TABLET ORAL NIGHTLY
COMMUNITY

## 2023-04-26 NOTE — PROGRESS NOTES
[facility] PRE-ADMISSION TESTING INSTRUCTIONS      ARRIVAL INSTRUCTIONS:  [x] Parking the day of Surgery is located in the Main Entrance lot. Upon entering the main door make an immediate right to the surgery reception desk. [x] Bring photo ID and insurance card    [] Bring in a copy of Living will or Durable Power of  papers. [x] Please be sure to arrange for responsible adult to provide transportation to and from the hospital    [x] Please arrange for responsible adult to be with you for the 24 hour period post procedure due to having anesthesia    [x] If you awake am of surgery not feeling well or have temperature >100 please call [phone]    GENERAL INSTRUCTIONS:    [x] Nothing by mouth after midnight, including gum, candy, mints or water    [x] You may brush your teeth, but do not swallow any water    [x] Take medications as instructed with 1-2 oz of water    [x] Stop herbal supplements and vitamins 5 days prior to procedure    [x] Follow preop dosing of blood thinners per physician instructions    [] Take 1/2 dose of evening insulin, but no insulin after midnight    [] No oral diabetic medications after midnight    [] If diabetic and have low blood sugar or feel symptomatic, take 1-2oz apple juice only    [] Bring inhalers day of surgery    [] Bring C-PAP/ Bi-Pap day of surgery    [] Bring urine specimen day of surgery    [x] Shower or bath with soap, lather and rinse well, AM of Surgery, no lotion, powders or creams to surgical site    [x] Follow bowel prep as instructed per surgeon    [x] No tobacco products within 24 hours of surgery     [x] No alcohol or illegal drug use within 24 hours of surgery.     [x] Jewelry, body piercing's, eyeglasses, contact lenses and dentures are not permitted into surgery (bring cases)      [x] Please do not wear any nail polish, make up or hair products on the day of surgery    [x] You can expect a call the business day prior to

## 2023-04-28 ENCOUNTER — ANESTHESIA EVENT (OUTPATIENT)
Dept: ENDOSCOPY | Age: 58
End: 2023-04-28
Payer: MEDICARE

## 2023-04-28 ENCOUNTER — ANESTHESIA (OUTPATIENT)
Dept: ENDOSCOPY | Age: 58
End: 2023-04-28
Payer: MEDICARE

## 2023-04-28 ENCOUNTER — HOSPITAL ENCOUNTER (OUTPATIENT)
Age: 58
Setting detail: OUTPATIENT SURGERY
Discharge: HOME | End: 2023-04-28
Attending: SURGERY | Admitting: SURGERY
Payer: MEDICARE

## 2023-04-28 VITALS
SYSTOLIC BLOOD PRESSURE: 128 MMHG | RESPIRATION RATE: 18 BRPM | BODY MASS INDEX: 27.94 KG/M2 | OXYGEN SATURATION: 95 % | DIASTOLIC BLOOD PRESSURE: 66 MMHG | WEIGHT: 148 LBS | HEART RATE: 72 BPM | TEMPERATURE: 97.7 F | HEIGHT: 61 IN

## 2023-04-28 DIAGNOSIS — K62.89 ANAL BURNING: ICD-10-CM

## 2023-04-28 DIAGNOSIS — K62.89 RECTAL PAIN: ICD-10-CM

## 2023-04-28 DIAGNOSIS — K60.1 CHRONIC ANAL FISSURE: ICD-10-CM

## 2023-04-28 PROCEDURE — 7100000011 HC PHASE II RECOVERY - ADDTL 15 MIN: Performed by: SURGERY

## 2023-04-28 PROCEDURE — 3700000001 HC ADD 15 MINUTES (ANESTHESIA): Performed by: SURGERY

## 2023-04-28 PROCEDURE — 2709999900 HC NON-CHARGEABLE SUPPLY: Performed by: SURGERY

## 2023-04-28 PROCEDURE — 46505 CHEMODENERVATION ANAL MUSC: CPT | Performed by: SURGERY

## 2023-04-28 PROCEDURE — 3609019800 HC COLONOSCOPY WITH SUBMUCOSAL INJECTION: Performed by: SURGERY

## 2023-04-28 PROCEDURE — 7100000010 HC PHASE II RECOVERY - FIRST 15 MIN: Performed by: SURGERY

## 2023-04-28 PROCEDURE — 6360000002 HC RX W HCPCS: Performed by: NURSE ANESTHETIST, CERTIFIED REGISTERED

## 2023-04-28 PROCEDURE — 3700000000 HC ANESTHESIA ATTENDED CARE: Performed by: SURGERY

## 2023-04-28 PROCEDURE — 6360000002 HC RX W HCPCS: Performed by: SURGERY

## 2023-04-28 PROCEDURE — G0121 COLON CA SCRN NOT HI RSK IND: HCPCS | Performed by: SURGERY

## 2023-04-28 PROCEDURE — 2580000003 HC RX 258: Performed by: SURGERY

## 2023-04-28 RX ORDER — PROPOFOL 10 MG/ML
INJECTION, EMULSION INTRAVENOUS PRN
Status: DISCONTINUED | OUTPATIENT
Start: 2023-04-28 | End: 2023-04-28 | Stop reason: SDUPTHER

## 2023-04-28 RX ORDER — ONDANSETRON 2 MG/ML
INJECTION INTRAMUSCULAR; INTRAVENOUS PRN
Status: DISCONTINUED | OUTPATIENT
Start: 2023-04-28 | End: 2023-04-28 | Stop reason: SDUPTHER

## 2023-04-28 RX ORDER — SODIUM CHLORIDE 9 MG/ML
INJECTION, SOLUTION INTRAVENOUS CONTINUOUS
Status: DISCONTINUED | OUTPATIENT
Start: 2023-04-28 | End: 2023-04-28 | Stop reason: HOSPADM

## 2023-04-28 RX ORDER — FENTANYL CITRATE 50 UG/ML
INJECTION, SOLUTION INTRAMUSCULAR; INTRAVENOUS PRN
Status: DISCONTINUED | OUTPATIENT
Start: 2023-04-28 | End: 2023-04-28 | Stop reason: SDUPTHER

## 2023-04-28 RX ORDER — DEXAMETHASONE SODIUM PHOSPHATE 4 MG/ML
INJECTION, SOLUTION INTRA-ARTICULAR; INTRALESIONAL; INTRAMUSCULAR; INTRAVENOUS; SOFT TISSUE PRN
Status: DISCONTINUED | OUTPATIENT
Start: 2023-04-28 | End: 2023-04-28 | Stop reason: SDUPTHER

## 2023-04-28 RX ADMIN — FENTANYL CITRATE 50 MCG: 50 INJECTION, SOLUTION INTRAMUSCULAR; INTRAVENOUS at 09:36

## 2023-04-28 RX ADMIN — SODIUM CHLORIDE: 9 INJECTION, SOLUTION INTRAVENOUS at 09:26

## 2023-04-28 RX ADMIN — PROPOFOL 250 MG: 10 INJECTION, EMULSION INTRAVENOUS at 09:45

## 2023-04-28 RX ADMIN — ONDANSETRON 4 MG: 2 INJECTION INTRAMUSCULAR; INTRAVENOUS at 09:49

## 2023-04-28 RX ADMIN — DEXAMETHASONE SODIUM PHOSPHATE 10 MG: 4 INJECTION, SOLUTION INTRAMUSCULAR; INTRAVENOUS at 09:49

## 2023-04-28 RX ADMIN — FENTANYL CITRATE 50 MCG: 50 INJECTION, SOLUTION INTRAMUSCULAR; INTRAVENOUS at 09:39

## 2023-04-28 ASSESSMENT — PAIN SCALES - GENERAL
PAINLEVEL_OUTOF10: 0
PAINLEVEL_OUTOF10: 0

## 2023-04-28 ASSESSMENT — LIFESTYLE VARIABLES: SMOKING_STATUS: 0

## 2023-04-28 ASSESSMENT — PAIN - FUNCTIONAL ASSESSMENT: PAIN_FUNCTIONAL_ASSESSMENT: 0-10

## 2023-04-28 NOTE — H&P
Patient's office history and physical was reviewed. Patient examined. There has been no change in the patient's history and physical.      Physician Signature: Electronically signed by Dr. Joseph Graff Surgery History and Physical     Patient's Name/Date of Birth: Atul Caldera / 1965     Date: 4/28/23     PCP: Loni Luna MD     Referring Physician:   Peter Curry MD  [phone]     CHIEF COMPLAINT:         Chief Complaint   Patient presents with    Colonoscopy       consult    Abdominal Pain    Rectal Pain            HISTORY OF PRESENT ILLNESS:     Atul Caldera is an 56 y.o. female who presents with epigastric pain. This has been going on for a few months. She has been awaking at night with a burning sensation in her rectal area. Thais Ta is not associated with a BM. She sometimes has issues during the day, but it is mostly at night. She this it is due to something in her rectum, though she does have an appointment with her gynecologist coming up. She said she will sit on the toilet for an hour at night and do Kegels and that helps somewhat. She has been taking Pepcid and thinks that may be helping. No nausea, vomiting, diarrhea, constipation. No changes in stool caliber. No bloody or black stools. She is currently seeing a urologist for blood in her urine. No unintentional weight loss. No family history of colon cancer. The patient has a known history of: no known risk factors. The patient has never had a colonoscopy before. She has done Cologuard, last was 3 years ago and was normal per the patient.      Past Medical History:   Past Medical History        Past Medical History:   Diagnosis Date    Anticardiolipin antibody syndrome [facility])       hematology-[facility]    Back pain      Bilateral carotid artery stenosis 05/28/2020    Bruit of left carotid artery 05/28/2020    Carotid stenosis, right 06/15/2020     Dr. Twyla Magallon and Dr. Ignacio Fine at [facility]    Cerebral artery occlusion with

## 2023-04-28 NOTE — OP NOTE
Operative Note: Colonoscopy    Tito Elizabeth     DATE OF PROCEDURE: 4/28/2023  SURGEON: Dr. David Pineda MD, M.D. PREOPERATIVE DIAGNOSES:    Posterior anal fissure  Screening colonoscopy    POSTOPERATIVE DIAGNOSES:  Mild pan diverticulosis  Posterior anal fissure    SPECIMENS:  * No specimens in log *     OPERATION:   Colonoscopy to the cecum with perianal Botox injection      ANESTHESIA: LMAC    COMPLICATIONS: None. BLOOD LOSS: Minimal    Procedure Note:    CONSENT AND INDICATIONS:  This is a 56y.o. year old female who is having the above. I have discussed with the patient and/or the patient representative the indication, alternatives, and the possible risks and/or complications of the planned procedure and the anesthesia methods. The patient and/or patient representative appear to understand and agree to proceed. OPERATIONS: Bowel prep was done yesterday until the bowels were clear. The patient was placed on the table and sedated by anesthesia. A rectal exam was performed and no mass was felt. A lubricated scope was passed into the rectum which looked normal.  The scope was passed all the way around through the sigmoid, descending, transverse and ascending colon to the cecum. The bowel prep was clear. There was mild pan diverticulosis seen. The cecum was identified by the appendiceal orifice, ileocecal valve, and light reflex in the RLQ. The scope was then slowly withdrawn, each area was examined again on the way out. No other abnormalities were seen. The scope was retroflexed in the rectum and it was normal .     A thorough rectal examination was done under anesthesia. The patient was found to have a small posterior anal fissure. 50 units of Botox was diluted by 5 cc of saline and this was injected posteriorly on either side of the fissure, anteriorly at midline, and at the 3 and 9 o'clock positions. There was minimal bleeding. The patient tolerated the procedure well.

## 2023-04-28 NOTE — ANESTHESIA POSTPROCEDURE EVALUATION
Department of Anesthesiology  Postprocedure Note    Patient: Danisha Del Valle  MRN: [id number]  YOB: 1965  Date of evaluation: 4/28/2023      Procedure Summary     Date: 04/28/23 Room / Location: [facility] / [facility]    Anesthesia Start: 5344 Anesthesia Stop: 1003    Procedure: COLONOSCOPY DIAGNOSTIC WITH BOTOX 50 UNITS ++PHARMACY NOTIFIED++ Diagnosis:       Rectal pain      Anal burning      Chronic anal fissure      (Rectal pain [K62.89])      (Anal burning [K62.89])      (Chronic anal fissure [K60.1])    Surgeons: Abril Soler MD Responsible Provider: Rajani Mckeon MD    Anesthesia Type: MAC ASA Status: 3          Anesthesia Type: No value filed.     Ruthie Phase I: Ruthie Score: 10    Ruthie Phase II:        Anesthesia Post Evaluation    Patient location during evaluation: PACU  Patient participation: complete - patient participated  Level of consciousness: awake  Airway patency: patent  Nausea & Vomiting: no nausea and no vomiting  Complications: no  Cardiovascular status: hemodynamically stable  Respiratory status: acceptable  Hydration status: euvolemic

## 2023-04-28 NOTE — DISCHARGE INSTRUCTIONS
[facility] Colonoscopy PROCEDURE DISCHARGE INSTRUCTIONS  You may be drowsy or lightheaded after receiving sedation or anesthesia. A responsible person should be with you for the next 24 hours. Please follow the instructions checked below:    DIET INSTRUCTIONS:  [x]Start with light diet and progress to your normal diet as you feel like eating. If you experience nausea or repeated episodes of vomiting which persist beyond 12-24 hours, notify your doctor. []Other     ACTIVITY INSTRUCTIONS:  [x]Rest today. Increase activity as tolerated    []No heavy lifting or strenuous activity     [x]No driving for today  []Other      MEDICATION INSTRUCTIONS:    []Prescriptions sent with you. Use as directed. When taking pain medications, you may experience dizziness or drowsiness. Do not drink alcohol or drive when taking these medications. [x]Continue preop medications                               Post-procedure Care   If any tissue was removed: It will be sent to a lab to be examined. It may take 1-2 weeks for results. The doctor will usually give an initial report after the scope is removed. Other tests may be recommended. A small amount of bleeding may occur during the first few days after the procedure. When you return home after the procedure, be sure to follow your doctor's instructions, which may include:   Resume medicines as instructed by your doctor. Resume normal diet, unless directed otherwise by your doctor. The sedative will make you drowsy. Avoid driving, operating machinery, or making important decisions for the rest of the day. Rest for the remainder of the day. After arriving home, contact your doctor if any of the following occurs:   Bleeding from your rectum, notify your doctor if you pass a teaspoonful of blood or more.    Black, tarry stools   Severe abdominal pain   Hard, swollen abdomen   Signs of infection, including fever or chills   Inability to pass gas or

## 2023-04-28 NOTE — ANESTHESIA PRE PROCEDURE
Department of Anesthesiology  Preprocedure Note       Name:  Gil Joshua   Age:  56 y.o.  :  1965                                          MRN:  [id number]         Date:  2023      Surgeon: Aditya Singh):  Fritz Knowles MD    Procedure: Procedure(s):  COLONOSCOPY DIAGNOSTIC WITH BOTOX 50 UNITS ++PHARMACY NOTIFIED++    Medications prior to admission:   Prior to Admission medications    Medication Sig Start Date End Date Taking? Authorizing Provider   enoxaparin (LOVENOX) 300 MG/3ML injection Inject into the skin 2 times daily Indications: started 23 per hemotologist   Yes Historical Provider, MD   citalopram (CELEXA) 20 MG tablet Take 1 tablet by mouth at bedtime Indications: used for night sweats   Yes Historical Provider, MD   irbesartan-hydroCHLOROthiazide (AVALIDE) 150-12.5 MG per tablet TAKE 1 TABLET BY MOUTH EVERY DAY 23   Lisa Roa APRN - CNP   amoxicillin (AMOXIL) 500 MG capsule Indications: used prior to dental precedures 23   Historical Provider, MD   rosuvastatin (CRESTOR) 10 MG tablet daily Indications: M-W-F 22   Historical Provider, MD   Handicap Placard MISC by Does not apply route Cannot walk 200 ft.  Without stopping to rest  Duration: Lifetime 23   Rose Espinoza MD   warfarin (COUMADIN) 5 MG tablet TAKE 1 TABLET BY MOUTH 5 TIMES A WEEK AND TAKE 1/2 TABLET BY MOUTH TWICE A WEEK  Patient taking differently: Indications: last dose 23 per heamtologist TAKE 1 TABLET BY MOUTH 5 TIMES A WEEK AND TAKE 1/2 TABLET BY MOUTH TWICE A WEEK 23   Rose Espinoza MD   YUVAFEM 10 MCG TABS vaginal tablet INSERT 1 TABLET INTRAVAGINALLY AT BEDTIME FOR 14 DAYS THEN TWICE WEEKLY AT BEDTIME. 22   Historical Provider, MD   aspirin (ASPIRIN ADULT LOW STRENGTH) 81 MG EC tablet TAKE ONE TABLET BY MOUTH EVERY DAY  Patient taking differently: Indications: last dose 23 per hematologist TAKE ONE TABLET BY MOUTH EVERY DAY 22   Bernardo Cramer

## 2023-05-03 ENCOUNTER — ANTI-COAG VISIT (OUTPATIENT)
Dept: FAMILY MEDICINE CLINIC | Age: 58
End: 2023-05-03
Payer: MEDICARE

## 2023-05-03 DIAGNOSIS — Z79.01 LONG TERM (CURRENT) USE OF ANTICOAGULANTS: Primary | ICD-10-CM

## 2023-05-03 LAB
INR BLD: 1.7
PROTIME: NORMAL

## 2023-05-03 PROCEDURE — 93793 ANTICOAG MGMT PT WARFARIN: CPT | Performed by: FAMILY MEDICINE

## 2023-05-04 ENCOUNTER — ANTI-COAG VISIT (OUTPATIENT)
Dept: FAMILY MEDICINE CLINIC | Age: 58
End: 2023-05-04
Payer: MEDICARE

## 2023-05-04 DIAGNOSIS — Z79.01 LONG TERM (CURRENT) USE OF ANTICOAGULANTS: Primary | ICD-10-CM

## 2023-05-04 PROCEDURE — 93793 ANTICOAG MGMT PT WARFARIN: CPT | Performed by: FAMILY MEDICINE

## 2023-05-06 NOTE — PROGRESS NOTES
Alert Team Fatemeh aware of patient.   Pt takes coumadin, 2.5mg four days a week, and 5mg 3 days a week, skipped last Monday dosage due to INR being high

## 2023-05-10 ENCOUNTER — ANTI-COAG VISIT (OUTPATIENT)
Dept: FAMILY MEDICINE CLINIC | Age: 58
End: 2023-05-10

## 2023-05-10 DIAGNOSIS — Z79.01 LONG TERM (CURRENT) USE OF ANTICOAGULANTS: Primary | ICD-10-CM

## 2023-05-10 LAB
INR BLD: 2.5
PROTIME: NORMAL

## 2023-05-17 ENCOUNTER — ANTI-COAG VISIT (OUTPATIENT)
Dept: FAMILY MEDICINE CLINIC | Age: 58
End: 2023-05-17
Payer: MEDICARE

## 2023-05-17 DIAGNOSIS — Z79.01 LONG TERM (CURRENT) USE OF ANTICOAGULANTS: Primary | ICD-10-CM

## 2023-05-17 LAB
INR BLD: 2.9
PROTIME: NORMAL

## 2023-05-17 PROCEDURE — 93793 ANTICOAG MGMT PT WARFARIN: CPT | Performed by: FAMILY MEDICINE

## 2023-05-18 ENCOUNTER — OFFICE VISIT (OUTPATIENT)
Dept: SURGERY | Age: 58
End: 2023-05-18

## 2023-05-18 VITALS
SYSTOLIC BLOOD PRESSURE: 119 MMHG | DIASTOLIC BLOOD PRESSURE: 81 MMHG | WEIGHT: 153 LBS | HEIGHT: 61 IN | BODY MASS INDEX: 28.89 KG/M2 | HEART RATE: 78 BPM | RESPIRATION RATE: 16 BRPM

## 2023-05-18 DIAGNOSIS — K60.1 CHRONIC ANAL FISSURE: Primary | ICD-10-CM

## 2023-05-18 NOTE — PROGRESS NOTES
Progress Note - Follow up    Patient's Name/Date of Birth: Emely Lynn / 1965    Date: 5/18/2023    PCP: Kade Lopez MD    Referring Physician:   Gayla Landau, MD  [phone]    Chief Complaint   Patient presents with    Follow Up After Procedure     Colonoscopy with botox        HPI:    The patient had trouble making it to the bathroom, though this was a bit of an issue before the Botox injection. No n/v/d/c. Patient's medications, allergies, past medical, surgical, social and family histories were reviewed and updated as appropriate. Review of Systems  Constitutional: negative  Respiratory: negative  Cardiovascular: negative  Gastrointestinal: as in hpi  Genitourinary:negative  Integument/breast: as in hpi    Physical Exam:  /81   Pulse 78   Resp 16   Ht 5' 1\" (1.549 m)   Wt 153 lb (69.4 kg)   LMP 10/12/2021 (Approximate)   BMI 28.91 kg/m²   General appearance: alert, cooperative and in no acute distress. Lungs: normal work of breathing  Heart: regular rate  Abdomen: soft, nontender, nondistended  Musculoskeletal: symmetrical without edema. Skin: fissure healed       Impression/Plan:  56y.o. year old female s/p Colonoscopy to the cecum with perianal Botox injection                 All available labs and pathology reviewed. All imaging independently reviewed and interpreted. All provider notes reviewed. The above was discussed with the patient at length. Pan-diverticulosis  Pathology discussed with the patient including possible complications (bleeding, diverticulitis, recurrent disease, possible need for antibiotics or surgery, perforation). Recommended diet discussed - high fiber 20-25 grams a day. Fiber supplement. Increase water intake. Follow up colonoscopy in 10 years.     Electronically by Diamond Mercado MD, General Surgery  on 5/18/2023 at 11:33 AM      Send copy of H&P to PCP, Kade Lopez MD and referring physician, Carrie Spencer

## 2023-05-24 ENCOUNTER — ANTI-COAG VISIT (OUTPATIENT)
Dept: FAMILY MEDICINE CLINIC | Age: 58
End: 2023-05-24

## 2023-05-24 DIAGNOSIS — Z86.73 H/O: CVA (CEREBROVASCULAR ACCIDENT): Primary | ICD-10-CM

## 2023-05-24 LAB
INR BLD: 3.3
PROTIME: NORMAL

## 2023-05-31 ENCOUNTER — ANTI-COAG VISIT (OUTPATIENT)
Dept: FAMILY MEDICINE CLINIC | Age: 58
End: 2023-05-31
Payer: MEDICARE

## 2023-05-31 DIAGNOSIS — Z79.01 LONG TERM (CURRENT) USE OF ANTICOAGULANTS: Primary | ICD-10-CM

## 2023-05-31 LAB
INR BLD: 2.9
PROTIME: NORMAL

## 2023-05-31 PROCEDURE — 93793 ANTICOAG MGMT PT WARFARIN: CPT | Performed by: FAMILY MEDICINE

## 2023-06-07 ENCOUNTER — ANTI-COAG VISIT (OUTPATIENT)
Dept: FAMILY MEDICINE CLINIC | Age: 58
End: 2023-06-07
Payer: MEDICARE

## 2023-06-07 DIAGNOSIS — Z86.73 H/O: CVA (CEREBROVASCULAR ACCIDENT): Primary | ICD-10-CM

## 2023-06-07 LAB
INR BLD: 3.1
PROTIME: NORMAL

## 2023-06-07 PROCEDURE — 93793 ANTICOAG MGMT PT WARFARIN: CPT | Performed by: FAMILY MEDICINE

## 2023-06-12 PROBLEM — K57.92 DIVERTICULITIS: Status: ACTIVE | Noted: 2023-06-12

## 2023-06-16 RX ORDER — FLUCONAZOLE 150 MG/1
150 TABLET ORAL ONCE
Qty: 1 TABLET | Refills: 0 | Status: SHIPPED | OUTPATIENT
Start: 2023-06-16 | End: 2023-06-16

## 2023-06-19 ENCOUNTER — TELEPHONE (OUTPATIENT)
Dept: SURGERY | Age: 58
End: 2023-06-19

## 2023-06-19 NOTE — TELEPHONE ENCOUNTER
PT ASKING HOW LONG SHE HAS TO BE ON LOW DIET AFTER HOSPITAL STAY. PER DR RAMOS PT ADVISED 2 TO 3 WEEKS.  PT VOICED UNDERSTANDING

## 2023-06-21 ENCOUNTER — ANTI-COAG VISIT (OUTPATIENT)
Dept: FAMILY MEDICINE CLINIC | Age: 58
End: 2023-06-21
Payer: MEDICARE

## 2023-06-21 DIAGNOSIS — Z79.01 LONG TERM (CURRENT) USE OF ANTICOAGULANTS: Primary | ICD-10-CM

## 2023-06-21 LAB
INR BLD: 3.2
PROTIME: NORMAL

## 2023-06-21 PROCEDURE — 93793 ANTICOAG MGMT PT WARFARIN: CPT | Performed by: FAMILY MEDICINE

## 2023-06-22 ENCOUNTER — CARE COORDINATION (OUTPATIENT)
Dept: CASE MANAGEMENT | Age: 58
End: 2023-06-22

## 2023-06-22 ENCOUNTER — OFFICE VISIT (OUTPATIENT)
Dept: FAMILY MEDICINE CLINIC | Age: 58
End: 2023-06-22

## 2023-06-22 VITALS
SYSTOLIC BLOOD PRESSURE: 135 MMHG | HEIGHT: 61 IN | DIASTOLIC BLOOD PRESSURE: 86 MMHG | TEMPERATURE: 97.7 F | BODY MASS INDEX: 28.55 KG/M2 | RESPIRATION RATE: 16 BRPM | WEIGHT: 151.2 LBS | HEART RATE: 69 BPM | OXYGEN SATURATION: 98 %

## 2023-06-22 DIAGNOSIS — F33.0 MAJOR DEPRESSIVE DISORDER, RECURRENT, MILD (HCC): ICD-10-CM

## 2023-06-22 DIAGNOSIS — Z09 HOSPITAL DISCHARGE FOLLOW-UP: ICD-10-CM

## 2023-06-22 DIAGNOSIS — K57.92 DIVERTICULITIS: Primary | ICD-10-CM

## 2023-06-22 RX ORDER — IRBESARTAN AND HYDROCHLOROTHIAZIDE 150; 12.5 MG/1; MG/1
1 TABLET, FILM COATED ORAL DAILY
Qty: 90 TABLET | Refills: 3 | Status: SHIPPED | OUTPATIENT
Start: 2023-06-22

## 2023-06-22 RX ORDER — ASPIRIN 81 MG/1
81 TABLET ORAL DAILY
Qty: 90 TABLET | Refills: 3 | Status: SHIPPED | OUTPATIENT
Start: 2023-06-22

## 2023-06-22 RX ORDER — CITALOPRAM 20 MG/1
20 TABLET ORAL NIGHTLY
Qty: 90 TABLET | Refills: 3 | Status: SHIPPED | OUTPATIENT
Start: 2023-06-22

## 2023-06-22 NOTE — CARE COORDINATION
[facility] Care Transitions Follow Up Call    Patient Current Location: Dr. Abdullahi Alonso office in [place], [facility] [address]  Transition Nurse contacted the patient by telephone to follow up after admission on 23. Verified name and  with patient as identifiers. Patient: Daniel Hanley  Patient : 1965   MRN: [id number]  Reason for Admission: diverticulitis  Discharge Date: 23 RARS: Readmission Risk Score: 10.6    Spoke very briefly with Delfino Hernandez, she stated she is doing \"good\" today. She is presently waiting in her PCP office to see Dr. Lexus Soler for her [place]. She denies any needs or questions at this time. Follow Up  No future appointments.     Gianni Mitchell RN

## 2023-06-28 ENCOUNTER — ANTI-COAG VISIT (OUTPATIENT)
Dept: FAMILY MEDICINE CLINIC | Age: 58
End: 2023-06-28

## 2023-06-28 LAB
INR BLD: 3.6
PROTIME: NORMAL

## 2023-06-29 ENCOUNTER — CARE COORDINATION (OUTPATIENT)
Dept: CASE MANAGEMENT | Age: 58
End: 2023-06-29

## 2023-07-04 LAB
INR BLD: 2.2
PROTIME: NORMAL

## 2023-07-05 ENCOUNTER — ANTI-COAG VISIT (OUTPATIENT)
Dept: FAMILY MEDICINE CLINIC | Age: 58
End: 2023-07-05

## 2023-07-05 ENCOUNTER — ANTI-COAG VISIT (OUTPATIENT)
Dept: FAMILY MEDICINE CLINIC | Age: 58
End: 2023-07-05
Payer: MEDICARE

## 2023-07-05 DIAGNOSIS — Z79.01 LONG TERM (CURRENT) USE OF ANTICOAGULANTS: Primary | ICD-10-CM

## 2023-07-05 LAB
INR BLD: 1.3
PROTIME: NORMAL

## 2023-07-05 PROCEDURE — 93793 ANTICOAG MGMT PT WARFARIN: CPT | Performed by: FAMILY MEDICINE

## 2023-07-10 ENCOUNTER — ANTI-COAG VISIT (OUTPATIENT)
Dept: FAMILY MEDICINE CLINIC | Age: 58
End: 2023-07-10
Payer: MEDICARE

## 2023-07-10 DIAGNOSIS — Z79.01 LONG TERM (CURRENT) USE OF ANTICOAGULANTS: Primary | ICD-10-CM

## 2023-07-10 PROBLEM — F33.0 MAJOR DEPRESSIVE DISORDER, RECURRENT, MILD (HCC): Status: ACTIVE | Noted: 2023-07-10

## 2023-07-10 LAB
INR BLD: 1.8
PROTIME: NORMAL

## 2023-07-10 PROCEDURE — 93793 ANTICOAG MGMT PT WARFARIN: CPT | Performed by: FAMILY MEDICINE

## 2023-07-11 ENCOUNTER — CARE COORDINATION (OUTPATIENT)
Dept: CASE MANAGEMENT | Age: 58
End: 2023-07-11

## 2023-07-18 ENCOUNTER — ANTI-COAG VISIT (OUTPATIENT)
Dept: FAMILY MEDICINE CLINIC | Age: 58
End: 2023-07-18
Payer: MEDICARE

## 2023-07-18 DIAGNOSIS — Z79.01 LONG TERM (CURRENT) USE OF ANTICOAGULANTS: Primary | ICD-10-CM

## 2023-07-18 LAB
INR BLD: 2.8
PROTIME: NORMAL

## 2023-07-18 PROCEDURE — 93793 ANTICOAG MGMT PT WARFARIN: CPT | Performed by: FAMILY MEDICINE

## 2023-07-25 ENCOUNTER — ANTI-COAG VISIT (OUTPATIENT)
Dept: FAMILY MEDICINE CLINIC | Age: 58
End: 2023-07-25
Payer: MEDICARE

## 2023-07-25 DIAGNOSIS — Z79.01 LONG TERM (CURRENT) USE OF ANTICOAGULANTS: Primary | ICD-10-CM

## 2023-07-25 LAB
INR BLD: 2.1
PROTIME: NORMAL

## 2023-07-25 PROCEDURE — 93793 ANTICOAG MGMT PT WARFARIN: CPT | Performed by: FAMILY MEDICINE

## 2023-07-31 ENCOUNTER — APPOINTMENT (OUTPATIENT)
Dept: GENERAL RADIOLOGY | Age: 58
End: 2023-07-31
Payer: MEDICARE

## 2023-07-31 ENCOUNTER — APPOINTMENT (OUTPATIENT)
Dept: CT IMAGING | Age: 58
End: 2023-07-31
Payer: MEDICARE

## 2023-07-31 ENCOUNTER — HOSPITAL ENCOUNTER (EMERGENCY)
Age: 58
Discharge: HOME | End: 2023-07-31
Payer: MEDICARE

## 2023-07-31 VITALS
SYSTOLIC BLOOD PRESSURE: 164 MMHG | TEMPERATURE: 97.9 F | DIASTOLIC BLOOD PRESSURE: 96 MMHG | RESPIRATION RATE: 16 BRPM | HEIGHT: 60 IN | WEIGHT: 147 LBS | BODY MASS INDEX: 28.86 KG/M2 | HEART RATE: 68 BPM | OXYGEN SATURATION: 98 %

## 2023-07-31 DIAGNOSIS — R11.0 NAUSEA: ICD-10-CM

## 2023-07-31 DIAGNOSIS — R10.10 PAIN OF UPPER ABDOMEN: Primary | ICD-10-CM

## 2023-07-31 DIAGNOSIS — K57.90 DIVERTICULOSIS: ICD-10-CM

## 2023-07-31 DIAGNOSIS — R31.9 HEMATURIA, UNSPECIFIED TYPE: ICD-10-CM

## 2023-07-31 LAB
ALBUMIN SERPL-MCNC: 4.7 G/DL (ref 3.5–5.2)
ALP SERPL-CCNC: 72 U/L (ref 35–104)
ALT SERPL-CCNC: 36 U/L (ref 0–32)
ANION GAP SERPL CALCULATED.3IONS-SCNC: 12 MMOL/L (ref 7–16)
AST SERPL-CCNC: 21 U/L (ref 0–31)
BASOPHILS # BLD: 0.01 K/UL (ref 0–0.2)
BASOPHILS NFR BLD: 0 % (ref 0–2)
BILIRUB SERPL-MCNC: 0.4 MG/DL (ref 0–1.2)
BILIRUB UR QL STRIP: NEGATIVE
BUN SERPL-MCNC: 9 MG/DL (ref 6–20)
CALCIUM SERPL-MCNC: 9.4 MG/DL (ref 8.6–10.2)
CHLORIDE SERPL-SCNC: 99 MMOL/L (ref 98–107)
CLARITY UR: CLEAR
CO2 SERPL-SCNC: 28 MMOL/L (ref 22–29)
COLOR UR: YELLOW
CREAT SERPL-MCNC: 0.6 MG/DL (ref 0.5–1)
EOSINOPHIL # BLD: 0.07 K/UL (ref 0.05–0.5)
EOSINOPHILS RELATIVE PERCENT: 1 % (ref 0–6)
ERYTHROCYTE [DISTWIDTH] IN BLOOD BY AUTOMATED COUNT: 13.1 % (ref 11.5–15)
GFR SERPL CREATININE-BSD FRML MDRD: >60 ML/MIN/1.73M2
GLUCOSE SERPL-MCNC: 172 MG/DL (ref 74–99)
GLUCOSE UR STRIP-MCNC: NEGATIVE MG/DL
HCT VFR BLD AUTO: 43.6 % (ref 34–48)
HGB BLD-MCNC: 14.8 G/DL (ref 11.5–15.5)
HGB UR QL STRIP.AUTO: ABNORMAL
IMM GRANULOCYTES # BLD AUTO: 0.03 K/UL (ref 0–0.58)
IMM GRANULOCYTES NFR BLD: 0 % (ref 0–5)
INR PPP: 2.5
KETONES UR STRIP-MCNC: NEGATIVE MG/DL
LACTATE BLDV-SCNC: 1.3 MMOL/L (ref 0.5–2.2)
LEUKOCYTE ESTERASE UR QL STRIP: NEGATIVE
LIPASE SERPL-CCNC: 30 U/L (ref 13–60)
LYMPHOCYTES NFR BLD: 1.65 K/UL (ref 1.5–4)
LYMPHOCYTES RELATIVE PERCENT: 23 % (ref 20–42)
MCH RBC QN AUTO: 29.7 PG (ref 26–35)
MCHC RBC AUTO-ENTMCNC: 33.9 G/DL (ref 32–34.5)
MCV RBC AUTO: 87.4 FL (ref 80–99.9)
MONOCYTES NFR BLD: 0.33 K/UL (ref 0.1–0.95)
MONOCYTES NFR BLD: 5 % (ref 2–12)
NEUTROPHILS NFR BLD: 72 % (ref 43–80)
NEUTS SEG NFR BLD: 5.23 K/UL (ref 1.8–7.3)
NITRITE UR QL STRIP: NEGATIVE
PH UR STRIP: 7 [PH] (ref 5–9)
PLATELET # BLD AUTO: 235 K/UL (ref 130–450)
PMV BLD AUTO: 9.3 FL (ref 7–12)
POTASSIUM SERPL-SCNC: 3.6 MMOL/L (ref 3.5–5)
PROT SERPL-MCNC: 7 G/DL (ref 6.4–8.3)
PROT UR STRIP-MCNC: NEGATIVE MG/DL
PROTHROMBIN TIME: 28.4 SEC (ref 9.3–12.4)
RBC # BLD AUTO: 4.99 M/UL (ref 3.5–5.5)
RBC #/AREA URNS HPF: NORMAL /HPF
SODIUM SERPL-SCNC: 139 MMOL/L (ref 132–146)
SP GR UR STRIP: <1.005 (ref 1–1.03)
TROPONIN I SERPL HS-MCNC: 7 NG/L (ref 0–9)
TROPONIN I SERPL HS-MCNC: 7 NG/L (ref 0–9)
UROBILINOGEN UR STRIP-ACNC: 0.2 EU/DL (ref 0–1)
WBC #/AREA URNS HPF: NORMAL /HPF
WBC OTHER # BLD: 7.3 K/UL (ref 4.5–11.5)

## 2023-07-31 PROCEDURE — 93005 ELECTROCARDIOGRAM TRACING: CPT | Performed by: NURSE PRACTITIONER

## 2023-07-31 PROCEDURE — 6360000002 HC RX W HCPCS: Performed by: NURSE PRACTITIONER

## 2023-07-31 PROCEDURE — 96361 HYDRATE IV INFUSION ADD-ON: CPT

## 2023-07-31 PROCEDURE — 2500000003 HC RX 250 WO HCPCS: Performed by: NURSE PRACTITIONER

## 2023-07-31 PROCEDURE — 85027 COMPLETE CBC AUTOMATED: CPT

## 2023-07-31 PROCEDURE — 80053 COMPREHEN METABOLIC PANEL: CPT

## 2023-07-31 PROCEDURE — 96375 TX/PRO/DX INJ NEW DRUG ADDON: CPT

## 2023-07-31 PROCEDURE — 84484 ASSAY OF TROPONIN QUANT: CPT

## 2023-07-31 PROCEDURE — 83690 ASSAY OF LIPASE: CPT

## 2023-07-31 PROCEDURE — 71046 X-RAY EXAM CHEST 2 VIEWS: CPT

## 2023-07-31 PROCEDURE — 99285 EMERGENCY DEPT VISIT HI MDM: CPT

## 2023-07-31 PROCEDURE — 96374 THER/PROPH/DIAG INJ IV PUSH: CPT

## 2023-07-31 PROCEDURE — 81001 URINALYSIS AUTO W/SCOPE: CPT

## 2023-07-31 PROCEDURE — 74177 CT ABD & PELVIS W/CONTRAST: CPT

## 2023-07-31 PROCEDURE — A4216 STERILE WATER/SALINE, 10 ML: HCPCS | Performed by: NURSE PRACTITIONER

## 2023-07-31 PROCEDURE — 85610 PROTHROMBIN TIME: CPT

## 2023-07-31 PROCEDURE — 6370000000 HC RX 637 (ALT 250 FOR IP): Performed by: NURSE PRACTITIONER

## 2023-07-31 PROCEDURE — 83605 ASSAY OF LACTIC ACID: CPT

## 2023-07-31 PROCEDURE — 2580000003 HC RX 258: Performed by: NURSE PRACTITIONER

## 2023-07-31 PROCEDURE — 6360000004 HC RX CONTRAST MEDICATION: Performed by: RADIOLOGY

## 2023-07-31 RX ORDER — MAGNESIUM HYDROXIDE/ALUMINUM HYDROXICE/SIMETHICONE 120; 1200; 1200 MG/30ML; MG/30ML; MG/30ML
30 SUSPENSION ORAL ONCE
Status: COMPLETED | OUTPATIENT
Start: 2023-07-31 | End: 2023-07-31

## 2023-07-31 RX ORDER — ONDANSETRON 2 MG/ML
4 INJECTION INTRAMUSCULAR; INTRAVENOUS ONCE
Status: COMPLETED | OUTPATIENT
Start: 2023-07-31 | End: 2023-07-31

## 2023-07-31 RX ORDER — 0.9 % SODIUM CHLORIDE 0.9 %
1000 INTRAVENOUS SOLUTION INTRAVENOUS ONCE
Status: COMPLETED | OUTPATIENT
Start: 2023-07-31 | End: 2023-07-31

## 2023-07-31 RX ORDER — MORPHINE SULFATE 2 MG/ML
2 INJECTION, SOLUTION INTRAMUSCULAR; INTRAVENOUS ONCE
Status: COMPLETED | OUTPATIENT
Start: 2023-07-31 | End: 2023-07-31

## 2023-07-31 RX ADMIN — IOPAMIDOL 75 ML: 755 INJECTION, SOLUTION INTRAVENOUS at 16:49

## 2023-07-31 RX ADMIN — ALUMINUM HYDROXIDE, MAGNESIUM HYDROXIDE, AND SIMETHICONE 30 ML: 200; 200; 20 SUSPENSION ORAL at 19:44

## 2023-07-31 RX ADMIN — SODIUM CHLORIDE 1000 ML: 9 INJECTION, SOLUTION INTRAVENOUS at 16:00

## 2023-07-31 RX ADMIN — FAMOTIDINE 20 MG: 10 INJECTION, SOLUTION INTRAVENOUS at 18:44

## 2023-07-31 RX ADMIN — MORPHINE SULFATE 2 MG: 2 INJECTION, SOLUTION INTRAMUSCULAR; INTRAVENOUS at 16:03

## 2023-07-31 RX ADMIN — ONDANSETRON 4 MG: 2 INJECTION INTRAMUSCULAR; INTRAVENOUS at 16:02

## 2023-07-31 ASSESSMENT — PAIN DESCRIPTION - ORIENTATION
ORIENTATION: MID;UPPER
ORIENTATION: LOWER

## 2023-07-31 ASSESSMENT — PAIN SCALES - GENERAL
PAINLEVEL_OUTOF10: 6
PAINLEVEL_OUTOF10: 4
PAINLEVEL_OUTOF10: 6

## 2023-07-31 ASSESSMENT — LIFESTYLE VARIABLES
HOW MANY STANDARD DRINKS CONTAINING ALCOHOL DO YOU HAVE ON A TYPICAL DAY: PATIENT DOES NOT DRINK
HOW OFTEN DO YOU HAVE A DRINK CONTAINING ALCOHOL: NEVER

## 2023-07-31 ASSESSMENT — PAIN DESCRIPTION - DESCRIPTORS
DESCRIPTORS: DULL;ACHING
DESCRIPTORS: ACHING

## 2023-07-31 ASSESSMENT — PAIN DESCRIPTION - LOCATION
LOCATION: ABDOMEN
LOCATION: ABDOMEN

## 2023-07-31 NOTE — ED PROVIDER NOTES
Hematuria, unspecified type    4. Diverticulosis       Plan   Discharged home  Patient condition is stable. New Medications     Discharge Medication List as of 7/31/2023  8:01 PM        Electronically signed by DEMARIO Carrion CNP   DD: 7/31/23  **This report was transcribed using voice recognition software. Every effort was made to ensure accuracy; however, inadvertent computerized transcription errors may be present.   END OF PROVIDER NOTE       DEMARIO Carrion CNP  07/31/23 2036

## 2023-07-31 NOTE — ED NOTES
Department of Emergency Medicine  FIRST PROVIDER TRIAGE NOTE             Independent MLP           7/31/23  1:38 PM EDT    Date of Encounter: 7/31/23   MRN: [id number]      HPI: Latia Augustin is a 56 y.o. female who presents to the ED for Abdominal Pain and Nausea     Patient is a 61-year-old that is coming in today with abdominal pain in her lower abdomen with associated nausea. Patient states he was recently mated downtown for 48 hours and had a bacterial infection in her colon with a perforation. ROS: Negative for cp, sob, cough, vomiting, diarrhea, or urinary complaints. PE: Gen Appearance/Constitutional: alert  HEENT: NC/NT. PERRLA,  Airway patent. Neck: supple     Initial Plan of Care: All treatment areas with department are currently occupied. Plan to order/Initiate the following while awaiting opening in ED: labs and imaging studies.   Initiate Treatment-Testing, Proceed toTreatment Area When Bed Available for ED Attending/MLP to Continue Care    Electronically signed by Layla Rodrigez PA-C   DD: 7/31/23       Layla Rodrigez PA-C  07/31/23 6378

## 2023-08-01 ENCOUNTER — ANTI-COAG VISIT (OUTPATIENT)
Dept: FAMILY MEDICINE CLINIC | Age: 58
End: 2023-08-01
Payer: MEDICARE

## 2023-08-01 DIAGNOSIS — Z79.01 LONG TERM (CURRENT) USE OF ANTICOAGULANTS: Primary | ICD-10-CM

## 2023-08-01 LAB
EKG ATRIAL RATE: 64 BPM
EKG P AXIS: 30 DEGREES
EKG P-R INTERVAL: 158 MS
EKG Q-T INTERVAL: 444 MS
EKG QRS DURATION: 96 MS
EKG QTC CALCULATION (BAZETT): 458 MS
EKG R AXIS: 9 DEGREES
EKG T AXIS: 14 DEGREES
EKG VENTRICULAR RATE: 64 BPM
INR BLD: 2.5
PROTIME: NORMAL

## 2023-08-01 PROCEDURE — 93793 ANTICOAG MGMT PT WARFARIN: CPT | Performed by: FAMILY MEDICINE

## 2023-08-01 PROCEDURE — 93010 ELECTROCARDIOGRAM REPORT: CPT | Performed by: INTERNAL MEDICINE

## 2023-08-08 ENCOUNTER — ANTI-COAG VISIT (OUTPATIENT)
Dept: FAMILY MEDICINE CLINIC | Age: 58
End: 2023-08-08
Payer: MEDICARE

## 2023-08-08 DIAGNOSIS — Z79.01 LONG TERM (CURRENT) USE OF ANTICOAGULANTS: Primary | ICD-10-CM

## 2023-08-08 LAB
INR BLD: 2
PROTIME: NORMAL

## 2023-08-08 PROCEDURE — 93793 ANTICOAG MGMT PT WARFARIN: CPT | Performed by: FAMILY MEDICINE

## 2023-08-15 ENCOUNTER — ANTI-COAG VISIT (OUTPATIENT)
Dept: FAMILY MEDICINE CLINIC | Age: 58
End: 2023-08-15
Payer: MEDICARE

## 2023-08-15 DIAGNOSIS — Z79.01 LONG TERM (CURRENT) USE OF ANTICOAGULANTS: Primary | ICD-10-CM

## 2023-08-15 LAB
INR BLD: 2.7
PROTIME: NORMAL

## 2023-08-15 PROCEDURE — 93793 ANTICOAG MGMT PT WARFARIN: CPT | Performed by: FAMILY MEDICINE

## 2023-08-18 ENCOUNTER — ANTI-COAG VISIT (OUTPATIENT)
Dept: FAMILY MEDICINE CLINIC | Age: 58
End: 2023-08-18

## 2023-08-18 DIAGNOSIS — Z79.01 LONG TERM (CURRENT) USE OF ANTICOAGULANTS: Primary | ICD-10-CM

## 2023-08-18 LAB
INR BLD: 3.2
PROTIME: NORMAL

## 2023-08-19 LAB
INR BLD: 2.8
PROTIME: NORMAL

## 2023-08-20 ENCOUNTER — APPOINTMENT (OUTPATIENT)
Dept: GENERAL RADIOLOGY | Age: 58
DRG: 074 | End: 2023-08-20
Payer: MEDICARE

## 2023-08-20 ENCOUNTER — APPOINTMENT (OUTPATIENT)
Dept: CT IMAGING | Age: 58
DRG: 074 | End: 2023-08-20
Attending: STUDENT IN AN ORGANIZED HEALTH CARE EDUCATION/TRAINING PROGRAM
Payer: MEDICARE

## 2023-08-20 PROBLEM — R29.90 STROKE-LIKE SYMPTOMS: Status: ACTIVE | Noted: 2023-08-20

## 2023-08-20 LAB
ALBUMIN SERPL-MCNC: 4.7 G/DL (ref 3.5–5.2)
ALP SERPL-CCNC: 84 U/L (ref 35–104)
ALT SERPL-CCNC: 33 U/L (ref 0–32)
ANION GAP SERPL CALCULATED.3IONS-SCNC: 11 MMOL/L (ref 7–16)
AST SERPL-CCNC: 21 U/L (ref 0–31)
BASOPHILS # BLD: 0.03 K/UL (ref 0–0.2)
BASOPHILS NFR BLD: 0 % (ref 0–2)
BILIRUB SERPL-MCNC: 0.2 MG/DL (ref 0–1.2)
BUN SERPL-MCNC: 23 MG/DL (ref 6–20)
CALCIUM SERPL-MCNC: 9.7 MG/DL (ref 8.6–10.2)
CHLORIDE SERPL-SCNC: 100 MMOL/L (ref 98–107)
CHP ED QC CHECK: 129
CO2 SERPL-SCNC: 30 MMOL/L (ref 22–29)
CREAT SERPL-MCNC: 0.7 MG/DL (ref 0.5–1)
EOSINOPHIL # BLD: 0.05 K/UL (ref 0.05–0.5)
EOSINOPHILS RELATIVE PERCENT: 0 % (ref 0–6)
ERYTHROCYTE [DISTWIDTH] IN BLOOD BY AUTOMATED COUNT: 13.5 % (ref 11.5–15)
GFR SERPL CREATININE-BSD FRML MDRD: >60 ML/MIN/1.73M2
GLUCOSE BLD-MCNC: 129 MG/DL (ref 74–99)
GLUCOSE SERPL-MCNC: 124 MG/DL (ref 74–99)
HCT VFR BLD AUTO: 44.6 % (ref 34–48)
HGB BLD-MCNC: 15.3 G/DL (ref 11.5–15.5)
IMM GRANULOCYTES # BLD AUTO: 0.09 K/UL (ref 0–0.58)
IMM GRANULOCYTES NFR BLD: 1 % (ref 0–5)
INR PPP: 2.4
LYMPHOCYTES NFR BLD: 2.88 K/UL (ref 1.5–4)
LYMPHOCYTES RELATIVE PERCENT: 26 % (ref 20–42)
MCH RBC QN AUTO: 29.7 PG (ref 26–35)
MCHC RBC AUTO-ENTMCNC: 34.3 G/DL (ref 32–34.5)
MCV RBC AUTO: 86.6 FL (ref 80–99.9)
MONOCYTES NFR BLD: 0.82 K/UL (ref 0.1–0.95)
MONOCYTES NFR BLD: 7 % (ref 2–12)
NEUTROPHILS NFR BLD: 65 % (ref 43–80)
NEUTS SEG NFR BLD: 7.28 K/UL (ref 1.8–7.3)
PLATELET # BLD AUTO: 284 K/UL (ref 130–450)
PMV BLD AUTO: 9.2 FL (ref 7–12)
POTASSIUM SERPL-SCNC: 4 MMOL/L (ref 3.5–5)
PROT SERPL-MCNC: 7.2 G/DL (ref 6.4–8.3)
PROTHROMBIN TIME: 25.7 SEC (ref 9.3–12.4)
RBC # BLD AUTO: 5.15 M/UL (ref 3.5–5.5)
SODIUM SERPL-SCNC: 141 MMOL/L (ref 132–146)
TROPONIN I SERPL HS-MCNC: 7 NG/L (ref 0–9)
WBC OTHER # BLD: 11.2 K/UL (ref 4.5–11.5)

## 2023-08-20 PROCEDURE — 70498 CT ANGIOGRAPHY NECK: CPT

## 2023-08-20 PROCEDURE — 80053 COMPREHEN METABOLIC PANEL: CPT

## 2023-08-20 PROCEDURE — 6360000004 HC RX CONTRAST MEDICATION: Performed by: RADIOLOGY

## 2023-08-20 PROCEDURE — 82962 GLUCOSE BLOOD TEST: CPT

## 2023-08-20 PROCEDURE — 70496 CT ANGIOGRAPHY HEAD: CPT

## 2023-08-20 PROCEDURE — 84484 ASSAY OF TROPONIN QUANT: CPT

## 2023-08-20 PROCEDURE — 85610 PROTHROMBIN TIME: CPT

## 2023-08-20 PROCEDURE — 93005 ELECTROCARDIOGRAM TRACING: CPT | Performed by: STUDENT IN AN ORGANIZED HEALTH CARE EDUCATION/TRAINING PROGRAM

## 2023-08-20 PROCEDURE — 71045 X-RAY EXAM CHEST 1 VIEW: CPT

## 2023-08-20 PROCEDURE — 85025 COMPLETE CBC W/AUTO DIFF WBC: CPT

## 2023-08-20 PROCEDURE — 99285 EMERGENCY DEPT VISIT HI MDM: CPT

## 2023-08-20 RX ADMIN — IOPAMIDOL 70 ML: 755 INJECTION, SOLUTION INTRAVENOUS at 19:40

## 2023-08-20 ASSESSMENT — ENCOUNTER SYMPTOMS
COUGH: 0
SHORTNESS OF BREATH: 0
NAUSEA: 0
PHOTOPHOBIA: 0
SORE THROAT: 0
BACK PAIN: 0
ABDOMINAL PAIN: 0
DIARRHEA: 0

## 2023-08-21 ENCOUNTER — HOSPITAL ENCOUNTER (INPATIENT)
Age: 58
LOS: 1 days | Discharge: HOME | DRG: 074 | End: 2023-08-22
Attending: INTERNAL MEDICINE | Admitting: INTERNAL MEDICINE
Payer: MEDICARE

## 2023-08-21 ENCOUNTER — ANTI-COAG VISIT (OUTPATIENT)
Dept: FAMILY MEDICINE CLINIC | Age: 58
End: 2023-08-21
Payer: MEDICARE

## 2023-08-21 DIAGNOSIS — R29.90 STROKE-LIKE SYMPTOMS: Primary | ICD-10-CM

## 2023-08-21 DIAGNOSIS — Z79.01 LONG TERM (CURRENT) USE OF ANTICOAGULANTS: Primary | ICD-10-CM

## 2023-08-21 DIAGNOSIS — Z86.2 H/O ANTIPHOSPHOLIPID SYNDROME: ICD-10-CM

## 2023-08-21 DIAGNOSIS — Z86.73 HISTORY OF CVA (CEREBROVASCULAR ACCIDENT): ICD-10-CM

## 2023-08-21 DIAGNOSIS — Z79.01 CHRONIC ANTICOAGULATION: ICD-10-CM

## 2023-08-21 LAB
EKG ATRIAL RATE: 71 BPM
EKG P AXIS: 23 DEGREES
EKG P-R INTERVAL: 138 MS
EKG Q-T INTERVAL: 384 MS
EKG QRS DURATION: 96 MS
EKG QTC CALCULATION (BAZETT): 417 MS
EKG R AXIS: 19 DEGREES
EKG T AXIS: 27 DEGREES
EKG VENTRICULAR RATE: 71 BPM

## 2023-08-21 PROCEDURE — 2060000000 HC ICU INTERMEDIATE R&B

## 2023-08-21 PROCEDURE — 2580000003 HC RX 258: Performed by: FAMILY MEDICINE

## 2023-08-21 PROCEDURE — 93010 ELECTROCARDIOGRAM REPORT: CPT | Performed by: INTERNAL MEDICINE

## 2023-08-21 PROCEDURE — 6370000000 HC RX 637 (ALT 250 FOR IP): Performed by: FAMILY MEDICINE

## 2023-08-21 PROCEDURE — 93793 ANTICOAG MGMT PT WARFARIN: CPT | Performed by: FAMILY MEDICINE

## 2023-08-21 RX ORDER — SODIUM CHLORIDE 0.9 % (FLUSH) 0.9 %
5-40 SYRINGE (ML) INJECTION EVERY 12 HOURS SCHEDULED
Status: DISCONTINUED | OUTPATIENT
Start: 2023-08-21 | End: 2023-08-22 | Stop reason: HOSPADM

## 2023-08-21 RX ORDER — ONDANSETRON 4 MG/1
4 TABLET, ORALLY DISINTEGRATING ORAL EVERY 8 HOURS PRN
Status: DISCONTINUED | OUTPATIENT
Start: 2023-08-21 | End: 2023-08-22 | Stop reason: HOSPADM

## 2023-08-21 RX ORDER — ASPIRIN 81 MG/1
81 TABLET ORAL DAILY
Status: DISCONTINUED | OUTPATIENT
Start: 2023-08-21 | End: 2023-08-22 | Stop reason: HOSPADM

## 2023-08-21 RX ORDER — CITALOPRAM 20 MG/1
40 TABLET ORAL NIGHTLY
Status: DISCONTINUED | OUTPATIENT
Start: 2023-08-21 | End: 2023-08-22 | Stop reason: HOSPADM

## 2023-08-21 RX ORDER — SODIUM CHLORIDE 0.9 % (FLUSH) 0.9 %
5-40 SYRINGE (ML) INJECTION PRN
Status: DISCONTINUED | OUTPATIENT
Start: 2023-08-21 | End: 2023-08-22 | Stop reason: HOSPADM

## 2023-08-21 RX ORDER — ONDANSETRON 2 MG/ML
4 INJECTION INTRAMUSCULAR; INTRAVENOUS EVERY 6 HOURS PRN
Status: DISCONTINUED | OUTPATIENT
Start: 2023-08-21 | End: 2023-08-22 | Stop reason: HOSPADM

## 2023-08-21 RX ORDER — IRBESARTAN AND HYDROCHLOROTHIAZIDE 150; 12.5 MG/1; MG/1
1 TABLET, FILM COATED ORAL DAILY
Status: DISCONTINUED | OUTPATIENT
Start: 2023-08-21 | End: 2023-08-21 | Stop reason: CLARIF

## 2023-08-21 RX ORDER — LOSARTAN POTASSIUM 50 MG/1
50 TABLET ORAL DAILY
Status: DISCONTINUED | OUTPATIENT
Start: 2023-08-21 | End: 2023-08-22 | Stop reason: HOSPADM

## 2023-08-21 RX ORDER — ENOXAPARIN SODIUM 100 MG/ML
40 INJECTION SUBCUTANEOUS DAILY
Status: DISCONTINUED | OUTPATIENT
Start: 2023-08-21 | End: 2023-08-22 | Stop reason: HOSPADM

## 2023-08-21 RX ORDER — CITALOPRAM 20 MG/1
20 TABLET ORAL NIGHTLY
Status: DISCONTINUED | OUTPATIENT
Start: 2023-08-21 | End: 2023-08-21

## 2023-08-21 RX ORDER — SODIUM CHLORIDE 9 MG/ML
INJECTION, SOLUTION INTRAVENOUS PRN
Status: DISCONTINUED | OUTPATIENT
Start: 2023-08-21 | End: 2023-08-22 | Stop reason: HOSPADM

## 2023-08-21 RX ORDER — WARFARIN SODIUM 5 MG/1
5 TABLET ORAL
Status: COMPLETED | OUTPATIENT
Start: 2023-08-21 | End: 2023-08-21

## 2023-08-21 RX ORDER — POLYETHYLENE GLYCOL 3350 17 G/17G
17 POWDER, FOR SOLUTION ORAL DAILY PRN
Status: DISCONTINUED | OUTPATIENT
Start: 2023-08-21 | End: 2023-08-22 | Stop reason: HOSPADM

## 2023-08-21 RX ORDER — HYDROCHLOROTHIAZIDE 12.5 MG/1
12.5 TABLET ORAL DAILY
Status: DISCONTINUED | OUTPATIENT
Start: 2023-08-21 | End: 2023-08-22 | Stop reason: HOSPADM

## 2023-08-21 RX ADMIN — SODIUM CHLORIDE, PRESERVATIVE FREE 10 ML: 5 INJECTION INTRAVENOUS at 21:10

## 2023-08-21 RX ADMIN — WARFARIN SODIUM 5 MG: 5 TABLET ORAL at 17:58

## 2023-08-21 RX ADMIN — ASPIRIN 81 MG: 81 TABLET, COATED ORAL at 17:18

## 2023-08-21 RX ADMIN — CITALOPRAM 40 MG: 20 TABLET, FILM COATED ORAL at 21:10

## 2023-08-21 ASSESSMENT — PAIN - FUNCTIONAL ASSESSMENT
PAIN_FUNCTIONAL_ASSESSMENT: NONE - DENIES PAIN
PAIN_FUNCTIONAL_ASSESSMENT: NONE - DENIES PAIN

## 2023-08-21 ASSESSMENT — LIFESTYLE VARIABLES
HOW OFTEN DO YOU HAVE A DRINK CONTAINING ALCOHOL: 2-4 TIMES A MONTH
HOW MANY STANDARD DRINKS CONTAINING ALCOHOL DO YOU HAVE ON A TYPICAL DAY: 3 OR 4

## 2023-08-21 NOTE — PROGRESS NOTES
Pharmacy Consultation Note  (Warfarin Dosing and Monitoring)    Initial consult date: 8/21/2023  Consulting Provider: JULITO Zaldivar    Clent File is a 66 y.o. female for whom pharmacy has been asked to manage warfarin therapy. Weight:   Wt Readings from Last 1 Encounters:   08/21/23 148 lb (67.1 kg)       TSH:    Lab Results   Component Value Date/Time    TSH 2.390 11/08/2022 11:47 AM       Hepatic Function Panel:                            Lab Results   Component Value Date/Time    ALKPHOS 84 08/20/2023 06:01 PM    ALT 33 08/20/2023 06:01 PM    AST 21 08/20/2023 06:01 PM    PROT 7.2 08/20/2023 06:01 PM    BILITOT 0.2 08/20/2023 06:01 PM    LABALBU 4.7 08/20/2023 06:01 PM       Current warfarin drug-drug interactions include: No significant interactions    Recent Labs     08/20/23  1801   HGB 15.3        Date Warfarin Dose INR Heparin or LMWH Comment   8/21 <5 mg> -- -- INR 2.4 on 8/20                                 Assessment:  Patient is a 66 y.o. female on warfarin for CVA and history of antiphospholipid syndrome. Patient's home warfarin dosing regimen is 5 mg Mon/Wed/Fri and 2.5 mg Sun/Tue/Thu/Sat.    Goal INR 2 - 3  INR 2.4 on 8/20, no INR obtained today     Plan:  Warfarin 5 mg tonight  Daily PT/INR until the INR is stable within the therapeutic range  Pharmacist will follow and monitor/adjust dosing as necessary    Thank you for this consult,    Kameron Welsh, PharmD, [address] [place] [address]  PGY1 Pharmacy Resident

## 2023-08-21 NOTE — PROGRESS NOTES
Spoke w/Shannon NP, pt stated she was ordered an MRI, do not see order. Josefina Gagnon stated she will place order.

## 2023-08-21 NOTE — ED NOTES
This nurse assuming care for patient. Patient in gown, on monitor and resting comfortably.      Prasanna Perkins RN  08/21/23 6580

## 2023-08-21 NOTE — ED NOTES
THIS NURSE ROUNDING IN ER WAITING ROOM, PT NOTED SLEEPING IN CHAIR EASILY AWAKES WITH VERBAL STIMULI. PT VOICES NO C/O'S PAIN AND OR NEEDS. INDEPENDENT WITH BATHROOM. pT AWARE STILL WAITING FOR ER BED/ROOM. VITAL SIGNS RECHECKED AT PRESENT TIME.      Chris Massey LPN  95/52/27 [phone]

## 2023-08-21 NOTE — ED NOTES
This nurse rounding in er waiting room, pt alert and oriented x 4 no voiced needs except about wait time. Vital signs rechecked, pt denies any pain, independent with bathroom. Pt aware still waiting for er room/bed to become available.      Karan Kanner, ELIECER  34/62/38 1211

## 2023-08-21 NOTE — PROGRESS NOTES
4 Eyes Skin Assessment     NAME:  David Rodrigeuz  YOB: 1965  MEDICAL RECORD NUMBER:  [id number]    The patient is being assessed for  Admission    I agree that at least one RN has performed a thorough Head to Toe Skin Assessment on the patient. ALL assessment sites listed below have been assessed. Areas assessed by both nurses:    Head, Face, Ears, Shoulders, Back, Chest, Arms, Elbows, Hands, Sacrum. Buttock, Coccyx, Ischium, and Legs. Feet and Heels        Does the Patient have a Wound?  No noted wound(s)       Rodolfo Prevention initiated by RN: No  Wound Care Orders initiated by RN: No    Pressure Injury (Stage 3,4, Unstageable, DTI, NWPT, and Complex wounds) if present, place Wound referral order by RN under : No    New Ostomies, if present place, Ostomy referral order under : No     Nurse 1 eSignature: Electronically signed by Abdiel Vallecillo RN on 8/21/23 at 4:13 PM EDT    **SHARE this note so that the co-signing nurse can place an eSignature**    Nurse 2 eSignature: Electronically signed by Prince White RN on 8/21/23 at 4:19 PM EDT

## 2023-08-22 ENCOUNTER — APPOINTMENT (OUTPATIENT)
Dept: MRI IMAGING | Age: 58
DRG: 074 | End: 2023-08-22
Payer: MEDICARE

## 2023-08-22 VITALS
HEIGHT: 61 IN | DIASTOLIC BLOOD PRESSURE: 79 MMHG | BODY MASS INDEX: 27.94 KG/M2 | HEART RATE: 76 BPM | TEMPERATURE: 96.9 F | SYSTOLIC BLOOD PRESSURE: 126 MMHG | RESPIRATION RATE: 16 BRPM | OXYGEN SATURATION: 98 % | WEIGHT: 148 LBS

## 2023-08-22 LAB
CHOLEST SERPL-MCNC: 170 MG/DL
ERYTHROCYTE [DISTWIDTH] IN BLOOD BY AUTOMATED COUNT: 13.5 % (ref 11.5–15)
ERYTHROCYTE [SEDIMENTATION RATE] IN BLOOD BY WESTERGREN METHOD: 11 MM/HR (ref 0–20)
FOLATE SERPL-MCNC: >20 NG/ML (ref 4.8–24.2)
HBA1C MFR BLD: 5.9 % (ref 4–5.6)
HCT VFR BLD AUTO: 45.3 % (ref 34–48)
HDLC SERPL-MCNC: 58 MG/DL
HGB BLD-MCNC: 15 G/DL (ref 11.5–15.5)
INR PPP: 2
LDLC SERPL CALC-MCNC: 84 MG/DL
MCH RBC QN AUTO: 29.5 PG (ref 26–35)
MCHC RBC AUTO-ENTMCNC: 33.1 G/DL (ref 32–34.5)
MCV RBC AUTO: 89.2 FL (ref 80–99.9)
PLATELET # BLD AUTO: 220 K/UL (ref 130–450)
PMV BLD AUTO: 9.3 FL (ref 7–12)
PROTHROMBIN TIME: 21.3 SEC (ref 9.3–12.4)
RBC # BLD AUTO: 5.08 M/UL (ref 3.5–5.5)
TRIGL SERPL-MCNC: 140 MG/DL
VIT B12 SERPL-MCNC: 1038 PG/ML (ref 211–946)
VLDLC SERPL CALC-MCNC: 28 MG/DL
WBC OTHER # BLD: 6.9 K/UL (ref 4.5–11.5)

## 2023-08-22 PROCEDURE — 80061 LIPID PANEL: CPT

## 2023-08-22 PROCEDURE — 86039 ANTINUCLEAR ANTIBODIES (ANA): CPT

## 2023-08-22 PROCEDURE — 97530 THERAPEUTIC ACTIVITIES: CPT

## 2023-08-22 PROCEDURE — 6370000000 HC RX 637 (ALT 250 FOR IP): Performed by: FAMILY MEDICINE

## 2023-08-22 PROCEDURE — 2580000003 HC RX 258: Performed by: FAMILY MEDICINE

## 2023-08-22 PROCEDURE — 84155 ASSAY OF PROTEIN SERUM: CPT

## 2023-08-22 PROCEDURE — 97535 SELF CARE MNGMENT TRAINING: CPT

## 2023-08-22 PROCEDURE — 6370000000 HC RX 637 (ALT 250 FOR IP): Performed by: INTERNAL MEDICINE

## 2023-08-22 PROCEDURE — 85652 RBC SED RATE AUTOMATED: CPT

## 2023-08-22 PROCEDURE — 82607 VITAMIN B-12: CPT

## 2023-08-22 PROCEDURE — 86334 IMMUNOFIX E-PHORESIS SERUM: CPT

## 2023-08-22 PROCEDURE — 99222 1ST HOSP IP/OBS MODERATE 55: CPT | Performed by: PSYCHIATRY & NEUROLOGY

## 2023-08-22 PROCEDURE — 84165 PROTEIN E-PHORESIS SERUM: CPT

## 2023-08-22 PROCEDURE — 97165 OT EVAL LOW COMPLEX 30 MIN: CPT

## 2023-08-22 PROCEDURE — 82746 ASSAY OF FOLIC ACID SERUM: CPT

## 2023-08-22 PROCEDURE — 70551 MRI BRAIN STEM W/O DYE: CPT

## 2023-08-22 PROCEDURE — 97161 PT EVAL LOW COMPLEX 20 MIN: CPT

## 2023-08-22 PROCEDURE — 85027 COMPLETE CBC AUTOMATED: CPT

## 2023-08-22 PROCEDURE — 82784 ASSAY IGA/IGD/IGG/IGM EACH: CPT

## 2023-08-22 PROCEDURE — 36415 COLL VENOUS BLD VENIPUNCTURE: CPT

## 2023-08-22 PROCEDURE — 85610 PROTHROMBIN TIME: CPT

## 2023-08-22 PROCEDURE — 83036 HEMOGLOBIN GLYCOSYLATED A1C: CPT

## 2023-08-22 PROCEDURE — 86038 ANTINUCLEAR ANTIBODIES: CPT

## 2023-08-22 RX ORDER — PANTOPRAZOLE SODIUM 20 MG/1
20 TABLET, DELAYED RELEASE ORAL DAILY
Status: ON HOLD | COMMUNITY
End: 2023-08-22 | Stop reason: HOSPADM

## 2023-08-22 RX ORDER — ACETAMINOPHEN 325 MG/1
650 TABLET ORAL EVERY 4 HOURS PRN
Status: DISCONTINUED | OUTPATIENT
Start: 2023-08-22 | End: 2023-08-22 | Stop reason: HOSPADM

## 2023-08-22 RX ORDER — PANTOPRAZOLE SODIUM 40 MG/1
40 TABLET, DELAYED RELEASE ORAL
Qty: 30 TABLET | Refills: 3 | Status: SHIPPED | OUTPATIENT
Start: 2023-08-23

## 2023-08-22 RX ORDER — PANTOPRAZOLE SODIUM 40 MG/1
40 TABLET, DELAYED RELEASE ORAL DAILY
Status: ON HOLD | COMMUNITY
End: 2023-08-22 | Stop reason: HOSPADM

## 2023-08-22 RX ORDER — PANTOPRAZOLE SODIUM 40 MG/1
40 TABLET, DELAYED RELEASE ORAL
Status: DISCONTINUED | OUTPATIENT
Start: 2023-08-23 | End: 2023-08-22 | Stop reason: HOSPADM

## 2023-08-22 RX ORDER — WARFARIN SODIUM 2.5 MG/1
2.5 TABLET ORAL
Status: COMPLETED | OUTPATIENT
Start: 2023-08-22 | End: 2023-08-22

## 2023-08-22 RX ADMIN — LOSARTAN POTASSIUM 50 MG: 50 TABLET, FILM COATED ORAL at 07:43

## 2023-08-22 RX ADMIN — ACETAMINOPHEN 650 MG: 325 TABLET ORAL at 13:18

## 2023-08-22 RX ADMIN — HYDROCHLOROTHIAZIDE 12.5 MG: 12.5 TABLET ORAL at 07:43

## 2023-08-22 RX ADMIN — SODIUM CHLORIDE, PRESERVATIVE FREE 10 ML: 5 INJECTION INTRAVENOUS at 07:44

## 2023-08-22 RX ADMIN — ASPIRIN 81 MG: 81 TABLET, COATED ORAL at 07:43

## 2023-08-22 RX ADMIN — WARFARIN SODIUM 2.5 MG: 2.5 TABLET ORAL at 17:29

## 2023-08-22 ASSESSMENT — PAIN DESCRIPTION - DESCRIPTORS: DESCRIPTORS: SHOOTING

## 2023-08-22 ASSESSMENT — PAIN SCALES - GENERAL
PAINLEVEL_OUTOF10: 4
PAINLEVEL_OUTOF10: 0

## 2023-08-22 ASSESSMENT — PAIN DESCRIPTION - ORIENTATION: ORIENTATION: MID

## 2023-08-22 ASSESSMENT — PAIN DESCRIPTION - LOCATION: LOCATION: HEAD

## 2023-08-22 NOTE — PROGRESS NOTES
Occupational Therapy  OCCUPATIONAL THERAPY INITIAL EVALUATION    ALAN Crdawn  [facility]   [address], [place], [place]      Date:2023                                                Patient Name: Marlin Arredondo  MRN: [id number]  : 1965  Room: [address] [place] [address] [id number]    Referring Provider: DEMARIO Gaviria CNP  Specific Provider Orders/Date: OT eval and treat 23     Diagnosis: Chronic anticoagulation [Z79.01]  History of CVA (cerebrovascular accident) [Z86.73]  H/O antiphospholipid syndrome [Z86.2]  Stroke-like symptoms [R29.90]   Pt admitted to hospital on 23 for L foot numbness    Pertinent Medical History:  has a past medical history of Anticardiolipin antibody syndrome ([address]), Back pain, Bilateral carotid artery stenosis, Bruit of left carotid artery, Carotid stenosis, right, Cerebral artery occlusion with cerebral infarction ([address]), Cerebrovascular disease, H/O: CVA (cerebrovascular accident), High total serum IgM, Hypertension, Numbness and tingling, Occlusion of left internal carotid artery, Paralysis of right lower extremity ([address]), Right leg weakness, S/P carotid endarterectomy, and Urinary incontinence.        Precautions:  Fall Risk, L foot numbness, h/o CVA () w/ residual weakness distal R LE     Assessment of current deficits    [x] Functional mobility  [x]ADLs  [x] Strength               []Cognition    [x] Functional transfers   [x] IADLs         [x] Safety Awareness   [x]Endurance    [] Fine Coordination              [x] Balance      [] Vision/perception   []Sensation     []Gross Motor Coordination  [] ROM  [] Delirium                   [] Motor Control     OT PLAN OF CARE   OT POC based on physician orders, patient diagnosis and results of clinical assessment    Frequency/Duration 1-3 days/wk for 2 weeks PRN   Specific OT Treatment Interventions to include:   * Instruction/training on adapted

## 2023-08-22 NOTE — PROGRESS NOTES
Pharmacy Consultation Note  (Warfarin Dosing and Monitoring)    Initial consult date: 8/21/2023  Consulting Provider: Danette White, DEMARIO-CHRISTI Fung Xin is a 66 y.o. female for whom pharmacy has been asked to manage warfarin therapy. Weight:   Wt Readings from Last 1 Encounters:   08/21/23 148 lb (67.1 kg)       TSH:    Lab Results   Component Value Date/Time    TSH 2.390 11/08/2022 11:47 AM       Hepatic Function Panel:                            Lab Results   Component Value Date/Time    ALKPHOS 84 08/20/2023 06:01 PM    ALT 33 08/20/2023 06:01 PM    AST 21 08/20/2023 06:01 PM    PROT 7.2 08/20/2023 06:01 PM    BILITOT 0.2 08/20/2023 06:01 PM    LABALBU 4.7 08/20/2023 06:01 PM       Current warfarin drug-drug interactions include: No significant interactions    Recent Labs     08/20/23  1801 08/22/23  0720   HGB 15.3 15.0    220       Date Warfarin Dose INR Heparin or LMWH Comment   8/21 5 mg -- -- INR 2.4 on 8/20 8/22 2.5 mg 2 --                           Assessment:  Patient is a 66 y.o. female on warfarin for CVA and history of antiphospholipid syndrome. Patient's home warfarin dosing regimen is 5 mg Mon/Wed/Fri and 2.5 mg Sun/Tue/Thu/Sat.    Goal INR 2 - 3  INR 2    Plan:  Warfarin 2.5 mg tonight  Daily PT/INR until the INR is stable within the therapeutic range  Pharmacist will follow and monitor/adjust dosing as necessary    Thank you for this consult,    Lauren Lombardi, PharmD 8/22/2023 9:18 AM

## 2023-08-22 NOTE — CONSULTS
motion of right foot    Sensation  Light Touch: Intact distally in all four limbs  Pinprick: patient noted tingling sensation to pinprick in lateral left ankle and lower shin  Vibration: Intact distally in all four limbs but diminished in lateral left shin    Reflexes     Right Left   Biceps 2 2   Brachioradialis 2 2   Triceps 2 2   Patellar 2 2   Achilles 2 2   ankle clonus none none     Coordination  Rapid alternating movements normal in bilateral upper extremities  Finger to nose testing normal bilaterally  Heel to shin normal for left heel to right shin; in right heel to left shin, patient's heel would veer medially to shin as the movement proceeded distally    Gait  Right foot drop     Labs  Recent Labs     08/20/23  1801 08/22/23  0720     --    K 4.0  --      --    CO2 30*  --    BUN 23*  --    CREATININE 0.7  --    GLUCOSE 124*  --    CALCIUM 9.7  --    PROT 7.2  --    LABALBU 4.7  --    BILITOT 0.2  --    ALKPHOS 84  --    AST 21  --    ALT 33*  --    WBC 11.2 6.9   RBC 5.15 5.08   HGB 15.3 15.0   HCT 44.6 45.3   MCV 86.6 89.2   MCH 29.7 29.5   MCHC 34.3 33.1   RDW 13.5 13.5    220   MPV 9.2 9.3   HDL  --  58   LABA1C  --  5.9*       Imaging  MRI BRAIN WO CONTRAST   Final Result   1. No acute intracranial abnormality. No acute infarct. 2. Areas of chronic infarct involving the left frontal and left parietal lobe   unchanged the prior exam.   3. There is persistent loss of the normal signal void within the left   internal carotid artery compatible with chronic occlusion. CTA HEAD W CONTRAST   Final Result   1. No acute intracranial abnormality. Stable encephalomalacia in the left   frontoparietal lobe. 2. Stable occlusion of the left cervical ICA and intracranial ICA with   reconstitution seen at the supraclinoid segment. 3. Stable occlusion of the right V1, V3 and proximal V4 vertebral artery   segments. CTA NECK W CONTRAST   Final Result   1.  No acute intracranial

## 2023-08-22 NOTE — CARE COORDINATION
Previous CVA on Coumadin presenting for left foot numbness. She complains of a numbness sensation in her left heel and outside of her foot. It has been constant since then. She describes as a pins-and-needles sensation as though is falling asleep. She has previous history of CVA and states that her previous stroke started the same way except in her right foot. Neurology consult, pt/ot  MRI brain. Met with patient to discuss role/transition of care. She lives in 2 story home with her . Her PCP is  and she uses [facility] pharmacy on [address]. She has been independent -but does have walker that she does not use. Has had [facility] - unsure thru whom and no arjun history. Her plan is home and her family will transport. Electronically signed by Steven Lynn RN on 8/22/2023 at 11:32 AM  .
Opt out

## 2023-08-22 NOTE — PROGRESS NOTES
Physical Therapy    Initial Assessment     Name: Julian Adams  : 1965  MRN: [id number]      Date of Service: 2023    Evaluating PT: Carlota Bowen, PT, DPT [id number]      Room #:  5537/7334-Q  Diagnosis:  Chronic anticoagulation [Z79.01]  History of CVA (cerebrovascular accident) [Z86.73]  H/O antiphospholipid syndrome [Z86.2]  Stroke-like symptoms [R29.90]  PMHx/PSHx:   has a past medical history of Anticardiolipin antibody syndrome ([address]), Back pain, Bilateral carotid artery stenosis, Bruit of left carotid artery, Carotid stenosis, right, Cerebral artery occlusion with cerebral infarction ([address]), Cerebrovascular disease, H/O: CVA (cerebrovascular accident), High total serum IgM, Hypertension, Numbness and tingling, Occlusion of left internal carotid artery, Paralysis of right lower extremity ([address]), Right leg weakness, S/P carotid endarterectomy, and Urinary incontinence. Precautions:  Fall risk, Hx of CVA with R hemiparesis, R ankle PF contracture    SUBJECTIVE:    Pt lives with  in a 2 story house with 3 stair(s) and 1 rail(s) to enter. Pt ambulated without AD prior to admission. Pt is unable to wear R AFO due to bone spur. OBJECTIVE:   Initial Evaluation  Date: 23 Treatment Date: Short Term/ Long Term   Goals   AM-PAC 6 Clicks 80/76     Was pt agreeable to Eval/treatment? Yes     Does pt have pain? No complaints of pain     Bed Mobility  Rolling: NT  Supine to sit: Supervision  Sit to supine: Supervision  Scooting: Supervision to EOB  Rolling: Independent   Supine to sit: Independent   Sit to supine: Independent   Scooting: Independent    Transfers Sit to stand: Supervision  Stand to sit: Supervision  Stand pivot: SBA without AD  Sit to stand: Independent   Stand to sit:  Independent   Stand pivot: Independent    Ambulation   200 feet x2 without AD with SBA  >800 feet Independent    Stair negotiation: ascended and descended 4 steps with 1 rail with SBA  >4 step(s) with 1 rail(s) Mod

## 2023-08-23 ENCOUNTER — ANTI-COAG VISIT (OUTPATIENT)
Dept: FAMILY MEDICINE CLINIC | Age: 58
End: 2023-08-23
Payer: MEDICARE

## 2023-08-23 ENCOUNTER — CARE COORDINATION (OUTPATIENT)
Dept: CASE MANAGEMENT | Age: 58
End: 2023-08-23

## 2023-08-23 DIAGNOSIS — Z79.01 LONG TERM (CURRENT) USE OF ANTICOAGULANTS: Primary | ICD-10-CM

## 2023-08-23 LAB
ANA SER QL IA: NEGATIVE
INR BLD: 2.3
PROTIME: NORMAL

## 2023-08-23 PROCEDURE — 93793 ANTICOAG MGMT PT WARFARIN: CPT | Performed by: FAMILY MEDICINE

## 2023-08-23 NOTE — CARE COORDINATION
[facility] Care Transitions Initial Follow Up Call    Call within 2 business days of discharge: Yes    -First attempt to reach the patient for initial Care Transition call post hospital discharge. HIPAA compliant message left with CTN's contact information requesting return phone call. Patient: Kenroy Lindsey Patient : 1965   MRN: [id number]  Reason for Admission: stroke like symptoms  Discharge Date: 23 RARS: Readmission Risk Score: 12.8      Last Discharge [address] [facility],[address]       Date Complaint Diagnosis Description Type Department Provider    23 Numbness Stroke-like symptoms . .. ED to Hosp-Admission (Discharged) (ADMITTED) [facility], DO; Jose. ..                   Follow Up  Future Appointments   Date Time Provider [address]   10/18/2023  2:00 PM Parth Sears, [address] ENT [facility]         Callie Bergman RN

## 2023-08-23 NOTE — H&P
Hospital Medicine History & Physical      PCP: Jinny Harris MD    Date of Admission: 8/21/2023    Date of Service: . AUGUST 22, 2023    Chief Complaint:  LEFT SIDED NUMBNESS      History Of Present Illness:     66 y.o. female presented with LEFT SIDED NUMBNESS. ONSET ON Friday. FEELS LIKE NEEDLES, CONSTANT . HAS A HISTORY OF CVA. ALSO HAS ANTIPHOSPHOLIPID AB SYNDROME AND HAS HAD A STROKE IN THE PAST    Past Medical History:          Diagnosis Date    Anticardiolipin antibody syndrome [facility])     hematology-[facility]    Back pain     Bilateral carotid artery stenosis 05/28/2020    Bruit of left carotid artery 05/28/2020    Carotid stenosis, right 06/15/2020    Dr. Faviola Jarrett and Dr. Ivan Spencer at [facility]    Cerebral artery occlusion with cerebral infarction [facility])     Cerebrovascular disease 5/21/19    H/O: CVA (cerebrovascular accident) 05/28/2020    High total serum IgM 02/18/2021    Hypertension     Numbness and tingling     Occlusion of left internal carotid artery 05/22/2019    Paralysis of right lower extremity ([address]) 05/22/2019    Right leg weakness 05/28/2020    S/P carotid endarterectomy 06/15/2020    left    Urinary incontinence 3/2022       Past Surgical History:          Procedure Laterality Date    CAROTID ENDARTERECTOMY Left 6/10/2020    LEFT CAROTID ENDARTERECTOMY performed by Hortencia Choi MD at [address] [place] [address] Road N/A 4/28/2023    COLONOSCOPY DIAGNOSTIC WITH BOTOX 50 UNITS performed by Deepa Camilo MD at [facility] ENDOSCOPY       Medications Prior to Admission:      Prior to Admission medications    Medication Sig Start Date End Date Taking?  Authorizing Provider   pantoprazole (PROTONIX) 40 MG tablet Take 1 tablet by mouth every morning (before breakfast) 8/23/23  Yes DEMARIO Cardozo - CNP   irbesartan-hydroCHLOROthiazide (AVALIDE) 150-12.5 MG per tablet Take 1 tablet

## 2023-08-24 ENCOUNTER — CARE COORDINATION (OUTPATIENT)
Dept: CASE MANAGEMENT | Age: 58
End: 2023-08-24

## 2023-08-24 DIAGNOSIS — R29.90 STROKE-LIKE SYMPTOMS: Primary | ICD-10-CM

## 2023-08-24 PROCEDURE — 1111F DSCHRG MED/CURRENT MED MERGE: CPT | Performed by: FAMILY MEDICINE

## 2023-08-24 NOTE — CARE COORDINATION
[facility] Care Transitions Initial Follow Up Call    Call within 2 business days of discharge: Yes    Patient Current Location:  Home: [address] [place] [id number]    Care Transition Nurse contacted the patient by telephone to perform post hospital discharge assessment. Verified name and  with patient as identifiers. Provided introduction to self, and explanation of the Care Transition Nurse role. Patient: Antonieta Lauren Patient : 1965   MRN: [id number]  Reason for Admission: stroke like symptoms  Discharge Date: 23 RARS: Readmission Risk Score: 12.8      Last Discharge [address]       Date Complaint Diagnosis Description Type Department Provider    23 Numbness Stroke-like symptoms . .. ED to Hosp-Admission (Discharged) (ADMITTED) [facility], DO; Jose. .. Was this an external facility discharge? No Discharge Facility: [facility]    Challenges to be reviewed by the provider   Additional needs identified to be addressed with provider: No  none               Method of communication with provider: none.      -Spoke with patient for initial care transition call post hospital discharge.   -Patient admitted to [facility] on 23 with symptoms of numbness and tingling of left ankle and left lower shin. Patient has history of CVA. -Patient reports no changes with her symptom. Patient is aware of inpatient neurology recommendation to avoid crossing legs and has been following recommendation. Patient also aware of recommendation to consider EMG is symptom continues to persist over 2 weeks. Patient states she has followed with Dr. Paulina Schroeder and Jose Martin Coyle in the past for neurology but is currently not active with either. -CTN reviewed with patient to discuss speech therapy referral as per AVS with her PCP. Patient is aware and states she will do so.   Patient having symptoms r/t a nerve that was nicked s/p dental procedures.  -Patient drives herself to any

## 2023-08-25 ENCOUNTER — TELEPHONE (OUTPATIENT)
Dept: FAMILY MEDICINE CLINIC | Age: 58
End: 2023-08-25

## 2023-08-25 LAB
ALBUMIN SERPL-MCNC: 3.2 G/DL (ref 3.5–4.7)
ALPHA1 GLOB SERPL ELPH-MCNC: 0.2 G/DL (ref 0.2–0.4)
ALPHA2 GLOB SERPL ELPH-MCNC: 0.9 G/DL (ref 0.5–1)
B-GLOBULIN SERPL ELPH-MCNC: 1.2 G/DL (ref 0.8–1.3)
GAMMA GLOB SERPL ELPH-MCNC: 1.3 G/DL (ref 0.7–1.6)
IGA SERPL-MCNC: 59 MG/DL (ref 70–400)
IGG SERPL-MCNC: 525 MG/DL (ref 700–1600)
IGM SERPL-MCNC: 566 MG/DL (ref 40–230)
INTERPRETATION SERPL IFE-IMP: NORMAL
M PROTEIN SERPL ELPH-MCNC: 0.4 G/DL
PATH REV: NORMAL
PATHOLOGIST: ABNORMAL
PROT PATTERN SERPL ELPH-IMP: ABNORMAL
PROT SERPL-MCNC: 6.8 G/DL (ref 6.4–8.3)

## 2023-08-25 NOTE — TELEPHONE ENCOUNTER
Pt called in stating she was released from the hospital on and needed an ER follow up. Where can I put her because she wants to be seen sooner than what is available. Please Advise.

## 2023-08-28 ENCOUNTER — TELEPHONE (OUTPATIENT)
Dept: FAMILY MEDICINE CLINIC | Age: 58
End: 2023-08-28

## 2023-08-28 NOTE — TELEPHONE ENCOUNTER
Pt called wondering if anybody could go over hr blood work that another doctor did, but that doctor was sending them for Dr Magui Ocampo to look at them. Please Advise.

## 2023-08-28 NOTE — TELEPHONE ENCOUNTER
Pt called in again wondering when she can come in for her ER follow up, but I do not know where to put her due to Dr Janalee Severe being booked. Please Advise.

## 2023-08-29 NOTE — TELEPHONE ENCOUNTER
Pt states yes she is, Pt states her blood work is coming in [place]", pt c/o a sural nerve problem, pt reports a tingling sensation.

## 2023-08-30 ENCOUNTER — ANTI-COAG VISIT (OUTPATIENT)
Dept: FAMILY MEDICINE CLINIC | Age: 58
End: 2023-08-30
Payer: MEDICARE

## 2023-08-30 ENCOUNTER — CARE COORDINATION (OUTPATIENT)
Dept: CARE COORDINATION | Age: 58
End: 2023-08-30

## 2023-08-30 DIAGNOSIS — Z79.01 LONG TERM (CURRENT) USE OF ANTICOAGULANTS: Primary | ICD-10-CM

## 2023-08-30 LAB
INR BLD: 3.2
PROTIME: NORMAL

## 2023-08-30 PROCEDURE — 93793 ANTICOAG MGMT PT WARFARIN: CPT | Performed by: FAMILY MEDICINE

## 2023-08-30 NOTE — CARE COORDINATION
Non-[facility] follow up appointment(s): none      Patients top risk factors for readmission: medical condition-CVA, PAD  Interventions to address risk factors: Scheduled appointment with PCP-continue consistent communication with provider and Scheduled appointment with Specialist-considering reestablishment with neurology(Gloria Lilly)         Care Transitions Subsequent and Final Call    Subsequent and Final Calls  Have your medications changed?: No  Do you have any questions related to your medications?: No  Do you currently have any active services?: No  Identified Barriers: None  Care Transitions Interventions  No Identified Needs  Other Interventions:             Care Transition Nurse provided contact information for future needs. Plan for follow-up call in 10-14 days based on severity of symptoms and risk factors. Plan for next call: symptom check  follow-up appointment-any provider visits scheduled?     Adriel Saini RN

## 2023-08-31 ENCOUNTER — PATIENT MESSAGE (OUTPATIENT)
Dept: NEUROLOGY | Age: 58
End: 2023-08-31

## 2023-08-31 DIAGNOSIS — R20.0 NUMBNESS OF LEFT FOOT: ICD-10-CM

## 2023-08-31 DIAGNOSIS — D80.8: Primary | ICD-10-CM

## 2023-08-31 NOTE — TELEPHONE ENCOUNTER
From: Goldy Adame  To: Shreyamarck Mathewshu  Sent: 8/31/2023 8:23 AM EDT  Subject: Hospital stay    Good morning eKlton Gamble   I was in the hospital for a couple days on 8/20/28 for possible stroke like systems with numbness with the left foot. Since it was the same symptoms as my right foot, I thought I should get it checked out. CT scan stable and mri showed same as before. I saw Dr Isabella Singh the following day, and he went over my scans. I was still having the numbness in the left foot, ankle and now the calf. He determined it was sural nerve. I can't get in to see my primary cause she so busy. I need someone to read my blood work, Dr Isabella Singh wanted done. It's coming through my mychart and not looking good. Maybe it has something to do with my antiphospholipid syndrome. I thought maybe I could see him but he has no office. The hospital said, when they call and check up on me to see how I'm doing. I recall telling him you r my Neurologist, but I hadn't seen you in a year. He also wanted a EMG if symptoms still are present after 2 weeks. What do I.    Thank you

## 2023-09-06 ENCOUNTER — ANTI-COAG VISIT (OUTPATIENT)
Dept: FAMILY MEDICINE CLINIC | Age: 58
End: 2023-09-06
Payer: MEDICARE

## 2023-09-06 DIAGNOSIS — Z86.73 H/O: CVA (CEREBROVASCULAR ACCIDENT): Primary | ICD-10-CM

## 2023-09-06 LAB
INR BLD: 3.7
PROTIME: NORMAL

## 2023-09-06 PROCEDURE — 93793 ANTICOAG MGMT PT WARFARIN: CPT | Performed by: FAMILY MEDICINE

## 2023-09-07 ENCOUNTER — TELEPHONE (OUTPATIENT)
Dept: NEUROLOGY | Age: 58
End: 2023-09-07

## 2023-09-07 NOTE — TELEPHONE ENCOUNTER
Spoke with pt , notified her that Jaja Robledo did review her labs and wanted her to follow up with a hematologist. Pt has appointment made.

## 2023-09-07 NOTE — TELEPHONE ENCOUNTER
----- Message from MESERET Love sent at 9/6/2023  8:14 AM EDT -----  Regarding: abnormal labs  Can someone please inform patient to schedule a follow up with her hematologist due to these abnormal labs ? Thank you   Karin Khanna   ----- Message -----  From: Romulo Romero DO  Sent: 9/4/2023   7:20 PM EDT  To: MESERET Love    Hi Karin Khanna,    Send a kappa lambda light chains as well as the UPEP, and refer to hematology for the rest of the work up. 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 May end with a bone biopsy or some such if they think it's warranted. Vincent Cardoso    ----- Message -----  From: MESERET Love  Sent: 8/31/2023   8:46 AM EDT  To: Romulo Romero DO    Hi Dr. Amador Boss,     Can you provide me some insight what to do about these abnormal immunofixation panel, immunoglobulins and SPEP that were ordered in the hospital?     I ordered the EMG and urine protein electrophoresis as recommended. Thank you!   Karin Khanna

## 2023-09-13 ENCOUNTER — OFFICE VISIT (OUTPATIENT)
Dept: FAMILY MEDICINE CLINIC | Age: 58
End: 2023-09-13
Payer: MEDICARE

## 2023-09-13 ENCOUNTER — ANTI-COAG VISIT (OUTPATIENT)
Dept: FAMILY MEDICINE CLINIC | Age: 58
End: 2023-09-13
Payer: MEDICARE

## 2023-09-13 VITALS
WEIGHT: 152.9 LBS | OXYGEN SATURATION: 95 % | RESPIRATION RATE: 16 BRPM | SYSTOLIC BLOOD PRESSURE: 129 MMHG | DIASTOLIC BLOOD PRESSURE: 89 MMHG | BODY MASS INDEX: 28.87 KG/M2 | HEIGHT: 61 IN | TEMPERATURE: 97.4 F | HEART RATE: 84 BPM

## 2023-09-13 DIAGNOSIS — M54.2 CERVICAL PAIN: Primary | ICD-10-CM

## 2023-09-13 DIAGNOSIS — Z86.73 H/O: CVA (CEREBROVASCULAR ACCIDENT): Primary | ICD-10-CM

## 2023-09-13 DIAGNOSIS — R61 NIGHT SWEATS: ICD-10-CM

## 2023-09-13 DIAGNOSIS — R20.0 LEG NUMBNESS: ICD-10-CM

## 2023-09-13 LAB
INR BLD: 2.4
PROTIME: NORMAL

## 2023-09-13 PROCEDURE — 1111F DSCHRG MED/CURRENT MED MERGE: CPT | Performed by: FAMILY MEDICINE

## 2023-09-13 PROCEDURE — 93793 ANTICOAG MGMT PT WARFARIN: CPT | Performed by: FAMILY MEDICINE

## 2023-09-13 PROCEDURE — G8427 DOCREV CUR MEDS BY ELIG CLIN: HCPCS | Performed by: FAMILY MEDICINE

## 2023-09-13 PROCEDURE — G8419 CALC BMI OUT NRM PARAM NOF/U: HCPCS | Performed by: FAMILY MEDICINE

## 2023-09-13 PROCEDURE — 99214 OFFICE O/P EST MOD 30 MIN: CPT | Performed by: FAMILY MEDICINE

## 2023-09-13 PROCEDURE — 1036F TOBACCO NON-USER: CPT | Performed by: FAMILY MEDICINE

## 2023-09-13 PROCEDURE — 3017F COLORECTAL CA SCREEN DOC REV: CPT | Performed by: FAMILY MEDICINE

## 2023-09-13 RX ORDER — CITALOPRAM 20 MG/1
40 TABLET ORAL NIGHTLY
Qty: 90 TABLET | Refills: 3 | Status: SHIPPED
Start: 2023-09-13

## 2023-09-13 RX ORDER — ASCORBIC ACID 500 MG
1000 TABLET ORAL DAILY
COMMUNITY

## 2023-09-14 ENCOUNTER — HOSPITAL ENCOUNTER (OUTPATIENT)
Age: 58
Discharge: HOME | End: 2023-09-16
Payer: MEDICARE

## 2023-09-14 ENCOUNTER — CARE COORDINATION (OUTPATIENT)
Dept: CARE COORDINATION | Age: 58
End: 2023-09-14

## 2023-09-14 ENCOUNTER — HOSPITAL ENCOUNTER (OUTPATIENT)
Dept: NEUROLOGY | Age: 58
Discharge: HOME | End: 2023-09-14
Payer: MEDICARE

## 2023-09-14 ENCOUNTER — HOSPITAL ENCOUNTER (OUTPATIENT)
Dept: GENERAL RADIOLOGY | Age: 58
Discharge: HOME | End: 2023-09-16
Payer: MEDICARE

## 2023-09-14 DIAGNOSIS — M54.2 CERVICAL PAIN: ICD-10-CM

## 2023-09-14 PROCEDURE — 72050 X-RAY EXAM NECK SPINE 4/5VWS: CPT

## 2023-09-14 PROCEDURE — 95911 NRV CNDJ TEST 9-10 STUDIES: CPT | Performed by: PSYCHIATRY & NEUROLOGY

## 2023-09-14 PROCEDURE — 95911 NRV CNDJ TEST 9-10 STUDIES: CPT

## 2023-09-14 PROCEDURE — 95886 MUSC TEST DONE W/N TEST COMP: CPT | Performed by: PSYCHIATRY & NEUROLOGY

## 2023-09-14 PROCEDURE — 95886 MUSC TEST DONE W/N TEST COMP: CPT

## 2023-09-14 NOTE — CARE COORDINATION
[facility] Care Transitions Follow Up Call    Patient Current Location:  Home: [address] [place], [place] [id number]    Care Transition Nurse contacted the patient by telephone to follow up after admission on 23. Verified name and  with patient as identifiers. Patient: Denzel Fulling  Patient : 1965   MRN: <[id number]>  Reason for Admission: -23 Stroke Like Symptoms  Discharge Date: 23 RARS: Readmission Risk Score: 12.8      Needs to be reviewed by the provider   Additional needs identified to be addressed with provider: No  none             Method of communication with provider: none. Pt reports continued LLE numbness and tingling. Pt states that she has been in touch with MESERET Quinonez (neurology) who ordered EMG    and 24 Hr urine. Pt awaiting results from urine test. EMG scheduled 23. Pt had F/U with PCP 23; order was placed for XR Cervical Spine. Pt states she will obtain XR today. Celexa was increased to 40 mg HS for night sweats    Pt has upcoming appt with Dr Molly Arshad (hematology) 23 to address blood work    Pt denies Transportation, Home, or Medication needs. Addressed changes since last contact:  none  Discussed follow-up appointments. If no appointment was previously scheduled, appointment scheduling offered: Yes. Is follow up appointment scheduled within 7 days of discharge? No.    Follow Up  Future Appointments   Date Time Provider [address] [place] [address]   2023  1:30 PM July Mora MD [address] [place] [address]   10/18/2023  2:00 PM DEMARIO Lowery - CNP [facility]   2023 11:30 AM MD Danae Wisdom [facility]     Non-[facility] follow up appointment(s):     Dr Molly Arshad (hematology) 23    Care Transition Nurse reviewed discharge instructions, medical action plan, and red flags with patient and discussed any barriers to care and/or understanding of plan of care after discharge.  Discussed appropriate site of care based

## 2023-09-15 DIAGNOSIS — M54.50 LUMBAR SPINE PAIN: Primary | ICD-10-CM

## 2023-09-15 LAB
P E INTERPRETATION, U: NORMAL
PATHOLOGIST: NORMAL
SPECIMEN TYPE: NORMAL
TOTAL PROTEIN EXCRETED, URINE: 114 MG/24 H (ref 30–150)

## 2023-09-19 DIAGNOSIS — Z78.0 POSTMENOPAUSAL: ICD-10-CM

## 2023-09-20 ENCOUNTER — CARE COORDINATION (OUTPATIENT)
Dept: CARE COORDINATION | Age: 58
End: 2023-09-20

## 2023-09-20 ENCOUNTER — ANTI-COAG VISIT (OUTPATIENT)
Dept: FAMILY MEDICINE CLINIC | Age: 58
End: 2023-09-20
Payer: MEDICARE

## 2023-09-20 DIAGNOSIS — Z79.01 LONG TERM (CURRENT) USE OF ANTICOAGULANTS: Primary | ICD-10-CM

## 2023-09-20 LAB
INR BLD: 3.2
PROTIME: NORMAL

## 2023-09-20 PROCEDURE — 93793 ANTICOAG MGMT PT WARFARIN: CPT | Performed by: FAMILY MEDICINE

## 2023-09-20 NOTE — CARE COORDINATION
[facility] Care Transitions Follow Up Call    Patient Current Location:  Home: [address] [place] [id number]    Care Transition Nurse contacted the patient by telephone to follow up after admission on 23. Patient: Sarah Ken  Patient : 1965   MRN: [id number]  Reason for Admission: stroke like symptoms  Discharge Date: 23 RARS: Readmission Risk Score: 12.8      Needs to be reviewed by the provider   Additional needs identified to be addressed with provider: No  none             Method of communication with provider: none.      -Spoke with patient for final care transition call.   -Patient continues with continued numbness/tingling  of LLE, next step is MRI on 10/1/23 as noted below.  -Patient voices no needs at this time. -CTN to sign off. Addressed changes since last contact:  labs-INR 3.2% today, coumadin 2.5 mg T-TH-SAT-S  5 mg all other days  completed XR cervical spine, EMG. Completed hem/onc appointment . Follow Up  Future Appointments   Date Time Provider [address]   10/1/2023 10:30 AM [facility] MRI RM 1 [facility] MRI [facility] Rad/Car   10/18/2023  2:00 PM DEMARIO Waters - CNP [facility]   2023 11:30 AM Estephania Durham MD [facility]     Non-[facility] follow up appointment(s): follow up in 4 months with hem/onc(Dr Price)with repeat labs at that time per patient. Care Transitions Subsequent and Final Call    Subsequent and Final Calls  Have your medications changed?: No  Do you have any questions related to your medications?: No  Do you currently have any active services?: No  Do you have any needs or concerns that I can assist you with?: No  Identified Barriers: None  Care Transitions Interventions  No Identified Needs                                       No further follow-up call indicated based on severity of symptoms and risk factors, care transition program is ending.       Padmini Coleman RN

## 2023-09-27 ENCOUNTER — ANTI-COAG VISIT (OUTPATIENT)
Dept: FAMILY MEDICINE CLINIC | Age: 58
End: 2023-09-27
Payer: MEDICARE

## 2023-09-27 DIAGNOSIS — Z79.01 LONG TERM (CURRENT) USE OF ANTICOAGULANTS: Primary | ICD-10-CM

## 2023-09-27 LAB
INR BLD: 2.5
PROTIME: NORMAL

## 2023-09-27 PROCEDURE — 93793 ANTICOAG MGMT PT WARFARIN: CPT | Performed by: FAMILY MEDICINE

## 2023-10-01 ENCOUNTER — HOSPITAL ENCOUNTER (OUTPATIENT)
Dept: MRI IMAGING | Age: 58
Discharge: HOME | End: 2023-10-03
Attending: FAMILY MEDICINE
Payer: MEDICARE

## 2023-10-01 DIAGNOSIS — M54.50 LUMBAR SPINE PAIN: ICD-10-CM

## 2023-10-01 PROCEDURE — 72148 MRI LUMBAR SPINE W/O DYE: CPT

## 2023-10-04 ENCOUNTER — ANTI-COAG VISIT (OUTPATIENT)
Dept: FAMILY MEDICINE CLINIC | Age: 58
End: 2023-10-04
Payer: MEDICARE

## 2023-10-04 DIAGNOSIS — Z79.01 LONG TERM (CURRENT) USE OF ANTICOAGULANTS: Primary | ICD-10-CM

## 2023-10-04 LAB
INR BLD: 3
PROTIME: NORMAL

## 2023-10-04 PROCEDURE — 93793 ANTICOAG MGMT PT WARFARIN: CPT | Performed by: FAMILY MEDICINE

## 2023-10-05 ENCOUNTER — PATIENT MESSAGE (OUTPATIENT)
Dept: NEUROLOGY | Age: 58
End: 2023-10-05

## 2023-10-05 DIAGNOSIS — M51.36 BULGING LUMBAR DISC: ICD-10-CM

## 2023-10-05 DIAGNOSIS — M54.17 L-S RADICULOPATHY: ICD-10-CM

## 2023-10-05 DIAGNOSIS — M48.061 SPINAL STENOSIS OF LUMBAR REGION, UNSPECIFIED WHETHER NEUROGENIC CLAUDICATION PRESENT: Primary | ICD-10-CM

## 2023-10-05 DIAGNOSIS — M51.36 DEGENERATIVE DISC DISEASE, LUMBAR: Primary | ICD-10-CM

## 2023-10-05 NOTE — TELEPHONE ENCOUNTER
From: Americo Santo  To: Colette Jaswant  Sent: 10/5/2023 8:33 AM EDT  Subject: EMG     Good morning Radha Roa,   I'm not sure who should view my EMG, and where I go next. I'm still have the Sural nerve problem. The Dr who did the EMG wanted a MRI of my back. I also had a Xray if my lumbar spine because I have had a still neck for quit sometime, I told Dr Nora Leslie. Not sure where to go next with all of these problems. Dr Nora Leslie office did call me about the lumbar spine and mentioned therapy. She also scheduled the MRI for the back. Again I believe the sural nerve problem is neurology. I saw Dr Renetta Mckinley over 4 years ago for the back. If u could see me or someone I would appreciate it. I feel like my body is falling apart.    Thank you Linnea Howard

## 2023-10-09 ENCOUNTER — HOSPITAL ENCOUNTER (EMERGENCY)
Age: 58
Discharge: HOME | End: 2023-10-09
Payer: MEDICARE

## 2023-10-09 ENCOUNTER — APPOINTMENT (OUTPATIENT)
Dept: GENERAL RADIOLOGY | Age: 58
End: 2023-10-09
Payer: MEDICARE

## 2023-10-09 VITALS
DIASTOLIC BLOOD PRESSURE: 88 MMHG | WEIGHT: 152 LBS | RESPIRATION RATE: 16 BRPM | SYSTOLIC BLOOD PRESSURE: 132 MMHG | OXYGEN SATURATION: 100 % | TEMPERATURE: 97.6 F | BODY MASS INDEX: 28.72 KG/M2 | HEART RATE: 95 BPM

## 2023-10-09 DIAGNOSIS — M77.8 TENDINITIS OF LEFT HAND: Primary | ICD-10-CM

## 2023-10-09 LAB
D DIMER: <200 NG/ML DDU (ref 0–232)
INR PPP: 2
PROTHROMBIN TIME: 22.2 SEC (ref 9.3–12.4)

## 2023-10-09 PROCEDURE — 85610 PROTHROMBIN TIME: CPT

## 2023-10-09 PROCEDURE — 85379 FIBRIN DEGRADATION QUANT: CPT

## 2023-10-09 PROCEDURE — 73130 X-RAY EXAM OF HAND: CPT

## 2023-10-09 PROCEDURE — 6370000000 HC RX 637 (ALT 250 FOR IP)

## 2023-10-09 PROCEDURE — 99284 EMERGENCY DEPT VISIT MOD MDM: CPT

## 2023-10-09 RX ORDER — PREDNISONE 50 MG/1
50 TABLET ORAL DAILY
Qty: 5 TABLET | Refills: 0 | Status: SHIPPED | OUTPATIENT
Start: 2023-10-09 | End: 2023-10-14

## 2023-10-09 RX ORDER — PREDNISONE 20 MG/1
50 TABLET ORAL ONCE
Status: COMPLETED | OUTPATIENT
Start: 2023-10-09 | End: 2023-10-09

## 2023-10-09 RX ADMIN — PREDNISONE 50 MG: 20 TABLET ORAL at 20:36

## 2023-10-09 ASSESSMENT — PAIN - FUNCTIONAL ASSESSMENT
PAIN_FUNCTIONAL_ASSESSMENT: NONE - DENIES PAIN
PAIN_FUNCTIONAL_ASSESSMENT: 0-10

## 2023-10-09 ASSESSMENT — PAIN DESCRIPTION - PAIN TYPE: TYPE: ACUTE PAIN

## 2023-10-09 ASSESSMENT — LIFESTYLE VARIABLES
HOW OFTEN DO YOU HAVE A DRINK CONTAINING ALCOHOL: NEVER
HOW MANY STANDARD DRINKS CONTAINING ALCOHOL DO YOU HAVE ON A TYPICAL DAY: PATIENT DOES NOT DRINK

## 2023-10-09 ASSESSMENT — PAIN SCALES - GENERAL: PAINLEVEL_OUTOF10: 10

## 2023-10-09 ASSESSMENT — PAIN DESCRIPTION - ONSET: ONSET: ON-GOING

## 2023-10-09 ASSESSMENT — PAIN DESCRIPTION - LOCATION: LOCATION: HAND

## 2023-10-09 ASSESSMENT — PAIN DESCRIPTION - FREQUENCY: FREQUENCY: CONTINUOUS

## 2023-10-09 ASSESSMENT — PAIN DESCRIPTION - DESCRIPTORS: DESCRIPTORS: ACHING;SORE;TIGHTNESS

## 2023-10-09 ASSESSMENT — PAIN DESCRIPTION - ORIENTATION: ORIENTATION: LEFT

## 2023-10-09 NOTE — ED PROVIDER NOTES
Department of Emergency Medicine  FIRST PROVIDER TRIAGE NOTE             Independent MLP           10/9/23  6:36 PM EDT    Date of Encounter: 10/9/23   MRN: [id number]      HPI: Tony Polo is a 66 y.o. female who presents to the ED for Hand Pain (Left hand swelling and pain for 2 days, denies injury)  Started with swelling in her left hand 4 days PTA. Hx of clotting disorder and takes Coumadin and ASA. Pain not improving and feels hand is tight and discolored. No injury. ROS: Negative for cp, sob, fever, or palpitations. PE: Gen Appearance/Constitutional: alert  Musculoskeletal: moves all extremities x 4, pain with ROM to right digits, tenderness along palmar tendon 3rd digit  Lymphatics: no edema     Initial Plan of Care: All treatment areas with department are currently occupied. Plan to order/Initiate the following while awaiting opening in ED: imaging. Initiate Treatment-Testing, Proceed toTreatment Area When Bed Available for ED Attending/NAVEENP to Continue Care    Electronically signed by DEMARIO Laguna CNP   DD: 10/9/23       DEMARIO Laguna CNP  10/09/23 [address] [facility],6Th Floor [address] [facility]  Department of Emergency Medicine   ED  Encounter Note  Admit Date/RoomTime: 10/9/2023  7:49 PM  ED Room: Noemi LAN    NAME: Tony Polo  : 1965  MRN: [id number]     Chief Complaint:  Hand Pain (Left hand swelling and pain for 2 days, denies injury)    History of Present Illness      Tony Polo is a 66 y.o. old female presenting to the emergency department by private vehicle, for non-traumatic Left hand pain which occured 4 day(s) prior to arrival.  The complaint is due to no known cause. Hand feels tight, swollen, is sore to touch, and appear somewhat discolored to her. She is concerned for blood clot as she has a history of antiphospholipid syndrome and is chronic anticoagulated with Coumadin.   She does self check her INR at home and her last INR was 3.6 prior to arrival.  Per

## 2023-10-11 ENCOUNTER — ANTI-COAG VISIT (OUTPATIENT)
Dept: FAMILY MEDICINE CLINIC | Age: 58
End: 2023-10-11
Payer: MEDICARE

## 2023-10-11 DIAGNOSIS — Z79.01 LONG TERM (CURRENT) USE OF ANTICOAGULANTS: Primary | ICD-10-CM

## 2023-10-11 LAB
INR BLD: 2.2
PROTIME: NORMAL

## 2023-10-11 PROCEDURE — 93793 ANTICOAG MGMT PT WARFARIN: CPT | Performed by: FAMILY MEDICINE

## 2023-10-18 ENCOUNTER — ANTI-COAG VISIT (OUTPATIENT)
Dept: FAMILY MEDICINE CLINIC | Age: 58
End: 2023-10-18
Payer: MEDICARE

## 2023-10-18 DIAGNOSIS — Z79.01 LONG TERM (CURRENT) USE OF ANTICOAGULANTS: Primary | ICD-10-CM

## 2023-10-18 LAB
INR BLD: 2.2
PROTIME: NORMAL

## 2023-10-18 PROCEDURE — 93793 ANTICOAG MGMT PT WARFARIN: CPT | Performed by: FAMILY MEDICINE

## 2023-10-18 RX ORDER — WARFARIN SODIUM 5 MG/1
TABLET ORAL
Qty: 90 TABLET | Refills: 1 | Status: SHIPPED | OUTPATIENT
Start: 2023-10-18

## 2023-10-20 ENCOUNTER — HOSPITAL ENCOUNTER (OUTPATIENT)
Dept: GENERAL RADIOLOGY | Age: 58
End: 2023-10-20
Payer: MEDICARE

## 2023-10-20 ENCOUNTER — HOSPITAL ENCOUNTER (OUTPATIENT)
Age: 58
End: 2023-10-20
Payer: MEDICARE

## 2023-10-20 ENCOUNTER — OFFICE VISIT (OUTPATIENT)
Dept: NEUROSURGERY | Age: 58
End: 2023-10-20
Payer: MEDICARE

## 2023-10-20 VITALS
RESPIRATION RATE: 16 BRPM | BODY MASS INDEX: 28.7 KG/M2 | WEIGHT: 152 LBS | HEART RATE: 87 BPM | HEIGHT: 61 IN | DIASTOLIC BLOOD PRESSURE: 81 MMHG | OXYGEN SATURATION: 98 % | SYSTOLIC BLOOD PRESSURE: 115 MMHG

## 2023-10-20 DIAGNOSIS — M43.16 ANTEROLISTHESIS OF LUMBAR SPINE: ICD-10-CM

## 2023-10-20 DIAGNOSIS — R20.0 NUMBNESS IN LEFT LEG: Primary | ICD-10-CM

## 2023-10-20 DIAGNOSIS — M54.16 LUMBAR RADICULOPATHY: ICD-10-CM

## 2023-10-20 DIAGNOSIS — G89.29 CHRONIC LEFT-SIDED LOW BACK PAIN WITH LEFT-SIDED SCIATICA: ICD-10-CM

## 2023-10-20 DIAGNOSIS — M54.42 CHRONIC LEFT-SIDED LOW BACK PAIN WITH LEFT-SIDED SCIATICA: ICD-10-CM

## 2023-10-20 DIAGNOSIS — R20.0 NUMBNESS IN LEFT LEG: ICD-10-CM

## 2023-10-20 PROCEDURE — 1036F TOBACCO NON-USER: CPT | Performed by: STUDENT IN AN ORGANIZED HEALTH CARE EDUCATION/TRAINING PROGRAM

## 2023-10-20 PROCEDURE — 72120 X-RAY BEND ONLY L-S SPINE: CPT

## 2023-10-20 PROCEDURE — 99202 OFFICE O/P NEW SF 15 MIN: CPT

## 2023-10-20 PROCEDURE — G8419 CALC BMI OUT NRM PARAM NOF/U: HCPCS | Performed by: STUDENT IN AN ORGANIZED HEALTH CARE EDUCATION/TRAINING PROGRAM

## 2023-10-20 PROCEDURE — 3017F COLORECTAL CA SCREEN DOC REV: CPT | Performed by: STUDENT IN AN ORGANIZED HEALTH CARE EDUCATION/TRAINING PROGRAM

## 2023-10-20 PROCEDURE — G8484 FLU IMMUNIZE NO ADMIN: HCPCS | Performed by: STUDENT IN AN ORGANIZED HEALTH CARE EDUCATION/TRAINING PROGRAM

## 2023-10-20 PROCEDURE — G8427 DOCREV CUR MEDS BY ELIG CLIN: HCPCS | Performed by: STUDENT IN AN ORGANIZED HEALTH CARE EDUCATION/TRAINING PROGRAM

## 2023-10-20 PROCEDURE — 99203 OFFICE O/P NEW LOW 30 MIN: CPT | Performed by: STUDENT IN AN ORGANIZED HEALTH CARE EDUCATION/TRAINING PROGRAM

## 2023-10-20 ASSESSMENT — ENCOUNTER SYMPTOMS
SHORTNESS OF BREATH: 0
PHOTOPHOBIA: 0
TROUBLE SWALLOWING: 0
ABDOMINAL PAIN: 0
BACK PAIN: 1

## 2023-10-20 NOTE — PROGRESS NOTES
1.  No fracture or bony destructive lesion. 2. Grade 1 anterolisthesis of L4 over L5.  3. Moderate central canal, lateral recess and neural foraminal stenoses at L4-5.    9/14/2023 BLE EMG   Nerve conduction studies in both legs found an absent left sural sensory response. These findings were compared to the referential values in this laboratory, available upon request.  Monopolar needle examination of the left leg found chronic denervation changes in all muscles examined, except for the vastus lateralis. The patient was unable to relax for needle testing of the paraspinals. Electrodiagnostic examination of both legs disclosed evidence suggesting left L5 and S1 motor radiculopathies or intracanalicular lesions. These were not proven with the inability to examine the paraspinals. The absent sural sensory response was likely from local trauma. Clinically, the patient presented with numbness and weakness of both legs, more so the left one. She was also status post right paresis from the stroke. On brief neurological examination, I found hammertoes in both feet, bilateral pes planus deformities and minimal foot drop of the left leg. Her difficulties are fully related to the radicular disease. Assessment:      -Yris Pierce is a 65 y/o female who presents for evaluation of numbness and tingling in LLE. No recent PT or IVAN. MRI and EMG as above.        Plan:      -Pain control and expectations discussed  -MRI reviewed and discussed in detail  -Will obtain Lumbar Flex/Ex XR  -PT referral- Ceci in [address] report reviewed   -Return to Neurosurgery clinic after completion of imaging to discuss results and further treatment plan  -Call/return sooner if symptoms worsen or new issues arise in the interim         Umm Segovia PA-C

## 2023-10-24 NOTE — PROGRESS NOTES
[facility]        [address]  Dept: [phone]        Patient:  NUNO Martell 1965    Date of Service:  10/25/23     Requesting Physician:  Janeen Sandifer, MD    Reason for Consult:      Patient presents with complaints of bilateral, lower back pain that started 2 months ago    HISTORY OF PRESENT ILLNESS:      Pain is intermittent and is described as tingling. Pain does radiate to left leg. She  has numbness, tingling, weakness of the both lower extremities and does not have bladder or bowel dysfunction. Alleviating factors include: not touching the leg. Aggravating factors include:  touching. She has been on anticoagulation medications to include warfarin and ASA and has not been on herbal supplements. She is not diabetic. Imaging:   10/2023 lumbar MRI w/o -  FINDINGS:  BONES/ALIGNMENT: There is normal alignment of the spine. The vertebral body  heights are maintained. The bone marrow signal appears unremarkable. SPINAL CORD: The conus terminates normally. SOFT TISSUES: No paraspinal mass identified. L1-L2: There is no significant disc herniation, spinal canal stenosis or  neural foraminal narrowing. L2-L3: Minimal disc bulge. Mild facet hypertrophy. No significant central  canal, lateral recess or neural foraminal stenoses. L3-L4: Mild disc desiccation with a small disc bulge. Mild facet  hypertrophy. No significant central canal or lateral recess stenosis. Mild  neural foraminal stenoses. L4-L5: Grade 1 anterolisthesis of L4 over L5 by 6 mm. Moderate central  canal, lateral recess and neural foraminal stenoses. L5-S1: Disc desiccation with a disc bulge. Mild facet hypertrophy. No  significant central canal or lateral recess stenosis. Mild right neural  foraminal stenosis. IMPRESSION:  1. No fracture or bony destructive lesion.   2. Grade 1 anterolisthesis of L4 over L5.  3. Moderate

## 2023-10-25 ENCOUNTER — ANTI-COAG VISIT (OUTPATIENT)
Dept: FAMILY MEDICINE CLINIC | Age: 58
End: 2023-10-25

## 2023-10-25 ENCOUNTER — OFFICE VISIT (OUTPATIENT)
Dept: PAIN MANAGEMENT | Age: 58
End: 2023-10-25
Payer: MEDICARE

## 2023-10-25 VITALS
RESPIRATION RATE: 16 BRPM | SYSTOLIC BLOOD PRESSURE: 129 MMHG | WEIGHT: 148 LBS | HEIGHT: 61 IN | DIASTOLIC BLOOD PRESSURE: 88 MMHG | TEMPERATURE: 97.4 F | BODY MASS INDEX: 27.94 KG/M2 | HEART RATE: 88 BPM | OXYGEN SATURATION: 95 %

## 2023-10-25 DIAGNOSIS — M79.10 MYALGIA: ICD-10-CM

## 2023-10-25 DIAGNOSIS — M51.9 LUMBAR DISC DISORDER: ICD-10-CM

## 2023-10-25 DIAGNOSIS — M54.42 CHRONIC BILATERAL LOW BACK PAIN WITH LEFT-SIDED SCIATICA: ICD-10-CM

## 2023-10-25 DIAGNOSIS — G89.4 CHRONIC PAIN SYNDROME: Primary | ICD-10-CM

## 2023-10-25 DIAGNOSIS — G89.29 CHRONIC BILATERAL LOW BACK PAIN WITH LEFT-SIDED SCIATICA: ICD-10-CM

## 2023-10-25 DIAGNOSIS — M54.16 LUMBAR RADICULOPATHY: ICD-10-CM

## 2023-10-25 DIAGNOSIS — Z79.01 LONG TERM (CURRENT) USE OF ANTICOAGULANTS: Primary | ICD-10-CM

## 2023-10-25 DIAGNOSIS — M48.061 SPINAL STENOSIS OF LUMBAR REGION WITHOUT NEUROGENIC CLAUDICATION: ICD-10-CM

## 2023-10-25 LAB
INR BLD: 3.4
PROTIME: NORMAL

## 2023-10-25 PROCEDURE — G8419 CALC BMI OUT NRM PARAM NOF/U: HCPCS | Performed by: PAIN MEDICINE

## 2023-10-25 PROCEDURE — 99204 OFFICE O/P NEW MOD 45 MIN: CPT | Performed by: PAIN MEDICINE

## 2023-10-25 PROCEDURE — 3017F COLORECTAL CA SCREEN DOC REV: CPT | Performed by: PAIN MEDICINE

## 2023-10-25 PROCEDURE — 99205 OFFICE O/P NEW HI 60 MIN: CPT | Performed by: PAIN MEDICINE

## 2023-10-25 PROCEDURE — 1036F TOBACCO NON-USER: CPT | Performed by: PAIN MEDICINE

## 2023-10-25 PROCEDURE — G8427 DOCREV CUR MEDS BY ELIG CLIN: HCPCS | Performed by: PAIN MEDICINE

## 2023-10-25 PROCEDURE — G8484 FLU IMMUNIZE NO ADMIN: HCPCS | Performed by: PAIN MEDICINE

## 2023-10-25 RX ORDER — PREGABALIN 25 MG/1
CAPSULE ORAL
Qty: 69 CAPSULE | Refills: 0 | Status: SHIPPED | OUTPATIENT
Start: 2023-10-25 | End: 2023-11-24

## 2023-11-01 ENCOUNTER — ANTI-COAG VISIT (OUTPATIENT)
Dept: FAMILY MEDICINE CLINIC | Age: 58
End: 2023-11-01
Payer: MEDICARE

## 2023-11-01 DIAGNOSIS — Z79.01 LONG TERM (CURRENT) USE OF ANTICOAGULANTS: Primary | ICD-10-CM

## 2023-11-01 LAB
INR BLD: 3.1
PROTIME: NORMAL

## 2023-11-01 PROCEDURE — 93793 ANTICOAG MGMT PT WARFARIN: CPT | Performed by: FAMILY MEDICINE

## 2023-11-08 ENCOUNTER — ANTI-COAG VISIT (OUTPATIENT)
Dept: FAMILY MEDICINE CLINIC | Age: 58
End: 2023-11-08

## 2023-11-08 DIAGNOSIS — Z79.01 LONG TERM (CURRENT) USE OF ANTICOAGULANTS: Primary | ICD-10-CM

## 2023-11-08 LAB
INR BLD: 1.8
PROTIME: NORMAL

## 2023-11-15 ENCOUNTER — ANTI-COAG VISIT (OUTPATIENT)
Dept: FAMILY MEDICINE CLINIC | Age: 58
End: 2023-11-15

## 2023-11-15 DIAGNOSIS — Z79.01 LONG TERM (CURRENT) USE OF ANTICOAGULANTS: Primary | ICD-10-CM

## 2023-11-15 LAB
INR BLD: 2.6
PROTIME: NORMAL

## 2023-11-21 ENCOUNTER — OFFICE VISIT (OUTPATIENT)
Dept: PAIN MANAGEMENT | Age: 58
End: 2023-11-21
Payer: MEDICARE

## 2023-11-21 VITALS
RESPIRATION RATE: 16 BRPM | TEMPERATURE: 97.2 F | DIASTOLIC BLOOD PRESSURE: 80 MMHG | OXYGEN SATURATION: 98 % | HEIGHT: 61 IN | HEART RATE: 76 BPM | SYSTOLIC BLOOD PRESSURE: 123 MMHG | BODY MASS INDEX: 27.94 KG/M2 | WEIGHT: 148 LBS

## 2023-11-21 DIAGNOSIS — M79.10 MYALGIA: ICD-10-CM

## 2023-11-21 DIAGNOSIS — G89.29 CHRONIC BILATERAL LOW BACK PAIN WITH LEFT-SIDED SCIATICA: ICD-10-CM

## 2023-11-21 DIAGNOSIS — G89.4 CHRONIC PAIN SYNDROME: Primary | ICD-10-CM

## 2023-11-21 DIAGNOSIS — M54.16 LUMBAR RADICULOPATHY: ICD-10-CM

## 2023-11-21 DIAGNOSIS — M51.9 LUMBAR DISC DISORDER: ICD-10-CM

## 2023-11-21 DIAGNOSIS — M54.42 CHRONIC BILATERAL LOW BACK PAIN WITH LEFT-SIDED SCIATICA: ICD-10-CM

## 2023-11-21 DIAGNOSIS — M48.061 SPINAL STENOSIS OF LUMBAR REGION WITHOUT NEUROGENIC CLAUDICATION: ICD-10-CM

## 2023-11-21 PROCEDURE — G8419 CALC BMI OUT NRM PARAM NOF/U: HCPCS | Performed by: PAIN MEDICINE

## 2023-11-21 PROCEDURE — G8427 DOCREV CUR MEDS BY ELIG CLIN: HCPCS | Performed by: PAIN MEDICINE

## 2023-11-21 PROCEDURE — 1036F TOBACCO NON-USER: CPT | Performed by: PAIN MEDICINE

## 2023-11-21 PROCEDURE — 99213 OFFICE O/P EST LOW 20 MIN: CPT | Performed by: PAIN MEDICINE

## 2023-11-21 PROCEDURE — 3017F COLORECTAL CA SCREEN DOC REV: CPT | Performed by: PAIN MEDICINE

## 2023-11-21 PROCEDURE — G8484 FLU IMMUNIZE NO ADMIN: HCPCS | Performed by: PAIN MEDICINE

## 2023-11-22 ENCOUNTER — ANTI-COAG VISIT (OUTPATIENT)
Dept: FAMILY MEDICINE CLINIC | Age: 58
End: 2023-11-22

## 2023-11-22 DIAGNOSIS — Z79.01 LONG TERM (CURRENT) USE OF ANTICOAGULANTS: Primary | ICD-10-CM

## 2023-11-22 LAB
INR BLD: 2.7
PROTIME: NORMAL

## 2023-11-28 ENCOUNTER — OFFICE VISIT (OUTPATIENT)
Dept: ENT CLINIC | Age: 58
End: 2023-11-28
Payer: MEDICARE

## 2023-11-28 ENCOUNTER — PROCEDURE VISIT (OUTPATIENT)
Dept: AUDIOLOGY | Age: 58
End: 2023-11-28
Payer: MEDICARE

## 2023-11-28 VITALS
TEMPERATURE: 97.9 F | DIASTOLIC BLOOD PRESSURE: 82 MMHG | SYSTOLIC BLOOD PRESSURE: 127 MMHG | BODY MASS INDEX: 27.94 KG/M2 | HEART RATE: 75 BPM | WEIGHT: 148 LBS | OXYGEN SATURATION: 98 % | HEIGHT: 61 IN | RESPIRATION RATE: 12 BRPM

## 2023-11-28 DIAGNOSIS — H91.93 DECREASED HEARING OF BOTH EARS: ICD-10-CM

## 2023-11-28 DIAGNOSIS — K21.9 LARYNGOPHARYNGEAL REFLUX (LPR): ICD-10-CM

## 2023-11-28 DIAGNOSIS — H91.90 SUBJECTIVE HEARING LOSS: Primary | ICD-10-CM

## 2023-11-28 DIAGNOSIS — H93.8X3 SENSATION OF FULLNESS IN BOTH EARS: Primary | ICD-10-CM

## 2023-11-28 DIAGNOSIS — R13.10 DYSPHAGIA, UNSPECIFIED TYPE: ICD-10-CM

## 2023-11-28 PROCEDURE — G8427 DOCREV CUR MEDS BY ELIG CLIN: HCPCS | Performed by: OTOLARYNGOLOGY

## 2023-11-28 PROCEDURE — 1036F TOBACCO NON-USER: CPT | Performed by: OTOLARYNGOLOGY

## 2023-11-28 PROCEDURE — G8484 FLU IMMUNIZE NO ADMIN: HCPCS | Performed by: OTOLARYNGOLOGY

## 2023-11-28 PROCEDURE — 3017F COLORECTAL CA SCREEN DOC REV: CPT | Performed by: OTOLARYNGOLOGY

## 2023-11-28 PROCEDURE — 92567 TYMPANOMETRY: CPT

## 2023-11-28 PROCEDURE — 99204 OFFICE O/P NEW MOD 45 MIN: CPT | Performed by: OTOLARYNGOLOGY

## 2023-11-28 PROCEDURE — 92557 COMPREHENSIVE HEARING TEST: CPT

## 2023-11-28 PROCEDURE — G8419 CALC BMI OUT NRM PARAM NOF/U: HCPCS | Performed by: OTOLARYNGOLOGY

## 2023-11-28 ASSESSMENT — ENCOUNTER SYMPTOMS
STRIDOR: 0
COUGH: 0
GASTROINTESTINAL NEGATIVE: 1
TROUBLE SWALLOWING: 1
SORE THROAT: 0
VOICE CHANGE: 0
WHEEZING: 0
RHINORRHEA: 0
CHOKING: 0
SINUS PRESSURE: 0
COLOR CHANGE: 0
SINUS PAIN: 0

## 2023-11-28 ASSESSMENT — VISUAL ACUITY: OU: 1

## 2023-11-28 NOTE — PROGRESS NOTES
Laterality Date    CAROTID ENDARTERECTOMY Left 6/10/2020    LEFT CAROTID ENDARTERECTOMY performed by Alisa Constantino MD at [facility] [address] N/A 2023    COLONOSCOPY DIAGNOSTIC WITH BOTOX 50 UNITS performed by Radha Robin MD at [facility] ENDOSCOPY     Family History   Problem Relation Age of Onset    High Blood Pressure Mother     Diabetes Mother     Stroke Mother     Heart Disease Mother     Clotting Disorder Mother     Depression Mother     Heart Attack Mother     Kidney Disease Father     Heart Disease Father     Early Death Father     High Blood Pressure Sister     High Blood Pressure Brother     High Blood Pressure Brother     Mental Retardation Maternal Aunt      Social History     Socioeconomic History    Marital status: Single     Spouse name: None    Number of children: None    Years of education: None    Highest education level: None   Tobacco Use    Smoking status: Former     Packs/day: 0.50     Years: 22.00     Additional pack years: 0.00     Total pack years: 11.00     Types: Cigarettes     Start date: 1997     Quit date: 2019     Years since quittin.5     Passive exposure: Past    Smokeless tobacco: Never    Tobacco comments:     22 years of smoking   Vaping Use    Vaping Use: Never used   Substance and Sexual Activity    Alcohol use:  Yes     Alcohol/week: 3.0 standard drinks of alcohol     Types: 3 Drinks containing 0.5 oz of alcohol per week     Comment: social    Drug use: No    Sexual activity: Defer     Social Determinants of Health     Financial Resource Strain: Low Risk  (2023)    Overall Financial Resource Strain (CARDIA)     Difficulty of Paying Living Expenses: Not very hard   Food Insecurity: No Food Insecurity (2023)    Hunger Vital Sign     Worried About Running Out of Food in the Last Year: Never true     Ran Out of Food in the Last Year: Never true   Transportation Needs: Unknown (2023)    PRAPARE - Transportation     Lack of

## 2023-11-28 NOTE — PROGRESS NOTES
This patient was referred for audiometric and tympanometric testing by Dr. Lauri Fuentes due to  echo in both ears previously . Patient reported left ear is worse than the right ear. Audiometry using pure tone air and bone conduction testing revealed hearing sensitivity within normal limits, bilaterally. Reliability was good. Speech reception thresholds were in good agreement with the pure tone averages, bilaterally. Speech discrimination scores were excellent, bilaterally. Tympanometry revealed normal middle ear peak pressure and compliance, bilaterally. The results were reviewed with the patient. Recommendations for follow up will be made pending physician consult.     Shay Florian/[facility]  [place] [id number]  Electronically signed by Shay Florian on 11/28/2023 at 9:38 AM

## 2023-11-29 ENCOUNTER — ANTI-COAG VISIT (OUTPATIENT)
Dept: FAMILY MEDICINE CLINIC | Age: 58
End: 2023-11-29
Payer: MEDICARE

## 2023-11-29 ENCOUNTER — TELEPHONE (OUTPATIENT)
Dept: ENT CLINIC | Age: 58
End: 2023-11-29

## 2023-11-29 DIAGNOSIS — Z79.01 LONG TERM (CURRENT) USE OF ANTICOAGULANTS: Primary | ICD-10-CM

## 2023-11-29 LAB
INR BLD: 2.9
PROTIME: NORMAL
PROTIME: NORMAL

## 2023-11-29 PROCEDURE — 93793 ANTICOAG MGMT PT WARFARIN: CPT | Performed by: FAMILY MEDICINE

## 2023-11-29 NOTE — TELEPHONE ENCOUNTER
[facility] to authorize order with patient insurance. Patient is scheduled for MBS with radiology on 12/12/23 @ 1:30pm. Patient has been notified of date and time and that they need to arrive at 1:00pm. Patient is scheduled for Esophagram with radiology on 1/2/24 @ 2:00pm. Patient has been notified of date and time and that they need to arrive at 1:30pm. Patient was informed of her prep prior to procedure. Patient instructed to park in [facility] parking lot and report to registration. Patient verbalized understanding.     Electronically signed by Katy Mckeon MA on 11/29/23 at 2:22 PM EST

## 2023-12-02 LAB
INR BLD: 2.7
INR BLD: 2.7
PROTIME: NORMAL

## 2023-12-03 LAB
INR BLD: 1.6
INR BLD: 1.6
PROTIME: NORMAL

## 2023-12-04 ENCOUNTER — ANTI-COAG VISIT (OUTPATIENT)
Dept: FAMILY MEDICINE CLINIC | Age: 58
End: 2023-12-04

## 2023-12-04 ENCOUNTER — ANTI-COAG VISIT (OUTPATIENT)
Dept: FAMILY MEDICINE CLINIC | Age: 58
End: 2023-12-04
Payer: MEDICARE

## 2023-12-04 DIAGNOSIS — Z79.01 LONG TERM (CURRENT) USE OF ANTICOAGULANTS: Primary | ICD-10-CM

## 2023-12-04 PROCEDURE — 93793 ANTICOAG MGMT PT WARFARIN: CPT | Performed by: FAMILY MEDICINE

## 2023-12-06 ENCOUNTER — ANTI-COAG VISIT (OUTPATIENT)
Dept: FAMILY MEDICINE CLINIC | Age: 58
End: 2023-12-06
Payer: MEDICARE

## 2023-12-06 DIAGNOSIS — Z79.01 LONG TERM (CURRENT) USE OF ANTICOAGULANTS: Primary | ICD-10-CM

## 2023-12-06 LAB
INR BLD: 1.1
PROTIME: NORMAL

## 2023-12-06 PROCEDURE — 93793 ANTICOAG MGMT PT WARFARIN: CPT | Performed by: FAMILY MEDICINE

## 2023-12-07 ENCOUNTER — ANTI-COAG VISIT (OUTPATIENT)
Dept: FAMILY MEDICINE CLINIC | Age: 58
End: 2023-12-07
Payer: MEDICARE

## 2023-12-07 DIAGNOSIS — Z79.01 LONG TERM (CURRENT) USE OF ANTICOAGULANTS: Primary | ICD-10-CM

## 2023-12-07 LAB
INR BLD: 0.9
PROTIME: NORMAL

## 2023-12-07 PROCEDURE — 93793 ANTICOAG MGMT PT WARFARIN: CPT | Performed by: FAMILY MEDICINE

## 2023-12-08 ENCOUNTER — TELEPHONE (OUTPATIENT)
Dept: FAMILY MEDICINE CLINIC | Age: 58
End: 2023-12-08

## 2023-12-08 NOTE — TELEPHONE ENCOUNTER
Nasima Brady from [facility] called and wanted to let you know that the patients INR was 1.0 on 12/8

## 2023-12-08 NOTE — PROGRESS NOTES
Pt states she is on Levenox and is taking 5mg of Warfarin daily and 60mg of Levenox BID. The pt is now seeing Hematologist to watch INR because the goal is to stop the Levenox. The pt needs an INR of 2.0 according to the Hematologist to d/c the Levenox injections. Pt saw Hematologist 12/4/23 and has another appt on 12/11/23 to have INR checked in office.

## 2023-12-11 ENCOUNTER — HOSPITAL ENCOUNTER (EMERGENCY)
Age: 58
Discharge: HOME | End: 2023-12-11
Attending: EMERGENCY MEDICINE
Payer: MEDICARE

## 2023-12-11 ENCOUNTER — APPOINTMENT (OUTPATIENT)
Dept: CT IMAGING | Age: 58
End: 2023-12-11
Payer: MEDICARE

## 2023-12-11 VITALS
HEIGHT: 61 IN | RESPIRATION RATE: 16 BRPM | SYSTOLIC BLOOD PRESSURE: 155 MMHG | WEIGHT: 148 LBS | BODY MASS INDEX: 27.94 KG/M2 | HEART RATE: 98 BPM | TEMPERATURE: 97.9 F | OXYGEN SATURATION: 98 % | DIASTOLIC BLOOD PRESSURE: 104 MMHG

## 2023-12-11 DIAGNOSIS — K62.5 RECTAL BLEEDING: Primary | ICD-10-CM

## 2023-12-11 LAB
ABO + RH BLD: NORMAL
ALBUMIN SERPL-MCNC: 4.7 G/DL (ref 3.5–5.2)
ALP SERPL-CCNC: 73 U/L (ref 35–104)
ALT SERPL-CCNC: 53 U/L (ref 0–32)
ANION GAP SERPL CALCULATED.3IONS-SCNC: 11 MMOL/L (ref 7–16)
ARM BAND NUMBER: NORMAL
AST SERPL-CCNC: 32 U/L (ref 0–31)
BASOPHILS # BLD: 0.03 K/UL (ref 0–0.2)
BASOPHILS NFR BLD: 0 % (ref 0–2)
BILIRUB SERPL-MCNC: <0.2 MG/DL (ref 0–1.2)
BLOOD BANK SAMPLE EXPIRATION: NORMAL
BLOOD GROUP ANTIBODIES SERPL: NEGATIVE
BUN SERPL-MCNC: 12 MG/DL (ref 6–20)
CALCIUM SERPL-MCNC: 10.1 MG/DL (ref 8.6–10.2)
CHLORIDE SERPL-SCNC: 104 MMOL/L (ref 98–107)
CO2 SERPL-SCNC: 29 MMOL/L (ref 22–29)
CREAT SERPL-MCNC: 0.7 MG/DL (ref 0.5–1)
EKG ATRIAL RATE: 78 BPM
EKG P AXIS: 35 DEGREES
EKG P-R INTERVAL: 140 MS
EKG Q-T INTERVAL: 368 MS
EKG QRS DURATION: 90 MS
EKG QTC CALCULATION (BAZETT): 419 MS
EKG R AXIS: 8 DEGREES
EKG T AXIS: 11 DEGREES
EKG VENTRICULAR RATE: 78 BPM
EOSINOPHIL # BLD: 0.15 K/UL (ref 0.05–0.5)
EOSINOPHILS RELATIVE PERCENT: 2 % (ref 0–6)
ERYTHROCYTE [DISTWIDTH] IN BLOOD BY AUTOMATED COUNT: 12.3 % (ref 11.5–15)
GFR SERPL CREATININE-BSD FRML MDRD: >60 ML/MIN/1.73M2
GLUCOSE SERPL-MCNC: 95 MG/DL (ref 74–99)
HCT VFR BLD AUTO: 44.2 % (ref 34–48)
HGB BLD-MCNC: 14.9 G/DL (ref 11.5–15.5)
IMM GRANULOCYTES # BLD AUTO: <0.03 K/UL (ref 0–0.58)
IMM GRANULOCYTES NFR BLD: 0 % (ref 0–5)
INR PPP: 2.1
LYMPHOCYTES NFR BLD: 2.16 K/UL (ref 1.5–4)
LYMPHOCYTES RELATIVE PERCENT: 28 % (ref 20–42)
MCH RBC QN AUTO: 29.9 PG (ref 26–35)
MCHC RBC AUTO-ENTMCNC: 33.7 G/DL (ref 32–34.5)
MCV RBC AUTO: 88.6 FL (ref 80–99.9)
MONOCYTES NFR BLD: 0.71 K/UL (ref 0.1–0.95)
MONOCYTES NFR BLD: 9 % (ref 2–12)
NEUTROPHILS NFR BLD: 60 % (ref 43–80)
NEUTS SEG NFR BLD: 4.64 K/UL (ref 1.8–7.3)
PLATELET # BLD AUTO: 271 K/UL (ref 130–450)
PMV BLD AUTO: 9.5 FL (ref 7–12)
POTASSIUM SERPL-SCNC: 4.3 MMOL/L (ref 3.5–5)
PROT SERPL-MCNC: 7.5 G/DL (ref 6.4–8.3)
PROTHROMBIN TIME: 23.7 SEC (ref 9.3–12.4)
RBC # BLD AUTO: 4.99 M/UL (ref 3.5–5.5)
SODIUM SERPL-SCNC: 144 MMOL/L (ref 132–146)
WBC OTHER # BLD: 7.7 K/UL (ref 4.5–11.5)

## 2023-12-11 PROCEDURE — 99285 EMERGENCY DEPT VISIT HI MDM: CPT

## 2023-12-11 PROCEDURE — 85025 COMPLETE CBC W/AUTO DIFF WBC: CPT

## 2023-12-11 PROCEDURE — 86850 RBC ANTIBODY SCREEN: CPT

## 2023-12-11 PROCEDURE — 93005 ELECTROCARDIOGRAM TRACING: CPT

## 2023-12-11 PROCEDURE — 6360000004 HC RX CONTRAST MEDICATION: Performed by: RADIOLOGY

## 2023-12-11 PROCEDURE — 86900 BLOOD TYPING SEROLOGIC ABO: CPT

## 2023-12-11 PROCEDURE — 86901 BLOOD TYPING SEROLOGIC RH(D): CPT

## 2023-12-11 PROCEDURE — 80053 COMPREHEN METABOLIC PANEL: CPT

## 2023-12-11 PROCEDURE — 74177 CT ABD & PELVIS W/CONTRAST: CPT

## 2023-12-11 PROCEDURE — 93010 ELECTROCARDIOGRAM REPORT: CPT | Performed by: INTERNAL MEDICINE

## 2023-12-11 PROCEDURE — 85610 PROTHROMBIN TIME: CPT

## 2023-12-11 RX ADMIN — IOPAMIDOL 75 ML: 755 INJECTION, SOLUTION INTRAVENOUS at 16:16

## 2023-12-11 ASSESSMENT — PAIN - FUNCTIONAL ASSESSMENT: PAIN_FUNCTIONAL_ASSESSMENT: NONE - DENIES PAIN

## 2023-12-12 ENCOUNTER — HOSPITAL ENCOUNTER (OUTPATIENT)
Dept: GENERAL RADIOLOGY | Age: 58
Discharge: HOME | End: 2023-12-14
Payer: MEDICARE

## 2023-12-12 DIAGNOSIS — R13.10 DYSPHAGIA, UNSPECIFIED TYPE: ICD-10-CM

## 2023-12-12 PROCEDURE — 92611 MOTION FLUOROSCOPY/SWALLOW: CPT

## 2023-12-12 PROCEDURE — 74230 X-RAY XM SWLNG FUNCJ C+: CPT

## 2023-12-12 PROCEDURE — 2500000003 HC RX 250 WO HCPCS: Performed by: RADIOLOGY

## 2023-12-12 PROCEDURE — 92526 ORAL FUNCTION THERAPY: CPT

## 2023-12-12 RX ADMIN — BARIUM SULFATE 176 G: 0.81 POWDER, FOR SUSPENSION ORAL at 13:23

## 2023-12-12 RX ADMIN — BARIUM SULFATE 10 ML: 400 PASTE ORAL at 13:22

## 2023-12-12 RX ADMIN — BARIUM SULFATE 1 TABLET: 700 TABLET ORAL at 13:23

## 2023-12-12 RX ADMIN — BARIUM SULFATE 140 ML: 400 SUSPENSION ORAL at 13:23

## 2023-12-12 NOTE — PROGRESS NOTES
on file    PROCEDURE:  Consistencies Administered During the Evaluation   Liquids: thin liquid   Solids:  pureed foods, solid foods, and OTHER:  Barium tablet      Method of Intake:   cup, straw, spoon  Self fed      Position:   Sitting in the [facility] chair with head elevated above 75 degrees    INSTRUMENTAL ASSESSMENT:    ORAL PREPARATION PHASE:    Within functional limits    ORAL PHASE:   Within functional limits    PHARYNGEAL PHASE:     ONSET TIME       Onset time of the pharyngeal swallow was adequate       PHARYNGEAL RESIDUALS        Vallecula/Pharyngeal Wall           No significant residuals were noted in the vallecula      Pyriform Sinuses      No significant residuals were noted in the pyriform sinuses     LARYNGEAL PENETRATION   Patient with laryngeal penetration of thin liquids. Laryngeal penetration was transient to deep, but cleared from the laryngeal vestibule consistently. ASPIRATION  Aspiration was not present during this evaluation    PENETRATION-ASPIRATION SCALE (PAS):  THIN 4 = Material enters the airway, contacts the vocal folds, and is ejected from the airway  MILDLY THICK item not administered  MODERATELY THICK item not administered  PUREE 1 = Material does not enter the airway  HARD SOLID 1 = Material does not enter the airway       COMPENSATORY STRATEGIES    Compensatory strategies were not attempted      STRUCTURAL/FUNCTIONAL ANOMALIES   No structural/functional anomalies were noted    CERVICAL ESOPHAGEAL STAGE :     The cervical esophagus appeared adequate          ___________    Cognition:   Within functional limits for this exam    Oral Peripheral Examination   Adequate lingual/labial strength     Current Respiratory Status   room air     Parameters of Speech Production  Respiration:  Adequate for speech production  Quality:   Within functional limits  Intensity: Within functional limits    Pain: No pain reported.     EDUCATION:   The Speech Language Pathologist (SLP) completed education

## 2023-12-27 ENCOUNTER — ANTI-COAG VISIT (OUTPATIENT)
Dept: FAMILY MEDICINE CLINIC | Age: 58
End: 2023-12-27
Payer: MEDICARE

## 2023-12-27 DIAGNOSIS — Z79.01 LONG TERM (CURRENT) USE OF ANTICOAGULANTS: Primary | ICD-10-CM

## 2023-12-27 LAB
INR BLD: 3.4
PROTIME: NORMAL

## 2023-12-27 PROCEDURE — 93793 ANTICOAG MGMT PT WARFARIN: CPT | Performed by: FAMILY MEDICINE

## 2023-12-29 ENCOUNTER — HOSPITAL ENCOUNTER (OUTPATIENT)
Age: 58
Discharge: HOME | End: 2023-12-29
Payer: MEDICARE

## 2023-12-29 LAB
ALBUMIN SERPL-MCNC: 4.6 G/DL (ref 3.5–5.2)
ALP SERPL-CCNC: 84 U/L (ref 35–104)
ALT SERPL-CCNC: 33 U/L (ref 0–32)
ANION GAP SERPL CALCULATED.3IONS-SCNC: 10 MMOL/L (ref 7–16)
AST SERPL-CCNC: 26 U/L (ref 0–31)
BASOPHILS # BLD: 0.02 K/UL (ref 0–0.2)
BASOPHILS NFR BLD: 0 % (ref 0–2)
BILIRUB SERPL-MCNC: 0.5 MG/DL (ref 0–1.2)
BUN SERPL-MCNC: 14 MG/DL (ref 6–20)
CALCIUM SERPL-MCNC: 9.6 MG/DL (ref 8.6–10.2)
CHLORIDE SERPL-SCNC: 102 MMOL/L (ref 98–107)
CO2 SERPL-SCNC: 29 MMOL/L (ref 22–29)
CREAT SERPL-MCNC: 0.9 MG/DL (ref 0.5–1)
EOSINOPHIL # BLD: 0.14 K/UL (ref 0.05–0.5)
EOSINOPHILS RELATIVE PERCENT: 2 % (ref 0–6)
ERYTHROCYTE [DISTWIDTH] IN BLOOD BY AUTOMATED COUNT: 12.1 % (ref 11.5–15)
GFR SERPL CREATININE-BSD FRML MDRD: >60 ML/MIN/1.73M2
GLUCOSE SERPL-MCNC: 122 MG/DL (ref 74–99)
HCT VFR BLD AUTO: 44.7 % (ref 34–48)
HGB BLD-MCNC: 15 G/DL (ref 11.5–15.5)
IMM GRANULOCYTES # BLD AUTO: <0.03 K/UL (ref 0–0.58)
IMM GRANULOCYTES NFR BLD: 0 % (ref 0–5)
LYMPHOCYTES NFR BLD: 1.57 K/UL (ref 1.5–4)
LYMPHOCYTES RELATIVE PERCENT: 22 % (ref 20–42)
MCH RBC QN AUTO: 29.8 PG (ref 26–35)
MCHC RBC AUTO-ENTMCNC: 33.6 G/DL (ref 32–34.5)
MCV RBC AUTO: 88.9 FL (ref 80–99.9)
MONOCYTES NFR BLD: 0.51 K/UL (ref 0.1–0.95)
MONOCYTES NFR BLD: 7 % (ref 2–12)
NEUTROPHILS NFR BLD: 69 % (ref 43–80)
NEUTS SEG NFR BLD: 5.03 K/UL (ref 1.8–7.3)
PLATELET # BLD AUTO: 248 K/UL (ref 130–450)
PMV BLD AUTO: 9.3 FL (ref 7–12)
POTASSIUM SERPL-SCNC: 4.3 MMOL/L (ref 3.5–5)
PROT SERPL-MCNC: 7.4 G/DL (ref 6.4–8.3)
RBC # BLD AUTO: 5.03 M/UL (ref 3.5–5.5)
SODIUM SERPL-SCNC: 141 MMOL/L (ref 132–146)
WBC OTHER # BLD: 7.3 K/UL (ref 4.5–11.5)

## 2023-12-29 PROCEDURE — 85025 COMPLETE CBC W/AUTO DIFF WBC: CPT

## 2023-12-29 PROCEDURE — 80053 COMPREHEN METABOLIC PANEL: CPT

## 2023-12-29 PROCEDURE — 36415 COLL VENOUS BLD VENIPUNCTURE: CPT

## 2024-01-03 ENCOUNTER — TELEPHONE (OUTPATIENT)
Dept: ENT CLINIC | Age: 59
End: 2024-01-03

## 2024-01-03 NOTE — TELEPHONE ENCOUNTER
Pt called in wanting to know if she needs to r/s her appt on 1/9/24. She had to cancel her esophagram due to being sick, its  r/s for 2/5/24. Please, advise. Marilu can be reached at [phone]

## 2024-01-05 RX ORDER — AZITHROMYCIN 250 MG/1
250 TABLET, FILM COATED ORAL SEE ADMIN INSTRUCTIONS
Qty: 6 TABLET | Refills: 0 | Status: SHIPPED | OUTPATIENT
Start: 2024-01-05 | End: 2024-01-10

## 2024-01-12 ENCOUNTER — TELEPHONE (OUTPATIENT)
Dept: FAMILY MEDICINE CLINIC | Age: 59
End: 2024-01-12

## 2024-01-12 NOTE — TELEPHONE ENCOUNTER
Pt states that she was given a z skylar last week   and has taken the full prescription but her nose is still running; pt was   wondering if there is anything else that she can take that will will help   with this

## 2024-01-16 ENCOUNTER — OFFICE VISIT (OUTPATIENT)
Dept: PAIN MANAGEMENT | Age: 59
End: 2024-01-16
Payer: MEDICARE

## 2024-01-16 VITALS
DIASTOLIC BLOOD PRESSURE: 80 MMHG | HEIGHT: 61 IN | TEMPERATURE: 98.2 F | RESPIRATION RATE: 16 BRPM | SYSTOLIC BLOOD PRESSURE: 142 MMHG | BODY MASS INDEX: 29.27 KG/M2 | HEART RATE: 91 BPM | WEIGHT: 155 LBS | OXYGEN SATURATION: 98 %

## 2024-01-16 DIAGNOSIS — M54.42 CHRONIC BILATERAL LOW BACK PAIN WITH LEFT-SIDED SCIATICA: ICD-10-CM

## 2024-01-16 DIAGNOSIS — G89.29 CHRONIC BILATERAL LOW BACK PAIN WITH LEFT-SIDED SCIATICA: ICD-10-CM

## 2024-01-16 DIAGNOSIS — M51.9 LUMBAR DISC DISORDER: ICD-10-CM

## 2024-01-16 DIAGNOSIS — M79.10 MYALGIA: ICD-10-CM

## 2024-01-16 DIAGNOSIS — M54.16 LUMBAR RADICULOPATHY: ICD-10-CM

## 2024-01-16 DIAGNOSIS — G89.4 CHRONIC PAIN SYNDROME: Primary | ICD-10-CM

## 2024-01-16 DIAGNOSIS — M48.061 SPINAL STENOSIS OF LUMBAR REGION WITHOUT NEUROGENIC CLAUDICATION: ICD-10-CM

## 2024-01-16 PROCEDURE — 3017F COLORECTAL CA SCREEN DOC REV: CPT | Performed by: PAIN MEDICINE

## 2024-01-16 PROCEDURE — 99213 OFFICE O/P EST LOW 20 MIN: CPT | Performed by: PAIN MEDICINE

## 2024-01-16 PROCEDURE — G8484 FLU IMMUNIZE NO ADMIN: HCPCS | Performed by: PAIN MEDICINE

## 2024-01-16 PROCEDURE — G8427 DOCREV CUR MEDS BY ELIG CLIN: HCPCS | Performed by: PAIN MEDICINE

## 2024-01-16 PROCEDURE — 1036F TOBACCO NON-USER: CPT | Performed by: PAIN MEDICINE

## 2024-01-16 PROCEDURE — G8419 CALC BMI OUT NRM PARAM NOF/U: HCPCS | Performed by: PAIN MEDICINE

## 2024-01-16 NOTE — PROGRESS NOTES
Marilu Newton presents to the [facility] on 1/16/2024. Marilu is complaining of pain low back. The pain is constant. The pain is described as aching, throbbing, shooting, and stabbing. Pain is rated on her best day at a 2, on her worst day at a 6, and on average at a 4 on the VAS scale. She took her last dose of Tylenol yesterday.    Any procedures since your last visit: No    She is not on NSAIDS and  is  on anticoagulation medications to include Coumadin and is managed by Dr. Price. And Karla Fields MD      Pacemaker or defibrillator: No    Medication Contract and Consent for Opioid Use Documents Filed        No documents found                       BP (!) 142/80   Pulse 91   Temp 98.2 °F (36.8 °C) (Temporal)   Resp 16   Ht 1.549 m (5' 1\")   Wt 70.3 kg (155 lb)   LMP 10/12/2021 (Approximate)   SpO2 98%   BMI 29.29 kg/m²      Patient's last menstrual period was 10/12/2021 (approximate).

## 2024-01-16 NOTE — PROGRESS NOTES
[facility]        [address] [address]  Dept: [phone]        Follow up Note      Marilu Newton     Date of Visit:  01/16/24     CC:  Patient presents for follow up   Chief Complaint   Patient presents with    Follow-up     Low back     HPI:    Pain is unchanged.  Change in quality of symptoms:no.    Medication side effects:drowsiness with pregabalin  Recent diagnostic testing:none.   Recent interventional procedures:none.    She has been on anticoagulation medications to include warfarin and ASA and has not been on herbal supplements.  She is not diabetic.     Imaging:   10/2023 lumbar MRI w/o -  FINDINGS:  BONES/ALIGNMENT: There is normal alignment of the spine. The vertebral body  heights are maintained. The bone marrow signal appears unremarkable.     SPINAL CORD: The conus terminates normally.     SOFT TISSUES: No paraspinal mass identified.     L1-L2: There is no significant disc herniation, spinal canal stenosis or  neural foraminal narrowing.     L2-L3: Minimal disc bulge.  Mild facet hypertrophy.  No significant central  canal, lateral recess or neural foraminal stenoses.     L3-L4: Mild disc desiccation with a small disc bulge.  Mild facet  hypertrophy.  No significant central canal or lateral recess stenosis.  Mild  neural foraminal stenoses.     L4-L5: Grade 1 anterolisthesis of L4 over L5 by 6 mm.  Moderate central  canal, lateral recess and neural foraminal stenoses.     L5-S1: Disc desiccation with a disc bulge.  Mild facet hypertrophy.  No  significant central canal or lateral recess stenosis.  Mild right neural  foraminal stenosis.     IMPRESSION:  1.  No fracture or bony destructive lesion.  2. Grade 1 anterolisthesis of L4 over L5.  3. Moderate central canal, lateral recess and neural foraminal stenoses at  L4-5.     Lumbar xray 10/2023 -  FINDINGS:  Lateral neutral and flexion and extension views of the lumbar spine

## 2024-02-05 ENCOUNTER — HOSPITAL ENCOUNTER (OUTPATIENT)
Dept: GENERAL RADIOLOGY | Age: 59
Discharge: HOME | End: 2024-02-07
Payer: MEDICARE

## 2024-02-05 DIAGNOSIS — R13.10 DYSPHAGIA, UNSPECIFIED TYPE: ICD-10-CM

## 2024-02-05 PROCEDURE — 2500000003 HC RX 250 WO HCPCS: Performed by: RADIOLOGY

## 2024-02-05 PROCEDURE — 74220 X-RAY XM ESOPHAGUS 1CNTRST: CPT

## 2024-02-05 PROCEDURE — 6370000000 HC RX 637 (ALT 250 FOR IP): Performed by: RADIOLOGY

## 2024-02-05 RX ADMIN — BARIUM SULFATE 140 ML: 980 POWDER, FOR SUSPENSION ORAL at 09:34

## 2024-02-05 RX ADMIN — BARIUM SULFATE 1 TABLET: 700 TABLET ORAL at 09:33

## 2024-02-05 RX ADMIN — BARIUM SULFATE 176 ML: 960 POWDER, FOR SUSPENSION ORAL at 09:33

## 2024-02-05 RX ADMIN — ANTACID/ANTIFLATULENT 1 EACH: 380; 550; 10; 10 GRANULE, EFFERVESCENT ORAL at 09:34

## 2024-02-08 RX ORDER — ROSUVASTATIN CALCIUM 10 MG/1
10 TABLET, COATED ORAL
Qty: 12 TABLET | Refills: 2 | Status: SHIPPED | OUTPATIENT
Start: 2024-02-09 | End: 2024-05-09

## 2024-02-14 ENCOUNTER — OFFICE VISIT (OUTPATIENT)
Dept: ENT CLINIC | Age: 59
End: 2024-02-14
Payer: MEDICARE

## 2024-02-14 VITALS
HEIGHT: 61 IN | TEMPERATURE: 97.9 F | BODY MASS INDEX: 27.94 KG/M2 | WEIGHT: 148 LBS | SYSTOLIC BLOOD PRESSURE: 135 MMHG | DIASTOLIC BLOOD PRESSURE: 86 MMHG | OXYGEN SATURATION: 96 % | HEART RATE: 78 BPM

## 2024-02-14 DIAGNOSIS — H91.90 SUBJECTIVE HEARING LOSS: ICD-10-CM

## 2024-02-14 DIAGNOSIS — J30.1 SEASONAL ALLERGIC RHINITIS DUE TO POLLEN: Primary | ICD-10-CM

## 2024-02-14 DIAGNOSIS — R13.10 DYSPHAGIA, UNSPECIFIED TYPE: ICD-10-CM

## 2024-02-14 PROCEDURE — G8484 FLU IMMUNIZE NO ADMIN: HCPCS | Performed by: OTOLARYNGOLOGY

## 2024-02-14 PROCEDURE — G8427 DOCREV CUR MEDS BY ELIG CLIN: HCPCS | Performed by: OTOLARYNGOLOGY

## 2024-02-14 PROCEDURE — 3017F COLORECTAL CA SCREEN DOC REV: CPT | Performed by: OTOLARYNGOLOGY

## 2024-02-14 PROCEDURE — 1036F TOBACCO NON-USER: CPT | Performed by: OTOLARYNGOLOGY

## 2024-02-14 PROCEDURE — G8419 CALC BMI OUT NRM PARAM NOF/U: HCPCS | Performed by: OTOLARYNGOLOGY

## 2024-02-14 PROCEDURE — 99213 OFFICE O/P EST LOW 20 MIN: CPT | Performed by: OTOLARYNGOLOGY

## 2024-02-14 RX ORDER — FLUTICASONE PROPIONATE 50 MCG
2 SPRAY, SUSPENSION (ML) NASAL DAILY
Qty: 16 G | Refills: 3 | Status: SHIPPED | OUTPATIENT
Start: 2024-02-14

## 2024-02-14 NOTE — PROGRESS NOTES
Subjective:      Patient ID:  Marilu Newton is a 58 y.o. female.    HPI:    Pt returns for review of Esophagram and MBS.  There are not changes since last visit.     Past Medical History:   Diagnosis Date    Anticardiolipin antibody syndrome (HCC)     hematology-[facility]    Back pain     Bilateral carotid artery stenosis 05/28/2020    Bruit of left carotid artery 05/28/2020    Carotid stenosis, right 06/15/2020    Dr. Koenig and Dr. 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    Hypertension     Numbness and tingling     Occlusion of left internal carotid artery 05/22/2019    Paralysis of right lower extremity (HCC) 05/22/2019    Right leg weakness 05/28/2020    S/P carotid endarterectomy 06/15/2020    left    Urinary incontinence 3/2022     Past Surgical History:   Procedure Laterality Date    CAROTID ENDARTERECTOMY Left 6/10/2020    LEFT CAROTID ENDARTERECTOMY performed by Eric Koenig MD at [facility] OR    COLONOSCOPY N/A 4/28/2023    COLONOSCOPY DIAGNOSTIC WITH BOTOX 50 UNITS performed by Linette Robbins MD at [facility] ENDOSCOPY     Family History   Problem Relation Age of Onset    High Blood Pressure Mother     Diabetes Mother     Stroke Mother     Heart Disease Mother     Clotting Disorder Mother     Depression Mother     Heart Attack Mother     Kidney Disease Father     Heart Disease Father     Early Death Father     High Blood Pressure Sister     High Blood Pressure Brother     High Blood Pressure Brother     Mental Retardation Maternal Aunt      Social History     Socioeconomic History    Marital status: Single     Spouse name: None    Number of children: None    Years of education: None    Highest education level: None   Tobacco Use    Smoking status: Former     Current packs/day: 0.00     Average packs/day: 0.5 packs/day for 22.1 years (11.0 ttl pk-yrs)

## 2024-02-19 ENCOUNTER — HOSPITAL ENCOUNTER (OUTPATIENT)
Age: 59
Discharge: HOME | End: 2024-02-19
Payer: MEDICARE

## 2024-02-19 DIAGNOSIS — D80.8: ICD-10-CM

## 2024-02-19 DIAGNOSIS — I63.9 CEREBROVASCULAR ACCIDENT (CVA), UNSPECIFIED MECHANISM (HCC): ICD-10-CM

## 2024-02-19 DIAGNOSIS — E78.5 HYPERLIPIDEMIA, UNSPECIFIED HYPERLIPIDEMIA TYPE: ICD-10-CM

## 2024-02-19 LAB
ALBUMIN SERPL-MCNC: 4.5 G/DL (ref 3.5–5.2)
ALP SERPL-CCNC: 76 U/L (ref 35–104)
ALT SERPL-CCNC: 27 U/L (ref 0–32)
ANION GAP SERPL CALCULATED.3IONS-SCNC: 9 MMOL/L (ref 7–16)
AST SERPL-CCNC: 17 U/L (ref 0–31)
BILIRUB SERPL-MCNC: 0.3 MG/DL (ref 0–1.2)
BUN SERPL-MCNC: 15 MG/DL (ref 6–20)
CALCIUM SERPL-MCNC: 9.7 MG/DL (ref 8.6–10.2)
CHLORIDE SERPL-SCNC: 104 MMOL/L (ref 98–107)
CHOLEST SERPL-MCNC: 165 MG/DL
CO2 SERPL-SCNC: 30 MMOL/L (ref 22–29)
CREAT SERPL-MCNC: 0.8 MG/DL (ref 0.5–1)
GFR SERPL CREATININE-BSD FRML MDRD: >60 ML/MIN/1.73M2
GLUCOSE SERPL-MCNC: 117 MG/DL (ref 74–99)
HDLC SERPL-MCNC: 57 MG/DL
LDLC SERPL CALC-MCNC: 86 MG/DL
POTASSIUM SERPL-SCNC: 4.5 MMOL/L (ref 3.5–5)
PROT SERPL-MCNC: 7.2 G/DL (ref 6.4–8.3)
SODIUM SERPL-SCNC: 143 MMOL/L (ref 132–146)
TRIGL SERPL-MCNC: 110 MG/DL
TSH SERPL DL<=0.05 MIU/L-ACNC: 2.1 UIU/ML (ref 0.27–4.2)
VLDLC SERPL CALC-MCNC: 22 MG/DL

## 2024-02-19 PROCEDURE — 36415 COLL VENOUS BLD VENIPUNCTURE: CPT

## 2024-02-19 PROCEDURE — 80061 LIPID PANEL: CPT

## 2024-02-19 PROCEDURE — 84443 ASSAY THYROID STIM HORMONE: CPT

## 2024-02-19 PROCEDURE — 84156 ASSAY OF PROTEIN URINE: CPT

## 2024-02-19 PROCEDURE — 80053 COMPREHEN METABOLIC PANEL: CPT

## 2024-02-19 PROCEDURE — 84166 PROTEIN E-PHORESIS/URINE/CSF: CPT

## 2024-02-21 ENCOUNTER — OFFICE VISIT (OUTPATIENT)
Dept: FAMILY MEDICINE CLINIC | Age: 59
End: 2024-02-21
Payer: MEDICARE

## 2024-02-21 VITALS
HEIGHT: 61 IN | WEIGHT: 152.3 LBS | RESPIRATION RATE: 16 BRPM | BODY MASS INDEX: 28.75 KG/M2 | DIASTOLIC BLOOD PRESSURE: 78 MMHG | SYSTOLIC BLOOD PRESSURE: 138 MMHG | HEART RATE: 79 BPM | TEMPERATURE: 97.8 F | OXYGEN SATURATION: 99 %

## 2024-02-21 DIAGNOSIS — Z00.00 MEDICARE ANNUAL WELLNESS VISIT, SUBSEQUENT: Primary | ICD-10-CM

## 2024-02-21 DIAGNOSIS — F41.9 ANXIETY: ICD-10-CM

## 2024-02-21 LAB
HBA1C MFR BLD: 5.2 %
P E INTERPRETATION, U: NORMAL
PATHOLOGIST: NORMAL
SPECIMEN TYPE: NORMAL

## 2024-02-21 PROCEDURE — G8484 FLU IMMUNIZE NO ADMIN: HCPCS | Performed by: FAMILY MEDICINE

## 2024-02-21 PROCEDURE — 83036 HEMOGLOBIN GLYCOSYLATED A1C: CPT | Performed by: FAMILY MEDICINE

## 2024-02-21 PROCEDURE — 3017F COLORECTAL CA SCREEN DOC REV: CPT | Performed by: FAMILY MEDICINE

## 2024-02-21 PROCEDURE — G0439 PPPS, SUBSEQ VISIT: HCPCS | Performed by: FAMILY MEDICINE

## 2024-02-21 RX ORDER — LORAZEPAM 0.5 MG/1
0.5 TABLET ORAL EVERY 8 HOURS PRN
Qty: 20 TABLET | Refills: 0 | Status: SHIPPED | OUTPATIENT
Start: 2024-02-21 | End: 2024-03-22

## 2024-02-21 RX ORDER — ASPIRIN 81 MG/1
81 TABLET ORAL DAILY
Qty: 90 TABLET | Refills: 3 | Status: SHIPPED | OUTPATIENT
Start: 2024-02-21

## 2024-02-21 RX ORDER — WARFARIN SODIUM 5 MG/1
TABLET ORAL
Qty: 90 TABLET | Refills: 1 | Status: SHIPPED | OUTPATIENT
Start: 2024-02-21

## 2024-02-21 ASSESSMENT — PATIENT HEALTH QUESTIONNAIRE - PHQ9
SUM OF ALL RESPONSES TO PHQ9 QUESTIONS 1 & 2: 4
6. FEELING BAD ABOUT YOURSELF - OR THAT YOU ARE A FAILURE OR HAVE LET YOURSELF OR YOUR FAMILY DOWN: 0
SUM OF ALL RESPONSES TO PHQ QUESTIONS 1-9: 7
9. THOUGHTS THAT YOU WOULD BE BETTER OFF DEAD, OR OF HURTING YOURSELF: 0
SUM OF ALL RESPONSES TO PHQ QUESTIONS 1-9: 7
4. FEELING TIRED OR HAVING LITTLE ENERGY: 0
3. TROUBLE FALLING OR STAYING ASLEEP: 2
8. MOVING OR SPEAKING SO SLOWLY THAT OTHER PEOPLE COULD HAVE NOTICED. OR THE OPPOSITE, BEING SO FIGETY OR RESTLESS THAT YOU HAVE BEEN MOVING AROUND A LOT MORE THAN USUAL: 0
2. FEELING DOWN, DEPRESSED OR HOPELESS: 1
7. TROUBLE CONCENTRATING ON THINGS, SUCH AS READING THE NEWSPAPER OR WATCHING TELEVISION: 1
1. LITTLE INTEREST OR PLEASURE IN DOING THINGS: 3
5. POOR APPETITE OR OVEREATING: 0
10. IF YOU CHECKED OFF ANY PROBLEMS, HOW DIFFICULT HAVE THESE PROBLEMS MADE IT FOR YOU TO DO YOUR WORK, TAKE CARE OF THINGS AT HOME, OR GET ALONG WITH OTHER PEOPLE: 1

## 2024-02-21 ASSESSMENT — LIFESTYLE VARIABLES
HOW MANY STANDARD DRINKS CONTAINING ALCOHOL DO YOU HAVE ON A TYPICAL DAY: 1 OR 2
HOW OFTEN DO YOU HAVE A DRINK CONTAINING ALCOHOL: 2-4 TIMES A MONTH

## 2024-02-21 NOTE — PATIENT INSTRUCTIONS
medicine.     If your doctor recommends aspirin, take the amount directed each day. Make sure you take aspirin and not another kind of pain reliever, such as acetaminophen (Tylenol).   When should you call for help?   Call 911 if you have symptoms of a heart attack. These may include:    Chest pain or pressure, or a strange feeling in the chest.     Sweating.     Shortness of breath.     Pain, pressure, or a strange feeling in the back, neck, jaw, or upper belly or in one or both shoulders or arms.     Lightheadedness or sudden weakness.     A fast or irregular heartbeat.   After you call 911, the  may tell you to chew 1 adult-strength or 2 to 4 low-dose aspirin. Wait for an ambulance. Do not try to drive yourself.  Watch closely for changes in your health, and be sure to contact your doctor if you have any problems.  Where can you learn more?  Go to https://www.dabanniu.com.net/patientEd and enter F075 to learn more about \"A Healthy Heart: Care Instructions.\"  Current as of: June 25, 2023               Content Version: 13.9  © 9058-0705 [employer].   Care instructions adapted under license by [employer]. If you have questions about a medical condition or this instruction, always ask your healthcare professional. [employer] disclaims any warranty or liability for your use of this information.      Personalized Preventive Plan for Marilu Newton - 2/21/2024  Medicare offers a range of preventive health benefits. Some of the tests and screenings are paid in full while other may be subject to a deductible, co-insurance, and/or copay.    Some of these benefits include a comprehensive review of your medical history including lifestyle, illnesses that may run in your family, and various assessments and screenings as appropriate.    After reviewing your medical record and screening and assessments performed today your provider may have ordered immunizations, labs, imaging, and/or referrals

## 2024-02-21 NOTE — PROGRESS NOTES
Objective   Vitals:    02/21/24 1301   BP: 138/78   Pulse: 79   Resp: 16   Temp: 97.8 °F (36.6 °C)   TempSrc: Temporal   SpO2: 99%   Weight: 69.1 kg (152 lb 4.8 oz)   Height: 1.549 m (5' 1\")      Body mass index is 28.78 kg/m².      General Appearance: alert and oriented to person, place and time, well developed and well- nourished, in no acute distress  Skin: warm and dry, no rash or erythema  Head: normocephalic and atraumatic  Eyes: pupils equal, round, and reactive to light, extraocular eye movements intact, conjunctivae normal  ENT: tympanic membrane, external ear and ear canal normal bilaterally, nose without deformity, nasal mucosa and turbinates normal without polyps  Neck: supple and non-tender without mass, no thyromegaly or thyroid nodules, no cervical lymphadenopathy  Pulmonary/Chest: clear to auscultation bilaterally- no wheezes, rales or rhonchi, normal air movement, no respiratory distress  Cardiovascular: normal rate, regular rhythm, normal S1 and S2, no murmurs, rubs, clicks, or gallops, distal pulses intact, no carotid bruits  Abdomen: soft, non-tender, non-distended, normal bowel sounds, no masses or organomegaly  Extremities: no cyanosis, clubbing or edema  Musculoskeletal: normal range of motion, no joint swelling, deformity or tenderness  Neurologic: reflexes normal and symmetric, no cranial nerve deficit, gait, coordination and speech normal       No Known Allergies  Prior to Visit Medications    Medication Sig Taking? Authorizing Provider   warfarin (COUMADIN) 5 MG tablet TAKE 1 TABLET BY MOUTH 5 TIMES A WEEK AND TAKE 1/2 TABLET BY MOUTH TWICE A WEEK Yes Karla Fields MD   aspirin (ASPIRIN ADULT LOW STRENGTH) 81 MG EC tablet Take 1 tablet by mouth daily Yes Karla Fields MD   LORazepam (ATIVAN) 0.5 MG tablet Take 1 tablet by mouth every 8 hours as needed for Anxiety for up to 30 days. Max Daily Amount: 1.5 mg Yes Karla Fields MD   fluticasone (FLONASE)

## 2024-02-28 RX ORDER — ROSUVASTATIN CALCIUM 10 MG/1
10 TABLET, COATED ORAL
Qty: 36 TABLET | Refills: 2 | Status: SHIPPED | OUTPATIENT
Start: 2024-02-28 | End: 2024-05-28

## 2024-02-29 ENCOUNTER — TELEPHONE (OUTPATIENT)
Dept: VASCULAR SURGERY | Age: 59
End: 2024-02-29

## 2024-02-29 NOTE — TELEPHONE ENCOUNTER
Pt phoned requesting an office visit with Dr. Koenig for vascular clearance prior to an upcoming surgery.  She was instructed to contact Dr. Harrison's office at [facility] as she is following with him.

## 2024-04-01 ENCOUNTER — TELEPHONE (OUTPATIENT)
Dept: FAMILY MEDICINE CLINIC | Age: 59
End: 2024-04-01

## 2024-04-01 NOTE — TELEPHONE ENCOUNTER
Pt called in stating that for 2 weeks she has been fighting a cold. States that she is starting to feel better but then over the weekend her right ear started hurting. States that she still has nasal congestion. She has been doing OTC treatment and she tested herself for COVID and it was negative.   She wants to know if something can be called in for her?

## 2024-04-10 ENCOUNTER — ANTI-COAG VISIT (OUTPATIENT)
Dept: FAMILY MEDICINE CLINIC | Age: 59
End: 2024-04-10
Payer: MEDICARE

## 2024-04-10 DIAGNOSIS — Z79.01 LONG TERM (CURRENT) USE OF ANTICOAGULANTS: Primary | ICD-10-CM

## 2024-04-10 LAB
INR BLD: 2.8
PROTIME: NORMAL

## 2024-04-10 PROCEDURE — 93793 ANTICOAG MGMT PT WARFARIN: CPT | Performed by: FAMILY MEDICINE

## 2024-04-15 ENCOUNTER — HOSPITAL ENCOUNTER (OUTPATIENT)
Dept: GENERAL RADIOLOGY | Age: 59
Discharge: HOME | End: 2024-04-17
Payer: MEDICARE

## 2024-04-15 VITALS — BODY MASS INDEX: 27.96 KG/M2 | WEIGHT: 148 LBS

## 2024-04-15 DIAGNOSIS — Z12.31 VISIT FOR SCREENING MAMMOGRAM: ICD-10-CM

## 2024-04-15 PROCEDURE — 77063 BREAST TOMOSYNTHESIS BI: CPT

## 2024-04-20 ENCOUNTER — TRANSCRIBE ORDERS (OUTPATIENT)
Dept: ADMINISTRATIVE | Age: 59
End: 2024-04-20

## 2024-04-20 DIAGNOSIS — K62.89 RECTAL PAIN: ICD-10-CM

## 2024-04-20 DIAGNOSIS — R10.84 ABDOMINAL PAIN, GENERALIZED: Primary | ICD-10-CM

## 2024-05-01 ENCOUNTER — HOSPITAL ENCOUNTER (OUTPATIENT)
Age: 59
Discharge: HOME | End: 2024-05-01
Payer: MEDICARE

## 2024-05-01 ENCOUNTER — ANTI-COAG VISIT (OUTPATIENT)
Dept: FAMILY MEDICINE CLINIC | Age: 59
End: 2024-05-01
Payer: MEDICARE

## 2024-05-01 DIAGNOSIS — Z79.01 LONG TERM (CURRENT) USE OF ANTICOAGULANTS: Primary | ICD-10-CM

## 2024-05-01 LAB
BUN SERPL-MCNC: 17 MG/DL (ref 6–20)
CREAT SERPL-MCNC: 0.8 MG/DL (ref 0.5–1)
GFR, ESTIMATED: 82 ML/MIN/1.73M2
INR BLD: 2.7
PROTIME: NORMAL

## 2024-05-01 PROCEDURE — 93793 ANTICOAG MGMT PT WARFARIN: CPT | Performed by: FAMILY MEDICINE

## 2024-05-01 PROCEDURE — 84520 ASSAY OF UREA NITROGEN: CPT

## 2024-05-01 PROCEDURE — 82565 ASSAY OF CREATININE: CPT

## 2024-05-01 PROCEDURE — 36415 COLL VENOUS BLD VENIPUNCTURE: CPT

## 2024-05-03 ENCOUNTER — TELEPHONE (OUTPATIENT)
Dept: FAMILY MEDICINE CLINIC | Age: 59
End: 2024-05-03

## 2024-05-03 NOTE — TELEPHONE ENCOUNTER
Patient called and stated that she is getting surgery on May 13 th and she is to be off of her blood thinners for at least a week, starting May 6th per her Hematologist. She wants to know if you can manage her during that process  She said she has been giving herself two shots of Lovenox because she cannot be off of the blood thinners due to clotting disorder.

## 2024-05-03 NOTE — TELEPHONE ENCOUNTER
What she told me was checking her blood to see if it is ok during the time of her being off of the blood thinners. I told her that a nurse would probably call her so she can explain it more clear.

## 2024-05-05 ENCOUNTER — HOSPITAL ENCOUNTER (OUTPATIENT)
Dept: MRI IMAGING | Age: 59
Discharge: HOME | End: 2024-05-07
Payer: MEDICARE

## 2024-05-05 DIAGNOSIS — K62.89 RECTAL PAIN: ICD-10-CM

## 2024-05-05 DIAGNOSIS — R10.84 ABDOMINAL PAIN, GENERALIZED: ICD-10-CM

## 2024-05-05 PROCEDURE — A9579 GAD-BASE MR CONTRAST NOS,1ML: HCPCS | Performed by: RADIOLOGY

## 2024-05-05 PROCEDURE — 6360000004 HC RX CONTRAST MEDICATION: Performed by: RADIOLOGY

## 2024-05-05 PROCEDURE — 72197 MRI PELVIS W/O & W/DYE: CPT

## 2024-05-05 RX ADMIN — GADOTERIDOL 13 ML: 279.3 INJECTION, SOLUTION INTRAVENOUS at 11:46

## 2024-05-07 ENCOUNTER — ANTI-COAG VISIT (OUTPATIENT)
Dept: FAMILY MEDICINE CLINIC | Age: 59
End: 2024-05-07
Payer: MEDICARE

## 2024-05-07 DIAGNOSIS — Z79.01 LONG TERM (CURRENT) USE OF ANTICOAGULANTS: Primary | ICD-10-CM

## 2024-05-07 LAB
INR BLD: 2.9
PROTIME: NORMAL

## 2024-05-07 PROCEDURE — 93793 ANTICOAG MGMT PT WARFARIN: CPT | Performed by: FAMILY MEDICINE

## 2024-05-15 ENCOUNTER — ANTI-COAG VISIT (OUTPATIENT)
Dept: FAMILY MEDICINE CLINIC | Age: 59
End: 2024-05-15
Payer: MEDICARE

## 2024-05-15 DIAGNOSIS — Z79.01 LONG TERM (CURRENT) USE OF ANTICOAGULANTS: Primary | ICD-10-CM

## 2024-05-15 LAB
INR BLD: 0.9
PROTIME: NORMAL

## 2024-05-15 PROCEDURE — 93793 ANTICOAG MGMT PT WARFARIN: CPT | Performed by: FAMILY MEDICINE

## 2024-05-16 ENCOUNTER — ANTI-COAG VISIT (OUTPATIENT)
Dept: FAMILY MEDICINE CLINIC | Age: 59
End: 2024-05-16
Payer: MEDICARE

## 2024-05-16 DIAGNOSIS — Z79.01 LONG TERM (CURRENT) USE OF ANTICOAGULANTS: Primary | ICD-10-CM

## 2024-05-16 LAB
INR BLD: 1.4
PROTIME: NORMAL

## 2024-05-16 PROCEDURE — 93793 ANTICOAG MGMT PT WARFARIN: CPT | Performed by: FAMILY MEDICINE

## 2024-05-17 ENCOUNTER — ANTI-COAG VISIT (OUTPATIENT)
Dept: FAMILY MEDICINE CLINIC | Age: 59
End: 2024-05-17
Payer: MEDICARE

## 2024-05-17 DIAGNOSIS — Z79.01 LONG TERM (CURRENT) USE OF ANTICOAGULANTS: Primary | ICD-10-CM

## 2024-05-17 LAB
INR BLD: 1.6
PROTIME: NORMAL

## 2024-05-17 PROCEDURE — 93793 ANTICOAG MGMT PT WARFARIN: CPT | Performed by: FAMILY MEDICINE

## 2024-05-18 LAB
INR BLD: 2
PROTIME: NORMAL

## 2024-05-20 ENCOUNTER — ANTI-COAG VISIT (OUTPATIENT)
Dept: FAMILY MEDICINE CLINIC | Age: 59
End: 2024-05-20
Payer: MEDICARE

## 2024-05-20 DIAGNOSIS — Z79.01 LONG TERM (CURRENT) USE OF ANTICOAGULANTS: Primary | ICD-10-CM

## 2024-05-20 PROCEDURE — 93793 ANTICOAG MGMT PT WARFARIN: CPT | Performed by: FAMILY MEDICINE

## 2024-05-23 ENCOUNTER — ANTI-COAG VISIT (OUTPATIENT)
Dept: FAMILY MEDICINE CLINIC | Age: 59
End: 2024-05-23
Payer: MEDICARE

## 2024-05-23 DIAGNOSIS — Z79.01 LONG TERM (CURRENT) USE OF ANTICOAGULANTS: Primary | ICD-10-CM

## 2024-05-23 LAB
INR BLD: 1.9
PROTIME: NORMAL

## 2024-05-23 PROCEDURE — 93793 ANTICOAG MGMT PT WARFARIN: CPT | Performed by: FAMILY MEDICINE

## 2024-05-29 ENCOUNTER — ANTI-COAG VISIT (OUTPATIENT)
Dept: FAMILY MEDICINE CLINIC | Age: 59
End: 2024-05-29
Payer: MEDICARE

## 2024-05-29 DIAGNOSIS — Z79.01 LONG TERM (CURRENT) USE OF ANTICOAGULANTS: Primary | ICD-10-CM

## 2024-05-29 LAB
INR BLD: 2.2
PROTIME: NORMAL

## 2024-05-29 PROCEDURE — 93793 ANTICOAG MGMT PT WARFARIN: CPT | Performed by: FAMILY MEDICINE

## 2024-06-05 LAB
INR BLD: 3.7
PROTIME: NORMAL

## 2024-06-06 ENCOUNTER — ANTI-COAG VISIT (OUTPATIENT)
Dept: FAMILY MEDICINE CLINIC | Age: 59
End: 2024-06-06

## 2024-06-06 DIAGNOSIS — Z79.01 LONG TERM (CURRENT) USE OF ANTICOAGULANTS: Primary | ICD-10-CM

## 2024-06-12 ENCOUNTER — ANTI-COAG VISIT (OUTPATIENT)
Dept: FAMILY MEDICINE CLINIC | Age: 59
End: 2024-06-12
Payer: MEDICARE

## 2024-06-12 DIAGNOSIS — Z79.01 LONG TERM (CURRENT) USE OF ANTICOAGULANTS: Primary | ICD-10-CM

## 2024-06-12 LAB
INR BLD: 2.1
PROTIME: NORMAL

## 2024-06-12 PROCEDURE — 93793 ANTICOAG MGMT PT WARFARIN: CPT | Performed by: FAMILY MEDICINE

## 2024-06-19 ENCOUNTER — ANTI-COAG VISIT (OUTPATIENT)
Dept: FAMILY MEDICINE CLINIC | Age: 59
End: 2024-06-19

## 2024-06-19 DIAGNOSIS — Z79.01 LONG TERM (CURRENT) USE OF ANTICOAGULANTS: Primary | ICD-10-CM

## 2024-06-19 LAB
INR BLD: 2.5
PROTIME: NORMAL

## 2024-06-26 ENCOUNTER — ANTI-COAG VISIT (OUTPATIENT)
Dept: FAMILY MEDICINE CLINIC | Age: 59
End: 2024-06-26
Payer: MEDICARE

## 2024-06-26 DIAGNOSIS — Z79.01 LONG TERM (CURRENT) USE OF ANTICOAGULANTS: Primary | ICD-10-CM

## 2024-06-26 LAB
INR BLD: 2.9
PROTIME: NORMAL

## 2024-06-26 PROCEDURE — 93793 ANTICOAG MGMT PT WARFARIN: CPT | Performed by: FAMILY MEDICINE

## 2024-07-03 LAB
INR BLD: 2.3
PROTHROMBIN TIME: NORMAL

## 2024-07-05 ENCOUNTER — ANTI-COAG VISIT (OUTPATIENT)
Dept: FAMILY MEDICINE CLINIC | Age: 59
End: 2024-07-05

## 2024-07-05 DIAGNOSIS — Z79.01 LONG TERM (CURRENT) USE OF ANTICOAGULANTS: Primary | ICD-10-CM

## 2024-07-10 ENCOUNTER — ANTI-COAG VISIT (OUTPATIENT)
Dept: FAMILY MEDICINE CLINIC | Age: 59
End: 2024-07-10

## 2024-07-10 DIAGNOSIS — Z79.01 LONG TERM (CURRENT) USE OF ANTICOAGULANTS: Primary | ICD-10-CM

## 2024-07-10 LAB
INR BLD: 3.3
PROTHROMBIN TIME: NORMAL

## 2024-07-17 ENCOUNTER — ANTI-COAG VISIT (OUTPATIENT)
Dept: FAMILY MEDICINE CLINIC | Age: 59
End: 2024-07-17

## 2024-07-17 DIAGNOSIS — Z79.01 LONG TERM (CURRENT) USE OF ANTICOAGULANTS: Primary | ICD-10-CM

## 2024-07-17 LAB
INR BLD: 2.6
PROTHROMBIN TIME: NORMAL

## 2024-07-19 ENCOUNTER — TELEPHONE (OUTPATIENT)
Dept: NEUROLOGY | Age: 59
End: 2024-07-19

## 2024-07-19 ENCOUNTER — HOSPITAL ENCOUNTER (OUTPATIENT)
Dept: GENERAL RADIOLOGY | Age: 59
End: 2024-07-19
Payer: MEDICARE

## 2024-07-19 ENCOUNTER — TELEPHONE (OUTPATIENT)
Dept: PHYSICAL MEDICINE AND REHAB | Age: 59
End: 2024-07-19

## 2024-07-19 ENCOUNTER — HOSPITAL ENCOUNTER (OUTPATIENT)
Age: 59
End: 2024-07-19
Payer: MEDICARE

## 2024-07-19 DIAGNOSIS — G81.14 LEFT SPASTIC HEMIPARESIS (HCC): Primary | ICD-10-CM

## 2024-07-19 DIAGNOSIS — M21.371 RIGHT FOOT DROP: ICD-10-CM

## 2024-07-19 PROCEDURE — 77073 BONE LENGTH STUDIES: CPT

## 2024-07-19 NOTE — TELEPHONE ENCOUNTER
Patient contacted office stating, she would like to receive botox in her foot for spasticity caused by a stroke. States she was informed by Dr. Casas office that she would need a referral.    Patient would like to know if provider would be willing to place referral?    Pt informed provider is out of the office, and would advise once she returns. Pt verbalized understanding.

## 2024-07-19 NOTE — TELEPHONE ENCOUNTER
Patient called back and states she was suggested a couple years ago by Dr. Salas to get botox  in her foot for spasticity. States she is not a big fan of her and has not seen her for a couple of years. Aware referral would need to be placed and addressed with Dr. Casas.

## 2024-07-19 NOTE — TELEPHONE ENCOUNTER
Pt called to see if either Providers can give a Botox injections in her feet? Is so she will get a referral? Please contact pt.

## 2024-07-24 LAB
INR BLD: 3
PROTHROMBIN TIME: NORMAL

## 2024-07-29 ENCOUNTER — ANTI-COAG VISIT (OUTPATIENT)
Dept: FAMILY MEDICINE CLINIC | Age: 59
End: 2024-07-29

## 2024-07-29 DIAGNOSIS — Z79.01 LONG TERM (CURRENT) USE OF ANTICOAGULANTS: Primary | ICD-10-CM

## 2024-07-29 NOTE — PROGRESS NOTES
recommended    Diagnosis and treatment were significantly limited by the following social determinants of health:  [  ] Financial   [  ] Transportation   [  ] No insurance   [  ] Housing   [  ] Substance abuse   [  ] Lack of support   [  ] BMI was elevated today, and weight loss plan recommended is : conventional weight loss..  [  ]   Tobacco Use: Medium Risk (7/3/2024)    Received from [facility]    Patient History     Smoking Tobacco Use: Former     Smokeless Tobacco Use: Never     Passive Exposure: Not on file   [  ] affect on work ability: not applicable.     Case discussed with another provider : no  Follow up for Botox once approved.       Roseanne Casas D.O., P.T.  Board Certified Physical Medicine and Rehabilitation  Board Certified Electrodiagnostic Medicine

## 2024-07-31 LAB
INR BLD: 2.3
PROTHROMBIN TIME: NORMAL

## 2024-08-05 ENCOUNTER — ANTI-COAG VISIT (OUTPATIENT)
Dept: FAMILY MEDICINE CLINIC | Age: 59
End: 2024-08-05

## 2024-08-05 DIAGNOSIS — Z79.01 LONG TERM (CURRENT) USE OF ANTICOAGULANTS: Primary | ICD-10-CM

## 2024-08-07 LAB
INR BLD: 3.6
PROTHROMBIN TIME: NORMAL

## 2024-08-08 ENCOUNTER — ANTI-COAG VISIT (OUTPATIENT)
Dept: FAMILY MEDICINE CLINIC | Age: 59
End: 2024-08-08

## 2024-08-08 DIAGNOSIS — Z79.01 LONG TERM (CURRENT) USE OF ANTICOAGULANTS: Primary | ICD-10-CM

## 2024-08-12 ENCOUNTER — OFFICE VISIT (OUTPATIENT)
Dept: PHYSICAL MEDICINE AND REHAB | Age: 59
End: 2024-08-12
Payer: MEDICARE

## 2024-08-12 VITALS
WEIGHT: 143 LBS | BODY MASS INDEX: 27.02 KG/M2 | SYSTOLIC BLOOD PRESSURE: 114 MMHG | DIASTOLIC BLOOD PRESSURE: 80 MMHG | HEART RATE: 96 BPM

## 2024-08-12 DIAGNOSIS — M21.70 LEG LENGTH DIFFERENCE, ACQUIRED: ICD-10-CM

## 2024-08-12 DIAGNOSIS — G81.10 SPASTIC HEMIPARESIS AFFECTING DOMINANT SIDE (HCC): Primary | ICD-10-CM

## 2024-08-12 PROCEDURE — 99204 OFFICE O/P NEW MOD 45 MIN: CPT | Performed by: PHYSICAL MEDICINE & REHABILITATION

## 2024-08-12 PROCEDURE — 1036F TOBACCO NON-USER: CPT | Performed by: PHYSICAL MEDICINE & REHABILITATION

## 2024-08-12 PROCEDURE — G8417 CALC BMI ABV UP PARAM F/U: HCPCS | Performed by: PHYSICAL MEDICINE & REHABILITATION

## 2024-08-12 PROCEDURE — G8427 DOCREV CUR MEDS BY ELIG CLIN: HCPCS | Performed by: PHYSICAL MEDICINE & REHABILITATION

## 2024-08-12 PROCEDURE — 3017F COLORECTAL CA SCREEN DOC REV: CPT | Performed by: PHYSICAL MEDICINE & REHABILITATION

## 2024-08-12 RX ORDER — ACETAMINOPHEN 160 MG
TABLET,DISINTEGRATING ORAL
COMMUNITY

## 2024-08-12 RX ORDER — PHENOL 1.4 %
1 AEROSOL, SPRAY (ML) MUCOUS MEMBRANE DAILY
COMMUNITY

## 2024-08-12 NOTE — PATIENT INSTRUCTIONS
vision, puffy eyelids, dry eyes, drooping eyebrows;  dry mouth;  headache, tiredness;  increased sweating in areas other than the underarms; or  bruising, bleeding, pain, redness, or swelling where the injection was given.  This is not a complete list of side effects and others may occur. Call your doctor for medical advice about side effects. You may report side effects to FDA at 3-116-ZBB-1854.  What other drugs will affect Botox?  Tell your doctor about all your other medicines, especially:  a muscle relaxer;  cold or allergy medicine;  an injectable antibiotic; or  a blood thinner --warfarin, Coumadin, Jantoven.  This list is not complete. Other drugs may affect Botox, including prescription and over-the-counter medicines, vitamins, and herbal products. Not all possible drug interactions are listed here.  Where can I get more information?  Your doctor or pharmacist can provide more information about Botox (onabotulinumtoxinA).  Remember, keep this and all other medicines out of the reach of children, never share your medicines with others, and use this medication only for the indication prescribed.  Every effort has been made to ensure that the information provided by [employer]. ('[employer]') is accurate, up-to-date, and complete, but no guarantee is made to that effect. Drug information contained herein may be time sensitive. [employer] information has been compiled for use by healthcare practitioners and consumers in the [place] and therefore [employer] does not warrant that uses outside of the [place] are appropriate, unless specifically indicated otherwise. [employer]'s drug information does not endorse drugs, diagnose patients or recommend therapy. [employer] drug information is an informational resource designed to assist licensed healthcare practitioners in caring for their patients and/or to serve consumers viewing this service as a supplement to, and not a substitute for, the expertise, skill,

## 2024-08-13 ENCOUNTER — HOSPITAL ENCOUNTER (OUTPATIENT)
Age: 59
Discharge: HOME | End: 2024-08-15
Payer: MEDICARE

## 2024-08-13 ENCOUNTER — HOSPITAL ENCOUNTER (OUTPATIENT)
Dept: GENERAL RADIOLOGY | Age: 59
Discharge: HOME | End: 2024-08-15
Payer: MEDICARE

## 2024-08-13 DIAGNOSIS — M21.70 LEG LENGTH DIFFERENCE, ACQUIRED: ICD-10-CM

## 2024-08-13 PROCEDURE — 72081 X-RAY EXAM ENTIRE SPI 1 VW: CPT

## 2024-08-14 ENCOUNTER — ANTI-COAG VISIT (OUTPATIENT)
Dept: FAMILY MEDICINE CLINIC | Age: 59
End: 2024-08-14

## 2024-08-14 ENCOUNTER — TELEPHONE (OUTPATIENT)
Dept: PHYSICAL MEDICINE AND REHAB | Age: 59
End: 2024-08-14

## 2024-08-14 DIAGNOSIS — Z79.01 LONG TERM (CURRENT) USE OF ANTICOAGULANTS: Primary | ICD-10-CM

## 2024-08-14 LAB
INR BLD: 3.2
PROTHROMBIN TIME: NORMAL

## 2024-08-14 NOTE — TELEPHONE ENCOUNTER
Called and spoke to Judy pharmacist from [facility] to give verbal order for botox. Order for botox  inject 500 units IM upper/lower extremities every 12 weeks with 1 refill, auth ends Nov 14 2024.  Stated order will get processed and they will do a benefit verification and get consent to ship. I will call back in a few days to set up shipment.

## 2024-08-18 SDOH — ECONOMIC STABILITY: FOOD INSECURITY: WITHIN THE PAST 12 MONTHS, YOU WORRIED THAT YOUR FOOD WOULD RUN OUT BEFORE YOU GOT MONEY TO BUY MORE.: NEVER TRUE

## 2024-08-18 SDOH — ECONOMIC STABILITY: FOOD INSECURITY: WITHIN THE PAST 12 MONTHS, THE FOOD YOU BOUGHT JUST DIDN'T LAST AND YOU DIDN'T HAVE MONEY TO GET MORE.: NEVER TRUE

## 2024-08-18 SDOH — ECONOMIC STABILITY: INCOME INSECURITY: HOW HARD IS IT FOR YOU TO PAY FOR THE VERY BASICS LIKE FOOD, HOUSING, MEDICAL CARE, AND HEATING?: NOT HARD AT ALL

## 2024-08-20 ENCOUNTER — TELEPHONE (OUTPATIENT)
Dept: PHYSICAL MEDICINE AND REHAB | Age: 59
End: 2024-08-20

## 2024-08-20 NOTE — TELEPHONE ENCOUNTER
----- Message from Dr. Roseanne Casas DO sent at 8/19/2024 10:47 PM EDT -----  Reviewed test abnormal, inform patient that it showed mild scoliosis.

## 2024-08-21 ENCOUNTER — OFFICE VISIT (OUTPATIENT)
Dept: FAMILY MEDICINE CLINIC | Age: 59
End: 2024-08-21
Payer: MEDICARE

## 2024-08-21 ENCOUNTER — ANTI-COAG VISIT (OUTPATIENT)
Dept: FAMILY MEDICINE CLINIC | Age: 59
End: 2024-08-21

## 2024-08-21 VITALS
TEMPERATURE: 97.8 F | WEIGHT: 143 LBS | HEIGHT: 61 IN | RESPIRATION RATE: 16 BRPM | SYSTOLIC BLOOD PRESSURE: 128 MMHG | DIASTOLIC BLOOD PRESSURE: 80 MMHG | BODY MASS INDEX: 27 KG/M2 | OXYGEN SATURATION: 98 % | HEART RATE: 88 BPM

## 2024-08-21 DIAGNOSIS — R73.01 IMPAIRED FASTING GLUCOSE: ICD-10-CM

## 2024-08-21 DIAGNOSIS — Z79.01 LONG TERM (CURRENT) USE OF ANTICOAGULANTS: Primary | ICD-10-CM

## 2024-08-21 DIAGNOSIS — Z86.73 H/O: CVA (CEREBROVASCULAR ACCIDENT): ICD-10-CM

## 2024-08-21 DIAGNOSIS — M24.574 CONTRACTURE OF JOINT OF RIGHT FOOT: Primary | ICD-10-CM

## 2024-08-21 DIAGNOSIS — E78.5 HYPERLIPIDEMIA, UNSPECIFIED HYPERLIPIDEMIA TYPE: ICD-10-CM

## 2024-08-21 DIAGNOSIS — M25.551 RIGHT HIP PAIN: ICD-10-CM

## 2024-08-21 LAB
INR BLD: 2.6
PROTHROMBIN TIME: NORMAL

## 2024-08-21 PROCEDURE — G8417 CALC BMI ABV UP PARAM F/U: HCPCS | Performed by: FAMILY MEDICINE

## 2024-08-21 PROCEDURE — 3017F COLORECTAL CA SCREEN DOC REV: CPT | Performed by: FAMILY MEDICINE

## 2024-08-21 PROCEDURE — G8427 DOCREV CUR MEDS BY ELIG CLIN: HCPCS | Performed by: FAMILY MEDICINE

## 2024-08-21 PROCEDURE — 1036F TOBACCO NON-USER: CPT | Performed by: FAMILY MEDICINE

## 2024-08-21 PROCEDURE — 99214 OFFICE O/P EST MOD 30 MIN: CPT | Performed by: FAMILY MEDICINE

## 2024-08-21 RX ORDER — HYDROCORTISONE 25 MG/G
CREAM TOPICAL 2 TIMES DAILY
Qty: 28 G | Refills: 2 | Status: SHIPPED | OUTPATIENT
Start: 2024-08-21

## 2024-08-21 RX ORDER — PANTOPRAZOLE SODIUM 40 MG/1
40 TABLET, DELAYED RELEASE ORAL
Qty: 30 TABLET | Refills: 3 | Status: SHIPPED | OUTPATIENT
Start: 2024-08-21

## 2024-08-21 RX ORDER — ESTRADIOL 10 UG/1
10 TABLET VAGINAL
Qty: 8 TABLET | Refills: 3 | Status: SHIPPED | OUTPATIENT
Start: 2024-08-22

## 2024-08-21 RX ORDER — ROSUVASTATIN CALCIUM 10 MG/1
10 TABLET, COATED ORAL
Qty: 36 TABLET | Refills: 2 | Status: SHIPPED | OUTPATIENT
Start: 2024-08-21

## 2024-08-21 RX ORDER — IRBESARTAN AND HYDROCHLOROTHIAZIDE 150; 12.5 MG/1; MG/1
1 TABLET, FILM COATED ORAL DAILY
Qty: 90 TABLET | Refills: 3 | Status: SHIPPED | OUTPATIENT
Start: 2024-08-21

## 2024-08-21 RX ORDER — TRAZODONE HYDROCHLORIDE 50 MG/1
50 TABLET, FILM COATED ORAL NIGHTLY PRN
Qty: 30 TABLET | Refills: 5 | Status: SHIPPED | OUTPATIENT
Start: 2024-08-21

## 2024-08-21 RX ORDER — ASPIRIN 81 MG/1
81 TABLET ORAL DAILY
Qty: 90 TABLET | Refills: 3 | Status: SHIPPED | OUTPATIENT
Start: 2024-08-21

## 2024-08-21 RX ORDER — WARFARIN SODIUM 5 MG/1
TABLET ORAL
Qty: 90 TABLET | Refills: 1 | Status: SHIPPED | OUTPATIENT
Start: 2024-08-21

## 2024-08-21 SDOH — ECONOMIC STABILITY: FOOD INSECURITY: WITHIN THE PAST 12 MONTHS, THE FOOD YOU BOUGHT JUST DIDN'T LAST AND YOU DIDN'T HAVE MONEY TO GET MORE.: NEVER TRUE

## 2024-08-21 SDOH — ECONOMIC STABILITY: FOOD INSECURITY: WITHIN THE PAST 12 MONTHS, YOU WORRIED THAT YOUR FOOD WOULD RUN OUT BEFORE YOU GOT MONEY TO BUY MORE.: NEVER TRUE

## 2024-08-21 SDOH — ECONOMIC STABILITY: INCOME INSECURITY: HOW HARD IS IT FOR YOU TO PAY FOR THE VERY BASICS LIKE FOOD, HOUSING, MEDICAL CARE, AND HEATING?: NOT HARD AT ALL

## 2024-08-21 NOTE — PROGRESS NOTES
Hypertension:  Patient is here for follow up chronic hypertension.  This is  generally controlled on current medication regimen. Takes meds as directed and tolerates them well.  Most recent labs reviewed with patient and are remarkable.  No symptoms from htn standpoint per ROS.  Patient is  compliant with lifestyle modifications.  Patient does not smoke.  Comorbid conditions include CVA.  She is having issues with leg length and is still limping. She is seeing dr. Liz for botox in her leg. She is not sleeping well. GYN put her on semaglutide and her B12 went way up.     Patient's past medical, surgical, social and/or family history reviewed, updated in chart, and are non-contributory (unless otherwise stated).  Medications and allergies also reviewed and updated in chart.        Review of Systems:  Constitutional:  No fever, no fatigue, no chills, no headaches, no weight change  Dermatology:  No rash, no mole, no dry or sensitive skin  ENT:  No cough, no sore throat, no sinus pain, no runny nose, no ear pain  Cardiology:  No chest pain, no palpitations, no leg edema, no shortness of breath, no PND  Gastroenterology:  No dysphagia, no abdominal pain, no nausea, no vomiting, no constipation, no diarrhea, no heartburn  Musculoskeletal:  No joint pain, no leg cramps, no back pain, no muscle aches  Respiratory:  No shortness of breath, no orthopnea, no wheezing, no ESPINOSA, no hemoptysis  Urology:  No blood in the urine, no urinary frequency, no urinary incontinence, no urinary urgency, no nocturia, no dysuria  Vitals:    08/21/24 1328   BP: 128/80   Site: Left Upper Arm   Pulse: 88   Resp: 16   Temp: 97.8 °F (36.6 °C)   SpO2: 98%   Weight: 64.9 kg (143 lb)   Height: 1.549 m (5' 1\")       General:  Patient alert and oriented x 3, NAD, pleasant  HEENT:  Atraumatic, normocephalic, PERRLA, EOMI, clear conjunctiva, TMs clear, nose-clear, throat - no erythema  Neck:  Supple, no goiter, no carotid bruits, no  left uncontrolled.    Counseled regarding the possible side effects, risks, benefits and alternatives to treatment; patient and/or guardian verbalizes understanding, agrees, feels comfortable with and wishes to proceed with above treatment plan.    Advised patient to call with any new medication issues, and read all Rx info from pharmacy to assure aware of all possible risks and side effects of medication before taking.    Reviewed age and gender appropriate health screening exams and vaccinations.  Advised patient regarding importance of keeping up with recommended health maintenance and to schedule as soon as possible if overdue, as this is important in assessing for undiagnosed pathology, especially cancer, as well as protecting against potentially harmful/life threatening disease.        Patient and/or guardian verbalizes understanding and agrees with above counseling, assessment and plan.    All questions answered.    Karla Fields MD  8/27/2024    I have personally reviewed and updated the chief complaint, HPI, Past Medical, Family and Social History, as well as the above Review of Systems.

## 2024-08-22 NOTE — PROGRESS NOTES
Roseanne Casas D.O.  [facility]  [address] [place] [address]  Phone: [phone]  Fax: [phone]  9/3/2024    Chief Complaint   Patient presents with    Leg Pain     EMG guided botox 500 units        Informed Consent:  The indications, risks, benefits, and  alternatives of onabotulinum toxin A were discussed with the patient. I explained to the patient the potential side effects including but not limited to: distant spread of toxin effect causing droopy eyelids, facial drooping, neck pain, headache, double vision, muscle pain/spasm/weakness/stiffness,  bronchitis,  injection site pain, increased blood pressure, hypersensitivity, anaphylaxis, difficulty swallowing, difficulty speaking, urinary incontinence and breathing difficulty.  The patient is aware that swallowing and breathing difficulties can be life threatening and there have been reports of death in some cases where onabotulinum toxin was injected.   The patient was advised of the expected benefit to include decreased spasticity in the injected muscles, and the anticipated duration of effect of 3 months. A permit was signed and scanned into the media.    Last injection: n/a  Taking anticoagulants/antiplatelets: Yes  Diabetic: No  Febrile/active infection: No    Ambulatory Procedure Time Out  Correct Patient: Yes  Correct Procedure: Yes  Correct Site/Side: Yes  Correct Site(s) Marked: Yes  Informed Consent Signed: Yes  Allergies Verified: Yes  Staff Present & Credential:: Gabriella Mohr/    Diagnosis:  1. Spastic hemiparesis affecting dominant side (HCC)        Procedure note: After obtaining consent,  EMG guided onabotulinum toxin A injection was performed of the right lower extremity at the locations and doses listed below.  500 units of onabotulinum toxin A was reconstituted using 4 cc of preservative free normal saline ( 5 units per 0.1 ml). EMG guidance was necessary to adequately

## 2024-09-03 ENCOUNTER — OFFICE VISIT (OUTPATIENT)
Dept: PHYSICAL MEDICINE AND REHAB | Age: 59
End: 2024-09-03
Payer: MEDICARE

## 2024-09-03 VITALS — WEIGHT: 143 LBS | BODY MASS INDEX: 27.02 KG/M2 | TEMPERATURE: 97.3 F

## 2024-09-03 DIAGNOSIS — G81.10 SPASTIC HEMIPARESIS AFFECTING DOMINANT SIDE (HCC): Primary | ICD-10-CM

## 2024-09-03 PROCEDURE — 95874 GUIDE NERV DESTR NEEDLE EMG: CPT | Performed by: PHYSICAL MEDICINE & REHABILITATION

## 2024-09-03 PROCEDURE — 64644 CHEMODENERV 1 EXTREM 5/> MUS: CPT | Performed by: PHYSICAL MEDICINE & REHABILITATION

## 2024-09-04 LAB
INR BLD: 3.3
PROTHROMBIN TIME: NORMAL

## 2024-09-05 ENCOUNTER — ANTI-COAG VISIT (OUTPATIENT)
Dept: FAMILY MEDICINE CLINIC | Age: 59
End: 2024-09-05

## 2024-09-05 DIAGNOSIS — Z79.01 LONG TERM (CURRENT) USE OF ANTICOAGULANTS: Primary | ICD-10-CM

## 2024-09-11 LAB — INR BLD: 2.7

## 2024-09-17 ENCOUNTER — ANTI-COAG VISIT (OUTPATIENT)
Dept: FAMILY MEDICINE CLINIC | Age: 59
End: 2024-09-17

## 2024-09-17 DIAGNOSIS — Z86.73 H/O: CVA (CEREBROVASCULAR ACCIDENT): Primary | ICD-10-CM

## 2024-09-18 LAB
INR BLD: 2.1
PROTHROMBIN TIME: NORMAL

## 2024-09-18 NOTE — PROGRESS NOTES
Roseanne Casas D.O.  [facility]  [address] [place] [address]  Phone: [phone]  Fax: [phone]    10/14/2024  Consult requested by: No ref. provider found for :   Chief Complaint   Patient presents with    Spasms     6 wk f/u botox      Primary care: Karla Fields MD    An independent historian was not needed. Patient had EMG guided botox 500 units on 9/3/2024 at the following sites in the right leg:   Medial gastrocnemius- 75 units @ 3 sites (1/4 distance popliteal crease to heel)  Lateral Gastrocnemius- 75 units @ 3 sites (1/4 distance popliteal crease to heel)  Soleus- 75 units @ 3 sites (2/3 distance from heel to popliteal crease medially)  Tibialis Posterior- 75 untis @ 3 sites  (midway between heel and popliteal crease medially)   Flexor digitorum longus- 50 units @ 2 sites ( 1/3-1/2 distance from heel to popliteal crease posterior to tibia)   Flexor digitorum brevis- 20 units  Flexor hallucis longus- 50 units @ 2 sites ( 1/3 distance from heel to popliteal crease lateral to achilles)   Total dose 420 units total waste 80 units    She does feel some change in her symptoms but feels like her foot is still very stiff/tight. She states it is very difficult to bend her leg due to stiffness. Denies spasms.  She reports there is quite a bit of pain associated with the tightness. She states sleep is rarely disrupted by tightness.  She states the stiffness is very bothersome.  She states her toes are pulling in and curling very bad.  She states it is very difficult to walk or move her leg due to stiffness.  She states it is a little difficult to don pants and shoes.     Data reviewed/prior work up:   Review of external notes:   Referring provider's office notes: No ref. provider found  Spasticity screening tool-scanned to media  Review of labs:   Lab Results   Component Value Date     02/19/2024    K 4.5 02/19/2024     02/19/2024    CO2

## 2024-09-19 ENCOUNTER — ANTI-COAG VISIT (OUTPATIENT)
Dept: FAMILY MEDICINE CLINIC | Age: 59
End: 2024-09-19
Payer: MEDICARE

## 2024-09-19 DIAGNOSIS — Z79.01 LONG TERM (CURRENT) USE OF ANTICOAGULANTS: Primary | ICD-10-CM

## 2024-09-19 PROCEDURE — 93793 ANTICOAG MGMT PT WARFARIN: CPT | Performed by: FAMILY MEDICINE

## 2024-09-24 ENCOUNTER — HOSPITAL ENCOUNTER (OUTPATIENT)
Age: 59
Discharge: HOME | End: 2024-09-24
Payer: MEDICARE

## 2024-09-24 DIAGNOSIS — R73.01 IMPAIRED FASTING GLUCOSE: ICD-10-CM

## 2024-09-24 DIAGNOSIS — E78.5 HYPERLIPIDEMIA, UNSPECIFIED HYPERLIPIDEMIA TYPE: ICD-10-CM

## 2024-09-24 LAB
CHOLEST SERPL-MCNC: 156 MG/DL
HBA1C MFR BLD: 5.5 % (ref 4–5.6)
HDLC SERPL-MCNC: 50 MG/DL
LDLC SERPL CALC-MCNC: 82 MG/DL
TRIGL SERPL-MCNC: 120 MG/DL
TSH SERPL DL<=0.05 MIU/L-ACNC: 2.45 UIU/ML (ref 0.27–4.2)
VLDLC SERPL CALC-MCNC: 24 MG/DL

## 2024-09-24 PROCEDURE — 83036 HEMOGLOBIN GLYCOSYLATED A1C: CPT

## 2024-09-24 PROCEDURE — 84443 ASSAY THYROID STIM HORMONE: CPT

## 2024-09-24 PROCEDURE — 80061 LIPID PANEL: CPT

## 2024-09-24 PROCEDURE — 36415 COLL VENOUS BLD VENIPUNCTURE: CPT

## 2024-09-25 LAB — INR BLD: 2.4

## 2024-09-26 ENCOUNTER — ANTI-COAG VISIT (OUTPATIENT)
Dept: FAMILY MEDICINE CLINIC | Age: 59
End: 2024-09-26

## 2024-09-26 DIAGNOSIS — Z79.01 CHRONIC ANTICOAGULATION: Primary | ICD-10-CM

## 2024-10-02 ENCOUNTER — ANTI-COAG VISIT (OUTPATIENT)
Dept: FAMILY MEDICINE CLINIC | Age: 59
End: 2024-10-02

## 2024-10-02 DIAGNOSIS — Z79.01 LONG TERM (CURRENT) USE OF ANTICOAGULANTS: Primary | ICD-10-CM

## 2024-10-02 LAB
INR BLD: 2.6
PROTHROMBIN TIME: NORMAL

## 2024-10-09 LAB
INR BLD: 2.8
PROTHROMBIN TIME: NORMAL

## 2024-10-14 ENCOUNTER — OFFICE VISIT (OUTPATIENT)
Dept: PHYSICAL MEDICINE AND REHAB | Age: 59
End: 2024-10-14
Payer: MEDICARE

## 2024-10-14 VITALS
DIASTOLIC BLOOD PRESSURE: 84 MMHG | HEIGHT: 61 IN | SYSTOLIC BLOOD PRESSURE: 134 MMHG | HEART RATE: 93 BPM | WEIGHT: 140 LBS | BODY MASS INDEX: 26.43 KG/M2

## 2024-10-14 DIAGNOSIS — G81.10 SPASTIC HEMIPARESIS AFFECTING DOMINANT SIDE (HCC): Primary | ICD-10-CM

## 2024-10-14 PROCEDURE — 3017F COLORECTAL CA SCREEN DOC REV: CPT | Performed by: PHYSICAL MEDICINE & REHABILITATION

## 2024-10-14 PROCEDURE — G8484 FLU IMMUNIZE NO ADMIN: HCPCS | Performed by: PHYSICAL MEDICINE & REHABILITATION

## 2024-10-14 PROCEDURE — G8427 DOCREV CUR MEDS BY ELIG CLIN: HCPCS | Performed by: PHYSICAL MEDICINE & REHABILITATION

## 2024-10-14 PROCEDURE — G8417 CALC BMI ABV UP PARAM F/U: HCPCS | Performed by: PHYSICAL MEDICINE & REHABILITATION

## 2024-10-14 PROCEDURE — 99214 OFFICE O/P EST MOD 30 MIN: CPT | Performed by: PHYSICAL MEDICINE & REHABILITATION

## 2024-10-14 PROCEDURE — 1036F TOBACCO NON-USER: CPT | Performed by: PHYSICAL MEDICINE & REHABILITATION

## 2024-10-14 RX ORDER — LIDOCAINE/PRILOCAINE 2.5 %-2.5%
CREAM (GRAM) TOPICAL
Qty: 60 G | Refills: 0 | Status: SHIPPED | OUTPATIENT
Start: 2024-10-14

## 2024-10-15 ENCOUNTER — ANTI-COAG VISIT (OUTPATIENT)
Dept: FAMILY MEDICINE CLINIC | Age: 59
End: 2024-10-15

## 2024-10-15 DIAGNOSIS — Z79.01 LONG TERM (CURRENT) USE OF ANTICOAGULANTS: Primary | ICD-10-CM

## 2024-10-16 LAB
INR BLD: 1.6
PROTHROMBIN TIME: NORMAL

## 2024-10-17 ENCOUNTER — ANTI-COAG VISIT (OUTPATIENT)
Dept: FAMILY MEDICINE CLINIC | Age: 59
End: 2024-10-17

## 2024-10-17 DIAGNOSIS — Z79.01 LONG TERM (CURRENT) USE OF ANTICOAGULANTS: Primary | ICD-10-CM

## 2024-10-22 ENCOUNTER — TELEPHONE (OUTPATIENT)
Dept: PHYSICAL MEDICINE AND REHAB | Age: 59
End: 2024-10-22

## 2024-10-22 NOTE — TELEPHONE ENCOUNTER
Pt called in to r/s her injection on 12/4/24, she would like to know if she can r/s for January? Marilu can be reached at [phone]. Thank you.

## 2024-10-30 ENCOUNTER — ANTI-COAG VISIT (OUTPATIENT)
Dept: FAMILY MEDICINE CLINIC | Age: 59
End: 2024-10-30

## 2024-10-30 LAB
INR BLD: 2.7
INR BLD: 2.7
PROTHROMBIN TIME: NORMAL

## 2024-11-06 ENCOUNTER — ANTI-COAG VISIT (OUTPATIENT)
Dept: FAMILY MEDICINE CLINIC | Age: 59
End: 2024-11-06

## 2024-11-06 LAB
INR BLD: 1.4
INR BLD: NORMAL
PROTHROMBIN TIME: 1.4

## 2024-11-13 LAB
INR BLD: 2.2
PROTHROMBIN TIME: NORMAL

## 2024-11-13 RX ORDER — ONDANSETRON 4 MG/1
4 TABLET, ORALLY DISINTEGRATING ORAL 3 TIMES DAILY PRN
Qty: 21 TABLET | Refills: 0 | Status: SHIPPED | OUTPATIENT
Start: 2024-11-13

## 2024-11-15 ENCOUNTER — ANTI-COAG VISIT (OUTPATIENT)
Dept: FAMILY MEDICINE CLINIC | Age: 59
End: 2024-11-15

## 2024-11-15 DIAGNOSIS — Z79.01 LONG TERM (CURRENT) USE OF ANTICOAGULANTS: Primary | ICD-10-CM

## 2024-11-20 LAB
INR BLD: 2.4
PROTHROMBIN TIME: NORMAL

## 2024-11-25 ENCOUNTER — ANTI-COAG VISIT (OUTPATIENT)
Dept: FAMILY MEDICINE CLINIC | Age: 59
End: 2024-11-25

## 2024-11-25 DIAGNOSIS — Z79.01 LONG TERM (CURRENT) USE OF ANTICOAGULANTS: Primary | ICD-10-CM

## 2024-11-27 LAB
INR BLD: 2.3
PROTHROMBIN TIME: NORMAL

## 2024-11-29 ENCOUNTER — ANTI-COAG VISIT (OUTPATIENT)
Dept: FAMILY MEDICINE CLINIC | Age: 59
End: 2024-11-29

## 2024-11-29 DIAGNOSIS — Z79.01 LONG TERM (CURRENT) USE OF ANTICOAGULANTS: Primary | ICD-10-CM

## 2024-12-04 ENCOUNTER — ANTI-COAG VISIT (OUTPATIENT)
Dept: FAMILY MEDICINE CLINIC | Age: 59
End: 2024-12-04

## 2024-12-04 DIAGNOSIS — Z79.01 LONG TERM (CURRENT) USE OF ANTICOAGULANTS: Primary | ICD-10-CM

## 2024-12-04 LAB
INR BLD: 2.5
PROTHROMBIN TIME: NORMAL

## 2024-12-05 ENCOUNTER — TELEPHONE (OUTPATIENT)
Dept: PHYSICAL MEDICINE AND REHAB | Age: 59
End: 2024-12-05

## 2024-12-05 NOTE — TELEPHONE ENCOUNTER
Called and spoke with Mahsa from [facility] to start prior authorization for cpt codes 41121, 22786, [id number] no authorization required.   authorization [facility].  Called and spoke with Shannon from [facility] to start prior authorization for  Botox 500 units Reference # [id number].

## 2024-12-06 NOTE — TELEPHONE ENCOUNTER
Called and spoke with Judith from [facility] to set up delivery for Botox 500 units.  I was transferred to [place] to set up shipment is sent to ship on 12-9-24.

## 2024-12-10 ENCOUNTER — OFFICE VISIT (OUTPATIENT)
Dept: ENT CLINIC | Age: 59
End: 2024-12-10
Payer: MEDICARE

## 2024-12-10 VITALS
DIASTOLIC BLOOD PRESSURE: 92 MMHG | WEIGHT: 132.9 LBS | BODY MASS INDEX: 25.09 KG/M2 | HEIGHT: 61 IN | HEART RATE: 85 BPM | SYSTOLIC BLOOD PRESSURE: 121 MMHG | OXYGEN SATURATION: 97 % | TEMPERATURE: 97.9 F

## 2024-12-10 DIAGNOSIS — R13.10 DYSPHAGIA, UNSPECIFIED TYPE: ICD-10-CM

## 2024-12-10 DIAGNOSIS — J30.1 SEASONAL ALLERGIC RHINITIS DUE TO POLLEN: Primary | ICD-10-CM

## 2024-12-10 PROCEDURE — 99213 OFFICE O/P EST LOW 20 MIN: CPT | Performed by: OTOLARYNGOLOGY

## 2024-12-10 PROCEDURE — G8484 FLU IMMUNIZE NO ADMIN: HCPCS | Performed by: OTOLARYNGOLOGY

## 2024-12-10 PROCEDURE — G8427 DOCREV CUR MEDS BY ELIG CLIN: HCPCS | Performed by: OTOLARYNGOLOGY

## 2024-12-10 PROCEDURE — 3017F COLORECTAL CA SCREEN DOC REV: CPT | Performed by: OTOLARYNGOLOGY

## 2024-12-10 PROCEDURE — 1036F TOBACCO NON-USER: CPT | Performed by: OTOLARYNGOLOGY

## 2024-12-10 PROCEDURE — G8417 CALC BMI ABV UP PARAM F/U: HCPCS | Performed by: OTOLARYNGOLOGY

## 2024-12-10 RX ORDER — AZELASTINE 1 MG/ML
2 SPRAY, METERED NASAL 2 TIMES DAILY
Qty: 120 ML | Refills: 3 | Status: SHIPPED | OUTPATIENT
Start: 2024-12-10

## 2024-12-10 NOTE — PROGRESS NOTES
Highest education level: None   Tobacco Use    Smoking status: Former     Current packs/day: 0.00     Average packs/day: 0.5 packs/day for 22.1 years (11.0 ttl pk-yrs)     Types: Cigarettes     Start date: 1997     Quit date: 2019     Years since quittin.5     Passive exposure: Past    Smokeless tobacco: Never    Tobacco comments:     22 years of smoking   Vaping Use    Vaping status: Never Used   Substance and Sexual Activity    Alcohol use: Yes     Alcohol/week: 3.0 standard drinks of alcohol     Types: 3 Drinks containing 0.5 oz of alcohol per week     Comment: social    Drug use: No    Sexual activity: Defer     Social Determinants of Health     Financial Resource Strain: Low Risk  (2024)    Overall Financial Resource Strain (CARDIA)     Difficulty of Paying Living Expenses: Not hard at all   Food Insecurity: No Food Insecurity (2024)    Hunger Vital Sign     Worried About Running Out of Food in the Last Year: Never true     Ran Out of Food in the Last Year: Never true   Transportation Needs: Unknown (2024)    PRAPARE - Transportation     Lack of Transportation (Non-Medical): No   Physical Activity: Sufficiently Active (2024)    Exercise Vital Sign     Days of Exercise per Week: 5 days     Minutes of Exercise per Session: 60 min   Housing Stability: Unknown (2024)    Housing Stability Vital Sign     Homeless in the Last Year: No     No Known Allergies        Review of Systems   Constitutional: Negative.  Negative for activity change, appetite change, fatigue and fever.   HENT:  Positive for trouble swallowing. Negative for congestion, ear discharge, ear pain, hearing loss, nosebleeds, postnasal drip, rhinorrhea, sinus pressure, sinus pain, sore throat, tinnitus and voice change.    Eyes: Negative.  Negative for pain, discharge and visual disturbance.   Respiratory: Negative.  Negative for apnea, cough, choking, chest tightness, shortness of breath, wheezing and stridor.

## 2024-12-11 ENCOUNTER — ANTI-COAG VISIT (OUTPATIENT)
Dept: FAMILY MEDICINE CLINIC | Age: 59
End: 2024-12-11

## 2024-12-11 DIAGNOSIS — Z79.01 LONG TERM (CURRENT) USE OF ANTICOAGULANTS: Primary | ICD-10-CM

## 2024-12-11 LAB
INR BLD: 1.9
PROTHROMBIN TIME: NORMAL

## 2024-12-18 ENCOUNTER — ANTI-COAG VISIT (OUTPATIENT)
Dept: FAMILY MEDICINE CLINIC | Age: 59
End: 2024-12-18

## 2024-12-18 DIAGNOSIS — Z79.01 LONG TERM (CURRENT) USE OF ANTICOAGULANTS: Primary | ICD-10-CM

## 2024-12-18 LAB
INR BLD: 1.7
PROTHROMBIN TIME: NORMAL

## 2024-12-18 NOTE — PROGRESS NOTES
Roseanne Casas D.O.  [facility]  [address] [place] [address]  Phone: [phone]  Fax: [phone]  1/6/2025    Chief Complaint   Patient presents with    Leg Pain     EMG guided Botox 500 units       Informed Consent:  The indications, risks, benefits, and  alternatives of onabotulinum toxin A were discussed with the patient. I explained to the patient the potential side effects including but not limited to: distant spread of toxin effect causing droopy eyelids, facial drooping, neck pain, headache, double vision, muscle pain/spasm/weakness/stiffness,  bronchitis,  injection site pain, increased blood pressure, hypersensitivity, anaphylaxis, difficulty swallowing, difficulty speaking, urinary incontinence and breathing difficulty.  The patient is aware that swallowing and breathing difficulties can be life threatening and there have been reports of death in some cases where onabotulinum toxin was injected.   The patient was advised of the expected benefit to include decreased spasticity in the injected muscles, and the anticipated duration of effect of 3 months. A permit was signed and scanned into the media.    Last injection:09/03/2024  Taking anticoagulants/antiplatelets: Yes  Diabetic: No  Febrile/active infection: No         Diagnosis:  1. Spastic hemiparesis affecting dominant side (HCC)          Procedure note: After obtaining consent,  EMG guided onabotulinum toxin A injection was performed of the right lower extremity at the locations and doses listed below.  500 units of onabotulinum toxin A was reconstituted using 4 cc of preservative free normal saline ( 5 units per 0.1 ml). EMG guidance was necessary to adequately localize muscle due to nearby neurovascular structures and to maximize the response to the toxin. At each injection site, the skin was prepared with alcohol and using a no touch technique the Myoject needle was directed to the desired

## 2024-12-25 LAB
INR BLD: 2.2
PROTHROMBIN TIME: NORMAL

## 2024-12-27 ENCOUNTER — ANTI-COAG VISIT (OUTPATIENT)
Dept: FAMILY MEDICINE CLINIC | Age: 59
End: 2024-12-27

## 2024-12-27 DIAGNOSIS — Z79.01 LONG TERM (CURRENT) USE OF ANTICOAGULANTS: Primary | ICD-10-CM

## 2025-01-02 LAB
INR BLD: 3
PROTHROMBIN TIME: NORMAL

## 2025-01-06 ENCOUNTER — OFFICE VISIT (OUTPATIENT)
Dept: PHYSICAL MEDICINE AND REHAB | Age: 60
End: 2025-01-06
Payer: MEDICARE

## 2025-01-06 VITALS — WEIGHT: 126 LBS | TEMPERATURE: 97.1 F | BODY MASS INDEX: 23.79 KG/M2 | HEIGHT: 61 IN

## 2025-01-06 DIAGNOSIS — G81.10 SPASTIC HEMIPARESIS AFFECTING DOMINANT SIDE (HCC): Primary | ICD-10-CM

## 2025-01-06 PROCEDURE — 64644 CHEMODENERV 1 EXTREM 5/> MUS: CPT | Performed by: PHYSICAL MEDICINE & REHABILITATION

## 2025-01-06 PROCEDURE — 95874 GUIDE NERV DESTR NEEDLE EMG: CPT | Performed by: PHYSICAL MEDICINE & REHABILITATION

## 2025-01-06 RX ORDER — LORAZEPAM 0.5 MG/1
0.5 TABLET ORAL PRN
COMMUNITY
Start: 2024-02-21

## 2025-01-08 LAB
INR BLD: 1.8
PROTHROMBIN TIME: NORMAL

## 2025-01-10 ENCOUNTER — ANTI-COAG VISIT (OUTPATIENT)
Dept: FAMILY MEDICINE CLINIC | Age: 60
End: 2025-01-10

## 2025-01-10 DIAGNOSIS — Z79.01 LONG TERM (CURRENT) USE OF ANTICOAGULANTS: Primary | ICD-10-CM

## 2025-01-15 ENCOUNTER — ANTI-COAG VISIT (OUTPATIENT)
Dept: FAMILY MEDICINE CLINIC | Age: 60
End: 2025-01-15

## 2025-01-15 DIAGNOSIS — Z79.01 LONG TERM (CURRENT) USE OF ANTICOAGULANTS: Primary | ICD-10-CM

## 2025-01-15 LAB
INR BLD: 2.5
PROTHROMBIN TIME: NORMAL

## 2025-01-22 ENCOUNTER — ANTI-COAG VISIT (OUTPATIENT)
Dept: FAMILY MEDICINE CLINIC | Age: 60
End: 2025-01-22
Payer: MEDICARE

## 2025-01-22 DIAGNOSIS — Z79.01 LONG TERM (CURRENT) USE OF ANTICOAGULANTS: Primary | ICD-10-CM

## 2025-01-22 LAB
INR BLD: 2.3
PROTHROMBIN TIME: NORMAL

## 2025-01-22 PROCEDURE — 93793 ANTICOAG MGMT PT WARFARIN: CPT | Performed by: FAMILY MEDICINE

## 2025-01-29 ENCOUNTER — ANTI-COAG VISIT (OUTPATIENT)
Dept: FAMILY MEDICINE CLINIC | Age: 60
End: 2025-01-29
Payer: MEDICARE

## 2025-01-29 DIAGNOSIS — Z79.01 LONG TERM (CURRENT) USE OF ANTICOAGULANTS: Primary | ICD-10-CM

## 2025-01-29 LAB
INR BLD: 2
PROTHROMBIN TIME: NORMAL

## 2025-01-29 PROCEDURE — 93793 ANTICOAG MGMT PT WARFARIN: CPT | Performed by: FAMILY MEDICINE

## 2025-01-30 NOTE — PROGRESS NOTES
sleep    There is  associated pain. Pain scale: Pain Score:   0 - No pain on the visual analog scale where 0 is no pain and 10 is the worst pain possible.  Location: right foot and ankle Alleviating factors: none Exacerbating factors:walking     ADL: Self-care  Mobility: independence Device: None  Exercise: never  Work history: disability  Education level: High School  Support system: lives with     Prior treatments have included:   Treatment Date Effective   Rest     Ice     Heat     NSAID     Analgesic     Skeletal muscle relaxant     Anti-spasmodic Baclofen-cognitive side effects; Dantrium    Botox     Oral steroids     Antidepressant     Anticonvulsant gabapentin    Topical     Supplements     Physical therapy     Home exercise program     Manipulation     Massage     Aquatic     Traction     Electric stimulation     Injections     Ergonomics     Alternative therapy     Surgery     Bracing     Activity modifications     Cognitive behavioral therapy         Past medical, surgical, family, social history, allergies and medications were otherwise reviewed in Epic.      Review of systems was completed by patient and reviewed with me and is scanned in media separately.     Physical Exam: Blood pressure 138/88, pulse 85, height 1.549 m (5' 1\"), weight 57.2 kg (126 lb), last menstrual period 10/12/2021. General: The patient is in no apparent distress. Body habitus is BMI 27.02. HEENT: No rhinorrhea, sneezing, yawning, or lacrimation. No scleral icterus or conjunctival injection. SKIN: No piloerection. No track marks. No rash. Normal turgor. No erythema or ecchymosis. Psychological: Mood and affect are appropriate. Hygiene is appropriate. Cardiovascular:  Heart is regular rate and rhythm.  Peripheral pulses are 2+ at the dorsalis pedis, posterior tibial and radial arteries.  There is no edema. Respiratory: Respirations are regular and unlabored.  There is no cyanosis. Lymphatic: There is no cervical or inguinal

## 2025-02-05 ENCOUNTER — ANTI-COAG VISIT (OUTPATIENT)
Dept: FAMILY MEDICINE CLINIC | Age: 60
End: 2025-02-05
Payer: MEDICARE

## 2025-02-05 DIAGNOSIS — Z79.01 LONG TERM (CURRENT) USE OF ANTICOAGULANTS: Primary | ICD-10-CM

## 2025-02-05 LAB
INR BLD: 2.1
PROTHROMBIN TIME: NORMAL

## 2025-02-05 PROCEDURE — 93793 ANTICOAG MGMT PT WARFARIN: CPT | Performed by: FAMILY MEDICINE

## 2025-02-07 ENCOUNTER — HOSPITAL ENCOUNTER (OUTPATIENT)
Dept: GENERAL RADIOLOGY | Age: 60
Discharge: HOME | End: 2025-02-09
Payer: MEDICARE

## 2025-02-07 ENCOUNTER — HOSPITAL ENCOUNTER (OUTPATIENT)
Age: 60
Discharge: HOME | End: 2025-02-09
Payer: MEDICARE

## 2025-02-07 DIAGNOSIS — K59.01 CONSTIPATION DUE TO SLOW TRANSIT: ICD-10-CM

## 2025-02-07 PROCEDURE — 74018 RADEX ABDOMEN 1 VIEW: CPT

## 2025-02-12 ENCOUNTER — ANTI-COAG VISIT (OUTPATIENT)
Dept: FAMILY MEDICINE CLINIC | Age: 60
End: 2025-02-12
Payer: MEDICARE

## 2025-02-12 DIAGNOSIS — Z79.01 LONG TERM (CURRENT) USE OF ANTICOAGULANTS: Primary | ICD-10-CM

## 2025-02-12 LAB
INR BLD: 2
PROTHROMBIN TIME: NORMAL

## 2025-02-12 PROCEDURE — 93793 ANTICOAG MGMT PT WARFARIN: CPT | Performed by: FAMILY MEDICINE

## 2025-02-13 ENCOUNTER — HOSPITAL ENCOUNTER (OUTPATIENT)
Dept: GENERAL RADIOLOGY | Age: 60
Discharge: HOME | End: 2025-02-15
Payer: MEDICARE

## 2025-02-13 VITALS — WEIGHT: 121 LBS | BODY MASS INDEX: 23.75 KG/M2 | HEIGHT: 60 IN

## 2025-02-13 DIAGNOSIS — N64.52 NIPPLE DISCHARGE: ICD-10-CM

## 2025-02-13 DIAGNOSIS — N64.52 DISCHARGE FROM BOTH NIPPLES: ICD-10-CM

## 2025-02-13 PROCEDURE — G0279 TOMOSYNTHESIS, MAMMO: HCPCS

## 2025-02-13 PROCEDURE — 76642 ULTRASOUND BREAST LIMITED: CPT

## 2025-02-19 LAB
INR BLD: 2.7
PROTHROMBIN TIME: NORMAL

## 2025-02-23 SDOH — ECONOMIC STABILITY: INCOME INSECURITY: IN THE LAST 12 MONTHS, WAS THERE A TIME WHEN YOU WERE NOT ABLE TO PAY THE MORTGAGE OR RENT ON TIME?: NO

## 2025-02-23 SDOH — HEALTH STABILITY: PHYSICAL HEALTH: ON AVERAGE, HOW MANY DAYS PER WEEK DO YOU ENGAGE IN MODERATE TO STRENUOUS EXERCISE (LIKE A BRISK WALK)?: 0 DAYS

## 2025-02-23 SDOH — ECONOMIC STABILITY: FOOD INSECURITY: WITHIN THE PAST 12 MONTHS, YOU WORRIED THAT YOUR FOOD WOULD RUN OUT BEFORE YOU GOT MONEY TO BUY MORE.: NEVER TRUE

## 2025-02-23 SDOH — ECONOMIC STABILITY: TRANSPORTATION INSECURITY
IN THE PAST 12 MONTHS, HAS THE LACK OF TRANSPORTATION KEPT YOU FROM MEDICAL APPOINTMENTS OR FROM GETTING MEDICATIONS?: NO

## 2025-02-23 SDOH — ECONOMIC STABILITY: FOOD INSECURITY: WITHIN THE PAST 12 MONTHS, THE FOOD YOU BOUGHT JUST DIDN'T LAST AND YOU DIDN'T HAVE MONEY TO GET MORE.: NEVER TRUE

## 2025-02-23 ASSESSMENT — LIFESTYLE VARIABLES
HOW OFTEN DO YOU HAVE SIX OR MORE DRINKS ON ONE OCCASION: 1
HOW OFTEN DO YOU HAVE A DRINK CONTAINING ALCOHOL: 2-4 TIMES A MONTH
HOW OFTEN DO YOU HAVE A DRINK CONTAINING ALCOHOL: 3
HOW MANY STANDARD DRINKS CONTAINING ALCOHOL DO YOU HAVE ON A TYPICAL DAY: 1 OR 2
HOW MANY STANDARD DRINKS CONTAINING ALCOHOL DO YOU HAVE ON A TYPICAL DAY: 1

## 2025-02-23 ASSESSMENT — PATIENT HEALTH QUESTIONNAIRE - PHQ9
SUM OF ALL RESPONSES TO PHQ QUESTIONS 1-9: 0
2. FEELING DOWN, DEPRESSED OR HOPELESS: NOT AT ALL
SUM OF ALL RESPONSES TO PHQ QUESTIONS 1-9: 0
SUM OF ALL RESPONSES TO PHQ QUESTIONS 1-9: 0
1. LITTLE INTEREST OR PLEASURE IN DOING THINGS: NOT AT ALL
SUM OF ALL RESPONSES TO PHQ9 QUESTIONS 1 & 2: 0
SUM OF ALL RESPONSES TO PHQ QUESTIONS 1-9: 0

## 2025-02-24 ENCOUNTER — ANTI-COAG VISIT (OUTPATIENT)
Dept: FAMILY MEDICINE CLINIC | Age: 60
End: 2025-02-24
Payer: MEDICARE

## 2025-02-24 DIAGNOSIS — Z79.01 LONG TERM (CURRENT) USE OF ANTICOAGULANTS: Primary | ICD-10-CM

## 2025-02-24 PROCEDURE — 93793 ANTICOAG MGMT PT WARFARIN: CPT | Performed by: FAMILY MEDICINE

## 2025-02-25 ENCOUNTER — OFFICE VISIT (OUTPATIENT)
Dept: FAMILY MEDICINE CLINIC | Age: 60
End: 2025-02-25

## 2025-02-25 VITALS
HEIGHT: 61 IN | BODY MASS INDEX: 23.79 KG/M2 | SYSTOLIC BLOOD PRESSURE: 110 MMHG | HEART RATE: 82 BPM | TEMPERATURE: 97.8 F | OXYGEN SATURATION: 99 % | WEIGHT: 126 LBS | DIASTOLIC BLOOD PRESSURE: 70 MMHG | RESPIRATION RATE: 18 BRPM

## 2025-02-25 DIAGNOSIS — F41.9 ANXIETY: Primary | ICD-10-CM

## 2025-02-25 DIAGNOSIS — E78.5 HYPERLIPIDEMIA, UNSPECIFIED HYPERLIPIDEMIA TYPE: ICD-10-CM

## 2025-02-25 DIAGNOSIS — Z00.00 MEDICARE ANNUAL WELLNESS VISIT, SUBSEQUENT: ICD-10-CM

## 2025-02-25 DIAGNOSIS — R73.01 IMPAIRED FASTING GLUCOSE: ICD-10-CM

## 2025-02-25 RX ORDER — PANTOPRAZOLE SODIUM 40 MG/1
40 TABLET, DELAYED RELEASE ORAL
Qty: 90 TABLET | Refills: 1 | Status: SHIPPED | OUTPATIENT
Start: 2025-02-25

## 2025-02-25 RX ORDER — SENNOSIDES 8.6 MG
2 TABLET ORAL DAILY
COMMUNITY
Start: 2025-02-09

## 2025-02-25 RX ORDER — GABAPENTIN 300 MG/1
300 CAPSULE ORAL 4 TIMES DAILY
COMMUNITY
Start: 2025-02-12

## 2025-02-25 RX ORDER — TRAZODONE HYDROCHLORIDE 50 MG/1
50 TABLET ORAL NIGHTLY PRN
Qty: 30 TABLET | Refills: 5 | Status: SHIPPED | OUTPATIENT
Start: 2025-02-25

## 2025-02-25 ASSESSMENT — PATIENT HEALTH QUESTIONNAIRE - PHQ9
3. TROUBLE FALLING OR STAYING ASLEEP: NOT AT ALL
SUM OF ALL RESPONSES TO PHQ QUESTIONS 1-9: 0
6. FEELING BAD ABOUT YOURSELF - OR THAT YOU ARE A FAILURE OR HAVE LET YOURSELF OR YOUR FAMILY DOWN: NOT AT ALL
SUM OF ALL RESPONSES TO PHQ9 QUESTIONS 1 & 2: 0
1. LITTLE INTEREST OR PLEASURE IN DOING THINGS: NOT AT ALL
10. IF YOU CHECKED OFF ANY PROBLEMS, HOW DIFFICULT HAVE THESE PROBLEMS MADE IT FOR YOU TO DO YOUR WORK, TAKE CARE OF THINGS AT HOME, OR GET ALONG WITH OTHER PEOPLE: NOT DIFFICULT AT ALL
9. THOUGHTS THAT YOU WOULD BE BETTER OFF DEAD, OR OF HURTING YOURSELF: NOT AT ALL
SUM OF ALL RESPONSES TO PHQ QUESTIONS 1-9: 0
SUM OF ALL RESPONSES TO PHQ QUESTIONS 1-9: 0
4. FEELING TIRED OR HAVING LITTLE ENERGY: NOT AT ALL
7. TROUBLE CONCENTRATING ON THINGS, SUCH AS READING THE NEWSPAPER OR WATCHING TELEVISION: NOT AT ALL
5. POOR APPETITE OR OVEREATING: NOT AT ALL
2. FEELING DOWN, DEPRESSED OR HOPELESS: NOT AT ALL
SUM OF ALL RESPONSES TO PHQ QUESTIONS 1-9: 0
8. MOVING OR SPEAKING SO SLOWLY THAT OTHER PEOPLE COULD HAVE NOTICED. OR THE OPPOSITE, BEING SO FIGETY OR RESTLESS THAT YOU HAVE BEEN MOVING AROUND A LOT MORE THAN USUAL: NOT AT ALL

## 2025-02-25 NOTE — PROGRESS NOTES
(!) Abnormal     Interventions:  See A/P for plan and any pertinent orders                         Objective   Vitals:    02/25/25 1325   BP: 110/70   Pulse: 82   Resp: 18   Temp: 97.8 °F (36.6 °C)   TempSrc: Temporal   SpO2: 99%   Weight: 57.2 kg (126 lb)   Height: 1.549 m (5' 1\")      Body mass index is 23.81 kg/m².      Physical Exam  Lungs were auscultated.            No Known Allergies  Prior to Visit Medications    Medication Sig Taking? Authorizing Provider   pantoprazole (PROTONIX) 40 MG tablet TAKE 1 TABLET BY MOUTH EVERY DAY BEFORE BREAKFAST Yes Karla Fields MD   gabapentin (NEURONTIN) 300 MG capsule Take 1 capsule by mouth 4 times daily. Yes ProviderLise MD   senna (SENOKOT) 8.6 MG TABS tablet Take 2 tablets by mouth daily Yes Lise Barger MD   traZODone (DESYREL) 50 MG tablet Take 1 tablet by mouth nightly as needed for Sleep Yes Karla Fields MD   Evolocumab (REPATHA) SOSY syringe Inject 1 mL into the skin every 14 days Yes Karla Fields MD   LORazepam (ATIVAN) 0.5 MG tablet Take 1 tablet by mouth as needed for Anxiety. Yes ProviderLise MD   azelastine (ASTELIN) 0.1 % nasal spray 2 sprays by Nasal route 2 times daily Use in each nostril as directed Yes Werner Zhao DO   ondansetron (ZOFRAN-ODT) 4 MG disintegrating tablet Take 1 tablet by mouth 3 times daily as needed for Nausea or Vomiting Yes Anita Rosenbaum APRN - NP   lidocaine-prilocaine (EMLA) 2.5-2.5 % cream Apply topically as needed. Yes Roseanne Casas DO   rosuvastatin (CRESTOR) 10 MG tablet Take 1 tablet by mouth three times a week Indications: M-W-F Yes Karla Fields MD   warfarin (COUMADIN) 5 MG tablet TAKE 1 TABLET BY MOUTH 5 TIMES A WEEK AND TAKE 1/2 TABLET BY MOUTH TWICE A WEEK Yes Karla Fields MD   irbesartan-hydroCHLOROthiazide (AVALIDE) 150-12.5 MG per tablet Take 1 tablet by mouth daily Yes Karla Fields MD   aspirin (ASPIRIN ADULT LOW STRENGTH) 81 MG

## 2025-02-26 LAB
INR BLD: 2.4
PROTHROMBIN TIME: NORMAL

## 2025-02-27 ENCOUNTER — OFFICE VISIT (OUTPATIENT)
Dept: PHYSICAL MEDICINE AND REHAB | Age: 60
End: 2025-02-27
Payer: MEDICARE

## 2025-02-27 VITALS
HEART RATE: 85 BPM | SYSTOLIC BLOOD PRESSURE: 138 MMHG | WEIGHT: 126 LBS | DIASTOLIC BLOOD PRESSURE: 88 MMHG | BODY MASS INDEX: 23.79 KG/M2 | HEIGHT: 61 IN

## 2025-02-27 DIAGNOSIS — G81.10 SPASTIC HEMIPARESIS AFFECTING DOMINANT SIDE (HCC): Primary | ICD-10-CM

## 2025-02-27 PROCEDURE — G8420 CALC BMI NORM PARAMETERS: HCPCS | Performed by: PHYSICAL MEDICINE & REHABILITATION

## 2025-02-27 PROCEDURE — 3017F COLORECTAL CA SCREEN DOC REV: CPT | Performed by: PHYSICAL MEDICINE & REHABILITATION

## 2025-02-27 PROCEDURE — 99214 OFFICE O/P EST MOD 30 MIN: CPT | Performed by: PHYSICAL MEDICINE & REHABILITATION

## 2025-02-27 PROCEDURE — G8427 DOCREV CUR MEDS BY ELIG CLIN: HCPCS | Performed by: PHYSICAL MEDICINE & REHABILITATION

## 2025-02-27 PROCEDURE — 1036F TOBACCO NON-USER: CPT | Performed by: PHYSICAL MEDICINE & REHABILITATION

## 2025-02-27 RX ORDER — ASCORBIC ACID 500 MG
1500 TABLET ORAL DAILY
COMMUNITY

## 2025-02-28 ENCOUNTER — TELEPHONE (OUTPATIENT)
Dept: PHYSICAL MEDICINE AND REHAB | Age: 60
End: 2025-02-28

## 2025-02-28 NOTE — TELEPHONE ENCOUNTER
Spoke with  Ny RAMIREZ pharmacist  at [facility] and new script given for botox 600 units every 12 weeks IM with three refills.

## 2025-03-05 ENCOUNTER — ANTI-COAG VISIT (OUTPATIENT)
Dept: FAMILY MEDICINE CLINIC | Age: 60
End: 2025-03-05

## 2025-03-05 ENCOUNTER — ANTI-COAG VISIT (OUTPATIENT)
Dept: FAMILY MEDICINE CLINIC | Age: 60
End: 2025-03-05
Payer: MEDICARE

## 2025-03-05 ENCOUNTER — TELEPHONE (OUTPATIENT)
Dept: FAMILY MEDICINE CLINIC | Age: 60
End: 2025-03-05

## 2025-03-05 DIAGNOSIS — Z79.01 LONG TERM (CURRENT) USE OF ANTICOAGULANTS: Primary | ICD-10-CM

## 2025-03-05 LAB
INR BLD: 1.5
PROTHROMBIN TIME: NORMAL

## 2025-03-05 PROCEDURE — 93793 ANTICOAG MGMT PT WARFARIN: CPT | Performed by: FAMILY MEDICINE

## 2025-03-05 NOTE — TELEPHONE ENCOUNTER
Called pt and left vm to return call    would like to know if she has been taking her  medication as directed and if she missed any which may have rsulted in her low INR

## 2025-03-08 ENCOUNTER — HOSPITAL ENCOUNTER (OUTPATIENT)
Age: 60
Discharge: HOME | End: 2025-03-08
Payer: MEDICARE

## 2025-03-08 DIAGNOSIS — R73.01 IMPAIRED FASTING GLUCOSE: ICD-10-CM

## 2025-03-08 DIAGNOSIS — E78.5 HYPERLIPIDEMIA, UNSPECIFIED HYPERLIPIDEMIA TYPE: ICD-10-CM

## 2025-03-08 LAB
CHOLEST SERPL-MCNC: 186 MG/DL
HBA1C MFR BLD: 5.2 % (ref 4–5.6)
HDLC SERPL-MCNC: 55 MG/DL
LDLC SERPL CALC-MCNC: 117 MG/DL
TRIGL SERPL-MCNC: 72 MG/DL
VLDLC SERPL CALC-MCNC: 14 MG/DL

## 2025-03-08 PROCEDURE — 80061 LIPID PANEL: CPT

## 2025-03-08 PROCEDURE — 36415 COLL VENOUS BLD VENIPUNCTURE: CPT

## 2025-03-08 PROCEDURE — 83036 HEMOGLOBIN GLYCOSYLATED A1C: CPT

## 2025-03-10 ENCOUNTER — PATIENT MESSAGE (OUTPATIENT)
Dept: FAMILY MEDICINE CLINIC | Age: 60
End: 2025-03-10

## 2025-03-10 ENCOUNTER — RESULTS FOLLOW-UP (OUTPATIENT)
Dept: FAMILY MEDICINE CLINIC | Age: 60
End: 2025-03-10

## 2025-03-12 LAB
INR BLD: 2.2
PROTHROMBIN TIME: NORMAL

## 2025-03-13 ENCOUNTER — ANTI-COAG VISIT (OUTPATIENT)
Dept: FAMILY MEDICINE CLINIC | Age: 60
End: 2025-03-13
Payer: MEDICARE

## 2025-03-13 DIAGNOSIS — Z79.01 LONG TERM (CURRENT) USE OF ANTICOAGULANTS: Primary | ICD-10-CM

## 2025-03-13 PROCEDURE — 93793 ANTICOAG MGMT PT WARFARIN: CPT | Performed by: FAMILY MEDICINE

## 2025-03-14 RX ORDER — BACLOFEN 10 MG/1
10 TABLET ORAL 3 TIMES DAILY
Qty: 90 TABLET | Refills: 0 | Status: SHIPPED | OUTPATIENT
Start: 2025-03-14

## 2025-03-20 ENCOUNTER — TELEPHONE (OUTPATIENT)
Dept: PHYSICAL MEDICINE AND REHAB | Age: 60
End: 2025-03-20

## 2025-03-20 NOTE — TELEPHONE ENCOUNTER
Called and spoke with Laura at [facility] to set up delivery of botox 600 units. States to arrive 3/25/25.

## 2025-03-26 ENCOUNTER — ANTI-COAG VISIT (OUTPATIENT)
Dept: FAMILY MEDICINE CLINIC | Age: 60
End: 2025-03-26
Payer: MEDICARE

## 2025-03-26 DIAGNOSIS — Z79.01 LONG TERM (CURRENT) USE OF ANTICOAGULANTS: Primary | ICD-10-CM

## 2025-03-26 LAB
INR BLD: 2
PROTHROMBIN TIME: NORMAL

## 2025-03-26 PROCEDURE — 93793 ANTICOAG MGMT PT WARFARIN: CPT | Performed by: FAMILY MEDICINE

## 2025-04-02 ENCOUNTER — ANTI-COAG VISIT (OUTPATIENT)
Dept: FAMILY MEDICINE CLINIC | Age: 60
End: 2025-04-02
Payer: MEDICARE

## 2025-04-02 DIAGNOSIS — Z79.01 LONG TERM (CURRENT) USE OF ANTICOAGULANTS: Primary | ICD-10-CM

## 2025-04-02 LAB
INR BLD: 2.8
PROTHROMBIN TIME: NORMAL

## 2025-04-02 PROCEDURE — 93793 ANTICOAG MGMT PT WARFARIN: CPT | Performed by: FAMILY MEDICINE

## 2025-04-07 ENCOUNTER — OFFICE VISIT (OUTPATIENT)
Dept: PHYSICAL MEDICINE AND REHAB | Age: 60
End: 2025-04-07
Payer: MEDICARE

## 2025-04-07 VITALS
WEIGHT: 130 LBS | DIASTOLIC BLOOD PRESSURE: 85 MMHG | BODY MASS INDEX: 24.55 KG/M2 | HEIGHT: 61 IN | TEMPERATURE: 97.2 F | SYSTOLIC BLOOD PRESSURE: 134 MMHG | HEART RATE: 82 BPM

## 2025-04-07 DIAGNOSIS — R09.89 OTHER SPECIFIED SYMPTOMS AND SIGNS INVOLVING THE CIRCULATORY AND RESPIRATORY SYSTEMS: ICD-10-CM

## 2025-04-07 DIAGNOSIS — G81.10 SPASTIC HEMIPARESIS AFFECTING DOMINANT SIDE (HCC): Primary | ICD-10-CM

## 2025-04-07 PROCEDURE — 64644 CHEMODENERV 1 EXTREM 5/> MUS: CPT | Performed by: PHYSICAL MEDICINE & REHABILITATION

## 2025-04-07 PROCEDURE — 95874 GUIDE NERV DESTR NEEDLE EMG: CPT | Performed by: PHYSICAL MEDICINE & REHABILITATION

## 2025-04-07 RX ORDER — BACLOFEN 10 MG/1
10 TABLET ORAL 3 TIMES DAILY
Qty: 270 TABLET | Refills: 1 | Status: SHIPPED | OUTPATIENT
Start: 2025-04-07

## 2025-04-08 ENCOUNTER — TELEPHONE (OUTPATIENT)
Dept: PHYSICAL MEDICINE AND REHAB | Age: 60
End: 2025-04-08

## 2025-04-08 NOTE — TELEPHONE ENCOUNTER
Called and spoke with Era from vascular and informed her that it is correct claudication with exercise.

## 2025-04-08 NOTE — TELEPHONE ENCOUNTER
Era from vascular called stating that the order you placed for NIKKO is with exercise and she wanted to clarify the order if you ment to place it with exercise.  Please advise.

## 2025-04-09 ENCOUNTER — ANTI-COAG VISIT (OUTPATIENT)
Dept: FAMILY MEDICINE CLINIC | Age: 60
End: 2025-04-09
Payer: MEDICARE

## 2025-04-09 DIAGNOSIS — Z79.01 LONG TERM (CURRENT) USE OF ANTICOAGULANTS: Primary | ICD-10-CM

## 2025-04-09 LAB
INR BLD: 2.4
PROTHROMBIN TIME: NORMAL

## 2025-04-09 PROCEDURE — 93793 ANTICOAG MGMT PT WARFARIN: CPT | Performed by: FAMILY MEDICINE

## 2025-04-16 ENCOUNTER — ANTI-COAG VISIT (OUTPATIENT)
Dept: FAMILY MEDICINE CLINIC | Age: 60
End: 2025-04-16
Payer: MEDICARE

## 2025-04-16 DIAGNOSIS — Z79.01 LONG TERM (CURRENT) USE OF ANTICOAGULANTS: Primary | ICD-10-CM

## 2025-04-16 LAB
INR BLD: 2.4
PROTHROMBIN TIME: NORMAL

## 2025-04-16 PROCEDURE — 93793 ANTICOAG MGMT PT WARFARIN: CPT | Performed by: FAMILY MEDICINE

## 2025-04-23 ENCOUNTER — ANTI-COAG VISIT (OUTPATIENT)
Dept: FAMILY MEDICINE CLINIC | Age: 60
End: 2025-04-23
Payer: MEDICARE

## 2025-04-23 DIAGNOSIS — Z79.01 LONG TERM (CURRENT) USE OF ANTICOAGULANTS: Primary | ICD-10-CM

## 2025-04-23 LAB
INR BLD: 1.6
PROTHROMBIN TIME: NORMAL

## 2025-04-23 PROCEDURE — 93793 ANTICOAG MGMT PT WARFARIN: CPT | Performed by: FAMILY MEDICINE

## 2025-04-30 ENCOUNTER — HOSPITAL ENCOUNTER (OUTPATIENT)
Dept: INTERVENTIONAL RADIOLOGY/VASCULAR | Age: 60
Discharge: HOME | End: 2025-05-02
Payer: MEDICARE

## 2025-04-30 ENCOUNTER — ANTI-COAG VISIT (OUTPATIENT)
Dept: FAMILY MEDICINE CLINIC | Age: 60
End: 2025-04-30
Payer: MEDICARE

## 2025-04-30 DIAGNOSIS — Z79.01 LONG TERM (CURRENT) USE OF ANTICOAGULANTS: Primary | ICD-10-CM

## 2025-04-30 DIAGNOSIS — G81.10 SPASTIC HEMIPARESIS AFFECTING DOMINANT SIDE (HCC): ICD-10-CM

## 2025-04-30 DIAGNOSIS — R09.89 OTHER SPECIFIED SYMPTOMS AND SIGNS INVOLVING THE CIRCULATORY AND RESPIRATORY SYSTEMS: ICD-10-CM

## 2025-04-30 LAB
INR BLD: 2
PROTHROMBIN TIME: NORMAL

## 2025-04-30 PROCEDURE — 93924 LWR XTR VASC STDY BILAT: CPT

## 2025-04-30 PROCEDURE — 93793 ANTICOAG MGMT PT WARFARIN: CPT | Performed by: FAMILY MEDICINE

## 2025-05-01 ENCOUNTER — HOSPITAL ENCOUNTER (OUTPATIENT)
Age: 60
Discharge: HOME | End: 2025-05-01
Payer: MEDICARE

## 2025-05-01 ENCOUNTER — OFFICE VISIT (OUTPATIENT)
Dept: VASCULAR SURGERY | Age: 60
End: 2025-05-01
Payer: MEDICARE

## 2025-05-01 DIAGNOSIS — R09.89 DECREASED DORSALIS PEDIS PULSE: ICD-10-CM

## 2025-05-01 DIAGNOSIS — I65.21 CAROTID STENOSIS, RIGHT: ICD-10-CM

## 2025-05-01 DIAGNOSIS — R23.0 TOE CYANOSIS: ICD-10-CM

## 2025-05-01 DIAGNOSIS — I65.22 OCCLUSION OF LEFT INTERNAL CAROTID ARTERY: Primary | ICD-10-CM

## 2025-05-01 DIAGNOSIS — Z98.890 S/P CAROTID ENDARTERECTOMY: ICD-10-CM

## 2025-05-01 DIAGNOSIS — L81.9 DISCOLORATION OF SKIN OF TOE: ICD-10-CM

## 2025-05-01 PROBLEM — R29.90 STROKE-LIKE SYMPTOMS: Status: RESOLVED | Noted: 2023-08-20 | Resolved: 2025-05-01

## 2025-05-01 LAB
CRP SERPL HS-MCNC: <3 MG/L (ref 0–5)
RHEUMATOID FACT SER NEPH-ACNC: <10 IU/ML (ref 0–14)

## 2025-05-01 PROCEDURE — 86038 ANTINUCLEAR ANTIBODIES: CPT

## 2025-05-01 PROCEDURE — 85652 RBC SED RATE AUTOMATED: CPT

## 2025-05-01 PROCEDURE — G8420 CALC BMI NORM PARAMETERS: HCPCS | Performed by: SURGERY

## 2025-05-01 PROCEDURE — 36415 COLL VENOUS BLD VENIPUNCTURE: CPT

## 2025-05-01 PROCEDURE — 99204 OFFICE O/P NEW MOD 45 MIN: CPT | Performed by: SURGERY

## 2025-05-01 PROCEDURE — 86140 C-REACTIVE PROTEIN: CPT

## 2025-05-01 PROCEDURE — 1036F TOBACCO NON-USER: CPT | Performed by: SURGERY

## 2025-05-01 PROCEDURE — 86431 RHEUMATOID FACTOR QUANT: CPT

## 2025-05-01 PROCEDURE — 3017F COLORECTAL CA SCREEN DOC REV: CPT | Performed by: SURGERY

## 2025-05-01 PROCEDURE — 86039 ANTINUCLEAR ANTIBODIES (ANA): CPT

## 2025-05-01 PROCEDURE — 86225 DNA ANTIBODY NATIVE: CPT

## 2025-05-01 PROCEDURE — G8427 DOCREV CUR MEDS BY ELIG CLIN: HCPCS | Performed by: SURGERY

## 2025-05-01 NOTE — PROGRESS NOTES
Indicated follow-up: Return if symptoms worsen or fail to improve.      CC to Dr. Zora Parson

## 2025-05-02 ENCOUNTER — TELEPHONE (OUTPATIENT)
Dept: VASCULAR SURGERY | Age: 60
End: 2025-05-02

## 2025-05-02 LAB
ANA SER QL IA: NEGATIVE
ANTI DNA DOUBLE STRANDED: NEGATIVE
ERYTHROCYTE [SEDIMENTATION RATE] IN BLOOD BY WESTERGREN METHOD: 6 MM/HR (ref 0–20)

## 2025-05-02 RX ORDER — CILOSTAZOL 100 MG/1
100 TABLET ORAL 2 TIMES DAILY
Qty: 60 TABLET | Refills: 11 | Status: SHIPPED | OUTPATIENT
Start: 2025-05-02 | End: 2026-04-27

## 2025-05-02 NOTE — TELEPHONE ENCOUNTER
The patient lab work, sed rate, CRP, MARTA, anti-DNA, rheumatoid factor etc. that were done because of some cyanosis of the toes right foot, normal    It is felt, some of the discoloration could be due to vascular spasm of the small vessels and the microcirculation partly caused by her discomfort in the right leg due to old stroke as well as history of back problems could be reflux versus spasm    Nevertheless, for now it was decided to consider trial of Pletal 100 mg p.o. twice daily for 3 months only if no improvement patient to discontinue it    At that time, the possible consideration of low-dose Procardia versus Norvasc for vascular dilatation could be considered in collaboration with the patient's PCP the monitoring of her blood pressure    Patient also in the process of making appointment to see her consultants at the [facility] and was advised to discuss with them also regarding the same    All her questions were answered

## 2025-05-06 ENCOUNTER — RESULTS FOLLOW-UP (OUTPATIENT)
Dept: PHYSICAL MEDICINE AND REHAB | Age: 60
End: 2025-05-06

## 2025-05-07 ENCOUNTER — ANTI-COAG VISIT (OUTPATIENT)
Dept: FAMILY MEDICINE CLINIC | Age: 60
End: 2025-05-07
Payer: MEDICARE

## 2025-05-07 DIAGNOSIS — Z79.01 LONG TERM (CURRENT) USE OF ANTICOAGULANTS: Primary | ICD-10-CM

## 2025-05-07 LAB
INR BLD: 1.8
PROTHROMBIN TIME: NORMAL

## 2025-05-07 PROCEDURE — 93793 ANTICOAG MGMT PT WARFARIN: CPT | Performed by: FAMILY MEDICINE

## 2025-05-07 NOTE — RESULT ENCOUNTER NOTE
Reviewed test abnormal, inform patient that it showed exercise induced claudication. See if she has a vascular surgeon or does she need a referral?

## 2025-05-09 NOTE — TELEPHONE ENCOUNTER
----- Message from Dr. Roseanne Casas DO sent at 5/8/2025  4:02 PM EDT -----  Ok good just wanted to make sure it was followed up.  ----- Message -----  From: Sindy Hill LPN  Sent: 5/7/2025   7:41 AM EDT  To: Roseanne Casas DO      ----- Message -----  From: Roseanne Casas DO  Sent: 5/6/2025   8:47 PM EDT  To: Dawson Butler Pmr Clinical Staff    Reviewed test abnormal, inform patient that it showed exercise induced claudication. See if she has a vascular surgeon or does she need a referral?

## 2025-05-14 ENCOUNTER — ANTI-COAG VISIT (OUTPATIENT)
Dept: FAMILY MEDICINE CLINIC | Age: 60
End: 2025-05-14
Payer: MEDICARE

## 2025-05-14 DIAGNOSIS — Z79.01 LONG TERM (CURRENT) USE OF ANTICOAGULANTS: Primary | ICD-10-CM

## 2025-05-14 LAB
INR BLD: 1.8
PROTHROMBIN TIME: NORMAL

## 2025-05-14 PROCEDURE — 93793 ANTICOAG MGMT PT WARFARIN: CPT | Performed by: FAMILY MEDICINE

## 2025-05-19 ENCOUNTER — TELEPHONE (OUTPATIENT)
Dept: ADMINISTRATIVE | Age: 60
End: 2025-05-19

## 2025-05-19 NOTE — TELEPHONE ENCOUNTER
Patient 6 wk f/u r/s to next available 6/3/2025. Please advise if provider wants patient in sooner. Patient stated botox did not work.  Prior Auth marked on note 5/28

## 2025-05-21 LAB
INR BLD: 2.7
PROTHROMBIN TIME: NORMAL

## 2025-05-22 ENCOUNTER — ANTI-COAG VISIT (OUTPATIENT)
Dept: FAMILY MEDICINE CLINIC | Age: 60
End: 2025-05-22
Payer: MEDICARE

## 2025-05-22 DIAGNOSIS — Z79.01 LONG TERM (CURRENT) USE OF ANTICOAGULANTS: Primary | ICD-10-CM

## 2025-05-22 PROCEDURE — 93793 ANTICOAG MGMT PT WARFARIN: CPT | Performed by: FAMILY MEDICINE

## 2025-05-22 NOTE — PROGRESS NOTES
Roseanne Casas D.O.  [facility]  [address] [place] [address]  Phone: [phone]  Fax: [phone]        Chief Complaint   Patient presents with    Foot Pain     6 week F/U botox      History of Present Illness  The patient is a 59-year-old female who presents for follow-up on her Botox treatment from 04/07/2025. She received 150 units in the right leg medial gastrocnemius, 150 units in the lateral gastrocnemius, 100 units in the soleus, 100 units in the tibialis posterior, and 100 units in the flexor digitorum longus, totaling 600 units with no waste.    She reports that the Botox treatment has not been effective in managing her symptoms. She experiences severe pain in her foot, to the extent that she has expressed a desire to amputate it. She also reports difficulty in straightening her toes and moving her ankle up and down. She does not experience any cramping or spasms. During a 6-minute walk test at physical rehabilitation, she noticed that she was tripping over her toes due to an inability to lift her foot. She also reports weakness in her glutes and hamstrings, which she attributes to her stroke. She has been experiencing Raynaud's phenomenon, even when wearing gloves and hand warmers in temperatures as low as 55 degrees. She was previously on medication for her big toe but discontinued it due to associated headaches. Since stopping the medication, she has been free of headaches and her toe has not been causing her discomfort.        Data reviewed:   Results    Review of external notes:   Spasticity screening tool completed today, reviewed and scanned to media   Review of labs:   Lab Results   Component Value Date     02/19/2024    K 4.5 02/19/2024     02/19/2024    CO2 30 (H) 02/19/2024    BUN 17 05/01/2024    CREATININE 0.8 05/01/2024    GLUCOSE 117 (H) 02/19/2024    CALCIUM 9.7 02/19/2024    BILITOT 0.3 02/19/2024    ALKPHOS 76

## 2025-05-29 ENCOUNTER — ANTI-COAG VISIT (OUTPATIENT)
Dept: FAMILY MEDICINE CLINIC | Age: 60
End: 2025-05-29
Payer: MEDICARE

## 2025-05-29 DIAGNOSIS — Z79.01 LONG TERM (CURRENT) USE OF ANTICOAGULANTS: Primary | ICD-10-CM

## 2025-05-29 LAB
INTERNATIONAL NORMALIZATION RATIO, POC: 0.9
PROTHROMBIN TIME, POC: NORMAL

## 2025-05-29 PROCEDURE — 93793 ANTICOAG MGMT PT WARFARIN: CPT | Performed by: FAMILY MEDICINE

## 2025-05-29 NOTE — PROGRESS NOTES
She stated that had to go off of it for a dental procedure. Went back  on it today is on 5mg and needs to do shots until she reaches 2. Hematologist is aware and patient sees hematologist tomorrow. She is retesting tomorrow as well.

## 2025-05-30 ENCOUNTER — ANTI-COAG VISIT (OUTPATIENT)
Dept: FAMILY MEDICINE CLINIC | Age: 60
End: 2025-05-30
Payer: MEDICARE

## 2025-05-30 DIAGNOSIS — Z79.01 LONG TERM (CURRENT) USE OF ANTICOAGULANTS: Primary | ICD-10-CM

## 2025-05-30 LAB
INTERNATIONAL NORMALIZATION RATIO, POC: 1
PROTHROMBIN TIME, POC: NORMAL

## 2025-05-30 PROCEDURE — 93793 ANTICOAG MGMT PT WARFARIN: CPT | Performed by: FAMILY MEDICINE

## 2025-06-03 ENCOUNTER — OFFICE VISIT (OUTPATIENT)
Dept: PHYSICAL MEDICINE AND REHAB | Age: 60
End: 2025-06-03
Payer: MEDICARE

## 2025-06-03 VITALS
BODY MASS INDEX: 24.55 KG/M2 | DIASTOLIC BLOOD PRESSURE: 81 MMHG | HEIGHT: 61 IN | WEIGHT: 130 LBS | SYSTOLIC BLOOD PRESSURE: 133 MMHG | HEART RATE: 80 BPM

## 2025-06-03 DIAGNOSIS — G81.10 SPASTIC HEMIPARESIS AFFECTING DOMINANT SIDE (HCC): Primary | ICD-10-CM

## 2025-06-03 PROCEDURE — G8420 CALC BMI NORM PARAMETERS: HCPCS | Performed by: PHYSICAL MEDICINE & REHABILITATION

## 2025-06-03 PROCEDURE — 1036F TOBACCO NON-USER: CPT | Performed by: PHYSICAL MEDICINE & REHABILITATION

## 2025-06-03 PROCEDURE — G8427 DOCREV CUR MEDS BY ELIG CLIN: HCPCS | Performed by: PHYSICAL MEDICINE & REHABILITATION

## 2025-06-03 PROCEDURE — 3017F COLORECTAL CA SCREEN DOC REV: CPT | Performed by: PHYSICAL MEDICINE & REHABILITATION

## 2025-06-03 PROCEDURE — 99214 OFFICE O/P EST MOD 30 MIN: CPT | Performed by: PHYSICAL MEDICINE & REHABILITATION

## 2025-06-04 LAB
INR BLD: 2.6
INR BLD: 2.6
PROTHROMBIN TIME: NORMAL

## 2025-06-05 ENCOUNTER — ANTI-COAG VISIT (OUTPATIENT)
Dept: FAMILY MEDICINE CLINIC | Age: 60
End: 2025-06-05
Payer: MEDICARE

## 2025-06-05 ENCOUNTER — ANTI-COAG VISIT (OUTPATIENT)
Dept: FAMILY MEDICINE CLINIC | Age: 60
End: 2025-06-05

## 2025-06-05 DIAGNOSIS — Z79.01 LONG TERM (CURRENT) USE OF ANTICOAGULANTS: Primary | ICD-10-CM

## 2025-06-05 LAB — INTERNATIONAL NORMALIZATION RATIO, POC: 2.6

## 2025-06-05 PROCEDURE — 85610 PROTHROMBIN TIME: CPT | Performed by: FAMILY MEDICINE

## 2025-06-05 PROCEDURE — 93793 ANTICOAG MGMT PT WARFARIN: CPT | Performed by: FAMILY MEDICINE

## 2025-06-10 ENCOUNTER — OFFICE VISIT (OUTPATIENT)
Dept: ENT CLINIC | Age: 60
End: 2025-06-10
Payer: MEDICARE

## 2025-06-10 VITALS
SYSTOLIC BLOOD PRESSURE: 125 MMHG | TEMPERATURE: 97 F | RESPIRATION RATE: 16 BRPM | DIASTOLIC BLOOD PRESSURE: 81 MMHG | WEIGHT: 127 LBS | OXYGEN SATURATION: 97 % | HEIGHT: 61 IN | BODY MASS INDEX: 23.98 KG/M2 | HEART RATE: 80 BPM

## 2025-06-10 DIAGNOSIS — K21.9 LARYNGOPHARYNGEAL REFLUX (LPR): Primary | ICD-10-CM

## 2025-06-10 DIAGNOSIS — J30.1 SEASONAL ALLERGIC RHINITIS DUE TO POLLEN: ICD-10-CM

## 2025-06-10 DIAGNOSIS — R13.10 DYSPHAGIA, UNSPECIFIED TYPE: ICD-10-CM

## 2025-06-10 PROCEDURE — 3017F COLORECTAL CA SCREEN DOC REV: CPT | Performed by: OTOLARYNGOLOGY

## 2025-06-10 PROCEDURE — 99213 OFFICE O/P EST LOW 20 MIN: CPT | Performed by: OTOLARYNGOLOGY

## 2025-06-10 PROCEDURE — 1036F TOBACCO NON-USER: CPT | Performed by: OTOLARYNGOLOGY

## 2025-06-10 PROCEDURE — G8420 CALC BMI NORM PARAMETERS: HCPCS | Performed by: OTOLARYNGOLOGY

## 2025-06-10 PROCEDURE — G8427 DOCREV CUR MEDS BY ELIG CLIN: HCPCS | Performed by: OTOLARYNGOLOGY

## 2025-06-10 NOTE — PROGRESS NOTES
Subjective:      Patient ID:  Marilu Newton is a 59 y.o. female.    HPI:    Pt returns for recheck of allergies and swallow function. Hx of MBSS with grossly normal findings. From a swallow standpoint pt is doing ok. Still with mild swallowing difficulty but eating and drinking ok. Using flonase daily.     Interval 6/10: Doing well with as needed astepro with good relief of nasal drainage.    Past Medical History:   Diagnosis Date    Anticardiolipin antibody syndrome     hematology-[facility]    Back pain     Bilateral carotid artery stenosis 05/28/2020    Bruit of left carotid artery 05/28/2020    Carotid stenosis, right 06/15/2020    Dr. Koenig and Dr. 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    Right leg weakness 05/28/2020    S/P carotid endarterectomy 06/15/2020    left    Urinary incontinence 3/2022     Past Surgical History:   Procedure Laterality Date    CAROTID ENDARTERECTOMY Left 06/10/2020    LEFT CAROTID ENDARTERECTOMY performed by Eric Koenig MD at [facility] OR    COLONOSCOPY N/A 04/28/2023    COLONOSCOPY DIAGNOSTIC WITH BOTOX 50 UNITS performed by Linette Robbins MD at [facility] ENDOSCOPY     Family History   Problem Relation Age of Onset    High Blood Pressure Mother     Diabetes Mother     Stroke Mother     Heart Disease Mother     Clotting Disorder Mother     Depression Mother     Heart Attack Mother     Kidney Disease Father     Heart Disease Father     Early Death Father     High Blood Pressure Sister     High Blood Pressure Brother     High Blood Pressure Brother     Mental Retardation Maternal Aunt     Cancer Maternal Cousin      Social History     Socioeconomic History    Marital status:

## 2025-06-11 ENCOUNTER — ANTI-COAG VISIT (OUTPATIENT)
Dept: FAMILY MEDICINE CLINIC | Age: 60
End: 2025-06-11
Payer: MEDICARE

## 2025-06-11 DIAGNOSIS — Z79.01 LONG TERM (CURRENT) USE OF ANTICOAGULANTS: Primary | ICD-10-CM

## 2025-06-11 LAB
INR BLD: 1.3
PROTHROMBIN TIME: NORMAL

## 2025-06-11 PROCEDURE — 93793 ANTICOAG MGMT PT WARFARIN: CPT | Performed by: FAMILY MEDICINE

## 2025-06-18 ENCOUNTER — ANTI-COAG VISIT (OUTPATIENT)
Dept: FAMILY MEDICINE CLINIC | Age: 60
End: 2025-06-18
Payer: MEDICARE

## 2025-06-18 DIAGNOSIS — Z79.01 LONG TERM (CURRENT) USE OF ANTICOAGULANTS: Primary | ICD-10-CM

## 2025-06-18 LAB
INR BLD: 1.9
PROTHROMBIN TIME: NORMAL

## 2025-06-18 PROCEDURE — 93793 ANTICOAG MGMT PT WARFARIN: CPT | Performed by: FAMILY MEDICINE

## 2025-06-19 ENCOUNTER — TELEPHONE (OUTPATIENT)
Dept: PHYSICAL MEDICINE AND REHAB | Age: 60
End: 2025-06-19

## 2025-06-20 NOTE — TELEPHONE ENCOUNTER
See my note, I need to know which alternative to Botox the insurance prefers because that will determine the dosing.  Dysport, Myobloc or Xeomin?

## 2025-06-23 NOTE — TELEPHONE ENCOUNTER
Called and spoke with [facility] and xeomin is preferred under her insurance but will need prior authorization. Please advise.

## 2025-06-24 ENCOUNTER — TELEPHONE (OUTPATIENT)
Dept: PHYSICAL MEDICINE AND REHAB | Age: 60
End: 2025-06-24

## 2025-06-24 NOTE — TELEPHONE ENCOUNTER
Called and spoke with Ramona pharmacist with [facility] to call in a new script for Xeomin 600 mg # 3 (200 unit vials) 4 refills.

## 2025-06-24 NOTE — TELEPHONE ENCOUNTER
Called and left message on patient voicemail to call office back.  Will wait for return call from patient.

## 2025-06-24 NOTE — TELEPHONE ENCOUNTER
Called and spoke with Juan from [facility]  to start a prior authorization for xeomin 600 mg.  Approved authorization [id number] 6-24-25 expires 9-24-25 call [phone] to call script for xeomin.

## 2025-06-24 NOTE — TELEPHONE ENCOUNTER
----- Message from Rebeca Hu sent at 3/11/2022  9:23 AM CST -----  Contact: pt  Type:  Patient Returning Call    Who Called:pt  Would the patient rather a call back or a response via [employer]? Call   Best Call Back Number:[phone]  Additional Information:     Pt would like to cancel procedure on 03/21 due to transportation     Melany called from [facility] to schedule Xeomin 200 units quantity of 3 to be delivered tomorrow

## 2025-06-25 ENCOUNTER — ANTI-COAG VISIT (OUTPATIENT)
Dept: FAMILY MEDICINE CLINIC | Age: 60
End: 2025-06-25
Payer: MEDICARE

## 2025-06-25 DIAGNOSIS — Z79.01 LONG TERM (CURRENT) USE OF ANTICOAGULANTS: Primary | ICD-10-CM

## 2025-06-25 LAB
INR BLD: 1.8
PROTHROMBIN TIME: NORMAL

## 2025-06-25 PROCEDURE — 93793 ANTICOAG MGMT PT WARFARIN: CPT | Performed by: FAMILY MEDICINE

## 2025-07-02 ENCOUNTER — ANTI-COAG VISIT (OUTPATIENT)
Dept: FAMILY MEDICINE CLINIC | Age: 60
End: 2025-07-02
Payer: MEDICARE

## 2025-07-02 DIAGNOSIS — Z79.01 LONG TERM (CURRENT) USE OF ANTICOAGULANTS: Primary | ICD-10-CM

## 2025-07-02 LAB
INR BLD: 1.7
PROTHROMBIN TIME: NORMAL

## 2025-07-02 PROCEDURE — 93793 ANTICOAG MGMT PT WARFARIN: CPT | Performed by: FAMILY MEDICINE

## 2025-07-09 LAB
INR BLD: 2.1
PROTHROMBIN TIME: NORMAL

## 2025-07-09 NOTE — PROGRESS NOTES
Roseanne Casas D.O.  [facility]  [address] [place] [address]  Phone: [phone]  Fax: [phone]  7/23/2025    Chief Complaint   Patient presents with    Botox Injection    Spasms     EMG guided Xeomin 600 units       Informed Consent:  The indications, risks, benefits, and  alternatives of Xeomin were discussed with the patient. I explained to the patient the potential side effects including but not limited to: distant spread of toxin effect causing droopy eyelids, facial drooping, neck pain, headache, double vision, muscle pain/spasm/weakness/stiffness,  bronchitis,  injection site pain, increased blood pressure, hypersensitivity, anaphylaxis, difficulty swallowing, difficulty speaking, urinary incontinence and breathing difficulty.  The patient is aware that swallowing and breathing difficulties can be life threatening and there have been reports of death in some cases where onabotulinum toxin was injected.   The patient was advised of the expected benefit to include decreased spasticity in the injected muscles, and the anticipated duration of effect of 3 months. A permit was signed and scanned into the media.    Last injection: n/a  Taking anticoagulants/antiplatelets: Yes  Diabetic: No  Febrile/active infection: No    Ambulatory Procedure Time Out  Correct Patient: Yes  Correct Procedure: Yes  Correct Site/Side: Yes  Correct Site(s) Marked: Yes  Informed Consent Signed: Yes  Allergies Verified: Yes  Staff Present & Credential:: Sindy Hill LPN/ Dr. Casas    Diagnosis:  1. Spastic hemiparesis affecting dominant side (HCC)        Procedure note: After obtaining consent,  EMG guided onabotulinum toxin A injection was performed of the right lower extremities at the locations and doses listed below.  600 units of Xeomin was reconstituted using 4 cc of preservative free normal saline ( 5 units per 0.1 ml). EMG guidance was necessary to adequately localize

## 2025-07-10 ENCOUNTER — ANTI-COAG VISIT (OUTPATIENT)
Dept: FAMILY MEDICINE CLINIC | Age: 60
End: 2025-07-10
Payer: MEDICARE

## 2025-07-10 DIAGNOSIS — Z79.01 LONG TERM (CURRENT) USE OF ANTICOAGULANTS: Primary | ICD-10-CM

## 2025-07-10 PROCEDURE — 93793 ANTICOAG MGMT PT WARFARIN: CPT | Performed by: FAMILY MEDICINE

## 2025-07-16 ENCOUNTER — ANTI-COAG VISIT (OUTPATIENT)
Dept: FAMILY MEDICINE CLINIC | Age: 60
End: 2025-07-16
Payer: MEDICARE

## 2025-07-16 DIAGNOSIS — Z79.01 LONG TERM (CURRENT) USE OF ANTICOAGULANTS: Primary | ICD-10-CM

## 2025-07-16 LAB
INR BLD: 1.6
PROTHROMBIN TIME: NORMAL

## 2025-07-16 PROCEDURE — 93793 ANTICOAG MGMT PT WARFARIN: CPT | Performed by: FAMILY MEDICINE

## 2025-07-23 ENCOUNTER — OFFICE VISIT (OUTPATIENT)
Dept: PHYSICAL MEDICINE AND REHAB | Age: 60
End: 2025-07-23

## 2025-07-23 VITALS
WEIGHT: 126.4 LBS | BODY MASS INDEX: 23.86 KG/M2 | HEIGHT: 61 IN | HEART RATE: 86 BPM | SYSTOLIC BLOOD PRESSURE: 122 MMHG | TEMPERATURE: 97.5 F | DIASTOLIC BLOOD PRESSURE: 79 MMHG

## 2025-07-23 DIAGNOSIS — G81.10 SPASTIC HEMIPARESIS AFFECTING DOMINANT SIDE (HCC): Primary | ICD-10-CM

## 2025-07-23 LAB — INR BLD: 2.5

## 2025-07-23 RX ORDER — ENOXAPARIN SODIUM 100 MG/ML
INJECTION SUBCUTANEOUS
COMMUNITY
Start: 2025-05-08

## 2025-07-23 RX ORDER — INCOBOTULINUMTOXINA 200 [USP'U]/1
INJECTION, POWDER, LYOPHILIZED, FOR SOLUTION INTRAMUSCULAR
COMMUNITY
Start: 2025-06-24

## 2025-07-24 ENCOUNTER — ANTI-COAG VISIT (OUTPATIENT)
Dept: FAMILY MEDICINE CLINIC | Age: 60
End: 2025-07-24
Payer: MEDICARE

## 2025-07-24 DIAGNOSIS — Z79.01 LONG TERM (CURRENT) USE OF ANTICOAGULANTS: Primary | ICD-10-CM

## 2025-07-24 PROCEDURE — 93793 ANTICOAG MGMT PT WARFARIN: CPT | Performed by: FAMILY MEDICINE

## 2025-07-30 ENCOUNTER — ANTI-COAG VISIT (OUTPATIENT)
Dept: FAMILY MEDICINE CLINIC | Age: 60
End: 2025-07-30
Payer: MEDICARE

## 2025-07-30 DIAGNOSIS — Z79.01 LONG TERM (CURRENT) USE OF ANTICOAGULANTS: Primary | ICD-10-CM

## 2025-07-30 LAB
INR BLD: 3.8
PROTHROMBIN TIME: NORMAL

## 2025-07-30 PROCEDURE — 93793 ANTICOAG MGMT PT WARFARIN: CPT | Performed by: FAMILY MEDICINE

## 2025-08-06 ENCOUNTER — ANTI-COAG VISIT (OUTPATIENT)
Dept: FAMILY MEDICINE CLINIC | Age: 60
End: 2025-08-06
Payer: MEDICARE

## 2025-08-06 DIAGNOSIS — Z79.01 LONG TERM (CURRENT) USE OF ANTICOAGULANTS: Primary | ICD-10-CM

## 2025-08-06 LAB
INR BLD: 3
PROTHROMBIN TIME: NORMAL

## 2025-08-06 PROCEDURE — 93793 ANTICOAG MGMT PT WARFARIN: CPT | Performed by: FAMILY MEDICINE

## 2025-08-13 ENCOUNTER — ANTI-COAG VISIT (OUTPATIENT)
Dept: FAMILY MEDICINE CLINIC | Age: 60
End: 2025-08-13
Payer: MEDICARE

## 2025-08-13 DIAGNOSIS — Z79.01 LONG TERM (CURRENT) USE OF ANTICOAGULANTS: Primary | ICD-10-CM

## 2025-08-13 LAB
INR BLD: 3.1
PROTHROMBIN TIME: NORMAL

## 2025-08-13 PROCEDURE — 93793 ANTICOAG MGMT PT WARFARIN: CPT | Performed by: FAMILY MEDICINE

## 2025-08-20 LAB
INR BLD: 2.8
PROTHROMBIN TIME: NORMAL

## 2025-08-26 ENCOUNTER — OFFICE VISIT (OUTPATIENT)
Dept: FAMILY MEDICINE CLINIC | Age: 60
End: 2025-08-26

## 2025-08-26 ENCOUNTER — ANTI-COAG VISIT (OUTPATIENT)
Dept: FAMILY MEDICINE CLINIC | Age: 60
End: 2025-08-26
Payer: MEDICARE

## 2025-08-26 VITALS
BODY MASS INDEX: 24.66 KG/M2 | HEART RATE: 83 BPM | TEMPERATURE: 98.4 F | DIASTOLIC BLOOD PRESSURE: 72 MMHG | HEIGHT: 61 IN | WEIGHT: 130.6 LBS | OXYGEN SATURATION: 97 % | SYSTOLIC BLOOD PRESSURE: 118 MMHG | RESPIRATION RATE: 14 BRPM

## 2025-08-26 DIAGNOSIS — F41.9 ANXIETY: ICD-10-CM

## 2025-08-26 DIAGNOSIS — K58.0 IRRITABLE BOWEL SYNDROME WITH DIARRHEA: Primary | ICD-10-CM

## 2025-08-26 DIAGNOSIS — Z79.01 LONG TERM (CURRENT) USE OF ANTICOAGULANTS: Primary | ICD-10-CM

## 2025-08-26 DIAGNOSIS — I73.00 RAYNAUD'S PHENOMENON WITHOUT GANGRENE: ICD-10-CM

## 2025-08-26 DIAGNOSIS — Z86.73 H/O: CVA (CEREBROVASCULAR ACCIDENT): ICD-10-CM

## 2025-08-26 PROCEDURE — 93793 ANTICOAG MGMT PT WARFARIN: CPT | Performed by: FAMILY MEDICINE

## 2025-08-26 RX ORDER — AMLODIPINE BESYLATE 2.5 MG/1
2.5 TABLET ORAL DAILY
Qty: 30 TABLET | Refills: 3 | Status: SHIPPED | OUTPATIENT
Start: 2025-08-26

## 2025-08-26 RX ORDER — IRBESARTAN AND HYDROCHLOROTHIAZIDE 150; 12.5 MG/1; MG/1
1 TABLET, FILM COATED ORAL DAILY
Qty: 90 TABLET | Refills: 3 | Status: SHIPPED | OUTPATIENT
Start: 2025-08-26

## 2025-08-26 RX ORDER — WARFARIN SODIUM 5 MG/1
TABLET ORAL
Qty: 90 TABLET | Refills: 1 | Status: SHIPPED | OUTPATIENT
Start: 2025-08-26

## 2025-08-26 RX ORDER — ROSUVASTATIN CALCIUM 10 MG/1
10 TABLET, COATED ORAL
Qty: 36 TABLET | Refills: 2 | Status: SHIPPED | OUTPATIENT
Start: 2025-08-27

## 2025-08-27 ENCOUNTER — ANTI-COAG VISIT (OUTPATIENT)
Dept: FAMILY MEDICINE CLINIC | Age: 60
End: 2025-08-27
Payer: MEDICARE

## 2025-08-27 DIAGNOSIS — Z79.01 LONG TERM (CURRENT) USE OF ANTICOAGULANTS: Primary | ICD-10-CM

## 2025-08-27 LAB
INR BLD: 3.4
PROTHROMBIN TIME: NORMAL

## 2025-08-27 PROCEDURE — 93793 ANTICOAG MGMT PT WARFARIN: CPT | Performed by: FAMILY MEDICINE

## 2025-09-02 ENCOUNTER — OFFICE VISIT (OUTPATIENT)
Dept: PHYSICAL MEDICINE AND REHAB | Age: 60
End: 2025-09-02
Payer: MEDICARE

## 2025-09-02 VITALS
WEIGHT: 129 LBS | BODY MASS INDEX: 24.35 KG/M2 | HEIGHT: 61 IN | DIASTOLIC BLOOD PRESSURE: 60 MMHG | SYSTOLIC BLOOD PRESSURE: 100 MMHG

## 2025-09-02 DIAGNOSIS — G81.10 SPASTIC HEMIPARESIS AFFECTING DOMINANT SIDE (HCC): Primary | ICD-10-CM

## 2025-09-02 PROCEDURE — 3017F COLORECTAL CA SCREEN DOC REV: CPT | Performed by: PHYSICAL MEDICINE & REHABILITATION

## 2025-09-02 PROCEDURE — 99214 OFFICE O/P EST MOD 30 MIN: CPT | Performed by: PHYSICAL MEDICINE & REHABILITATION

## 2025-09-02 PROCEDURE — G8420 CALC BMI NORM PARAMETERS: HCPCS | Performed by: PHYSICAL MEDICINE & REHABILITATION

## 2025-09-02 PROCEDURE — G8427 DOCREV CUR MEDS BY ELIG CLIN: HCPCS | Performed by: PHYSICAL MEDICINE & REHABILITATION

## 2025-09-02 PROCEDURE — 1036F TOBACCO NON-USER: CPT | Performed by: PHYSICAL MEDICINE & REHABILITATION

## 2025-09-02 RX ORDER — OXYMETAZOLINE HYDROCHLORIDE 30 G/1
CREAM TOPICAL
COMMUNITY
Start: 2025-08-12

## 2025-09-02 RX ORDER — M-VIT,TX,IRON,MINS/CALC/FOLIC 27MG-0.4MG
1 TABLET ORAL DAILY
COMMUNITY

## 2025-09-02 RX ORDER — INCOBOTULINUMTOXINA 200 [USP'U]/1
INJECTION, POWDER, LYOPHILIZED, FOR SOLUTION INTRAMUSCULAR
Qty: 2 EACH | COMMUNITY
Start: 2025-09-02 | End: 2025-09-02

## 2025-09-02 RX ORDER — MAGNESIUM GLUCONATE 27 MG(500)
500 TABLET ORAL DAILY
COMMUNITY

## (undated) DEVICE — LABEL MED 4 IN SURG PANEL W/ PEN STRL

## (undated) DEVICE — GOWN,AURORA,NONREINF,RAGLAN,L,STERILE: Brand: MEDLINE

## (undated) DEVICE — PATIENT RETURN ELECTRODE, SINGLE-USE, CONTACT QUALITY MONITORING, ADULT, WITH 9FT CORD, FOR PATIENTS WEIGING OVER 33LBS. (15KG): Brand: MEGADYNE

## (undated) DEVICE — PACK,LAPAROTOMY,NO GOWNS: Brand: MEDLINE

## (undated) DEVICE — Z DISCONTINUED USE 2425483 (LOW STOCK PER MEDLINE) TAPE UMB L18IN DIA1/8IN WHT COT NONABSORBABLE W/O NDL FOR

## (undated) DEVICE — Z INACTIVE USE 2535480 CLIP LIG M BLU TI HRT SHP WIRE HORZ 180 PER BX

## (undated) DEVICE — GLOVE SURG SZ 7.5 L11.73IN FNGR THK9.8MIL STRW LTX POLYMER

## (undated) DEVICE — TOWEL,OR,DSP,ST,BLUE,STD,6/PK,12PK/CS: Brand: MEDLINE

## (undated) DEVICE — SOLUTION IV IRRIG 500ML 0.9% SODIUM CHL 2F7123

## (undated) DEVICE — NEEDLE HYPO 26GA L0.625IN TAN POLYPR HUB S STL REG BVL STR

## (undated) DEVICE — Z DUP USE 2257490 ADHESIVE SKIN CLSRE 036ML TPCL 2CTL CNCRLTE HIGH VSCSTY DRMB

## (undated) DEVICE — SPONGE,PEANUT,XRAY,ST,SM,3/8",5/CARD: Brand: MEDLINE INDUSTRIES, INC.

## (undated) DEVICE — SET INSTR ART 1

## (undated) DEVICE — MAGNETIC INSTR DRAPE 20X16: Brand: MEDLINE INDUSTRIES, INC.

## (undated) DEVICE — 3M(TM) MEDIPORE(TM) +PAD SOFT CLOTH ADHESIVE WOUND DRESSING 3569: Brand: 3M™ MEDIPORE™

## (undated) DEVICE — LOOP VES W25MM THK1MM MAXI RED SIL FLD REPELLENT 100 PER

## (undated) DEVICE — SET SURG INSTR ART III

## (undated) DEVICE — CLOTH SURG PREP PREOPERATIVE CHLORHEXIDINE GLUC 2% READYPREP

## (undated) DEVICE — GLOVE ORANGE PI 7 1/2   MSG9075

## (undated) DEVICE — TOTAL TRAY, 16FR 10ML SIL FOLEY, URN: Brand: MEDLINE

## (undated) DEVICE — CLAMP INSERT: Brand: STEALTH® CLAMP INSERT

## (undated) DEVICE — DOUBLE BASIN SET: Brand: MEDLINE INDUSTRIES, INC.

## (undated) DEVICE — GARMENT,MEDLINE,DVT,INT,CALF,MED, GEN2: Brand: MEDLINE

## (undated) DEVICE — DILATOR ART

## (undated) DEVICE — CATHETER ETER IV 20GA L1IN POLYUR STR RADPQ INTROCAN SFTY

## (undated) DEVICE — SURGICAL PROCEDURE PACK VASC MAJ CUST

## (undated) DEVICE — 1 ML TUBERCULIN SYRINGE LUER-LOCK TIP: Brand: MONOJECT

## (undated) DEVICE — NEEDLE HYPO 18GA L1.5IN PNK POLYPR HUB S STL REG BVL STR

## (undated) DEVICE — CATHETER ETER URETH 24FR L16IN RED RUB INTMIT ROB MOD BARDX

## (undated) DEVICE — 9F PRUITT F3 OUTLYING SHUNT WITH T-PORT: Brand: PRUITT F3 CAROTID SHUNT

## (undated) DEVICE — SPONGE GZ W4XL4IN RAYON POLY FILL CVR W/ NONWOVEN FAB STERILE

## (undated) DEVICE — SET SURG BASIN MAYO REUSABLE

## (undated) DEVICE — APPLICATOR MEDICATED 26 CC SOLUTION HI LT ORNG CHLORAPREP

## (undated) DEVICE — SYRINGE MED 10ML LUERLOCK TIP W/O SFTY DISP

## (undated) DEVICE — GOWN,SIRUS,FABRNF,L,20/CS: Brand: MEDLINE

## (undated) DEVICE — DRAPE THER FLUID WARMING 66X44 IN FLAT SLUSH DBL DISC ORS

## (undated) DEVICE — GLOVE ORANGE PI 8   MSG9080

## (undated) DEVICE — GAUZE,SPONGE,4"X4",16PLY,XRAY,STRL,LF: Brand: MEDLINE

## (undated) DEVICE — GRADUATE TRIANG MEASURE 1000ML BLK PRNT